# Patient Record
Sex: FEMALE | Race: BLACK OR AFRICAN AMERICAN | NOT HISPANIC OR LATINO | Employment: FULL TIME | URBAN - METROPOLITAN AREA
[De-identification: names, ages, dates, MRNs, and addresses within clinical notes are randomized per-mention and may not be internally consistent; named-entity substitution may affect disease eponyms.]

---

## 2017-01-06 ENCOUNTER — OFFICE VISIT (OUTPATIENT)
Dept: OPHTHALMOLOGY | Facility: CLINIC | Age: 62
End: 2017-01-06
Payer: COMMERCIAL

## 2017-01-06 DIAGNOSIS — H10.812 PINGUECULITIS OF LEFT EYE: Primary | ICD-10-CM

## 2017-01-06 PROCEDURE — 99999 PR PBB SHADOW E&M-EST. PATIENT-LVL I: CPT | Mod: PBBFAC,,, | Performed by: OPTOMETRIST

## 2017-01-06 PROCEDURE — 92012 INTRM OPH EXAM EST PATIENT: CPT | Mod: S$GLB,,, | Performed by: OPTOMETRIST

## 2017-01-06 NOTE — PROGRESS NOTES
HPI     Eye Problem    Additional comments: irritation, tearing, fb sensation OS           Comments   PT c/o irritation, tearing, fb tearing, am crusting, itching in her left   eye for about 1 week. PT has been wearing cls during the day, which helps   with symptoms. Does not sleep with them .  Pain Scale:  1 discomfort  Onset:   1 week  OD, OS, OU:   OS*  Discharge:   tearing  A.M. Matting:  yes  Itch:   yes  Redness:   yes  Photophobia:   no  Foreign body sensation:   yes  Deep pain:   no  Previous occurrence:   no  Drops:   no         Last edited by Yee Jessica MA on 1/6/2017  9:32 AM. (History)            Assessment /Plan     For exam results, see Encounter Report.    Pingueculitis of left eye    pingueculitis OS with papillilary rxn  No fb seen with lid eversion   Pt has not yet started pred or ketorolac OD  Recommended d/c cls wear  Start pred and ketorolac OD as directed per JCC  Also pred qid OS x 1 wk, then bid OS x 1 wk, then d/c OS    Recommended pt schedule 1 mo f/u with JCC per last note  RTC PRN if symptoms worsen or persist x 1 wk OS  Discussed above and all questions were answered.

## 2017-01-23 RX ORDER — METFORMIN HYDROCHLORIDE 500 MG/1
TABLET, EXTENDED RELEASE ORAL
Qty: 90 TABLET | Refills: 1 | Status: SHIPPED | OUTPATIENT
Start: 2017-01-23 | End: 2017-03-31 | Stop reason: SDUPTHER

## 2017-02-17 DIAGNOSIS — E11.9 TYPE 2 DIABETES MELLITUS WITHOUT COMPLICATION: ICD-10-CM

## 2017-02-20 ENCOUNTER — OFFICE VISIT (OUTPATIENT)
Dept: OPHTHALMOLOGY | Facility: CLINIC | Age: 62
End: 2017-02-20
Payer: COMMERCIAL

## 2017-02-20 DIAGNOSIS — Z79.4 TYPE 2 DIABETES MELLITUS WITH BOTH EYES AFFECTED BY MILD NONPROLIFERATIVE RETINOPATHY AND MACULAR EDEMA, WITH LONG-TERM CURRENT USE OF INSULIN: Primary | ICD-10-CM

## 2017-02-20 DIAGNOSIS — E11.3213 TYPE 2 DIABETES MELLITUS WITH BOTH EYES AFFECTED BY MILD NONPROLIFERATIVE RETINOPATHY AND MACULAR EDEMA, WITH LONG-TERM CURRENT USE OF INSULIN: Primary | ICD-10-CM

## 2017-02-20 PROCEDURE — 3045F PR MOST RECENT HEMOGLOBIN A1C LEVEL 7.0-9.0%: CPT | Mod: S$GLB,,, | Performed by: OPHTHALMOLOGY

## 2017-02-20 PROCEDURE — 99213 OFFICE O/P EST LOW 20 MIN: CPT | Mod: 24,S$GLB,, | Performed by: OPHTHALMOLOGY

## 2017-02-20 PROCEDURE — 92134 CPTRZ OPH DX IMG PST SGM RTA: CPT | Mod: S$GLB,,, | Performed by: OPHTHALMOLOGY

## 2017-02-20 PROCEDURE — 99999 PR PBB SHADOW E&M-EST. PATIENT-LVL II: CPT | Mod: PBBFAC,,, | Performed by: OPHTHALMOLOGY

## 2017-02-20 RX ORDER — PREDNISOLONE ACETATE 10 MG/ML
1 SUSPENSION/ DROPS OPHTHALMIC 3 TIMES DAILY
Qty: 5 ML | Refills: 1 | Status: SHIPPED | OUTPATIENT
Start: 2017-02-20 | End: 2017-07-19 | Stop reason: ALTCHOICE

## 2017-02-20 RX ORDER — KETOROLAC TROMETHAMINE 5 MG/ML
1 SOLUTION OPHTHALMIC 3 TIMES DAILY
Qty: 5 ML | Refills: 0 | Status: SHIPPED | OUTPATIENT
Start: 2017-02-20 | End: 2018-02-20

## 2017-02-20 NOTE — MR AVS SNAPSHOT
[facility] - Ophthalmology  [address]  [place] [address]  Phone: [phone]  Fax: [phone]                  Latosha Enriquez   2017 3:00 PM   Office Visit    Description:  Female : 1955   Provider:  LINDSAY Bright MD   Department:  [facility] - Ophthalmology           Reason for Visit     dme           Diagnoses this Visit        Comments    Type 2 diabetes mellitus with both eyes affected by mild nonproliferative retinopathy and macular edema, with long-term current use of insulin    -  Primary            To Do List           Goals (5 Years of Data)     None      Follow-Up and Disposition     Return in about 1 month (around 3/20/2017) for eval for tx.       These Medications        Disp Refills Start End    prednisoLONE acetate (PRED FORTE) 1 % DrpS 5 mL 1 2017     Place 1 drop into both eyes 3 (three) times daily. - Both Eyes    [facility]: [facility] [address] [place] [address] & [facility] Ph #: [phone]       ketorolac 0.5% (ACULAR) 0.5 % Drop 5 mL 0 2017    Place 1 drop into both eyes 3 (three) times daily. - Both Eyes    [facility]: [facility] [address] [place], [place] [address] [facility] & [facility] Ph #: [phone]         [facility] On Call     [facility] On Call Nurse Care Line -  Assistance  Registered nurses in the [facility] Center provide clinical advisement, health education, appointment booking, and other advisory services.  Call for this free service at [phone].             Medications           Message regarding Medications     Verify the changes and/or additions to your medication regime listed below are the same as discussed with your clinician today.  If any of these changes or additions are incorrect, please notify your healthcare provider.        START taking these NEW medications        Refills    prednisoLONE acetate (PRED  "FORTE) 1 % DrpS 1    Sig: Place 1 drop into both eyes 3 (three) times daily.    Class: Normal    Route: Both Eyes    ketorolac 0.5% (ACULAR) 0.5 % Drop 0    Sig: Place 1 drop into both eyes 3 (three) times daily.    Class: Normal    Route: Both Eyes           Verify that the below list of medications is an accurate representation of the medications you are currently taking.  If none reported, the list may be blank. If incorrect, please contact your healthcare provider. Carry this list with you in case of emergency.           Current Medications     albuterol 90 mcg/actuation inhaler Inhale 2 puffs into the lungs every 6 (six) hours as needed.    allopurinol (ZYLOPRIM) 100 MG tablet Take 1 tablet (100 mg total) by mouth once daily.    budesonide 180mcg (PULMICORT FLEXHALER) 180 mcg/actuation AePB Inhale 2 puffs into the lungs 2 (two) times daily.    ergocalciferol (ERGOCALCIFEROL) 50,000 unit Cap Take 50,000 Units by mouth every 7 days.    esomeprazole (NEXIUM) 20 MG capsule Take 1 capsule (20 mg total) by mouth before breakfast.    EYLEA 2 mg/0.05 mL Soln     HUMALOG MIX 75-25 KWIKPEN 100 unit/mL (75-25) InPn INJECT 30 UNITS UNDER THE SKIN EVERY MORNING AND 20 UNITS EVERY EVENING.    hydrocodone-acetaminophen 10-325mg (NORCO)  mg Tab Take by mouth.    ibuprofen (ADVIL,MOTRIN) 800 MG tablet Take 1 tablet (800 mg total) by mouth 3 (three) times daily as needed for Pain.    losartan-hydrochlorothiazide 100-25 mg (HYZAAR) 100-25 mg per tablet Take 1 tablet by mouth once daily.    metformin (GLUCOPHAGE-XR) 500 MG 24 hr tablet TAKE 2 TABLETS BY MOUTH DAILY WITH BREAKFAST AND 1 TABLET WITH DINNER    pen needle, diabetic 31 gauge x 1/4" Ndle 1 Device by Misc.(Non-Drug; Combo Route) route 2 (two) times daily.    potassium chloride (MICRO-K) 10 MEQ CpSR TAKE 1 CAPSULE(10 MEQ) BY MOUTH EVERY DAY    prednisoLONE acetate (PRED FORTE) 1 % DrpS Place 1 drop into the right eye 4 (four) times daily.    simvastatin (ZOCOR) 20 " MG tablet Take 1 tablet (20 mg total) by mouth every evening.    ketorolac 0.5% (ACULAR) 0.5 % Drop Place 1 drop into both eyes 3 (three) times daily.    prednisoLONE acetate (PRED FORTE) 1 % DrpS Place 1 drop into both eyes 3 (three) times daily.           Clinical Reference Information           Allergies as of 2/20/2017     Antihistamines - Alkylamine    Doxycycline    Iodine And Iodide Containing Products      Immunizations Administered on Date of Encounter - 2/20/2017     None      Orders Placed During Today's Visit      Normal Orders This Visit    Posterior Segment OCT Retina-Both eyes       Language Assistance Services     ATTENTION: Language assistance services are available, free of charge. Please call [phone].      ATENCIÓN: Si kelley miranda, tiene a blancas disposición servicios gratuitos de asistencia lingüística. Lldebbie al [phone].     CHÚ Ý: N?u b?n nói Ti?ng Vi?t, có các d?ch v? h? tr? ngôn ng? mi?n phí dành cho b?n. G?i s? [phone].         [facility] - Ophthalmology complies with applicable Federal civil rights laws and does not discriminate on the basis of race, color, national origin, age, disability, or sex.

## 2017-02-20 NOTE — PROGRESS NOTES
===============================  Latosha Enriquez,   61 y.o. female   Last visit [facility]: :12/9/2016   Last visit eye dept. 1/6/2017  [place]:  Corrected distance visual acuity was 20/30 in the right eye and 20/20 in the left eye.   Not recorded         Not recorded        Manifest Refraction     Manifest Refraction      Sphere Cylinder Axis Dist   Right -2.75 +0.50 180 20/40   Left -3.00 Sphere  20/20                 Chief Complaint   Patient presents with    dme     2 months check up/   she has not used her ketorolac only the pred.     Ophthalmic Medications     Ophthalmic - Anti-inflammatory, Glucocorticoids Start End    prednisoLONE acetate (PRED FORTE) 1 % DrpS 12/9/2016     Sig: Place 1 drop into the right eye 4 (four) times daily.    Route: Right Eye         HPI     dme    Additional comments: 2 months check up/   she has not used her ketorolac   only the pred.           Comments   **NOTE** due to DME did not work around lula days    DM  DME OD  EYLEA OD #3 11/8/16       Last edited by Patricia Slater on 2/20/2017  3:09 PM. (History)      Read Studies: y  Gabriel    ________________  2/20/2017  1. Type 2 diabetes mellitus with both eyes affected by mild nonproliferative retinopathy and macular edema, with long-term current use of insulin      Has not been using ketoralac  Is out of PF  Refill pf and start ketoralac tid ou  rtc 1 month, inb eylea od         ===========================

## 2017-03-12 RX ORDER — INSULIN LISPRO 100 [IU]/ML
INJECTION, SUSPENSION SUBCUTANEOUS
Qty: 15 ML | Refills: 3 | Status: SHIPPED | OUTPATIENT
Start: 2017-03-12 | End: 2017-06-05 | Stop reason: SDUPTHER

## 2017-03-31 ENCOUNTER — OFFICE VISIT (OUTPATIENT)
Dept: OPHTHALMOLOGY | Facility: CLINIC | Age: 62
End: 2017-03-31
Payer: COMMERCIAL

## 2017-03-31 DIAGNOSIS — Z79.4 TYPE 2 DIABETES MELLITUS WITH BOTH EYES AFFECTED BY MILD NONPROLIFERATIVE RETINOPATHY AND MACULAR EDEMA, WITH LONG-TERM CURRENT USE OF INSULIN: Primary | ICD-10-CM

## 2017-03-31 DIAGNOSIS — E11.3213 TYPE 2 DIABETES MELLITUS WITH BOTH EYES AFFECTED BY MILD NONPROLIFERATIVE RETINOPATHY AND MACULAR EDEMA, WITH LONG-TERM CURRENT USE OF INSULIN: Primary | ICD-10-CM

## 2017-03-31 DIAGNOSIS — H52.4 MYOPIA WITH PRESBYOPIA, BILATERAL: Primary | ICD-10-CM

## 2017-03-31 DIAGNOSIS — H52.13 MYOPIA WITH PRESBYOPIA, BILATERAL: Primary | ICD-10-CM

## 2017-03-31 PROCEDURE — 92015 DETERMINE REFRACTIVE STATE: CPT | Mod: S$GLB,,, | Performed by: OPTOMETRIST

## 2017-03-31 PROCEDURE — 99999 PR PBB SHADOW E&M-EST. PATIENT-LVL I: CPT | Mod: PBBFAC,,, | Performed by: OPTOMETRIST

## 2017-03-31 PROCEDURE — 92310 CONTACT LENS FITTING OU: CPT | Mod: ,,, | Performed by: OPTOMETRIST

## 2017-03-31 PROCEDURE — 67028 INJECTION EYE DRUG: CPT | Mod: RT,S$GLB,, | Performed by: OPHTHALMOLOGY

## 2017-03-31 PROCEDURE — 92134 CPTRZ OPH DX IMG PST SGM RTA: CPT | Mod: S$GLB,,, | Performed by: OPHTHALMOLOGY

## 2017-03-31 PROCEDURE — 99499 UNLISTED E&M SERVICE: CPT | Mod: S$GLB,,, | Performed by: OPTOMETRIST

## 2017-03-31 PROCEDURE — 92012 INTRM OPH EXAM EST PATIENT: CPT | Mod: 25,S$GLB,, | Performed by: OPHTHALMOLOGY

## 2017-03-31 PROCEDURE — 99999 PR PBB SHADOW E&M-EST. PATIENT-LVL II: CPT | Mod: PBBFAC,,, | Performed by: OPHTHALMOLOGY

## 2017-03-31 RX ORDER — CIPROFLOXACIN HYDROCHLORIDE 3 MG/ML
1 SOLUTION/ DROPS OPHTHALMIC 4 TIMES DAILY
Qty: 5 ML | Refills: 0 | Status: SHIPPED | OUTPATIENT
Start: 2017-03-31 | End: 2017-04-04

## 2017-03-31 RX ORDER — METFORMIN HYDROCHLORIDE 500 MG/1
TABLET, EXTENDED RELEASE ORAL
Qty: 90 TABLET | Refills: 3 | Status: SHIPPED | OUTPATIENT
Start: 2017-03-31 | End: 2017-06-05 | Stop reason: SDUPTHER

## 2017-03-31 NOTE — PROGRESS NOTES
HPI     Contact Lens Fit    Additional comments: previous wear           Comments   Pt as last seen 2/20/17 by jcc, last seen by dnl 1/6/17. Here today for   contact lens fitting. Currently wears contact lenses, Air Optix; happy   with brand. Uses otc readers over contacts, +1.25-+1.75 Going to see jcc   after visit today. States slight change in va since last updated cls a few   years ago.        Last edited by Katherine Lea on 3/31/2017 11:22 AM. (History)            Assessment /Plan     For exam results, see Encounter Report.    Myopia with presbyopia, bilateral      Eyeglass Final Rx     Eyeglass Final Rx      Sphere Cylinder Axis Add   Right -2.75 +0.50 180 +2.50   Left -3.00 Sphere  +2.50       Expiration Date:  02/20/2018              Contact Lens Prescription (3/31/2017)       Brand Base Curve Diameter Sphere Cylinder   Right Air Optix Aqua 8.6 14.2 -2.50 Sphere   Left Air Optix Aqua 8.6 14.2 -3.00 Sphere       Expiration Date:  4/1/2018    Replacement:  Monthly    Solutions:  OptiFree Express    Wearing Schedule:  Daily wear        Dispensed trial contact lenses today. Patient is to wear lenses for 1 week. Ok to order supply if no problems. RTC PRN if any problems arise.  Otherwise f/u 1 yr for CL check up, as scheduled with [facility]  Discussed above and all questions were answered.

## 2017-03-31 NOTE — MR AVS SNAPSHOT
[facility]  [address] [place] [place] [address]  Phone: [phone]  Fax: [phone]                  Latosha Enriquez   3/31/2017 11:30 AM   Office Visit    Description:  Female : 1955   Provider:  LINDSAY Bright MD   Department:  [facility] - Ophthalmology           Reason for Visit     PDR/ME           Diagnoses this Visit        Comments    Type 2 diabetes mellitus with both eyes affected by mild nonproliferative retinopathy and macular edema, with long-term current use of insulin    -  Primary            To Do List           Goals (5 Years of Data)     None      Follow-Up and Disposition     Return in about 1 month (around 2017).       These Medications        Disp Refills Start End    ciprofloxacin HCl (CILOXAN) 0.3 % ophthalmic solution 5 mL 0 3/31/2017 2017    Place 1 drop into the right eye 4 (four) times daily. - Right Eye    [facility] [address] [place] [place] [address] [facility] Ph #: [phone]         [facility] On Call     [facility] On [facility] Nurse Care Line -  Assistance  Unless otherwise directed by your provider, please contact [facility], our nurse care line that is available for  assistance.     Registered nurses in the [facility] provide: appointment scheduling, clinical advisement, health education, and other advisory services.  Call: [phone] (toll free)               Medications           Message regarding Medications     Verify the changes and/or additions to your medication regime listed below are the same as discussed with your clinician today.  If any of these changes or additions are incorrect, please notify your healthcare provider.        START taking these NEW medications        Refills    ciprofloxacin HCl (CILOXAN) 0.3 % ophthalmic solution 0    Sig: Place 1 drop into the right eye 4 (four) times daily.    Class: Normal    Route: Right Eye     "  These medications were administered today        Dose Freq    aflibercept Soln 2 mg 2 mg Clinic/HOD 1 time    Si.05 mLs (2 mg total) by Intravitreal route one time.    Class: Normal    Route: Intravitreal           Verify that the below list of medications is an accurate representation of the medications you are currently taking.  If none reported, the list may be blank. If incorrect, please contact your healthcare provider. Carry this list with you in case of emergency.           Current Medications     albuterol 90 mcg/actuation inhaler Inhale 2 puffs into the lungs every 6 (six) hours as needed.    allopurinol (ZYLOPRIM) 100 MG tablet Take 1 tablet (100 mg total) by mouth once daily.    budesonide 180mcg (PULMICORT FLEXHALER) 180 mcg/actuation AePB Inhale 2 puffs into the lungs 2 (two) times daily.    ergocalciferol (ERGOCALCIFEROL) 50,000 unit Cap Take 50,000 Units by mouth every 7 days.    esomeprazole (NEXIUM) 20 MG capsule Take 1 capsule (20 mg total) by mouth before breakfast.    EYLEA 2 mg/0.05 mL Soln     HUMALOG MIX 75-25 KWIKPEN 100 unit/mL (75-25) InPn INJECT 30 UNITS UNDER THE SKIN IN THE MORNING AND 20 UNITS IN THE EVENING    hydrocodone-acetaminophen 10-325mg (NORCO)  mg Tab Take by mouth.    ibuprofen (ADVIL,MOTRIN) 800 MG tablet Take 1 tablet (800 mg total) by mouth 3 (three) times daily as needed for Pain.    ketorolac 0.5% (ACULAR) 0.5 % Drop Place 1 drop into both eyes 3 (three) times daily.    losartan-hydrochlorothiazide 100-25 mg (HYZAAR) 100-25 mg per tablet Take 1 tablet by mouth once daily.    metformin (GLUCOPHAGE-XR) 500 MG 24 hr tablet TAKE 2 TABLETS BY MOUTH DAILY WITH BREAKFAST AND 1 TABLET WITH DINNER    pen needle, diabetic 31 gauge x 1/4" Ndle 1 Device by Misc.(Non-Drug; Combo Route) route 2 (two) times daily.    potassium chloride (MICRO-K) 10 MEQ CpSR TAKE 1 CAPSULE(10 MEQ) BY MOUTH EVERY DAY    prednisoLONE acetate (PRED FORTE) 1 % DrpS Place 1 drop into the right " eye 4 (four) times daily.    prednisoLONE acetate (PRED FORTE) 1 % DrpS Place 1 drop into both eyes 3 (three) times daily.    simvastatin (ZOCOR) 20 MG tablet Take 1 tablet (20 mg total) by mouth every evening.    ciprofloxacin HCl (CILOXAN) 0.3 % ophthalmic solution Place 1 drop into the right eye 4 (four) times daily.           Clinical Reference Information           Allergies as of 3/31/2017     Antihistamines - Alkylamine    Doxycycline    Iodine And Iodide Containing Products      Immunizations Administered on Date of Encounter - 3/31/2017     None      Orders Placed During Today's Visit      Normal Orders This Visit    Posterior Segment OCT Retina-Both eyes     Prior Authorization Order       Administrations This Visit     aflibercept Soln 2 mg     Admin Date Action Dose Route Administered By             03/31/2017 Given 2 mg Intravitreal LINDSAY Bright MD                      Language Assistance Services     ATTENTION: Language assistance services are available, free of charge. Please call [phone].      ATENCIÓN: Si kelley miranda, tiene a blancas disposición servicios gratuitos de asistencia lingüística. Llame al [phone].     CHÚ Ý: N?u b?n nói Ti?ng Vi?t, có các d?ch v? h? tr? ngôn ng? mi?n phí dành cho b?n. G?i s? [phone].         [facility] - Ophthalmology complies with applicable Federal civil rights laws and does not discriminate on the basis of race, color, national origin, age, disability, or sex.

## 2017-03-31 NOTE — PROGRESS NOTES
===============================  Latosha Enriquez,   61 y.o. female   Last visit [facility]: :2/20/2017   Last visit eye dept. 3/31/2017  [place]:  Corrected distance visual acuity was 20/40 in the right eye and 20/20 in the left eye.  Tonometry     Tonometry (Applanation, 11:55 AM)      Right Left   Pressure 18 17                  Not recorded         Not recorded        Chief Complaint   Patient presents with    PDR/ME     1 MONTH CHECK/ DROPS/  IF NO BETTER EYLEA OD     Ophthalmic Medications     Ophthalmic - Anti-inflammatory, Glucocorticoids Start End    prednisoLONE acetate (PRED FORTE) 1 % DrpS 12/9/2016     Sig: Place 1 drop into the right eye 4 (four) times daily.    Route: Right Eye    prednisoLONE acetate (PRED FORTE) 1 % DrpS 2/20/2017     Sig: Place 1 drop into both eyes 3 (three) times daily.    Route: Both Eyes    Ophthalmic - Anti-inflammatory, NSAIDs Start End    ketorolac 0.5% (ACULAR) 0.5 % Drop 2/20/2017 2/20/2018    Sig: Place 1 drop into both eyes 3 (three) times daily.    Route: Both Eyes         HPI     PDR/ME    Additional comments: 1 MONTH CHECK/ DROPS/  IF NO BETTER EYLEA OD           Comments   As of 3/22/17 1 Eylea in fridge    DM  DME OD  EYLEA OD #3 11/8/16    pred forte  ketoralac       Last edited by Patricia Slater on 3/31/2017 11:51 AM. (History)      Read Studies: y  Vitalsy    ________________  3/31/2017  Problem List Items Addressed This Visit        Eye/Vision problems    Type 2 diabetes mellitus with both eyes affected by mild nonproliferative retinopathy and macular edema, with long-term current use of insulin - Primary    Relevant Medications    aflibercept Soln 2 mg (Completed)    ciprofloxacin HCl (CILOXAN) 0.3 % ophthalmic solution    Other Relevant Orders    Prior Authorization Order    Posterior Segment OCT Retina-Both eyes (Completed)        Worse DME OD today  Recommend resume treatment  Eylea OD today.    3/31/2017  Diagnosis :  od dme  Today:   Eylea (afibercept) 2 mg/0.05  ml Intravitreal Injection , OD   Follow up: rtc 1  Mo eyle #2        Call 24/7 for any worsening of vision. Check  OU QD. Gave my home phone number.      Procedure  Note:   OD}  Eylea (afibercept) 2 mg/0.05 ml Intravitreal Injection    I have explained the Risks, Benefits and Alternatives of the procedure in detail.  The patient voices understanding and all questions have been answered.  The patient agrees to proceed as discussed.  LIDOCAINE 2%  subconj bleb  was used for anesthesia.  Topical vigamox was used for antisepsis.  0.05 cc was  injected 3.7 mm from corneal limbus in the inferotemporal quadrant.  Following injection the IOP was less than thirty (<30) by tonopen.  The eye was then thoroughly irrigated with BSS.  Patient tolerated procedure well.  No complications were observed.  The Patient was educated that mild irritation tonight was normal secondary to topical antispsis use.  Pt was advised to call at any time day or night for pain, redness, or any decline in vision. I gave the patient my home number as well as the clinic on call number. Daily visual checks and Amsler grid testing were reviewed.  ciloxan Antibiotic Drops to be used 4 times daily for 4 days  LINDSAY Bright MD  Procedure ordered: y  Consent: y  Pre auth: y  MAR:y  Opnote: y  Charge capture:y  Sided procedure note: y         ===========================

## 2017-04-06 ENCOUNTER — OFFICE VISIT (OUTPATIENT)
Dept: OPHTHALMOLOGY | Facility: CLINIC | Age: 62
End: 2017-04-06
Payer: COMMERCIAL

## 2017-04-06 DIAGNOSIS — E11.3213 TYPE 2 DIABETES MELLITUS WITH BOTH EYES AFFECTED BY MILD NONPROLIFERATIVE RETINOPATHY AND MACULAR EDEMA, WITH LONG-TERM CURRENT USE OF INSULIN: ICD-10-CM

## 2017-04-06 DIAGNOSIS — H11.31 SUBCONJUNCTIVAL HEMORRHAGE, RIGHT: Primary | ICD-10-CM

## 2017-04-06 DIAGNOSIS — Z79.4 TYPE 2 DIABETES MELLITUS WITH BOTH EYES AFFECTED BY MILD NONPROLIFERATIVE RETINOPATHY AND MACULAR EDEMA, WITH LONG-TERM CURRENT USE OF INSULIN: ICD-10-CM

## 2017-04-06 PROCEDURE — 99212 OFFICE O/P EST SF 10 MIN: CPT | Mod: S$GLB,,, | Performed by: OPHTHALMOLOGY

## 2017-04-06 PROCEDURE — 99999 PR PBB SHADOW E&M-EST. PATIENT-LVL II: CPT | Mod: PBBFAC,,, | Performed by: OPHTHALMOLOGY

## 2017-04-06 PROCEDURE — 1160F RVW MEDS BY RX/DR IN RCRD: CPT | Mod: S$GLB,,, | Performed by: OPHTHALMOLOGY

## 2017-04-06 NOTE — MR AVS SNAPSHOT
[facility]  [address] [place] [address]  Phone: [phone]  Fax: [phone]                  Latosha Enriquez   2017 1:45 PM   Office Visit    Description:  Female : 1955   Provider:  LINDSAY Bright MD   Department:  [facility] - Ophthalmology           Reason for Visit     RED EYE           Diagnoses this Visit        Comments    Subconjunctival hemorrhage, right    -  Primary            To Do List           Future Appointments        Provider Department Dept Phone    2017 3:00 PM LINDSAY Bright MD [facility] Ophthalmology [phone]      Goals (5 Years of Data)     None      Follow-Up and Disposition     Return in about 3 weeks (around 2017).      [facility] On Call     [facility] On Call Nurse Care Line -  Assistance  Unless otherwise directed by your provider, please contact [facility], our nurse care line that is available for  assistance.     Registered nurses in the [facility] On Call Center provide: appointment scheduling, clinical advisement, health education, and other advisory services.  Call: [phone] (toll free)               Medications           Message regarding Medications     Verify the changes and/or additions to your medication regime listed below are the same as discussed with your clinician today.  If any of these changes or additions are incorrect, please notify your healthcare provider.             Verify that the below list of medications is an accurate representation of the medications you are currently taking.  If none reported, the list may be blank. If incorrect, please contact your healthcare provider. Carry this list with you in case of emergency.           Current Medications     albuterol 90 mcg/actuation inhaler Inhale 2 puffs into the lungs every 6 (six) hours as needed.    allopurinol (ZYLOPRIM) 100 MG tablet Take 1 tablet (100 mg total) by mouth once daily.    budesonide 180mcg (PULMICORT FLEXHALER) 180 mcg/actuation AePB  "Inhale 2 puffs into the lungs 2 (two) times daily.    ergocalciferol (ERGOCALCIFEROL) 50,000 unit Cap Take 50,000 Units by mouth every 7 days.    esomeprazole (NEXIUM) 20 MG capsule Take 1 capsule (20 mg total) by mouth before breakfast.    EYLEA 2 mg/0.05 mL Soln     HUMALOG MIX 75-25 KWIKPEN 100 unit/mL (75-25) InPn INJECT 30 UNITS UNDER THE SKIN IN THE MORNING AND 20 UNITS IN THE EVENING    hydrocodone-acetaminophen 10-325mg (NORCO)  mg Tab Take by mouth.    ibuprofen (ADVIL,MOTRIN) 800 MG tablet Take 1 tablet (800 mg total) by mouth 3 (three) times daily as needed for Pain.    ketorolac 0.5% (ACULAR) 0.5 % Drop Place 1 drop into both eyes 3 (three) times daily.    losartan-hydrochlorothiazide 100-25 mg (HYZAAR) 100-25 mg per tablet Take 1 tablet by mouth once daily.    metformin (GLUCOPHAGE-XR) 500 MG 24 hr tablet TAKE 2 TABLETS BY MOUTH DAILY WITH BREAKFAST AND 1 TABLET WITH DINNER    pen needle, diabetic 31 gauge x 1/4" Ndle 1 Device by Misc.(Non-Drug; Combo Route) route 2 (two) times daily.    potassium chloride (MICRO-K) 10 MEQ CpSR TAKE 1 CAPSULE(10 MEQ) BY MOUTH EVERY DAY    prednisoLONE acetate (PRED FORTE) 1 % DrpS Place 1 drop into the right eye 4 (four) times daily.    prednisoLONE acetate (PRED FORTE) 1 % DrpS Place 1 drop into both eyes 3 (three) times daily.    simvastatin (ZOCOR) 20 MG tablet Take 1 tablet (20 mg total) by mouth every evening.           Clinical Reference Information           Allergies as of 4/6/2017     Antihistamines - Alkylamine    Doxycycline    Iodine And Iodide Containing Products      Immunizations Administered on Date of Encounter - 4/6/2017     None      Language Assistance Services     ATTENTION: Language assistance services are available, free of charge. Please call [phone].      ATENCIÓN: Si kelley domingorebeka, tiene a blancas disposición servicios gratuitos de asistencia lingüística. Llame al [phone].     STEVIE Ý: N?u b?n nói Ti?ng Vi?t, có các d?ch v? h? tr? " jose hoewll? mi?n phí dành cho b?n. G?i s? [phone].         [facility] - Ophthalmology complies with applicable Federal civil rights laws and does not discriminate on the basis of race, color, national origin, age, disability, or sex.

## 2017-04-06 NOTE — PROGRESS NOTES
===============================  Latosha Enriquez,   61 y.o. female   Last visit [facility]: :3/31/2017   Last visit eye dept. 3/31/2017  [facility]:  Corrected distance visual acuity was 20/30 in the right eye and 20/20 in the left eye.  Tonometry     Tonometry      Right Left   Pressure 17                   Not recorded         Not recorded        Chief Complaint   Patient presents with    RED EYE     pt states that her eye is still red since her injection     Ophthalmic Medications     Ophthalmic - Anti-inflammatory, Glucocorticoids Start End    prednisoLONE acetate (PRED FORTE) 1 % DrpS 12/9/2016     Sig: Place 1 drop into the right eye 4 (four) times daily.    Route: Right Eye    prednisoLONE acetate (PRED FORTE) 1 % DrpS 2/20/2017     Sig: Place 1 drop into both eyes 3 (three) times daily.    Route: Both Eyes    Ophthalmic - Anti-inflammatory, NSAIDs Start End    ketorolac 0.5% (ACULAR) 0.5 % Drop 2/20/2017 2/20/2018    Sig: Place 1 drop into both eyes 3 (three) times daily.    Route: Both Eyes         HPI     RED EYE    Additional comments: pt states that her eye is still red since her   injection           Comments   As of 3/22/17 1 Eylea in fridge    DM  DME OD  EYLEA OD #4 3/31/17       Last edited by Patricia Slater on 4/6/2017  2:15 PM. (History)      Read Studies:   Vitals    ________________  4/6/2017  Problem List Items Addressed This Visit     None      Visit Diagnoses     Subconjunctival hemorrhage, right    -  Primary        MARY LOU, normal post injection  Expect resolution over couple weeks  rtc as scheduled for next injection.       ===========================

## 2017-04-19 ENCOUNTER — OFFICE VISIT (OUTPATIENT)
Dept: FAMILY MEDICINE | Facility: CLINIC | Age: 62
End: 2017-04-19
Payer: COMMERCIAL

## 2017-04-19 VITALS
SYSTOLIC BLOOD PRESSURE: 140 MMHG | BODY MASS INDEX: 40.25 KG/M2 | RESPIRATION RATE: 18 BRPM | HEIGHT: 66 IN | HEART RATE: 119 BPM | DIASTOLIC BLOOD PRESSURE: 80 MMHG | TEMPERATURE: 98 F | WEIGHT: 250.44 LBS | OXYGEN SATURATION: 98 %

## 2017-04-19 DIAGNOSIS — J30.9 ALLERGIC RHINITIS, UNSPECIFIED: Primary | ICD-10-CM

## 2017-04-19 DIAGNOSIS — R06.2 WHEEZING SYMPTOM: ICD-10-CM

## 2017-04-19 PROCEDURE — 3077F SYST BP >= 140 MM HG: CPT | Mod: S$GLB,,, | Performed by: REGISTERED NURSE

## 2017-04-19 PROCEDURE — 1160F RVW MEDS BY RX/DR IN RCRD: CPT | Mod: S$GLB,,, | Performed by: REGISTERED NURSE

## 2017-04-19 PROCEDURE — 99999 PR PBB SHADOW E&M-EST. PATIENT-LVL IV: CPT | Mod: PBBFAC,,, | Performed by: REGISTERED NURSE

## 2017-04-19 PROCEDURE — 3079F DIAST BP 80-89 MM HG: CPT | Mod: S$GLB,,, | Performed by: REGISTERED NURSE

## 2017-04-19 PROCEDURE — 99213 OFFICE O/P EST LOW 20 MIN: CPT | Mod: 25,S$GLB,, | Performed by: REGISTERED NURSE

## 2017-04-19 PROCEDURE — 96372 THER/PROPH/DIAG INJ SC/IM: CPT | Mod: S$GLB,,, | Performed by: REGISTERED NURSE

## 2017-04-19 RX ORDER — PROMETHAZINE HYDROCHLORIDE AND CODEINE PHOSPHATE 6.25; 1 MG/5ML; MG/5ML
5 SOLUTION ORAL
Qty: 180 ML | Refills: 0 | Status: SHIPPED | OUTPATIENT
Start: 2017-04-19 | End: 2017-06-09 | Stop reason: SDUPTHER

## 2017-04-19 RX ORDER — ALBUTEROL SULFATE 90 UG/1
2 AEROSOL, METERED RESPIRATORY (INHALATION) EVERY 6 HOURS PRN
Qty: 18 G | Refills: 3 | Status: SHIPPED | OUTPATIENT
Start: 2017-04-19 | End: 2019-03-05 | Stop reason: SDUPTHER

## 2017-04-19 RX ORDER — BETAMETHASONE SODIUM PHOSPHATE AND BETAMETHASONE ACETATE 3; 3 MG/ML; MG/ML
12 INJECTION, SUSPENSION INTRA-ARTICULAR; INTRALESIONAL; INTRAMUSCULAR; SOFT TISSUE
Status: COMPLETED | OUTPATIENT
Start: 2017-04-19 | End: 2017-04-19

## 2017-04-19 RX ADMIN — BETAMETHASONE SODIUM PHOSPHATE AND BETAMETHASONE ACETATE 12 MG: 3; 3 INJECTION, SUSPENSION INTRA-ARTICULAR; INTRALESIONAL; INTRAMUSCULAR; SOFT TISSUE at 09:04

## 2017-04-19 NOTE — MR AVS SNAPSHOT
[facility]  [address] [facility] [address]  Phone: [phone]                  Latosha Enriquez   2017 9:00 AM   Office Visit    Description:  Female : 1955   Provider:  Lior Beckman NP   Department:  [facility]           Reason for Visit     Sinus Problem           Diagnoses this Visit        Comments    Allergic rhinitis, unspecified    -  Primary     Wheezing symptom                To Do List           Future Appointments        Provider Department Dept Phone    2017 3:00 PM LINDSAY Bright MD [facility] - Ophthalmology [phone]      Goals (5 Years of Data)     None       These Medications        Disp Refills Start End    albuterol 90 mcg/actuation inhaler 18 g 3 2017     Inhale 2 puffs into the lungs every 6 (six) hours as needed. - Inhalation    Pharmacy: [facility] [address] [facility] [address] [facility] Ph #: [phone]       promethazine-codeine 6.25-10 mg/5 ml (PHENERGAN WITH CODEINE) 6.25-10 mg/5 mL syrup 180 mL 0 2017     Take 5 mLs by mouth every 4 to 6 hours as needed for Cough. - Oral    [facility] [address] [facility] [address] [facility] Ph #: [phone]         [facility]     [facility] Nurse Care Line -  Assistance  Unless otherwise directed by your provider, please contact [facility], our nurse care line that is available for 24/ assistance.     Registered nurses in the [facility] provide: appointment scheduling, clinical advisement, health education, and other advisory services.  Call: [phone] (toll free)               Medications           Message regarding Medications     Verify the changes and/or additions to your medication regime listed below are the same as discussed with your clinician today.  If any of these  changes or additions are incorrect, please notify your healthcare provider.        START taking these NEW medications        Refills    promethazine-codeine 6.25-10 mg/5 ml (PHENERGAN WITH CODEINE) 6.25-10 mg/5 mL syrup 0    Sig: Take 5 mLs by mouth every 4 to 6 hours as needed for Cough.    Class: Normal    Route: Oral      These medications were administered today        Dose Freq    betamethasone acetate-betamethasone sodium phosphate injection 12 mg 12 mg Clinic/HOD 1 time    Sig: Inject 2 mLs (12 mg total) into the muscle one time.    Class: Normal    Route: Intramuscular           Verify that the below list of medications is an accurate representation of the medications you are currently taking.  If none reported, the list may be blank. If incorrect, please contact your healthcare provider. Carry this list with you in case of emergency.           Current Medications     albuterol 90 mcg/actuation inhaler Inhale 2 puffs into the lungs every 6 (six) hours as needed.    allopurinol (ZYLOPRIM) 100 MG tablet Take 1 tablet (100 mg total) by mouth once daily.    budesonide 180mcg (PULMICORT FLEXHALER) 180 mcg/actuation AePB Inhale 2 puffs into the lungs 2 (two) times daily.    ergocalciferol (ERGOCALCIFEROL) 50,000 unit Cap Take 50,000 Units by mouth every 7 days.    esomeprazole (NEXIUM) 20 MG capsule Take 1 capsule (20 mg total) by mouth before breakfast.    EYLEA 2 mg/0.05 mL Soln     HUMALOG MIX 75-25 KWIKPEN 100 unit/mL (75-25) InPn INJECT 30 UNITS UNDER THE SKIN IN THE MORNING AND 20 UNITS IN THE EVENING    hydrocodone-acetaminophen 10-325mg (NORCO)  mg Tab Take by mouth.    ibuprofen (ADVIL,MOTRIN) 800 MG tablet Take 1 tablet (800 mg total) by mouth 3 (three) times daily as needed for Pain.    ketorolac 0.5% (ACULAR) 0.5 % Drop Place 1 drop into both eyes 3 (three) times daily.    losartan-hydrochlorothiazide 100-25 mg (HYZAAR) 100-25 mg per tablet Take 1 tablet by mouth once daily.    metformin  "(GLUCOPHAGE-XR) 500 MG 24 hr tablet TAKE 2 TABLETS BY MOUTH DAILY WITH BREAKFAST AND 1 TABLET WITH DINNER    pen needle, diabetic 31 gauge x 1/4" Ndle 1 Device by Misc.(Non-Drug; Combo Route) route 2 (two) times daily.    potassium chloride (MICRO-K) 10 MEQ CpSR TAKE 1 CAPSULE(10 MEQ) BY MOUTH EVERY DAY    prednisoLONE acetate (PRED FORTE) 1 % DrpS Place 1 drop into the right eye 4 (four) times daily.    prednisoLONE acetate (PRED FORTE) 1 % DrpS Place 1 drop into both eyes 3 (three) times daily.    simvastatin (ZOCOR) 20 MG tablet Take 1 tablet (20 mg total) by mouth every evening.    promethazine-codeine 6.25-10 mg/5 ml (PHENERGAN WITH CODEINE) 6.25-10 mg/5 mL syrup Take 5 mLs by mouth every 4 to 6 hours as needed for Cough.           Clinical Reference Information           Your Vitals Were     BP Pulse Temp Resp Height Weight    140/80 119 98.1 °F (36.7 °C) (Tympanic) 18 5' 5.5" (1.664 m) 113.6 kg (250 lb 7.1 oz)    SpO2 BMI             98% 41.04 kg/m2         Blood Pressure          Most Recent Value    BP  (!)  140/80      Allergies as of 4/19/2017     Antihistamines - Alkylamine    Doxycycline    Iodine And Iodide Containing Products      Immunizations Administered on Date of Encounter - 4/19/2017     None      Language Assistance Services     ATTENTION: Language assistance services are available, free of charge. Please call [phone].      ATENCIÓN: Si habla ruth, tiene a blancas disposición servicios gratuitos de asistencia lingüística. Llame al [phone].     STEVIE Ý: N?u b?n nói Ti?ng Vi?t, có các d?ch v? h? tr? ngôn ng? mi?n phí dành cho b?n. G?i s? [phone].         [facility] complies with applicable Federal civil rights laws and does not discriminate on the basis of race, color, national origin, age, disability, or sex.        "

## 2017-04-19 NOTE — PROGRESS NOTES
"Subjective:       Patient ID: Latosha Enriquez is a 61 y.o. female.    Chief Complaint: Sinus Problem      HPI    Mrs. Enriquez is here today with c/o sinus/allergy issues x 3 days.  Taking Tylenol with saline nasal rinses.  Reports RN, NC, PND, sneezing, itchy watery eyes.  Does c/o cough (productive, white) with occ wheezing at times.  Does c/o ear pain and pressure, HA, sweats and chills.  She reports being "allergic to antihistamines" and not able to take anything orally; however, she requests refill on Phenergan-Codeine for cough.  This does not cause any problems although contains antihistamine.  Reports antihistamines causes increased throat itching and irritation.    Review of Systems   Constitutional: Positive for chills, diaphoresis and fatigue.   HENT: Positive for congestion, ear pain, postnasal drip, rhinorrhea and sneezing. Negative for nosebleeds, sinus pressure and sore throat.    Eyes: Positive for discharge and itching. Negative for photophobia, pain, redness and visual disturbance.   Respiratory: Positive for cough and wheezing. Negative for shortness of breath.    Cardiovascular: Negative.    Gastrointestinal: Negative for abdominal pain, nausea and vomiting.   Allergic/Immunologic: Positive for environmental allergies.   Neurological: Positive for light-headedness and headaches. Negative for weakness and numbness.   Hematological: Negative for adenopathy.         Patient Active Problem List   Diagnosis    Hemorrhoids, internal    Diabetes mellitus type II, uncontrolled    Essential hypertension    Morbid obesity with BMI of 40.0-44.9, adult    Vitamin D deficiency disease    Primary osteoarthritis of both knees    GERD (gastroesophageal reflux disease)    Hyperlipidemia    Type 2 diabetes mellitus with both eyes affected by mild nonproliferative retinopathy and macular edema, with long-term current use of insulin         Objective:     Vitals:    04/19/17 0920   BP: (!) 140/80   Pulse: (!) 119 " "  Resp: 18   Temp: 98.1 °F (36.7 °C)   TempSrc: Tympanic   SpO2: 98%   Weight: 113.6 kg (250 lb 7.1 oz)   Height: 5' 5.5" (1.664 m)       Physical Exam   Constitutional: She is oriented to person, place, and time. She appears well-developed and well-nourished.   HENT:   Head: Normocephalic and atraumatic.   Right Ear: Tympanic membrane is bulging. Tympanic membrane is not injected, not perforated, not erythematous and not retracted.   Left Ear: Tympanic membrane is bulging. Tympanic membrane is not injected, not perforated, not erythematous and not retracted.   Nose: Mucosal edema and rhinorrhea (boggy, clear RN) present. No sinus tenderness. No epistaxis. Right sinus exhibits no maxillary sinus tenderness and no frontal sinus tenderness. Left sinus exhibits no maxillary sinus tenderness and no frontal sinus tenderness.   Mouth/Throat: Mucous membranes are normal. No oropharyngeal exudate, posterior oropharyngeal edema or posterior oropharyngeal erythema (clear PND noted).   Eyes: Pupils are equal, round, and reactive to light. Right eye exhibits discharge (increased amount of B clear watery d/c, no eye redness noted). Left eye exhibits discharge.   Neck: Normal range of motion. Neck supple.   Cardiovascular: Regular rhythm and normal heart sounds.  Tachycardia present.    Pulmonary/Chest: Effort normal and breath sounds normal. No respiratory distress. She has no wheezes. She exhibits no tenderness.   Lymphadenopathy:     She has no cervical adenopathy.   Neurological: She is alert and oriented to person, place, and time.   Vitals reviewed.        Medication List with Changes/Refills   New Medications    PROMETHAZINE-CODEINE 6.25-10 MG/5 ML (PHENERGAN WITH CODEINE) 6.25-10 MG/5 ML SYRUP    Take 5 mLs by mouth every 4 to 6 hours as needed for Cough.   Current Medications    ALLOPURINOL (ZYLOPRIM) 100 MG TABLET    Take 1 tablet (100 mg total) by mouth once daily.    BUDESONIDE 180MCG (PULMICORT FLEXHALER) 180 " "MCG/ACTUATION AEPB    Inhale 2 puffs into the lungs 2 (two) times daily.    ERGOCALCIFEROL (ERGOCALCIFEROL) 50,000 UNIT CAP    Take 50,000 Units by mouth every 7 days.    ESOMEPRAZOLE (NEXIUM) 20 MG CAPSULE    Take 1 capsule (20 mg total) by mouth before breakfast.    EYLEA 2 MG/0.05 ML SOLN        HUMALOG MIX 75-25 KWIKPEN 100 UNIT/ML (75-25) INPN    INJECT 30 UNITS UNDER THE SKIN IN THE MORNING AND 20 UNITS IN THE EVENING    HYDROCODONE-ACETAMINOPHEN 10-325MG (NORCO)  MG TAB    Take by mouth.    IBUPROFEN (ADVIL,MOTRIN) 800 MG TABLET    Take 1 tablet (800 mg total) by mouth 3 (three) times daily as needed for Pain.    KETOROLAC 0.5% (ACULAR) 0.5 % DROP    Place 1 drop into both eyes 3 (three) times daily.    LOSARTAN-HYDROCHLOROTHIAZIDE 100-25 MG (HYZAAR) 100-25 MG PER TABLET    Take 1 tablet by mouth once daily.    METFORMIN (GLUCOPHAGE-XR) 500 MG 24 HR TABLET    TAKE 2 TABLETS BY MOUTH DAILY WITH BREAKFAST AND 1 TABLET WITH DINNER    PEN NEEDLE, DIABETIC 31 GAUGE X 1/4" NDLE    1 Device by Misc.(Non-Drug; Combo Route) route 2 (two) times daily.    POTASSIUM CHLORIDE (MICRO-K) 10 MEQ CPSR    TAKE 1 CAPSULE(10 MEQ) BY MOUTH EVERY DAY    PREDNISOLONE ACETATE (PRED FORTE) 1 % DRPS    Place 1 drop into the right eye 4 (four) times daily.    PREDNISOLONE ACETATE (PRED FORTE) 1 % DRPS    Place 1 drop into both eyes 3 (three) times daily.    SIMVASTATIN (ZOCOR) 20 MG TABLET    Take 1 tablet (20 mg total) by mouth every evening.   Changed and/or Refilled Medications    Modified Medication Previous Medication    ALBUTEROL 90 MCG/ACTUATION INHALER albuterol 90 mcg/actuation inhaler       Inhale 2 puffs into the lungs every 6 (six) hours as needed.    Inhale 2 puffs into the lungs every 6 (six) hours as needed.           Diagnosis       1. Allergic rhinitis, unspecified    2. Wheezing symptom          Assessment/ Plan     Allergic rhinitis, unspecified  -     betamethasone acetate-betamethasone sodium phosphate " injection 12 mg; Inject 2 mLs (12 mg total) into the muscle one time.  -     promethazine-codeine 6.25-10 mg/5 ml (PHENERGAN WITH CODEINE) 6.25-10 mg/5 mL syrup; Take 5 mLs by mouth every 4 to 6 hours as needed for Cough.  Dispense: 180 mL; Refill: 0    Wheezing symptom  -     betamethasone acetate-betamethasone sodium phosphate injection 12 mg; Inject 2 mLs (12 mg total) into the muscle one time.  -     albuterol 90 mcg/actuation inhaler; Inhale 2 puffs into the lungs every 6 (six) hours as needed.  Dispense: 18 g; Refill: 3  -     promethazine-codeine 6.25-10 mg/5 ml (PHENERGAN WITH CODEINE) 6.25-10 mg/5 mL syrup; Take 5 mLs by mouth every 4 to 6 hours as needed for Cough.  Dispense: 180 mL; Refill: 0        Inhaler refilled for subjective c/o wheezing.  Continue nasal sprays, washes, rinses, etc.  Symptomatic care, rest, fluids, hydration.  Follow-up in clinic as needed.      LUIS Erickson  [facility]

## 2017-04-27 ENCOUNTER — TELEPHONE (OUTPATIENT)
Dept: PHARMACY | Facility: CLINIC | Age: 62
End: 2017-04-27

## 2017-04-27 RX ORDER — AFLIBERCEPT 40 MG/ML
2 INJECTION, SOLUTION INTRAVITREAL
Qty: 8 VIAL | Refills: 3 | Status: SHIPPED | OUTPATIENT
Start: 2017-04-27 | End: 2017-05-02 | Stop reason: SDUPTHER

## 2017-05-01 ENCOUNTER — TELEPHONE (OUTPATIENT)
Dept: FAMILY MEDICINE | Facility: CLINIC | Age: 62
End: 2017-05-01

## 2017-05-01 RX ORDER — TRAMADOL HYDROCHLORIDE 50 MG/1
50-100 TABLET ORAL EVERY 6 HOURS PRN
Qty: 30 TABLET | Refills: 0 | Status: SHIPPED | OUTPATIENT
Start: 2017-05-01 | End: 2017-05-03

## 2017-05-01 RX ORDER — TAMSULOSIN HYDROCHLORIDE 0.4 MG/1
0.4 CAPSULE ORAL DAILY
Qty: 30 CAPSULE | Refills: 0 | Status: SHIPPED | OUTPATIENT
Start: 2017-05-01 | End: 2018-02-09 | Stop reason: SDUPTHER

## 2017-05-01 NOTE — TELEPHONE ENCOUNTER
----- Message from Jessica Perez sent at 5/1/2017  9:15 AM CDT -----  States calling to speak to nurse regarding her kidney stone. Please adv/call [phone].//thanks. cw

## 2017-05-01 NOTE — TELEPHONE ENCOUNTER
Pt states she has a kidney stone and she has had them before.  She states that she knows its a kidney stone and wants tramadol and flomax called in to the pharmacy for her.  States she can only take her norco at night

## 2017-05-01 NOTE — TELEPHONE ENCOUNTER
----- Message from Emeli Sung sent at 5/1/2017  4:07 PM CDT -----  Contact: pt  Returning missed call - please call again

## 2017-05-02 DIAGNOSIS — E11.3213 TYPE 2 DIABETES MELLITUS WITH BOTH EYES AFFECTED BY MILD NONPROLIFERATIVE RETINOPATHY AND MACULAR EDEMA, WITH LONG-TERM CURRENT USE OF INSULIN: ICD-10-CM

## 2017-05-02 DIAGNOSIS — Z79.4 TYPE 2 DIABETES MELLITUS WITH BOTH EYES AFFECTED BY MILD NONPROLIFERATIVE RETINOPATHY AND MACULAR EDEMA, WITH LONG-TERM CURRENT USE OF INSULIN: ICD-10-CM

## 2017-05-02 RX ORDER — AFLIBERCEPT 40 MG/ML
2 INJECTION, SOLUTION INTRAVITREAL
Qty: 2 VIAL | Refills: 3 | Status: SHIPPED | OUTPATIENT
Start: 2017-05-02 | End: 2017-06-15 | Stop reason: SDUPTHER

## 2017-05-03 ENCOUNTER — TELEPHONE (OUTPATIENT)
Dept: FAMILY MEDICINE | Facility: CLINIC | Age: 62
End: 2017-05-03

## 2017-05-03 ENCOUNTER — HOSPITAL ENCOUNTER (OUTPATIENT)
Dept: RADIOLOGY | Facility: HOSPITAL | Age: 62
Discharge: HOME | End: 2017-05-03
Attending: REGISTERED NURSE
Payer: COMMERCIAL

## 2017-05-03 ENCOUNTER — OFFICE VISIT (OUTPATIENT)
Dept: FAMILY MEDICINE | Facility: CLINIC | Age: 62
End: 2017-05-03
Payer: COMMERCIAL

## 2017-05-03 VITALS
OXYGEN SATURATION: 98 % | HEIGHT: 66 IN | WEIGHT: 250.44 LBS | TEMPERATURE: 97 F | SYSTOLIC BLOOD PRESSURE: 160 MMHG | HEART RATE: 127 BPM | DIASTOLIC BLOOD PRESSURE: 70 MMHG | RESPIRATION RATE: 18 BRPM | BODY MASS INDEX: 40.25 KG/M2

## 2017-05-03 DIAGNOSIS — N20.0 KIDNEY STONE ON RIGHT SIDE: ICD-10-CM

## 2017-05-03 DIAGNOSIS — N20.0 RIGHT NEPHROLITHIASIS: Primary | ICD-10-CM

## 2017-05-03 DIAGNOSIS — N13.30 HYDRONEPHROSIS OF RIGHT KIDNEY: ICD-10-CM

## 2017-05-03 DIAGNOSIS — N20.0 KIDNEY STONE ON RIGHT SIDE: Primary | ICD-10-CM

## 2017-05-03 PROCEDURE — 1160F RVW MEDS BY RX/DR IN RCRD: CPT | Mod: S$GLB,,, | Performed by: REGISTERED NURSE

## 2017-05-03 PROCEDURE — 3077F SYST BP >= 140 MM HG: CPT | Mod: S$GLB,,, | Performed by: REGISTERED NURSE

## 2017-05-03 PROCEDURE — 96372 THER/PROPH/DIAG INJ SC/IM: CPT | Mod: S$GLB,,, | Performed by: REGISTERED NURSE

## 2017-05-03 PROCEDURE — 74176 CT ABD & PELVIS W/O CONTRAST: CPT | Mod: TC,PO

## 2017-05-03 PROCEDURE — 3078F DIAST BP <80 MM HG: CPT | Mod: S$GLB,,, | Performed by: REGISTERED NURSE

## 2017-05-03 PROCEDURE — 74176 CT ABD & PELVIS W/O CONTRAST: CPT | Mod: 26,,, | Performed by: RADIOLOGY

## 2017-05-03 PROCEDURE — 99213 OFFICE O/P EST LOW 20 MIN: CPT | Mod: 25,S$GLB,, | Performed by: REGISTERED NURSE

## 2017-05-03 PROCEDURE — 99999 PR PBB SHADOW E&M-EST. PATIENT-LVL V: CPT | Mod: 25,PBBFAC,, | Performed by: REGISTERED NURSE

## 2017-05-03 RX ORDER — HYDROCODONE BITARTRATE AND ACETAMINOPHEN 10; 325 MG/1; MG/1
1 TABLET ORAL
Qty: 15 TABLET | Refills: 0 | Status: SHIPPED | OUTPATIENT
Start: 2017-05-03 | End: 2017-07-06

## 2017-05-03 RX ORDER — ONDANSETRON 4 MG/1
4-8 TABLET, ORALLY DISINTEGRATING ORAL
Qty: 30 TABLET | Refills: 0 | Status: SHIPPED | OUTPATIENT
Start: 2017-05-03 | End: 2018-04-03

## 2017-05-03 RX ORDER — HYDROCODONE BITARTRATE AND ACETAMINOPHEN 10; 325 MG/1; MG/1
1 TABLET ORAL
Qty: 15 TABLET | Refills: 0 | Status: SHIPPED | OUTPATIENT
Start: 2017-05-03 | End: 2017-05-03 | Stop reason: SDUPTHER

## 2017-05-03 RX ORDER — KETOROLAC TROMETHAMINE 30 MG/ML
60 INJECTION, SOLUTION INTRAMUSCULAR; INTRAVENOUS
Status: COMPLETED | OUTPATIENT
Start: 2017-05-03 | End: 2017-05-03

## 2017-05-03 RX ADMIN — KETOROLAC TROMETHAMINE 60 MG: 30 INJECTION, SOLUTION INTRAMUSCULAR; INTRAVENOUS at 02:05

## 2017-05-03 NOTE — MR AVS SNAPSHOT
[facility]  [address] [facility] [address] [facility] [address]  Phone: [phone]                  Latosha Enriquez   5/3/2017 2:00 PM   Office Visit    Description:  Female : 1955   Provider:  Lior Beckman NP   Department:  [facility]           Reason for Visit     Nephrolithiasis           Diagnoses this Visit        Comments    Kidney stone on right side    -  Primary            To Do List           Future Appointments        Provider Department Dept Phone    5/3/2017 3:10 PM [facility] CT1 LIMIT [address] [facility] [phone]    2017 3:00 PM LINDSAY Bright MD [facility] - Ophthalmology [phone]      Goals (5 Years of Data)     None       These Medications        Disp Refills Start End    ondansetron (ZOFRAN-ODT) 4 MG TbDL 30 tablet 0 5/3/2017     Take 1-2 tablets (4-8 mg total) by mouth every 6 to 8 hours as needed (nausea/vomiting). - Oral    Pharmacy: [facility] [address] [place] [address] & [facility] Ph #: [phone]       hydrocodone-acetaminophen 10-325mg (NORCO)  mg Tab 15 tablet 0 5/3/2017     Take 1 tablet by mouth every 6 to 8 hours as needed for Pain. - Oral    Pharmacy: [facility] [address] [place] [address] & [facility] Ph #: [phone]         [facility]     [facility] Care Line -  Assistance  Unless otherwise directed by your provider, please contact [facility], our nurse care line that is available for  assistance.     Registered nurses in the [facility] provide: appointment scheduling, clinical advisement, health education, and other advisory services.  Call: [phone] (toll free)               Medications           Message regarding Medications     Verify the changes and/or additions to your medication regime listed below are the same as  discussed with your clinician today.  If any of these changes or additions are incorrect, please notify your healthcare provider.        START taking these NEW medications        Refills    ondansetron (ZOFRAN-ODT) 4 MG TbDL 0    Sig: Take 1-2 tablets (4-8 mg total) by mouth every 6 to 8 hours as needed (nausea/vomiting).    Class: Normal    Route: Oral    hydrocodone-acetaminophen 10-325mg (NORCO)  mg Tab 0    Sig: Take 1 tablet by mouth every 6 to 8 hours as needed for Pain.    Class: Print    Route: Oral      These medications were administered today        Dose Freq    ketorolac injection 60 mg 60 mg Clinic/HOD 1 time    Sig: Inject 60 mg into the muscle one time.    Class: Normal    Route: Intramuscular           Verify that the below list of medications is an accurate representation of the medications you are currently taking.  If none reported, the list may be blank. If incorrect, please contact your healthcare provider. Carry this list with you in case of emergency.           Current Medications     albuterol 90 mcg/actuation inhaler Inhale 2 puffs into the lungs every 6 (six) hours as needed.    allopurinol (ZYLOPRIM) 100 MG tablet Take 1 tablet (100 mg total) by mouth once daily.    budesonide 180mcg (PULMICORT FLEXHALER) 180 mcg/actuation AePB Inhale 2 puffs into the lungs 2 (two) times daily.    ergocalciferol (ERGOCALCIFEROL) 50,000 unit Cap Take 50,000 Units by mouth every 7 days.    esomeprazole (NEXIUM) 20 MG capsule Take 1 capsule (20 mg total) by mouth before breakfast.    EYLEA 2 mg/0.05 mL Soln     EYLEA 2 mg/0.05 mL Soln 0.05 mLs (2 mg total) by Intravitreal route every 28 days.    HUMALOG MIX 75-25 KWIKPEN 100 unit/mL (75-25) InPn INJECT 30 UNITS UNDER THE SKIN IN THE MORNING AND 20 UNITS IN THE EVENING    ibuprofen (ADVIL,MOTRIN) 800 MG tablet Take 1 tablet (800 mg total) by mouth 3 (three) times daily as needed for Pain.    ketorolac 0.5% (ACULAR) 0.5 % Drop Place 1 drop into both eyes  "3 (three) times daily.    losartan-hydrochlorothiazide 100-25 mg (HYZAAR) 100-25 mg per tablet Take 1 tablet by mouth once daily.    metformin (GLUCOPHAGE-XR) 500 MG 24 hr tablet TAKE 2 TABLETS BY MOUTH DAILY WITH BREAKFAST AND 1 TABLET WITH DINNER    pen needle, diabetic 31 gauge x 1/4" Ndle 1 Device by Misc.(Non-Drug; Combo Route) route 2 (two) times daily.    potassium chloride (MICRO-K) 10 MEQ CpSR TAKE 1 CAPSULE(10 MEQ) BY MOUTH EVERY DAY    prednisoLONE acetate (PRED FORTE) 1 % DrpS Place 1 drop into the right eye 4 (four) times daily.    prednisoLONE acetate (PRED FORTE) 1 % DrpS Place 1 drop into both eyes 3 (three) times daily.    promethazine-codeine 6.25-10 mg/5 ml (PHENERGAN WITH CODEINE) 6.25-10 mg/5 mL syrup Take 5 mLs by mouth every 4 to 6 hours as needed for Cough.    simvastatin (ZOCOR) 20 MG tablet Take 1 tablet (20 mg total) by mouth every evening.    tamsulosin (FLOMAX) 0.4 mg Cp24 Take 1 capsule (0.4 mg total) by mouth once daily.    tramadol (ULTRAM) 50 mg tablet Take 1-2 tablets ( mg total) by mouth every 6 (six) hours as needed for Pain.    hydrocodone-acetaminophen 10-325mg (NORCO)  mg Tab Take 1 tablet by mouth every 6 to 8 hours as needed for Pain.    ondansetron (ZOFRAN-ODT) 4 MG TbDL Take 1-2 tablets (4-8 mg total) by mouth every 6 to 8 hours as needed (nausea/vomiting).           Clinical Reference Information           Your Vitals Were     BP Pulse Temp Resp    160/70 (BP Location: Right arm, Patient Position: Sitting, BP Method: Manual) 127 96.9 °F (36.1 °C) (Tympanic) 18    Height Weight SpO2 BMI    5' 6" (1.676 m) 113.6 kg (250 lb 7.1 oz) 98% 40.42 kg/m2      Blood Pressure          Most Recent Value    BP  (!)  160/70      Allergies as of 5/3/2017     Antihistamines - Alkylamine    Doxycycline    Iodine And Iodide Containing Products      Immunizations Administered on Date of Encounter - 5/3/2017     None      Orders Placed During Today's Visit     Future " Labs/Procedures Expected by Expires    CT Renal Stone Study ABD Pelvis WO  5/3/2017 5/3/2018      Language Assistance Services     ATTENTION: Language assistance services are available, free of charge. Please call [phone].      ATENCIÓN: Si kelley miranda, tiene a blancas disposición servicios gratuitos de asistencia lingüística. Llame al [phone].     CHÚ Ý: N?u b?n nói Ti?ng Vi?t, có các d?ch v? h? tr? ngôn ng? mi?n phí dành cho b?n. G?i s? [phone].         [facility] complies with applicable Federal civil rights laws and does not discriminate on the basis of race, color, national origin, age, disability, or sex.

## 2017-05-03 NOTE — TELEPHONE ENCOUNTER
CT scan shows an obstructing 8mm stone in the distal right ureter with severe right-sided hydronephrosis --- needs to see Urology ASAP.

## 2017-05-03 NOTE — PROGRESS NOTES
Subjective:       Patient ID: Latosha Enriquez is a 61 y.o. female.    Chief Complaint: Nephrolithiasis      HPI    Mrs. Enriquez is here today with pain due to kidney stones.  Dr. Ronquillo phoned in Flomax and tramadol yesterday, which she is taking, but pain persists and is worsening.  Pain located to RT flank and bladder area, pain reported as stabbing.  Pain began about 3 days ago after eating green salad; she reports this is her usual trigger due to calcium oxalate stones.  She is also not able to eat chocolate or dairy.  Tramadol has been making her nauseated, she wishes to try something else for pain.  Requests scan to check for obstruction or blockage.      Review of Systems   Constitutional: Negative for chills and fever.   Respiratory: Negative.    Cardiovascular: Negative.    Genitourinary: Positive for flank pain and pelvic pain. Negative for difficulty urinating, dysuria, frequency and hematuria.   Neurological: Negative.          Patient Active Problem List   Diagnosis    Hemorrhoids, internal    Diabetes mellitus type II, uncontrolled    Essential hypertension    Morbid obesity with BMI of 40.0-44.9, adult    Vitamin D deficiency disease    Primary osteoarthritis of both knees    GERD (gastroesophageal reflux disease)    Hyperlipidemia    Type 2 diabetes mellitus with both eyes affected by mild nonproliferative retinopathy and macular edema, with long-term current use of insulin         Objective:     Physical Exam   Constitutional: She is oriented to person, place, and time. She appears well-developed and well-nourished.   Abdominal: There is tenderness. There is CVA tenderness.       Musculoskeletal:        Back:    Neurological: She is alert and oriented to person, place, and time.         Medication List with Changes/Refills   New Medications    HYDROCODONE-ACETAMINOPHEN 10-325MG (NORCO)  MG TAB    Take 1 tablet by mouth every 6 to 8 hours as needed for Pain.    ONDANSETRON (ZOFRAN-ODT) 4  "MG TBDL    Take 1-2 tablets (4-8 mg total) by mouth every 6 to 8 hours as needed (nausea/vomiting).   Current Medications    ALBUTEROL 90 MCG/ACTUATION INHALER    Inhale 2 puffs into the lungs every 6 (six) hours as needed.    ALLOPURINOL (ZYLOPRIM) 100 MG TABLET    Take 1 tablet (100 mg total) by mouth once daily.    BUDESONIDE 180MCG (PULMICORT FLEXHALER) 180 MCG/ACTUATION AEPB    Inhale 2 puffs into the lungs 2 (two) times daily.    ERGOCALCIFEROL (ERGOCALCIFEROL) 50,000 UNIT CAP    Take 50,000 Units by mouth every 7 days.    ESOMEPRAZOLE (NEXIUM) 20 MG CAPSULE    Take 1 capsule (20 mg total) by mouth before breakfast.    EYLEA 2 MG/0.05 ML SOLN        EYLEA 2 MG/0.05 ML SOLN    0.05 mLs (2 mg total) by Intravitreal route every 28 days.    HUMALOG MIX 75-25 KWIKPEN 100 UNIT/ML (75-25) INPN    INJECT 30 UNITS UNDER THE SKIN IN THE MORNING AND 20 UNITS IN THE EVENING    IBUPROFEN (ADVIL,MOTRIN) 800 MG TABLET    Take 1 tablet (800 mg total) by mouth 3 (three) times daily as needed for Pain.    KETOROLAC 0.5% (ACULAR) 0.5 % DROP    Place 1 drop into both eyes 3 (three) times daily.    LOSARTAN-HYDROCHLOROTHIAZIDE 100-25 MG (HYZAAR) 100-25 MG PER TABLET    Take 1 tablet by mouth once daily.    METFORMIN (GLUCOPHAGE-XR) 500 MG 24 HR TABLET    TAKE 2 TABLETS BY MOUTH DAILY WITH BREAKFAST AND 1 TABLET WITH DINNER    PEN NEEDLE, DIABETIC 31 GAUGE X 1/4" NDLE    1 Device by Misc.(Non-Drug; Combo Route) route 2 (two) times daily.    POTASSIUM CHLORIDE (MICRO-K) 10 MEQ CPSR    TAKE 1 CAPSULE(10 MEQ) BY MOUTH EVERY DAY    PREDNISOLONE ACETATE (PRED FORTE) 1 % DRPS    Place 1 drop into the right eye 4 (four) times daily.    PREDNISOLONE ACETATE (PRED FORTE) 1 % DRPS    Place 1 drop into both eyes 3 (three) times daily.    PROMETHAZINE-CODEINE 6.25-10 MG/5 ML (PHENERGAN WITH CODEINE) 6.25-10 MG/5 ML SYRUP    Take 5 mLs by mouth every 4 to 6 hours as needed for Cough.    SIMVASTATIN (ZOCOR) 20 MG TABLET    Take 1 tablet (20 mg " total) by mouth every evening.    TAMSULOSIN (FLOMAX) 0.4 MG CP24    Take 1 capsule (0.4 mg total) by mouth once daily.   Discontinued Medications    HYDROCODONE-ACETAMINOPHEN 10-325MG (NORCO)  MG TAB    Take by mouth.    TRAMADOL (ULTRAM) 50 MG TABLET    Take 1-2 tablets ( mg total) by mouth every 6 (six) hours as needed for Pain.           Diagnosis       1. Kidney stone on right side          Assessment/ Plan     Kidney stone on right side  -     CT Renal Stone Study ABD Pelvis WO; Future; Expected date: 5/3/17  -     ketorolac injection 60 mg; Inject 60 mg into the muscle one time.  -     ondansetron (ZOFRAN-ODT) 4 MG TbDL; Take 1-2 tablets (4-8 mg total) by mouth every 6 to 8 hours as needed (nausea/vomiting).  Dispense: 30 tablet; Refill: 0  -     hydrocodone-acetaminophen 10-325mg (NORCO)  mg Tab; Take 1 tablet by mouth every 6 to 8 hours as needed for Pain.  Dispense: 15 tablet; Refill: 0      CT pending.  Hydration, continue Flomax.  To ED if pain/symptoms worsen or persist.      LUIS Erickson  [facility]

## 2017-05-04 ENCOUNTER — HOSPITAL ENCOUNTER (OUTPATIENT)
Dept: RADIOLOGY | Facility: HOSPITAL | Age: 62
Discharge: HOME | End: 2017-05-04
Attending: UROLOGY
Payer: COMMERCIAL

## 2017-05-04 ENCOUNTER — APPOINTMENT (OUTPATIENT)
Dept: CARDIOLOGY | Facility: CLINIC | Age: 62
End: 2017-05-04
Payer: COMMERCIAL

## 2017-05-04 ENCOUNTER — OFFICE VISIT (OUTPATIENT)
Dept: UROLOGY | Facility: CLINIC | Age: 62
End: 2017-05-04
Payer: COMMERCIAL

## 2017-05-04 VITALS
WEIGHT: 250.88 LBS | BODY MASS INDEX: 40.49 KG/M2 | SYSTOLIC BLOOD PRESSURE: 146 MMHG | DIASTOLIC BLOOD PRESSURE: 81 MMHG | HEART RATE: 92 BPM

## 2017-05-04 DIAGNOSIS — N20.1 URETERAL STONE: ICD-10-CM

## 2017-05-04 DIAGNOSIS — N20.1 URETERAL STONE: Primary | ICD-10-CM

## 2017-05-04 LAB
BILIRUB SERPL-MCNC: NORMAL MG/DL
BLOOD URINE, POC: 250
COLOR, POC UA: YELLOW
GLUCOSE UR QL STRIP: NORMAL
KETONES UR QL STRIP: NORMAL
LEUKOCYTE ESTERASE URINE, POC: NORMAL
NITRITE, POC UA: NORMAL
PH, POC UA: 5
PROTEIN, POC: NORMAL
SPECIFIC GRAVITY, POC UA: 1.02
UROBILINOGEN, POC UA: NORMAL

## 2017-05-04 PROCEDURE — 99244 OFF/OP CNSLTJ NEW/EST MOD 40: CPT | Mod: 25,S$GLB,, | Performed by: UROLOGY

## 2017-05-04 PROCEDURE — 93000 ELECTROCARDIOGRAM COMPLETE: CPT | Mod: S$GLB,,, | Performed by: INTERNAL MEDICINE

## 2017-05-04 PROCEDURE — 99999 PR PBB SHADOW E&M-EST. PATIENT-LVL III: CPT | Mod: 25,PBBFAC,, | Performed by: UROLOGY

## 2017-05-04 PROCEDURE — 71020 XR CHEST PA AND LATERAL: CPT | Mod: 26,,, | Performed by: RADIOLOGY

## 2017-05-04 PROCEDURE — 74000 XR ABDOMEN AP 1 VIEW: CPT | Mod: TC

## 2017-05-04 PROCEDURE — 74000 XR ABDOMEN AP 1 VIEW: CPT | Mod: 26,,, | Performed by: RADIOLOGY

## 2017-05-04 PROCEDURE — 81002 URINALYSIS NONAUTO W/O SCOPE: CPT | Mod: S$GLB,,, | Performed by: UROLOGY

## 2017-05-04 PROCEDURE — 71020 XR CHEST PA AND LATERAL: CPT | Mod: TC

## 2017-05-04 NOTE — MR AVS SNAPSHOT
[facility]  [address] [facility] [address]  Phone: [phone]  Fax: [phone]                  Latosha Enriquez   2017 1:00 PM   Office Visit    Description:  Female : 1955   Provider:  Geoff Love IV, MD   Department:  [facility]           Diagnoses this Visit        Comments    Ureteral stone    -  Primary            To Do List           Future Appointments        Provider Department Dept Phone    2017 2:30 PM EKG, [facility] - Special Cardiology [phone]    2017 3:00 PM LINDSAY Bright MD [facility] - Ophthalmology [phone]    2017 3:45 PM ONL XR1- [facility] [phone]    2017 9:20 AM Geoff Love IV, MD [facility] Urology [phone]      Goals (5 Years of Data)     None      [facility]     [facility] -  Assistance  Unless otherwise directed by your provider, please contact [facility], our nurse care line that is available for  assistance.     Registered nurses in the [facility] provide: appointment scheduling, clinical advisement, health education, and other advisory services.  Call: [phone] (toll free)               Medications           Message regarding Medications     Verify the changes and/or additions to your medication regime listed below are the same as discussed with your clinician today.  If any of these changes or additions are incorrect, please notify your healthcare provider.             Verify that the below list of medications is an accurate representation of the medications you are currently taking.  If none reported, the list may be blank. If incorrect, please contact your healthcare provider. Carry this list with you in case of emergency.           Current Medications     albuterol 90 mcg/actuation inhaler Inhale 2 puffs into the lungs every 6 (six) hours as needed.    allopurinol (ZYLOPRIM) 100 MG tablet Take 1 tablet  "(100 mg total) by mouth once daily.    budesonide 180mcg (PULMICORT FLEXHALER) 180 mcg/actuation AePB Inhale 2 puffs into the lungs 2 (two) times daily.    ergocalciferol (ERGOCALCIFEROL) 50,000 unit Cap Take 50,000 Units by mouth every 7 days.    esomeprazole (NEXIUM) 20 MG capsule Take 1 capsule (20 mg total) by mouth before breakfast.    EYLEA 2 mg/0.05 mL Soln     EYLEA 2 mg/0.05 mL Soln 0.05 mLs (2 mg total) by Intravitreal route every 28 days.    HUMALOG MIX 75-25 KWIKPEN 100 unit/mL (75-25) InPn INJECT 30 UNITS UNDER THE SKIN IN THE MORNING AND 20 UNITS IN THE EVENING    hydrocodone-acetaminophen 10-325mg (NORCO)  mg Tab Take 1 tablet by mouth every 6 to 8 hours as needed for Pain.    ibuprofen (ADVIL,MOTRIN) 800 MG tablet Take 1 tablet (800 mg total) by mouth 3 (three) times daily as needed for Pain.    ketorolac 0.5% (ACULAR) 0.5 % Drop Place 1 drop into both eyes 3 (three) times daily.    losartan-hydrochlorothiazide 100-25 mg (HYZAAR) 100-25 mg per tablet Take 1 tablet by mouth once daily.    metformin (GLUCOPHAGE-XR) 500 MG 24 hr tablet TAKE 2 TABLETS BY MOUTH DAILY WITH BREAKFAST AND 1 TABLET WITH DINNER    ondansetron (ZOFRAN-ODT) 4 MG TbDL Take 1-2 tablets (4-8 mg total) by mouth every 6 to 8 hours as needed (nausea/vomiting).    pen needle, diabetic 31 gauge x 1/4" Ndle 1 Device by Misc.(Non-Drug; Combo Route) route 2 (two) times daily.    potassium chloride (MICRO-K) 10 MEQ CpSR TAKE 1 CAPSULE(10 MEQ) BY MOUTH EVERY DAY    prednisoLONE acetate (PRED FORTE) 1 % DrpS Place 1 drop into the right eye 4 (four) times daily.    prednisoLONE acetate (PRED FORTE) 1 % DrpS Place 1 drop into both eyes 3 (three) times daily.    promethazine-codeine 6.25-10 mg/5 ml (PHENERGAN WITH CODEINE) 6.25-10 mg/5 mL syrup Take 5 mLs by mouth every 4 to 6 hours as needed for Cough.    simvastatin (ZOCOR) 20 MG tablet Take 1 tablet (20 mg total) by mouth every evening.    tamsulosin (FLOMAX) 0.4 mg Cp24 Take 1 capsule " (0.4 mg total) by mouth once daily.           Clinical Reference Information           Your Vitals Were     BP Pulse Weight BMI       146/81 (BP Location: Right arm, Patient Position: Sitting, BP Method: Automatic) 92 113.8 kg (250 lb 14.1 oz) 40.49 kg/m2       Blood Pressure          Most Recent Value    BP  (!)  146/81      Allergies as of 5/4/2017     Antihistamines - Alkylamine    Doxycycline    Iodine And Iodide Containing Products    Tramadol      Immunizations Administered on Date of Encounter - 5/4/2017     None      Orders Placed During Today's Visit      Normal Orders This Visit    Case Request Operating Room: LITHOTRIPSY-LASER     POCT urine dipstick without microscope     Urine culture     Future Labs/Procedures Expected by Expires    X-Ray Abdomen AP 1 View  5/4/2017 5/4/2018      Language Assistance Services     ATTENTION: Language assistance services are available, free of charge. Please call [phone].      ATENCIÓN: Si habla ruth, tiene a blancas disposición servicios gratuitos de asistencia lingüística. Llame al [phone].     CHÚ Ý: N?u b?n nói Ti?ng Vi?t, có các d?ch v? h? tr? ngôn ng? mi?n phí dành cho b?n. G?i s? [phone].         [facility] complies with applicable Federal civil rights laws and does not discriminate on the basis of race, color, national origin, age, disability, or sex.

## 2017-05-04 NOTE — PROGRESS NOTES
"Chief Complaint: Urolithiasis    HPI:   5/4/17: 62 yo woman has passed 300 stones, CaOx or Uric Acid she says.  Really watches her diet.  If she eats a block of chocolate she makes a stone on the spot and passes it right away.  Eating lettuce makes a stone attack within 8 hours.  Cramps, passed stones a few weeks ago.  Has 80 bags of stones.  Nauseated with midline abd pain and right flank pain no other abd/pelvic pain and no exac/rel factors.  No gross hematuria.  No urinary bother.  No  history.  Normal sexual function.  CT shows an 8mm obstructing right ureteral stone.  Referred by Dr. Ronquillo.  8/13: Se: "The most recent study reveals a 24 hour urine volume of approximately 1300 cc. While low as much better than her previous study, which  revealed about 800 cc of urine within a 24-hour period. Other abnormalities include elevated excretion of sodium and calcium, as well as oxalate and uric acid. The patient has been counseled to increase her urine output and to  limit her sodium intake. She states that she is already on low oxalate diet. I referred her to a website (the national kidney disease clearing house) for more extensive dietary list. I will try to increase her urine output and decrease her calcium excretion by adding hydrochlorothiazide 25 mg by mouth twice daily. Previously, she used allopurinol, but stopped it after she had no kidney stone episodes for a couple of years, an indicator of its effectiveness. I restarted allopurinol today at 100 mg daily. I had a long talk with the patient over a period of at least a half hour regarding the implications of the low urine volume, as well as the other abnormalities, and we decided to try a repeat 24-hour urine study in about 6 weeks to see if these maneuvers will help her.     Allergies:  Antihistamines - alkylamine; Doxycycline; Iodine and iodide containing products; and Tramadol    Medications:  has a current medication list which includes the " following prescription(s): albuterol, allopurinol, budesonide 180mcg, ergocalciferol, esomeprazole, eylea, eylea, humalog mix 75-25 kwikpen, hydrocodone-acetaminophen 10-325mg, ibuprofen, ketorolac 0.5%, losartan-hydrochlorothiazide 100-25 mg, metformin, ondansetron, pen needle, diabetic, potassium chloride, prednisolone acetate, prednisolone acetate, promethazine-codeine 6.25-10 mg/5 ml, simvastatin, and tamsulosin.    Review of Systems:  General: No fever, chills, fatigability, or weight loss.  Skin: No rashes, itching, or changes in color or texture of skin.  Chest: Denies CANALES, cyanosis, wheezing, cough, and sputum production.  Abdomen: Appetite fine. No weight loss. Denies diarrhea, abdominal pain, hematemesis, or blood in stool.  Musculoskeletal: No joint stiffness or swelling. Denies back pain.  : As above.  All other review of systems negative.    PMH:   has a past medical history of Asthma; Colon polyps; Diabetes mellitus; Diabetes mellitus type II, uncontrolled; Diverticulosis of large intestine without hemorrhage (2015); Elevated liver enzymes; GERD (gastroesophageal reflux disease); Gout, unspecified; Hemorrhoids, internal (2015); Hyperlipidemia; Hypertension; IBS (irritable bowel syndrome); Morbid obesity with BMI of 40.0-44.9, adult; Nasal vestibulitis; Osteoarthritis of multiple joints; Type 2 diabetes mellitus with both eyes affected by mild nonproliferative retinopathy and macular edema, with long-term current use of insulin (2016); Urolithiasis; and Vitamin D deficiency disease.    PSH:   has a past surgical history that includes Total abdominal hysterectomy; Breast biopsy;  section, classic; Colonoscopy (N/A, 2015); Extracorporeal shock wave lithotripsy; and Ureteroscopy.    FamHx: family history includes Colon cancer in her maternal grandmother; Colon polyps in her mother; Diabetes in her brother and mother; Heart disease in her mother; Hypertension in her brother  and mother; Other in her mother; Ovarian cancer in her paternal aunt; Stroke in her brother.    SocHx:  reports that she has never smoked. She has never used smokeless tobacco. She reports that she does not drink alcohol or use illicit drugs.      Physical Exam:  Vitals:    05/04/17 1318   BP: (!) 146/81   Pulse: 92     General: A&Ox3, no apparent distress, no deformities  Neck: No masses, normal thyroid  Lungs: normal inspiration, no use of accessory muscles  Heart: normal pulse, no arrhythmias  Abdomen: Soft, NT, ND, no masses, no hernias, no hepatosplenomegaly  Lymphatic: Neck and groin nodes negative  Skin: The skin is warm and dry. No jaundice.  Ext: No c/c/e.  : deferred    Labs/Studies: Urinalysis performed in clinic, summary: UA normal exc 250 blood    Impression/Plan:   1. She has right hydronephrosis behind an obstructing 8mm ureteral stone.  Stable on flomax and pain meds currently.  Cath UA/UCx and RTC Wed for right URS with laser litho.

## 2017-05-04 NOTE — LETTER
May 4, 2017        Gabbie Ronquillo MD  [address] [facility] [address]             [facility]  [address] [place] [address]  Phone: [phone]  Fax: [phone]   Patient: Latosha Enriquez   MR Number: [id number]   YOB: 1955   Date of Visit: 5/4/2017       Dear Dr. Ronquillo:    Thank you for referring Latosha Enriquez to me for evaluation. Below are the relevant portions of my assessment and plan of care.            If you have questions, please do not hesitate to call me. I look forward to following Latosha along with you.    Sincerely,      Geoff Love IV, MD           CC  No Recipients

## 2017-05-05 ENCOUNTER — TELEPHONE (OUTPATIENT)
Dept: UROLOGY | Facility: CLINIC | Age: 62
End: 2017-05-05

## 2017-05-05 NOTE — TELEPHONE ENCOUNTER
Patient was called and informed that her surgery date has been changed to Thursday, May 11, 2017. Verbalized understanding.

## 2017-05-10 ENCOUNTER — ANESTHESIA EVENT (OUTPATIENT)
Dept: SURGERY | Facility: HOSPITAL | Age: 62
End: 2017-05-10
Payer: COMMERCIAL

## 2017-05-10 ENCOUNTER — TELEPHONE (OUTPATIENT)
Dept: UROLOGY | Facility: CLINIC | Age: 62
End: 2017-05-10

## 2017-05-10 DIAGNOSIS — N20.0 KIDNEY STONES: Primary | ICD-10-CM

## 2017-05-10 NOTE — TELEPHONE ENCOUNTER
Spoke with patient and confirmed surgery arrival time of 9AM with Dr. Love tomorrow. Patient states understanding.

## 2017-05-10 NOTE — PRE ADMISSION SCREENING
Pre op instructions reviewed with patient per phone:    To confirm, Your surgeon has instructed you:  Surgery is scheduled 05/11/17 at per MD.      [facility] main lobby by per MD.      INSTRUCTIONS IMPORTANT!!!  ¨ Do not eat, drink, or smoke after 12 midnight-including water. OK to brush teeth, no gum, candy or mints!    ¨ Take only these medicines with a small swallow of water-morning of surgery.  Nexium        ____  Do not wear makeup, including mascara.  ____  No powder, lotions or creams to surgical area.  ____  Please remove all jewelry, including piercings and leave at home.  ____  No money or valuables needed. Please leave at home.  ____  Please bring identification and insurance information to hospital.  ____  If going home the same day, arrange for a ride home. You will not be able to   drive if Anesthesia was used.  ____  Children, under 12 years old, must remain in the waiting room with an adult.  They are not allowed in patient areas.  ____  Wear loose fitting clothing. Allow for dressings, bandages.  ____  Stop Aspirin, Ibuprofen, Motrin and Aleve at least 5-7 days before surgery, unless otherwise instructed by your doctor, or the nurse.   You MAY use Tylenol/acetaminophen until day of surgery.  ____  If you take diabetic medication, do not take am of surgery unless instructed by   Doctor.  ____ Stop taking any Fish Oil supplement or any Vitamins that contain Vitamin E at least 5 days prior to surgery.          Bathing Instructions-- The night before surgery and the morning prior to coming to the hospital:   -Do not shave the surgical area.   -Shower and wash your hair and body as usual with anti-bacterial  soap and shampoo.   -Rinse your hair and body completely.   -Use one packet of hibiclens to wash the surgical site (using your hand) gently for 5 minutes.  Do not scrub you skin too hard.   -Do not use hibiclens on your head, face, or  genitals.   -Do not wash with anti-bacterial soap after you use the hibiclens.   -Rinse your body thoroughly.   -Dry with clean, soft towel.  Do not use lotion, cream, deodorant, or powders on   the surgical site.    Use antibacterial soap in place of hibiclens if your surgery is on the head, face or genitals.         Surgical Site Infection    Prevention of surgical site infections:     -Keep incisions clean and dry.   -Do not soak/submerge incisions in water until completely healed.   -Do not apply lotions, powders, creams, or deodorants to site.   -Always make sure hands are cleaned with antibacterial soap/ alcohol-based   prior to touching the surgical site.  (This includes doctors, nurses, staff, and yourself.)    Signs and symptoms:   -Redness and pain around the area where you had surgery   -Drainage of cloudy fluid from your surgical wound   -Fever over 100.4  I have read or had read and explained to me, and understand the above information.

## 2017-05-11 ENCOUNTER — ANESTHESIA (OUTPATIENT)
Dept: SURGERY | Facility: HOSPITAL | Age: 62
End: 2017-05-11
Payer: COMMERCIAL

## 2017-05-11 ENCOUNTER — HOSPITAL ENCOUNTER (OUTPATIENT)
Facility: HOSPITAL | Age: 62
Discharge: HOME | End: 2017-05-11
Attending: UROLOGY | Admitting: UROLOGY
Payer: COMMERCIAL

## 2017-05-11 ENCOUNTER — SURGERY (OUTPATIENT)
Age: 62
End: 2017-05-11

## 2017-05-11 VITALS
BODY MASS INDEX: 39.1 KG/M2 | SYSTOLIC BLOOD PRESSURE: 146 MMHG | TEMPERATURE: 98 F | HEART RATE: 72 BPM | OXYGEN SATURATION: 97 % | HEIGHT: 67 IN | DIASTOLIC BLOOD PRESSURE: 72 MMHG | WEIGHT: 249.13 LBS | RESPIRATION RATE: 26 BRPM

## 2017-05-11 DIAGNOSIS — N20.1 URETERAL STONE: ICD-10-CM

## 2017-05-11 LAB
POCT GLUCOSE: 163 MG/DL (ref 70–110)
POCT GLUCOSE: 200 MG/DL (ref 70–110)

## 2017-05-11 PROCEDURE — C2617 STENT, NON-COR, TEM W/O DEL: HCPCS | Performed by: UROLOGY

## 2017-05-11 PROCEDURE — 25000003 PHARM REV CODE 250: Performed by: NURSE ANESTHETIST, CERTIFIED REGISTERED

## 2017-05-11 PROCEDURE — 71000015 HC POSTOP RECOV 1ST HR: Performed by: UROLOGY

## 2017-05-11 PROCEDURE — 88300 SURGICAL PATH GROSS: CPT | Performed by: PATHOLOGY

## 2017-05-11 PROCEDURE — 74420 UROGRAPHY RTRGR +-KUB: CPT | Mod: 26,,, | Performed by: UROLOGY

## 2017-05-11 PROCEDURE — 36000707: Performed by: UROLOGY

## 2017-05-11 PROCEDURE — 27201423 OPTIME MED/SURG SUP & DEVICES STERILE SUPPLY: Performed by: UROLOGY

## 2017-05-11 PROCEDURE — 63600175 PHARM REV CODE 636 W HCPCS: Performed by: UROLOGY

## 2017-05-11 PROCEDURE — 25000003 PHARM REV CODE 250: Performed by: ANESTHESIOLOGY

## 2017-05-11 PROCEDURE — C1758 CATHETER, URETERAL: HCPCS | Performed by: UROLOGY

## 2017-05-11 PROCEDURE — 63600175 PHARM REV CODE 636 W HCPCS: Performed by: NURSE ANESTHETIST, CERTIFIED REGISTERED

## 2017-05-11 PROCEDURE — 76000 FLUOROSCOPY <1 HR PHYS/QHP: CPT | Mod: 26,59,, | Performed by: UROLOGY

## 2017-05-11 PROCEDURE — 82370 X-RAY ASSAY CALCULUS: CPT

## 2017-05-11 PROCEDURE — 36000706: Performed by: UROLOGY

## 2017-05-11 PROCEDURE — 63600175 PHARM REV CODE 636 W HCPCS: Performed by: ANESTHESIOLOGY

## 2017-05-11 PROCEDURE — 37000008 HC ANESTHESIA 1ST 15 MINUTES: Performed by: UROLOGY

## 2017-05-11 PROCEDURE — C1773 RET DEV, INSERTABLE: HCPCS | Performed by: UROLOGY

## 2017-05-11 PROCEDURE — 52356 CYSTO/URETERO W/LITHOTRIPSY: CPT | Mod: RT,,, | Performed by: UROLOGY

## 2017-05-11 PROCEDURE — 25500020 PHARM REV CODE 255: Performed by: UROLOGY

## 2017-05-11 PROCEDURE — 37000009 HC ANESTHESIA EA ADD 15 MINS: Performed by: UROLOGY

## 2017-05-11 PROCEDURE — C1769 GUIDE WIRE: HCPCS | Performed by: UROLOGY

## 2017-05-11 PROCEDURE — 71000033 HC RECOVERY, INTIAL HOUR: Performed by: UROLOGY

## 2017-05-11 PROCEDURE — 71000039 HC RECOVERY, EACH ADD'L HOUR: Performed by: UROLOGY

## 2017-05-11 PROCEDURE — 88300 SURGICAL PATH GROSS: CPT | Mod: 26,,, | Performed by: PATHOLOGY

## 2017-05-11 DEVICE — STENT URETERAL UNIV 6FR 24CM: Type: IMPLANTABLE DEVICE | Site: URETER | Status: FUNCTIONAL

## 2017-05-11 RX ORDER — SODIUM CHLORIDE 9 MG/ML
3 INJECTION, SOLUTION INTRAMUSCULAR; INTRAVENOUS; SUBCUTANEOUS
Status: DISCONTINUED | OUTPATIENT
Start: 2017-05-11 | End: 2017-05-11 | Stop reason: HOSPADM

## 2017-05-11 RX ORDER — CIPROFLOXACIN 500 MG/1
500 TABLET ORAL EVERY 12 HOURS
Qty: 6 TABLET | Refills: 0 | Status: SHIPPED | OUTPATIENT
Start: 2017-05-11 | End: 2017-05-14

## 2017-05-11 RX ORDER — NEOSTIGMINE METHYLSULFATE 1 MG/ML
INJECTION, SOLUTION INTRAVENOUS
Status: DISCONTINUED | OUTPATIENT
Start: 2017-05-11 | End: 2017-05-11

## 2017-05-11 RX ORDER — SODIUM CHLORIDE, SODIUM LACTATE, POTASSIUM CHLORIDE, CALCIUM CHLORIDE 600; 310; 30; 20 MG/100ML; MG/100ML; MG/100ML; MG/100ML
INJECTION, SOLUTION INTRAVENOUS CONTINUOUS PRN
Status: DISCONTINUED | OUTPATIENT
Start: 2017-05-11 | End: 2017-05-11

## 2017-05-11 RX ORDER — SODIUM CHLORIDE, SODIUM LACTATE, POTASSIUM CHLORIDE, CALCIUM CHLORIDE 600; 310; 30; 20 MG/100ML; MG/100ML; MG/100ML; MG/100ML
125 INJECTION, SOLUTION INTRAVENOUS CONTINUOUS
Status: DISCONTINUED | OUTPATIENT
Start: 2017-05-11 | End: 2017-05-11 | Stop reason: HOSPADM

## 2017-05-11 RX ORDER — DOCUSATE SODIUM 100 MG/1
100 CAPSULE, LIQUID FILLED ORAL 2 TIMES DAILY
Qty: 60 CAPSULE | Refills: 0 | Status: SHIPPED | OUTPATIENT
Start: 2017-05-11 | End: 2018-05-22

## 2017-05-11 RX ORDER — PROPOFOL 10 MG/ML
VIAL (ML) INTRAVENOUS
Status: DISCONTINUED | OUTPATIENT
Start: 2017-05-11 | End: 2017-05-11

## 2017-05-11 RX ORDER — FENTANYL CITRATE 50 UG/ML
INJECTION, SOLUTION INTRAMUSCULAR; INTRAVENOUS
Status: DISCONTINUED | OUTPATIENT
Start: 2017-05-11 | End: 2017-05-11

## 2017-05-11 RX ORDER — OXYCODONE AND ACETAMINOPHEN 5; 325 MG/1; MG/1
1 TABLET ORAL
Status: DISCONTINUED | OUTPATIENT
Start: 2017-05-11 | End: 2017-05-11 | Stop reason: HOSPADM

## 2017-05-11 RX ORDER — GLYCOPYRROLATE 0.2 MG/ML
INJECTION INTRAMUSCULAR; INTRAVENOUS
Status: DISCONTINUED | OUTPATIENT
Start: 2017-05-11 | End: 2017-05-11

## 2017-05-11 RX ORDER — MORPHINE SULFATE 10 MG/ML
2 INJECTION INTRAMUSCULAR; INTRAVENOUS; SUBCUTANEOUS EVERY 5 MIN PRN
Status: DISCONTINUED | OUTPATIENT
Start: 2017-05-11 | End: 2017-05-11 | Stop reason: HOSPADM

## 2017-05-11 RX ORDER — MIDAZOLAM HYDROCHLORIDE 1 MG/ML
INJECTION, SOLUTION INTRAMUSCULAR; INTRAVENOUS
Status: DISCONTINUED | OUTPATIENT
Start: 2017-05-11 | End: 2017-05-11

## 2017-05-11 RX ORDER — HYDROCODONE BITARTRATE AND ACETAMINOPHEN 5; 325 MG/1; MG/1
1 TABLET ORAL EVERY 4 HOURS PRN
Status: DISCONTINUED | OUTPATIENT
Start: 2017-05-11 | End: 2017-05-11 | Stop reason: HOSPADM

## 2017-05-11 RX ORDER — ONDANSETRON 2 MG/ML
INJECTION INTRAMUSCULAR; INTRAVENOUS
Status: DISCONTINUED | OUTPATIENT
Start: 2017-05-11 | End: 2017-05-11

## 2017-05-11 RX ORDER — LIDOCAINE HYDROCHLORIDE 10 MG/ML
INJECTION INFILTRATION; PERINEURAL
Status: DISCONTINUED | OUTPATIENT
Start: 2017-05-11 | End: 2017-05-11

## 2017-05-11 RX ORDER — ONDANSETRON 2 MG/ML
4 INJECTION INTRAMUSCULAR; INTRAVENOUS ONCE AS NEEDED
Status: DISCONTINUED | OUTPATIENT
Start: 2017-05-11 | End: 2017-05-11 | Stop reason: HOSPADM

## 2017-05-11 RX ORDER — MEPERIDINE HYDROCHLORIDE 50 MG/ML
12.5 INJECTION INTRAMUSCULAR; INTRAVENOUS; SUBCUTANEOUS ONCE AS NEEDED
Status: COMPLETED | OUTPATIENT
Start: 2017-05-11 | End: 2017-05-11

## 2017-05-11 RX ORDER — CEFAZOLIN SODIUM 2 G/50ML
2 SOLUTION INTRAVENOUS
Status: DISCONTINUED | OUTPATIENT
Start: 2017-05-11 | End: 2017-05-11 | Stop reason: HOSPADM

## 2017-05-11 RX ORDER — HYDROCODONE BITARTRATE AND ACETAMINOPHEN 10; 325 MG/1; MG/1
1 TABLET ORAL EVERY 4 HOURS PRN
Status: DISCONTINUED | OUTPATIENT
Start: 2017-05-11 | End: 2017-05-11 | Stop reason: HOSPADM

## 2017-05-11 RX ORDER — OXYCODONE AND ACETAMINOPHEN 5; 325 MG/1; MG/1
1-2 TABLET ORAL EVERY 4 HOURS PRN
Qty: 30 TABLET | Refills: 0 | Status: SHIPPED | OUTPATIENT
Start: 2017-05-11 | End: 2017-11-27

## 2017-05-11 RX ORDER — HYDROMORPHONE HYDROCHLORIDE 2 MG/ML
0.2 INJECTION, SOLUTION INTRAMUSCULAR; INTRAVENOUS; SUBCUTANEOUS EVERY 5 MIN PRN
Status: DISCONTINUED | OUTPATIENT
Start: 2017-05-11 | End: 2017-05-11 | Stop reason: HOSPADM

## 2017-05-11 RX ORDER — FUROSEMIDE 10 MG/ML
INJECTION INTRAMUSCULAR; INTRAVENOUS
Status: DISCONTINUED | OUTPATIENT
Start: 2017-05-11 | End: 2017-05-11

## 2017-05-11 RX ORDER — ROCURONIUM BROMIDE 10 MG/ML
INJECTION, SOLUTION INTRAVENOUS
Status: DISCONTINUED | OUTPATIENT
Start: 2017-05-11 | End: 2017-05-11

## 2017-05-11 RX ADMIN — FUROSEMIDE 10 MG: 10 INJECTION, SOLUTION INTRAMUSCULAR; INTRAVENOUS at 10:05

## 2017-05-11 RX ADMIN — ROCURONIUM BROMIDE 50 MG: 10 INJECTION, SOLUTION INTRAVENOUS at 10:05

## 2017-05-11 RX ADMIN — LIDOCAINE HYDROCHLORIDE 80 MG: 10 INJECTION, SOLUTION INFILTRATION; PERINEURAL at 10:05

## 2017-05-11 RX ADMIN — SODIUM CHLORIDE, SODIUM LACTATE, POTASSIUM CHLORIDE, AND CALCIUM CHLORIDE: 600; 310; 30; 20 INJECTION, SOLUTION INTRAVENOUS at 10:05

## 2017-05-11 RX ADMIN — ONDANSETRON 4 MG: 2 INJECTION, SOLUTION INTRAMUSCULAR; INTRAVENOUS at 11:05

## 2017-05-11 RX ADMIN — IOHEXOL 50 ML: 300 INJECTION, SOLUTION INTRAVENOUS at 10:05

## 2017-05-11 RX ADMIN — FENTANYL CITRATE 100 MCG: 50 INJECTION, SOLUTION INTRAMUSCULAR; INTRAVENOUS at 10:05

## 2017-05-11 RX ADMIN — CEFAZOLIN SODIUM 2 G: 2 SOLUTION INTRAVENOUS at 10:05

## 2017-05-11 RX ADMIN — MORPHINE SULFATE 2 MG: 10 INJECTION, SOLUTION INTRAMUSCULAR; INTRAVENOUS at 12:05

## 2017-05-11 RX ADMIN — NEOSTIGMINE METHYLSULFATE 5 MG: 1 INJECTION INTRAVENOUS at 11:05

## 2017-05-11 RX ADMIN — MIDAZOLAM HYDROCHLORIDE 2 MG: 1 INJECTION, SOLUTION INTRAMUSCULAR; INTRAVENOUS at 10:05

## 2017-05-11 RX ADMIN — PROPOFOL 200 MG: 10 INJECTION, EMULSION INTRAVENOUS at 10:05

## 2017-05-11 RX ADMIN — MEPERIDINE HYDROCHLORIDE 12.5 MG: 50 INJECTION INTRAMUSCULAR; INTRAVENOUS; SUBCUTANEOUS at 12:05

## 2017-05-11 RX ADMIN — GLYCOPYRROLATE 0.8 MG: 0.2 INJECTION, SOLUTION INTRAMUSCULAR; INTRAVENOUS at 11:05

## 2017-05-11 NOTE — PLAN OF CARE
Patient and daughter given discharge instructions and verbalize understanding.  Patient complains of tolerable pain and vital signs are stable.

## 2017-05-11 NOTE — TRANSFER OF CARE
"Anesthesia Transfer of Care Note    Patient: Latosha Enriquez    Procedure(s) Performed: Procedure(s) (LRB):  LITHOTRIPSY-LASER (Right)  PYELOGRAM-RETROGRADE (Right)  EXTRACTION-STONE-URETEROSCOPY (Right)  CYSTOSCOPY WITH STENT PLACEMENT (Right)    Patient location: PACU    Anesthesia Type: general    Transport from OR: Transported from OR on room air with adequate spontaneous ventilation    Post pain: adequate analgesia    Post assessment: no apparent anesthetic complications    Post vital signs: stable    Level of consciousness: awake    Nausea/Vomiting: no nausea/vomiting    Complications: none    Transfer of care protocol was followed      Last vitals:   Visit Vitals    BP (!) 142/80 (BP Location: Left arm, Patient Position: Lying, BP Method: Automatic)    Pulse 85    Temp 36.8 °C (98.3 °F) (Oral)    Resp 18    Ht 5' 6.5" (1.689 m)    Wt 113 kg (249 lb 1.9 oz)    SpO2 97%    Breastfeeding No    BMI 39.61 kg/m2     "

## 2017-05-11 NOTE — H&P (VIEW-ONLY)
"Chief Complaint: Urolithiasis    HPI:   5/4/17: 60 yo woman has passed 300 stones, CaOx or Uric Acid she says.  Really watches her diet.  If she eats a block of chocolate she makes a stone on the spot and passes it right away.  Eating lettuce makes a stone attack within 8 hours.  Cramps, passed stones a few weeks ago.  Has 80 bags of stones.  Nauseated with midline abd pain and right flank pain no other abd/pelvic pain and no exac/rel factors.  No gross hematuria.  No urinary bother.  No  history.  Normal sexual function.  CT shows an 8mm obstructing right ureteral stone.  Referred by Dr. Ronquillo.  8/13: Se: "The most recent study reveals a 24 hour urine volume of approximately 1300 cc. While low as much better than her previous study, which  revealed about 800 cc of urine within a 24-hour period. Other abnormalities include elevated excretion of sodium and calcium, as well as oxalate and uric acid. The patient has been counseled to increase her urine output and to  limit her sodium intake. She states that she is already on low oxalate diet. I referred her to a website (the national kidney disease clearing house) for more extensive dietary list. I will try to increase her urine output and decrease her calcium excretion by adding hydrochlorothiazide 25 mg by mouth twice daily. Previously, she used allopurinol, but stopped it after she had no kidney stone episodes for a couple of years, an indicator of its effectiveness. I restarted allopurinol today at 100 mg daily. I had a long talk with the patient over a period of at least a half hour regarding the implications of the low urine volume, as well as the other abnormalities, and we decided to try a repeat 24-hour urine study in about 6 weeks to see if these maneuvers will help her.     Allergies:  Antihistamines - alkylamine; Doxycycline; Iodine and iodide containing products; and Tramadol    Medications:  has a current medication list which includes the " following prescription(s): albuterol, allopurinol, budesonide 180mcg, ergocalciferol, esomeprazole, eylea, eylea, humalog mix 75-25 kwikpen, hydrocodone-acetaminophen 10-325mg, ibuprofen, ketorolac 0.5%, losartan-hydrochlorothiazide 100-25 mg, metformin, ondansetron, pen needle, diabetic, potassium chloride, prednisolone acetate, prednisolone acetate, promethazine-codeine 6.25-10 mg/5 ml, simvastatin, and tamsulosin.    Review of Systems:  General: No fever, chills, fatigability, or weight loss.  Skin: No rashes, itching, or changes in color or texture of skin.  Chest: Denies CANALES, cyanosis, wheezing, cough, and sputum production.  Abdomen: Appetite fine. No weight loss. Denies diarrhea, abdominal pain, hematemesis, or blood in stool.  Musculoskeletal: No joint stiffness or swelling. Denies back pain.  : As above.  All other review of systems negative.    PMH:   has a past medical history of Asthma; Colon polyps; Diabetes mellitus; Diabetes mellitus type II, uncontrolled; Diverticulosis of large intestine without hemorrhage (2015); Elevated liver enzymes; GERD (gastroesophageal reflux disease); Gout, unspecified; Hemorrhoids, internal (2015); Hyperlipidemia; Hypertension; IBS (irritable bowel syndrome); Morbid obesity with BMI of 40.0-44.9, adult; Nasal vestibulitis; Osteoarthritis of multiple joints; Type 2 diabetes mellitus with both eyes affected by mild nonproliferative retinopathy and macular edema, with long-term current use of insulin (2016); Urolithiasis; and Vitamin D deficiency disease.    PSH:   has a past surgical history that includes Total abdominal hysterectomy; Breast biopsy;  section, classic; Colonoscopy (N/A, 2015); Extracorporeal shock wave lithotripsy; and Ureteroscopy.    FamHx: family history includes Colon cancer in her maternal grandmother; Colon polyps in her mother; Diabetes in her brother and mother; Heart disease in her mother; Hypertension in her brother  and mother; Other in her mother; Ovarian cancer in her paternal aunt; Stroke in her brother.    SocHx:  reports that she has never smoked. She has never used smokeless tobacco. She reports that she does not drink alcohol or use illicit drugs.      Physical Exam:  Vitals:    05/04/17 1318   BP: (!) 146/81   Pulse: 92     General: A&Ox3, no apparent distress, no deformities  Neck: No masses, normal thyroid  Lungs: normal inspiration, no use of accessory muscles  Heart: normal pulse, no arrhythmias  Abdomen: Soft, NT, ND, no masses, no hernias, no hepatosplenomegaly  Lymphatic: Neck and groin nodes negative  Skin: The skin is warm and dry. No jaundice.  Ext: No c/c/e.  : deferred    Labs/Studies: Urinalysis performed in clinic, summary: UA normal exc 250 blood    Impression/Plan:   1. She has right hydronephrosis behind an obstructing 8mm ureteral stone.  Stable on flomax and pain meds currently.  Cath UA/UCx and RTC Wed for right URS with laser litho.

## 2017-05-11 NOTE — ANESTHESIA PREPROCEDURE EVALUATION
05/11/2017  Latosha Enriquez is a 61 y.o., female.    Anesthesia Evaluation    I have reviewed the Patient Summary Reports.    I have reviewed the Nursing Notes.   I have reviewed the Medications.     Review of Systems  Anesthesia Hx:  No problems with previous Anesthesia  History of prior surgery of interest to airway management or planning: Denies Family Hx of Anesthesia complications.   Denies Personal Hx of Anesthesia complications.   Social:  Non-Smoker    Cardiovascular:   Hypertension hyperlipidemia ECG has been reviewed.    Pulmonary:   Asthma    Renal/:   renal calculi    Hepatic/GI:   GERD Hemorrhoids  IBS  Diverticulosis  obesity   Musculoskeletal:   Arthritis  OA bilateral knees  gout   Endocrine:   Diabetes, well controlled, type 2 Vitamin D deficiency       Physical Exam  General:  Morbid Obesity    Airway/Jaw/Neck:  Airway Findings: Mouth Opening: Normal Tongue: Normal  General Airway Assessment: Adult  Mallampati: II  TM Distance: Normal, at least 6 cm         Dental:  Dental Findings:Upper and lower implants   Chest/Lungs:  Chest/Lungs Findings: Normal Respiratory Rate     Heart/Vascular:  Heart Findings: Rate: Normal             Anesthesia Plan  Type of Anesthesia, risks & benefits discussed:  Anesthesia Type:  general  Patient's Preference:   Intra-op Monitoring Plan: standard ASA monitors  Intra-op Monitoring Plan Comments:   Post Op Pain Control Plan:   Post Op Pain Control Plan Comments: Per surgeon  Induction:   IV  Beta Blocker:  Patient is not currently on a Beta-Blocker (No further documentation required).       Informed Consent: Patient understands risks and agrees with Anesthesia plan.  Questions answered. Anesthesia consent signed with patient.  ASA Score: 3     Day of Surgery Review of History & Physical:    H&P update referred to the surgeon.     Anesthesia Plan Notes: WBC 7.53,  Hgb 12.1, Hct 37.4, Plt 255,        Ready For Surgery From Anesthesia Perspective.

## 2017-05-11 NOTE — DISCHARGE SUMMARY
Date of Discharge: 05/11/2017     Principle Diagnosis: Right ureteral stone    Secondary Diagnosis:  has a past medical history of Asthma; Colon polyps; Diabetes mellitus; Diabetes mellitus type II, uncontrolled; Diverticulosis of large intestine without hemorrhage (11/23/2015); Elevated liver enzymes; Encounter for blood transfusion; GERD (gastroesophageal reflux disease); Gout, unspecified; Hemorrhoids, internal (11/23/2015); Hyperlipidemia; Hypertension; IBS (irritable bowel syndrome); Morbid obesity with BMI of 40.0-44.9, adult; Nasal vestibulitis; Osteoarthritis of multiple joints; Type 2 diabetes mellitus with both eyes affected by mild nonproliferative retinopathy and macular edema, with long-term current use of insulin (11/9/2016); Urolithiasis; and Vitamin D deficiency disease.    History of Present Illness: Pt was worked up in clinic and today's procedure was scheduled.  Please see H&P for full details.    Hospital Course: Pt presented on the day of surgery and after proper consents were obtained he was brought to the OR where his procedure was performed without difficulty.    Discharge Disposition: Home    Followup Plan:  1. Pt is provided with medications for pain and others as indicated.  2. RTC in 4 weeks with Mag3 prior

## 2017-05-11 NOTE — PLAN OF CARE
Pt requesting to go to restroom during transfer to Phase II of Recovery, assisted pt to bathroom. Upon exiting bathroom, pt states she does not want to get back on stretcher at this time but wanting to walk to room from bathroom. Pt allowed and guarded by nurse Emelyn PAUL. Pt observed with steady gait.

## 2017-05-11 NOTE — ANESTHESIA POSTPROCEDURE EVALUATION
"Anesthesia Post Evaluation    Patient: Latosha Enriquez    Procedure(s) Performed: Procedure(s) (LRB):  LITHOTRIPSY-LASER (Right)  PYELOGRAM-RETROGRADE (Right)  EXTRACTION-STONE-URETEROSCOPY (Right)  CYSTOSCOPY WITH STENT PLACEMENT (Right)    Final Anesthesia Type: general  Patient location during evaluation: PACU  Patient participation: Yes- Able to Participate  Level of consciousness: awake and alert  Post-procedure vital signs: reviewed and stable  Pain management: adequate  Airway patency: patent  PONV status at discharge: No PONV  Anesthetic complications: no      Cardiovascular status: blood pressure returned to baseline and hemodynamically stable  Respiratory status: unassisted, spontaneous ventilation and room air  Hydration status: euvolemic  Follow-up not needed.        Visit Vitals    BP (!) 143/78 (BP Location: Right arm, Patient Position: Lying, BP Method: Automatic)    Pulse 72    Temp 36.7 °C (98.1 °F) (Temporal)    Resp 11    Ht 5' 6.5" (1.689 m)    Wt 113 kg (249 lb 1.9 oz)    SpO2 99%    Breastfeeding No    BMI 39.61 kg/m2       Pain/Reza Score: Pain Assessment Performed: Yes (5/11/2017 11:30 AM)  Presence of Pain: non-verbal indicators absent (5/11/2017 11:30 AM)  Reza Score: 8 (5/11/2017 11:30 AM)      "

## 2017-05-11 NOTE — OP NOTE
Date of Procedure: 05/11/2017    PREOPERATIVE DIAGNOSIS:  Right ureteral stone.                                                                                                           POSTOPERATIVE DIAGNOSIS:  Right ureteral stone.                               PROCEDURES:                                                                  1.  Right ureteroscopy with laser lithotripsy and stone basket extraction.                          2.  Cystoscopy with placement of right double-J ureteral stent.              3.  Right retrograde pyelogram.                                               4.  Fluoro less than 1 hour.                                                 SURGEON:  Kenyon Love IV, M.D.                                          Assistants: None    Specimen: None    Prosthetics, Devices, Grafts: None    ANESTHESIA:  General endotracheal.                                           FINDINGS:  The patient had an approximately 1 cm stone in the right ureter which was obstructing.  Ureteroscopy was possible after placement of an appropriate guidewire. The stone was broken into small pieces. A stone basket was used, and all significant pieces were removed and brought to the back table for pathologic examination.  A 6-Algerian x 24 cm double-J ureteral stent was placed. Flouroscopy was used throughout the case for wire and scope guidance, and if applicable to check final stent placement.                                    BLOOD LOSS:  None.                                                           BLOOD GIVEN:  None.                                                          URINE OUTPUT:  None.                                                         DRAINS:  6-Algerian x 24 cm right double-J ureteral stent.                      PROCEDURE IN DETAIL:  After proper consents were obtained, the patient was brought to the Operating Room where he was prepped and draped in normal sterile fashion and provided general  endotracheal anesthesia in dorsal lithotomy position.  A 22-Belgian cystoscope was assembled and introduced per urethra and the bladder was rinsed and drained.  Fluoroscopy was used to evaluate stone position at the start of the case and throughout the case for wire and scope guidance.    An attempt was made to gently pass a guidewire up the ureter, but it would not pass the stone.  Thus, a 5-Belgian open-ended catheter with an angle-tipped glidewire was used, and the stone was passed with the wire up to the renal pelvis.  It was a little tight, but the 5-Belgian open-ended catheter was then advanced over the wire up to the renal pelvis.  Retrograde pyelogram was performed to ensure that the wire was inside the left collecting system.  The calices of the kidney were blunted and not appreciated, with severe hydronephrosis.  The wire was indeed within the collecting system. Water soluble contrast was used in a 50:50 solution with sterile water.  After retrograde pyelogram, the wire was exchanged using the 5-Belgian open-ended catheter for the normal guidewire, and then the cystoscope was set aside with the guidewire in place.      The semirigid ureteroscope was then brought to bear and easily passed into the ureteral orifice.  The stone was obstructing the ureter and observed in the mid ureter.  The medium laser fiber was brought in and with 1 joules of energy and 5 repetitions per second, the stone was broken into a large number of small pieces.  It was observed that the pieces would not pass easily through the distal ureter, so a stone basket was used and with multiple passes into the ureter, the larger stone fragments were brought to the bladder or back table.  The ureteroscope was then set aside and the cystoscope replaced into the bladder over the wire.  A double-J ureteral stent was advanced coaxially over the wire and up to the renal pelvis, and there was an excellent curl on both ends when the wire was withdrawn,  using fluoroscopic guidance.      The bladder was then rinsed and drained and stone fragments collected for pathologic examination.  After the bladder was drained, cystoscope was withdrawn and set aside.      The pt tolerated all of this well, and there were no complications.  she was awakened and transferred to Recovery in stable condition.

## 2017-05-11 NOTE — DISCHARGE INSTRUCTIONS
Cystoscopy    Cystoscopy is a procedure that lets your doctor look directly inside your urethra and bladder. It can be used to:  · Help diagnose a problem with your urethra, bladder, or kidneys.  · Take a sample (biopsy) of bladder or urethral tissue.  · Treat certain problems (such as removing kidney stones).  · Place a stent to bypass an obstruction.  · Take special X-rays of the kidneys.  Based on the findings, your doctor may recommend other tests or treatments.  What is a cystoscope?  A cystoscope is a telescope-like instrument that contains lenses and fiberoptics (small glass wires that make bright light). The cystoscope may be straight and rigid, or flexible to bend around curves in the urethra. The doctor may look directly into the cystoscope, or project the image onto a monitor.  Getting ready  · Ask your doctor if you should stop taking any medications prior to the procedure.  · Ask whether you should avoid eating or drinking anything after midnight before the procedure.  · Follow any other instructions your doctor gives you.  Tell your doctor before the exam if you:  · Take any medications, such as aspirin or blood thinners  · Have allergies to any medications  · Are pregnant   The procedure  Cystoscopy is done in the doctors office or hospital. The doctor and a nurse are present during the procedure. It takes only a few minutes, longer if a biopsy, X-ray, or treatment needs to be done.  During the procedure:  · You lie on an exam table on your back, knees bent and legs apart. You are covered with a drape.  · Your urethra and the area around it are washed. Anesthetic jelly may be applied to numb the urethra. Other pain medication is usually not needed. In some cases, you may be offered a mild sedative to help you relax. If a more extensive procedure is to be done, such as a biopsy or kidney stone removal, general anesthesia may be needed.  · The cystoscope is inserted. A sterile fluid is put into the  bladder to expand it. You may feel pressure from this fluid.  · When the procedure is done, the cystoscope is removed.  After the procedure  If you had a sedative, general anesthesia, or spinal anesthesia, you must have someone drive you home. Once youre home:  · Drink plenty of fluids.  · You may have burning or light bleeding when you urinate--this is normal.  · Medications may be prescribed to ease any discomfort or prevent infection. Take these as directed.  · Call your doctor if you have heavy bleeding or blood clots, burning that lasts more than a day, a fever over 100°F  (38° C), or trouble urinating.  Date Last Reviewed: 9/8/2014  © 1022-1023 [employer]. [address] [place] [address], [place] [address]. All rights reserved. This information is not intended as a substitute for professional medical care. Always follow your healthcare professional's instructions.      Treating Kidney Stones: Ureteroscopic Stone Removal    Ureteroscopic stone removal may be done before, after, or instead of other treatments. If you need this procedure, your doctor will discuss its risks and possible complications. You will be told how to prepare. And you will be told about anesthesia that will keep you pain-free during treatment.     A ureteroscope lets your doctor see your stone before removing it.   Removing the stone through the ureter  Ureteroscopic stone removal extracts a small stone in your ureter without an incision. Your doctor places a viewing tube (ureteroscope) in your ureter. A wire basket inserted through the tube removes the stone. Sometimes, a laser or a mechanical device is used to break up the stone. A soft tube may be left in your ureter briefly to drain urine.     The stone may be fragmented. The stone is then withdrawn or passed.   Your recovery  This is an outpatient or overnight procedure. For a few days after surgery, you may feel some pain when you urinate. Or you may need to urinate more often,  or have bloody urine. You may have a ureteral stent. This is a soft tube that prevents blockage from swelling after the procedure. The stent is removed when the swelling goes down, often within days. Follow up as instructed to check for any new stones.  When to call your healthcare provider  Call your healthcare provider right away if:  · You have sudden pain or flank pain.  · You have a fever over 100.4°F (38°C).  · You have nausea that lasts for days.  · You have heavy bleeding when you urinate.  · You have heavy bleeding through your drainage tube.  · You have swelling or redness around your incision.   Date Last Reviewed: 1/5/2015 © 2000-2016 [employer]. [address] [place] [address], [place] [address]. All rights reserved. This information is not intended as a substitute for professional medical care. Always follow your healthcare professional's instructions.      Shock Wave Lithotripsy  Passing a kidney stone can be very painful. Shock wave lithotripsy is a treatment that helps by breaking the kidney stone into smaller pieces that are easier to pass. This treatment is also called extracorporeal shock wave lithotripsy (ESWL). Lithotripsy takes about an hour. Its done in a hospital, lithotripsy center, or mobile lithotripsy van. You will likely go home the same day. This treatment is not used for all types of kidney stones. Your healthcare provider will discuss whether this is the right treatment for the type of stone you have.      Energy waves strike the stone, which begins to crack. The stone crumbles into tiny pieces.   During the procedure  · You get medicine to prevent pain and help you relax or sleep during lithotripsy. Once this takes effect, the procedure will start.  · A stent (flexible tube with holes in it) may be placed into your ureter (the tube that connects the kidney and the bladder). This helps keep urine flowing from the kidney.  · Your healthcare provider then uses X-ray or  ultrasound to find the exact location of the kidney stone.  · Sound waves are aimed at the stone and sent at high speed. If youre awake, you may feel a tapping as they pass through your body.  After the procedure  · Youll be monitored in a recovery room for about 1 hour to 3 hours. Antibiotics and pain medicine may be prescribed before you leave.  · Youll have a follow-up visit in a few weeks. If you received a stent, it will be removed. Your doctor will also check for pieces of stone. If large pieces remain, you may need a second lithotripsy or another procedure.  Possible risks and complications  · Infection  · Bleeding in the kidney  · Bruising of the kidney or skin  · Obstruction (blockage) of the ureter  · Failure to break up the stone (other procedures may be needed)   Passing the stone  It can take a day to several weeks for the pieces of stone to leave your body. Drink plenty of liquids to help flush your system. During this time:  · Your urine may be cloudy or slightly bloody. You may even see small pieces of stone.  · You may have a slight fever and some pain. Take prescribed or over-the-counter pain medication as instructed by your healthcare provider.  · You may be asked to strain your urine to collect some stone particles. These will be studied in the lab.  When to call your healthcare provider   Contact your healthcare provider right away if you have any of the following:  · Fever of 100.4°F (38°C) or higher, or as directed by your healthcare provider  · Heavy bleeding  · Pain that doesnt go away with medication  · Upset stomach and vomiting  · Problems urinating   Date Last Reviewed: 11/26/2014  © 8500-1819 [employer]. [address] [place] [address], [place] [address]. All rights reserved. This information is not intended as a substitute for professional medical care. Always follow your healthcare professional's instructions.    General Information:    1. Do not drink alcoholic beverages  including beer for 24 hours or as long as you are on pain medication..  2. Do not drive a motor vehicle, operate machinery or power tools, or signs legal papers for 24 hours or as long as you are on pain medication.   3. You may experience light-headedness, dizziness, and sleepiness following surgery. Please do not stay alone. A responsible adult should be with you for this 24 hour period.  4. Go home and rest.  5. Progress slowly to a normal diet unless instructed.  Otherwise, begin with liquids such as soft drinks, then soup and crackers working up to solid foods. Drink plenty of nonalcoholic fluids.  6. Certain anesthetics and pain medications produce nausea and vomiting in certain individuals. If nausea becomes a problem at home, call you doctor.  7. A nurse will be calling you sometime after surgery. Do not be alarmed. This is our way of finding out how you are doing.  8. Several times every hour while you are awake, take 2-3 deep breaths and cough. If you had stomach surgery hold a pillow or rolled towel firmly against your stomach before you cough. This will help with any pain the cough might cause.  9. Several times every hour while you are awake, pump and flex your feet 5-6 times and do foot circles. This will help prevent blood clots.  10. Call your doctor for severe pain, bleeding, fever, or signs or symptoms of infection (pain, swelling, redness, foul odor, drainage).  11. You can contact your doctor anytime by [phone] for the [facility] (at [facility]) or [phone] for the [facility] on [facility].   [email or web address] is another way to contact your doctor if you are an active participant online with [facility].      Ciprofloxacin tablets  What is this medicine?  CIPROFLOXACIN (sip moise FLOX a sin) is a quinolone antibiotic. It is used to treat certain kinds of bacterial infections. It will not work for colds, flu, or other viral infections.  How should I use this  medicine?  Take this medicine by mouth with a glass of water. Follow the directions on the prescription label. Take your medicine at regular intervals. Do not take your medicine more often than directed. Take all of your medicine as directed even if you think your are better. Do not skip doses or stop your medicine early.  You can take this medicine with food or on an empty stomach. It can be taken with a meal that contains dairy or calcium, but do not take it alone with a dairy product, like milk or yogurt or calcium-fortified juice.  A special MedGuide will be given to you by the pharmacist with each prescription and refill. Be sure to read this information carefully each time.  Talk to your pediatrician regarding the use of this medicine in children. Special care may be needed.  What side effects may I notice from receiving this medicine?  Side effects that you should report to your doctor or health care professional as soon as possible:  · allergic reactions like skin rash or hives, swelling of the face, lips, or tongue  · anxious  · confusion  · depressed mood  · diarrhea  · fast, irregular heartbeat  · hallucination, loss of contact with reality  · joint, muscle, or tendon pain or swelling  · pain, tingling, numbness in the hands or feet  · suicidal thoughts or other mood changes  · sunburn  · unusually weak or tired  Side effects that usually do not require medical attention (report to your doctor or health care professional if they continue or are bothersome):  · dry mouth  · headache  · nausea  · trouble sleeping  What may interact with this medicine?  Do not take this medicine with any of the following medications:  · cisapride  · droperidol  · terfenadine  · tizanidine  This medicine may also interact with the following medications:  · antacids  · birth control pills  · caffeine  · cyclosporin  · didanosine (ddI) buffered tablets or powder  · medicines for diabetes  · medicines for inflammation like  ibuprofen, naproxen  · methotrexate  · multivitamins  · omeprazole  · phenytoin  · probenecid  · sucralfate  · theophylline  · warfarin  What if I miss a dose?  If you miss a dose, take it as soon as you can. If it is almost time for your next dose, take only that dose. Do not take double or extra doses.  Where should I keep my medicine?  Keep out of the reach of children.  Store at room temperature below 30 degrees C (86 degrees F). Keep container tightly closed. Throw away any unused medicine after the expiration date.  What should I tell my health care provider before I take this medicine?  They need to know if you have any of these conditions:  · bone problems  · cerebral disease  · history of low levels of potassium in the blood  · joint problems  · irregular heartbeat  · kidney disease  · myasthenia gravis  · seizures  · tendon problems  · tingling of the fingers or toes, or other nerve disorder  · an unusual or allergic reaction to ciprofloxacin, other antibiotics or medicines, foods, dyes, or preservatives  · pregnant or trying to get pregnant  · breast-feeding  What should I watch for while using this medicine?  Tell your doctor or health care professional if your symptoms do not improve.  Do not treat diarrhea with over the counter products. Contact your doctor if you have diarrhea that lasts more than 2 days or if it is severe and watery.  You may get drowsy or dizzy. Do not drive, use machinery, or do anything that needs mental alertness until you know how this medicine affects you. Do not stand or sit up quickly, especially if you are an older patient. This reduces the risk of dizzy or fainting spells.  This medicine can make you more sensitive to the sun. Keep out of the sun. If you cannot avoid being in the sun, wear protective clothing and use sunscreen. Do not use sun lamps or tanning beds/booths.  Avoid antacids, aluminum, calcium, iron, magnesium, and zinc products for 6 hours before and 2 hours  after taking a dose of this medicine.  Date Last Reviewed:   NOTE:This sheet is a summary. It may not cover all possible information. If you have questions about this medicine, talk to your doctor, pharmacist, or health care provider. Copyright© 2016 [employer]      Docusate capsules  What is this medicine?  DOCUSATE (doc CUE sayt) is stool softener. It helps prevent constipation and straining or discomfort associated with hard or dry stools.  How should I use this medicine?  Take this medicine by mouth with a glass of water. Follow the directions on the label. Take your doses at regular intervals. Do not take your medicine more often than directed.  Talk to your pediatrician regarding the use of this medicine in children. While this medicine may be prescribed for children as young as 2 years for selected conditions, precautions do apply.  What side effects may I notice from receiving this medicine?  Side effects that you should report to your doctor or health care professional as soon as possible:  · allergic reactions like skin rash, itching or hives, swelling of the face, lips, or tongue  Side effects that usually do not require medical attention (report to your doctor or health care professional if they continue or are bothersome):  · diarrhea  · stomach cramps  · throat irritation  What may interact with this medicine?  · mineral oil  What if I miss a dose?  If you miss a dose, take it as soon as you can. If it is almost time for your next dose, take only that dose. Do not take double or extra doses.  Where should I keep my medicine?  Keep out of the reach of children.  Store at room temperature between 15 and 30 degrees C (59 and 86 degrees F). Throw away any unused medicine after the expiration date.  What should I tell my health care provider before I take this medicine?  They need to know if you have any of these conditions:  · nausea or vomiting  · severe constipation  · stomach pain  · sudden change in  bowel habit lasting more than 2 weeks  · an unusual or allergic reaction to docusate, other medicines, foods, dyes, or preservatives  · pregnant or trying to get pregnant  · breast-feeding  What should I watch for while using this medicine?  Do not use for more than one week without advice from your doctor or health care professional. If your constipation returns, check with your doctor or health care professional.  Drink plenty of water while taking this medicine. Drinking water helps decrease constipation.  Stop using this medicine and contact your doctor or health care professional if you experience any rectal bleeding or do not have a bowel movement after use. These could be signs of a more serious condition.  Date Last Reviewed:   NOTE:This sheet is a summary. It may not cover all possible information. If you have questions about this medicine, talk to your doctor, pharmacist, or health care provider. Copyright© 2016 [employer]      Acetaminophen; Oxycodone tablets  What is this medicine?  ACETAMINOPHEN; OXYCODONE (a set a JUAN DIEGO radha fen; ox i KOE done) is a pain reliever. It is used to treat moderate to severe pain.  How should I use this medicine?  Take this medicine by mouth with a full glass of water. Follow the directions on the prescription label. You can take it with or without food. If it upsets your stomach, take it with food. Take your medicine at regular intervals. Do not take it more often than directed.  A special MedGuide will be given to you by the pharmacist with each prescription and refill. Be sure to read this information carefully each time.  Talk to your pediatrician regarding the use of this medicine in children. Special care may be needed.  What side effects may I notice from receiving this medicine?  Side effects that you should report to your doctor or health care professional as soon as possible:  · allergic reactions like skin rash, itching or hives, swelling of the face, lips, or  tongue  · breathing problems  · confusion  · redness, blistering, peeling or loosening of the skin, including inside the mouth  · signs and symptoms of liver injury like dark yellow or brown urine; general ill feeling or flu-like symptoms; light-colored stools; loss of appetite; nausea; right upper belly pain; unusually weak or tired; yellowing of the eyes or skin  · signs and symptoms of low blood pressure like dizziness; feeling faint or lightheaded, falls; unusually weak or tired  · trouble passing urine or change in the amount of urine  Side effects that usually do not require medical attention (report to your doctor or health care professional if they continue or are bothersome):  · constipation  · dry mouth  · nausea, vomiting  · tiredness  What may interact with this medicine?  This medicine may interact with the following medications:  · alcohol  · antihistamines for allergy, cough and cold  · antiviral medicines for HIV or AIDS  · atropine  · certain antibiotics like clarithromycin, erythromycin, linezolid, rifampin  · certain medicines for anxiety or sleep  · certain medicines for bladder problems like oxybutynin, tolterodine  · certain medicines for depression like amitriptyline, fluoxetine, sertraline  · certain medicines for fungal infections like ketoconazole, itraconazole, voriconazole  · certain medicines for migraine headache like almotriptan, eletriptan, frovatriptan, naratriptan, rizatriptan, sumatriptan, zolmitriptan  · certain medicines for nausea or vomiting like dolasetron, ondansetron, palonosetron  · certain medicines for Parkinson's disease like benztropine, trihexyphenidyl  · certain medicines for seizures like phenobarbital, phenytoin, primidone  · certain medicines for stomach problems like dicyclomine, hyoscyamine  · certain medicines for travel sickness like scopolamine  · diuretics  · general anesthetics like halothane, isoflurane, methoxyflurane, propofol  · ipratropium  · local  anesthetics like lidocaine, pramoxine, tetracaine  · MAOIs like Carbex, Eldepryl, Marplan, Nardil, and Parnate  · medicines that relax muscles for surgery  · methylene blue  · nilotinib  · other medicines with acetaminophen  · other narcotic medicines for pain or cough  · phenothiazines like chlorpromazine, mesoridazine, prochlorperazine, thioridazine  What if I miss a dose?  If you miss a dose, take it as soon as you can. If it is almost time for your next dose, take only that dose. Do not take double or extra doses.  Where should I keep my medicine?  Keep out of the reach of children. This medicine can be abused. Keep your medicine in a safe place to protect it from theft. Do not share this medicine with anyone. Selling or giving away this medicine is dangerous and against the law.  This medicine may cause accidental overdose and death if it taken by other adults, children, or pets. Mix any unused medicine with a substance like cat litter or coffee grounds. Then throw the medicine away in a sealed container like a sealed bag or a coffee can with a lid. Do not use the medicine after the expiration date.  Store at room temperature between 20 and 25 degrees C (68 and 77 degrees F).  What should I tell my health care provider before I take this medicine?  They need to know if you have any of these conditions:  · brain tumor  · Crohn's disease, inflammatory bowel disease, or ulcerative colitis  · drug abuse or addiction  · head injury  · heart or circulation problems  · if you often drink alcohol  · kidney disease or problems going to the bathroom  · liver disease  · lung disease, asthma, or breathing problems  · an unusual or allergic reaction to acetaminophen, oxycodone, other opioid analgesics, other medicines, foods, dyes, or preservatives  · pregnant or trying to get pregnant  · breast-feeding  What should I watch for while using this medicine?  Tell your doctor or health care professional if your pain does not go  away, if it gets worse, or if you have new or a different type of pain. You may develop tolerance to the medicine. Tolerance means that you will need a higher dose of the medication for pain relief. Tolerance is normal and is expected if you take this medicine for a long time.  Do not suddenly stop taking your medicine because you may develop a severe reaction. Your body becomes used to the medicine. This does NOT mean you are addicted. Addiction is a behavior related to getting and using a drug for a non-medical reason. If you have pain, you have a medical reason to take pain medicine. Your doctor will tell you how much medicine to take. If your doctor wants you to stop the medicine, the dose will be slowly lowered over time to avoid any side effects.  There are different types of narcotic medicines (opiates). If you take more than one type at the same time or if you are taking another medicine that also causes drowsiness, you may have more side effects. Give your health care provider a list of all medicines you use. Your doctor will tell you how much medicine to take. Do not take more medicine than directed. Call emergency for help if you have problems breathing or unusual sleepiness.  Do not take other medicines that contain acetaminophen with this medicine. Always read labels carefully. If you have questions, ask your doctor or pharmacist.  If you take too much acetaminophen get medical help right away. Too much acetaminophen can be very dangerous and cause liver damage. Even if you do not have symptoms, it is important to get help right away.  You may get drowsy or dizzy. Do not drive, use machinery, or do anything that needs mental alertness until you know how this medicine affects you. Do not stand or sit up quickly, especially if you are an older patient. This reduces the risk of dizzy or fainting spells. Alcohol may interfere with the effect of this medicine. Avoid alcoholic drinks.  The medicine will cause  constipation. Try to have a bowel movement at least every 2 to 3 days. If you do not have a bowel movement for 3 days, call your doctor or health care professional.  Your mouth may get dry. Chewing sugarless gum or sucking hard candy, and drinking plenty or water may help. Contact your doctor if the problem does not go away or is severe.  Date Last Reviewed:   NOTE:This sheet is a summary. It may not cover all possible information. If you have questions about this medicine, talk to your doctor, pharmacist, or health care provider. Copyright© 2016 [employer]

## 2017-05-11 NOTE — PLAN OF CARE
Pt resting on stretcher stating she has rt flank and vaginal discomfort. iV pain med administered, pt states she would like  To hold off on any further pain med at this time and wait a moment before being transferred to Phase II of Recovery. Will cont to monitor.

## 2017-05-12 ENCOUNTER — TELEPHONE (OUTPATIENT)
Dept: UROLOGY | Facility: CLINIC | Age: 62
End: 2017-05-12

## 2017-05-12 NOTE — TELEPHONE ENCOUNTER
----- Message from Geoff Love IV, MD sent at 5/11/2017 11:16 AM CDT -----  RTC 4 wks with a Mag3 a few days prior (I put the order in already).  Needs a moya for it please arrange.  RTC should be for a cysto/stent pull

## 2017-05-15 ENCOUNTER — TELEPHONE (OUTPATIENT)
Dept: UROLOGY | Facility: CLINIC | Age: 62
End: 2017-05-15

## 2017-05-15 NOTE — TELEPHONE ENCOUNTER
"Patient requesting that stent be pulled sooner that 6/8/17. She states she is constantly going to the restroom and believes she is loosing a lot of fluid despite drinking a gallon of water daily. Informed patient that the stent causes frequency and to maintain her fluid intake. Patient still request be removed sooner to "prevent dehydration"... Please advise.  "

## 2017-05-15 NOTE — TELEPHONE ENCOUNTER
----- Message from Promise Sanjay sent at 5/15/2017  2:52 PM CDT -----  Contact: pt-  [phone]  Pt had sx last Thursday.  Pt is having a great deal of pain.   Needs to know what is normal.    Pharmacy_  [facility]

## 2017-05-16 RX ORDER — OXYBUTYNIN CHLORIDE 5 MG/1
5 TABLET ORAL 3 TIMES DAILY
Qty: 90 TABLET | Refills: 11 | Status: SHIPPED | OUTPATIENT
Start: 2017-05-16 | End: 2018-04-03

## 2017-05-24 LAB — URINARY STONE ANALYSIS: NORMAL

## 2017-05-25 ENCOUNTER — TELEPHONE (OUTPATIENT)
Dept: UROLOGY | Facility: CLINIC | Age: 62
End: 2017-05-25

## 2017-05-25 NOTE — TELEPHONE ENCOUNTER
----- Message from Zuleyka Rodríguez sent at 5/25/2017 12:57 PM CDT -----  Patient would like for you to call her regarding and appointment.    Call her at [phone].                            meyers

## 2017-05-30 ENCOUNTER — TELEPHONE (OUTPATIENT)
Dept: UROLOGY | Facility: CLINIC | Age: 62
End: 2017-05-30

## 2017-05-30 ENCOUNTER — HOSPITAL ENCOUNTER (EMERGENCY)
Facility: HOSPITAL | Age: 62
Discharge: HOME | End: 2017-05-30
Attending: SPECIALIST
Payer: COMMERCIAL

## 2017-05-30 VITALS
OXYGEN SATURATION: 98 % | BODY MASS INDEX: 38.3 KG/M2 | RESPIRATION RATE: 20 BRPM | WEIGHT: 244 LBS | HEART RATE: 69 BPM | DIASTOLIC BLOOD PRESSURE: 65 MMHG | SYSTOLIC BLOOD PRESSURE: 110 MMHG | TEMPERATURE: 98 F | HEIGHT: 67 IN

## 2017-05-30 DIAGNOSIS — R35.0 URINARY FREQUENCY: ICD-10-CM

## 2017-05-30 DIAGNOSIS — N20.1 URETEROLITHIASIS: ICD-10-CM

## 2017-05-30 DIAGNOSIS — T83.84XA PAIN DUE TO URETERAL STENT, INITIAL ENCOUNTER: Primary | ICD-10-CM

## 2017-05-30 DIAGNOSIS — R31.9 HEMATURIA: ICD-10-CM

## 2017-05-30 DIAGNOSIS — R10.9 RIGHT FLANK PAIN: ICD-10-CM

## 2017-05-30 DIAGNOSIS — N13.30 HYDRONEPHROSIS, UNSPECIFIED HYDRONEPHROSIS TYPE: ICD-10-CM

## 2017-05-30 LAB
ALBUMIN SERPL BCP-MCNC: 3.7 G/DL
ALP SERPL-CCNC: 74 U/L
ALT SERPL W/O P-5'-P-CCNC: 32 U/L
ANION GAP SERPL CALC-SCNC: 9 MMOL/L
APTT BLDCRRT: 26.9 SEC
AST SERPL-CCNC: 17 U/L
BACTERIA #/AREA URNS HPF: ABNORMAL /HPF
BASOPHILS # BLD AUTO: 0.06 K/UL
BASOPHILS NFR BLD: 0.6 %
BILIRUB SERPL-MCNC: 0.4 MG/DL
BILIRUB UR QL STRIP: NEGATIVE
BUN SERPL-MCNC: 13 MG/DL
CALCIUM SERPL-MCNC: 9.9 MG/DL
CHLORIDE SERPL-SCNC: 104 MMOL/L
CLARITY UR: CLEAR
CO2 SERPL-SCNC: 27 MMOL/L
COLOR UR: YELLOW
CREAT SERPL-MCNC: 0.8 MG/DL
DIFFERENTIAL METHOD: ABNORMAL
EOSINOPHIL # BLD AUTO: 0.3 K/UL
EOSINOPHIL NFR BLD: 2.4 %
ERYTHROCYTE [DISTWIDTH] IN BLOOD BY AUTOMATED COUNT: 13 %
EST. GFR  (AFRICAN AMERICAN): >60 ML/MIN/1.73 M^2
EST. GFR  (NON AFRICAN AMERICAN): >60 ML/MIN/1.73 M^2
GLUCOSE SERPL-MCNC: 110 MG/DL
GLUCOSE UR QL STRIP: NEGATIVE
HCT VFR BLD AUTO: 39.5 %
HGB BLD-MCNC: 12.5 G/DL
HGB UR QL STRIP: ABNORMAL
HYALINE CASTS #/AREA URNS LPF: 0 /LPF
INR PPP: 0.9
KETONES UR QL STRIP: ABNORMAL
LEUKOCYTE ESTERASE UR QL STRIP: ABNORMAL
LYMPHOCYTES # BLD AUTO: 3.1 K/UL
LYMPHOCYTES NFR BLD: 30.1 %
MCH RBC QN AUTO: 30.1 PG
MCHC RBC AUTO-ENTMCNC: 31.6 %
MCV RBC AUTO: 95 FL
MICROSCOPIC COMMENT: ABNORMAL
MONOCYTES # BLD AUTO: 0.7 K/UL
MONOCYTES NFR BLD: 6.3 %
NEUTROPHILS # BLD AUTO: 6.2 K/UL
NEUTROPHILS NFR BLD: 60.6 %
NITRITE UR QL STRIP: POSITIVE
PH UR STRIP: 5 [PH] (ref 5–8)
PLATELET # BLD AUTO: 285 K/UL
PMV BLD AUTO: 9.7 FL
POTASSIUM SERPL-SCNC: 3.6 MMOL/L
PROT SERPL-MCNC: 7.7 G/DL
PROT UR QL STRIP: ABNORMAL
PROTHROMBIN TIME: 9.7 SEC
RBC # BLD AUTO: 4.15 M/UL
RBC #/AREA URNS HPF: >100 /HPF (ref 0–4)
SODIUM SERPL-SCNC: 140 MMOL/L
SP GR UR STRIP: 1.02 (ref 1–1.03)
URN SPEC COLLECT METH UR: ABNORMAL
UROBILINOGEN UR STRIP-ACNC: 1 EU/DL
WBC # BLD AUTO: 10.3 K/UL
WBC #/AREA URNS HPF: 20 /HPF (ref 0–5)
WBC CLUMPS URNS QL MICRO: ABNORMAL

## 2017-05-30 PROCEDURE — 81000 URINALYSIS NONAUTO W/SCOPE: CPT

## 2017-05-30 PROCEDURE — 51702 INSERT TEMP BLADDER CATH: CPT

## 2017-05-30 PROCEDURE — 63600175 PHARM REV CODE 636 W HCPCS: Performed by: EMERGENCY MEDICINE

## 2017-05-30 PROCEDURE — 85610 PROTHROMBIN TIME: CPT

## 2017-05-30 PROCEDURE — 96375 TX/PRO/DX INJ NEW DRUG ADDON: CPT

## 2017-05-30 PROCEDURE — 85025 COMPLETE CBC W/AUTO DIFF WBC: CPT

## 2017-05-30 PROCEDURE — 96361 HYDRATE IV INFUSION ADD-ON: CPT

## 2017-05-30 PROCEDURE — 25000003 PHARM REV CODE 250: Performed by: EMERGENCY MEDICINE

## 2017-05-30 PROCEDURE — 80053 COMPREHEN METABOLIC PANEL: CPT

## 2017-05-30 PROCEDURE — 99285 EMERGENCY DEPT VISIT HI MDM: CPT | Mod: 25

## 2017-05-30 PROCEDURE — 85730 THROMBOPLASTIN TIME PARTIAL: CPT

## 2017-05-30 PROCEDURE — 87086 URINE CULTURE/COLONY COUNT: CPT

## 2017-05-30 PROCEDURE — 96365 THER/PROPH/DIAG IV INF INIT: CPT

## 2017-05-30 RX ORDER — SODIUM CHLORIDE 9 MG/ML
1000 INJECTION, SOLUTION INTRAVENOUS
Status: COMPLETED | OUTPATIENT
Start: 2017-05-30 | End: 2017-05-30

## 2017-05-30 RX ORDER — KETOROLAC TROMETHAMINE 30 MG/ML
30 INJECTION, SOLUTION INTRAMUSCULAR; INTRAVENOUS
Status: COMPLETED | OUTPATIENT
Start: 2017-05-30 | End: 2017-05-30

## 2017-05-30 RX ORDER — CEFUROXIME AXETIL 500 MG/1
500 TABLET ORAL EVERY 12 HOURS
Qty: 20 TABLET | Refills: 0 | Status: SHIPPED | OUTPATIENT
Start: 2017-05-30 | End: 2017-06-09

## 2017-05-30 RX ADMIN — SODIUM CHLORIDE 1000 ML: 0.9 INJECTION, SOLUTION INTRAVENOUS at 05:05

## 2017-05-30 RX ADMIN — KETOROLAC TROMETHAMINE 30 MG: 30 INJECTION, SOLUTION INTRAMUSCULAR at 05:05

## 2017-05-30 RX ADMIN — CEFTRIAXONE 2 G: 2 INJECTION, SOLUTION INTRAVENOUS at 05:05

## 2017-05-30 NOTE — ED PROVIDER NOTES
"SCRIBE #1 NOTE: I, Sanjay Liriano, am scribing for, and in the presence of, Zoya Rivera MD. I have scribed the HPI, ROS, and PEx    SCRIBE #2 NOTE: I, Guera Christensen , am scribing for, and in the presence of,  Dr. Jose Garrison. I have scribed the remaining portions of the note not scribed by Scribe #1.     History      Chief Complaint   Patient presents with    Post-op Problem     reports placement of a urostomy stent on 5/11 with increased pain and bleeing with urination       Review of patient's allergies indicates:   Allergen Reactions    Antihistamines - alkylamine Anaphylaxis and Itching     Cough, throat itches    Doxycycline Other (See Comments)     Stomach pain    Iodine and iodide containing products Other (See Comments)     Tongue swelling    Tramadol Nausea Only    Yeast Itching     Facial swelling, constipation        HPI   HPI    5/30/2017, 3:36 PM   History obtained from the patient      History of Present Illness: Latosha Enriquez is a 61 y.o. female patient who presents to the Emergency Department for further evaluation regarding a ureteral stent placed on 5/11 by Dr. Love. Pt c/o of frequent urination, stating she urinates 30-40 times a day. Symptoms are episodic and moderate in severity.  No mitigating or exacerbating factors reported. Associated sxs include hematuria, generalized weakness, and generalized body pain. Pt states that the generalized body pain is "so bad that she can't walk." Patient denies any fever, chills, constipation, hematochezia, dysuria, n/v/d., and all other sxs at this time. Pt has past prior Hx of anemia. No further complaints or concerns at this time.         Arrival mode: Personal vehicle     PCP: Gabbie Ronquillo MD       Past Medical History:  Past Medical History:   Diagnosis Date    Asthma     Colon polyps     Diabetes mellitus     Diabetes mellitus type II, uncontrolled     Diverticulosis of large intestine without hemorrhage 11/23/2015    " Elevated liver enzymes     Encounter for blood transfusion     GERD (gastroesophageal reflux disease)     Gout, unspecified     Hemorrhoids, internal 2015    Hyperlipidemia     Hypertension     IBS (irritable bowel syndrome)     Morbid obesity with BMI of 40.0-44.9, adult     Nasal vestibulitis     Osteoarthritis of multiple joints     Type 2 diabetes mellitus with both eyes affected by mild nonproliferative retinopathy and macular edema, with long-term current use of insulin 2016    Urolithiasis     Vitamin D deficiency disease        Past Surgical History:  Past Surgical History:   Procedure Laterality Date    BREAST BIOPSY Right      SECTION, CLASSIC      COLONOSCOPY N/A 2015    Procedure: COLONOSCOPY;  Surgeon: Stanislav Pickard MD;  Location[facility];  Service: Endoscopy;  Laterality: N/A;    EXTRACORPOREAL SHOCK WAVE LITHOTRIPSY      TOTAL ABDOMINAL HYSTERECTOMY      URETEROSCOPY           Family History:  Family History   Problem Relation Age of Onset    Hypertension Mother     Heart disease Mother     Diabetes Mother     Colon polyps Mother     Other Mother      lower limb amputation    Hypertension Brother     Stroke Brother     Ovarian cancer Paternal Aunt     Colon cancer Maternal Grandmother     Diabetes Brother        Social History:  Social History     Social History Main Topics    Smoking status: Never Smoker    Smokeless tobacco: Never Used    Alcohol use No    Drug use: No    Sexual activity: No       ROS   Review of Systems   Constitutional: Negative for fever.          + generalized weakness     HENT: Negative for sore throat.    Respiratory: Negative for chest tightness and shortness of breath.    Cardiovascular: Negative for chest pain.   Gastrointestinal: Negative for abdominal pain, anal bleeding, blood in stool, constipation, diarrhea, nausea and vomiting.   Genitourinary: Positive for frequency (30-40 times) and hematuria. Negative  "for dysuria, flank pain and urgency.   Musculoskeletal: Negative for back pain.        + generalized body pain   Skin: Negative for rash and wound.   Neurological: Negative for weakness.   Hematological: Does not bruise/bleed easily.   All other systems reviewed and are negative.      Physical Exam      Initial Vitals [05/30/17 1521]   BP Pulse Resp Temp SpO2   (!) 156/98 103 18 98 °F (36.7 °C) 97 %      Physical Exam  Nursing Notes and Vital Signs Reviewed.  Constitutional: Patient is in no apparent distress. Well-developed and well-nourished.  Head: Atraumatic. Normocephalic.  Eyes: PERRL. EOM intact. Conjunctivae are not pale. No scleral icterus.  ENT: Mucous membranes are moist. Oropharynx is clear and symmetric.    Neck: Supple. Full ROM. No lymphadenopathy.  Cardiovascular: Tachycardic. Regular rhythm. No murmurs, rubs, or gallops. Distal pulses are 2+ and symmetric.  Pulmonary/Chest: No respiratory distress. Clear to auscultation bilaterally. No wheezing, rales, or rhonchi.  Abdominal: Soft and non-distended.  There is no tenderness.  No rebound, guarding, or rigidity. Good bowel sounds.  Genitourinary: Right CVA tenderness  Musculoskeletal: Moves all extremities. No obvious deformities. No edema. No calf tenderness.  Skin: Warm and dry.  Neurological:  Alert, awake, and appropriate.  Normal speech.  No acute focal neurological deficits are appreciated.  Psychiatric: Normal affect. Good eye contact. Appropriate in content.    ED Course    Procedures  ED Vital Signs:  Vitals:    05/30/17 1521 05/30/17 1653 05/30/17 1657   BP: (!) 156/98 118/70    Pulse: 103  80   Resp: 18  17   Temp: 98 °F (36.7 °C)     TempSrc: Oral     SpO2: 97%  97%   Weight: 110.7 kg (244 lb)     Height: 5' 7" (1.702 m)         Abnormal Lab Results:  Labs Reviewed   CBC W/ AUTO DIFFERENTIAL - Abnormal; Notable for the following:        Result Value    MCHC 31.6 (*)     All other components within normal limits   URINALYSIS - Abnormal; " Notable for the following:     Protein, UA 2+ (*)     Ketones, UA Trace (*)     Occult Blood UA 3+ (*)     Nitrite, UA Positive (*)     Leukocytes, UA 2+ (*)     All other components within normal limits   URINALYSIS MICROSCOPIC - Abnormal; Notable for the following:     RBC, UA >100 (*)     WBC, UA 20 (*)     WBC Clumps, UA Occasional (*)     Bacteria, UA Few (*)     All other components within normal limits   CULTURE, URINE   COMPREHENSIVE METABOLIC PANEL   PROTIME-INR   APTT        All Lab Results:  Results for orders placed or performed during the hospital encounter of 05/30/17   CBC auto differential   Result Value Ref Range    WBC 10.30 3.90 - 12.70 K/uL    RBC 4.15 4.00 - 5.40 M/uL    Hemoglobin 12.5 12.0 - 16.0 g/dL    Hematocrit 39.5 37.0 - 48.5 %    MCV 95 82 - 98 fL    MCH 30.1 27.0 - 31.0 pg    MCHC 31.6 (L) 32.0 - 36.0 %    RDW 13.0 11.5 - 14.5 %    Platelets 285 150 - 350 K/uL    MPV 9.7 9.2 - 12.9 fL    Gran # 6.2 1.8 - 7.7 K/uL    Lymph # 3.1 1.0 - 4.8 K/uL    Mono # 0.7 0.3 - 1.0 K/uL    Eos # 0.3 0.0 - 0.5 K/uL    Baso # 0.06 0.00 - 0.20 K/uL    Gran% 60.6 38.0 - 73.0 %    Lymph% 30.1 18.0 - 48.0 %    Mono% 6.3 4.0 - 15.0 %    Eosinophil% 2.4 0.0 - 8.0 %    Basophil% 0.6 0.0 - 1.9 %    Differential Method Automated    Comprehensive metabolic panel   Result Value Ref Range    Sodium 140 136 - 145 mmol/L    Potassium 3.6 3.5 - 5.1 mmol/L    Chloride 104 95 - 110 mmol/L    CO2 27 23 - 29 mmol/L    Glucose 110 70 - 110 mg/dL    BUN, Bld 13 6 - 20 mg/dL    Creatinine 0.8 0.5 - 1.4 mg/dL    Calcium 9.9 8.7 - 10.5 mg/dL    Total Protein 7.7 6.0 - 8.4 g/dL    Albumin 3.7 3.5 - 5.2 g/dL    Total Bilirubin 0.4 0.1 - 1.0 mg/dL    Alkaline Phosphatase 74 55 - 135 U/L    AST 17 10 - 40 U/L    ALT 32 10 - 44 U/L    Anion Gap 9 8 - 16 mmol/L    eGFR if African American >60 >60 mL/min/1.73 m^2    eGFR if non African American >60 >60 mL/min/1.73 m^2   Protime-INR   Result Value Ref Range    Prothrombin Time 9.7 9.0  - 12.5 sec    INR 0.9 0.8 - 1.2   APTT   Result Value Ref Range    aPTT 26.9 21.0 - 32.0 sec   Urinalysis   Result Value Ref Range    Specimen UA Urine, Clean Catch     Color, UA Yellow Yellow, Straw, Tabby    Appearance, UA Clear Clear    pH, UA 5.0 5.0 - 8.0    Specific Gravity, UA 1.025 1.005 - 1.030    Protein, UA 2+ (A) Negative    Glucose, UA Negative Negative    Ketones, UA Trace (A) Negative    Bilirubin (UA) Negative Negative    Occult Blood UA 3+ (A) Negative    Nitrite, UA Positive (A) Negative    Urobilinogen, UA 1.0 <2.0 EU/dL    Leukocytes, UA 2+ (A) Negative   Urinalysis Microscopic   Result Value Ref Range    RBC, UA >100 (H) 0 - 4 /hpf    WBC, UA 20 (H) 0 - 5 /hpf    WBC Clumps, UA Occasional (A) None-Rare    Bacteria, UA Few (A) None-Occ /hpf    Hyaline Casts, UA 0 0-1/lpf /lpf    Microscopic Comment SEE COMMENT          Imaging Results:  Imaging Results          CT Renal Stone Study ABD Pelvis WO (Final result)  Result time 05/30/17 16:31:47    Final result by Cyrus Euceda III, MD (05/30/17 16:31:47)                 Impression:        1. Interval placement of a double-J type ureteral stent in position of the right with reduction in the hydronephrosis and hydroureter on the right although there is still moderate residual.    2. Ventral abdominal wall hernia containing bowel without obstruction again noted. Otherwise as above.      Electronically signed by: CYRUS EUCEDA MD  Date:     05/30/17  Time:    16:31              Narrative:    CT of the abdomen and pelvis without contrast.    Clinical indication: Abdominal pain. Kidney stones.    Compared to May 3.    Heart size is at least borderline enlarged. Suspicion of a small hiatal hernia. Extreme lung bases show minimal atelectatic change. There is no bowel obstruction or free intraperitoneal air. Bony windows show no gross acute abnormality. Multilevel degenerative changes again noted. These are again most severe at L5-S1 with  interspace narrowing and mild subluxation.        The liver and spleen remain grossly unremarkable for a noncontrasted examination. No adrenal or pancreatic mass or enlargement. Gallbladder is of normal CT appearance. Moderate volume of stool within the colon. No gross evidence of acute appendicitis. Cannot exclude a few diverticula in the distal colon with no gross evidence of diverticulitis.    Left kidney shows no significant finding. No detrimental change.    Right kidney shows interval placement of a ureteral stent with the proximal J. in the renal pelvis and the distal J. coiled within the bladder. There is at least borderline to mild bladder wall thickening.    These small stone previously noted in the mid to lower pole of the right kidney is again identified. Significant reduction in the dilatation of the right renal pelvis and an to a lesser extent the ureter compared to the prior study with mild periureteral and perinephric stranding again identified. The stone previously identified in the distal ureter is no longer present. Small anterior abdominal wall hernia again noted. No bowel obstruction.                                      The Emergency Provider reviewed the vital signs and test results, which are outlined above.    ED Discussion     4:03 PM: Dr. Marrufo discussed the pt's case with Dr. Love (Urology) who wants to be called when the renal CT and nuclear medicine flow study comes in.    4:04 PM: Dr. Marrufo transfers care of pt to Dr. Garrison, pending labs and radiology results.    4:40 PM: [email or web address] states that Dr. Love recommends the nuclear renal scan may be canceled for tonight and performed tomorrow morning. Currently awaiting lab results. Confirmed recommendation with Dr. Love.    4:59 PM: Reassessed pt at this time.  Pt is alert, awake, and in no apparent distress. Discussed with pt all pertinent ED information and results. Discussed pt dx and plan of tx. Gave pt all f/u and return to  the ED instructions. All questions and concerns were addressed at this time. Pt expresses understanding of information and instructions, and is comfortable with plan to discharge. Pt is stable for discharge.    I discussed with patient and/or family/caretaker that evaluation in the ED does not suggest any emergent or life threatening medical conditions requiring immediate intervention beyond what was provided in the ED, and I believe patient is safe for discharge.  Regardless, an unremarkable evaluation in the ED does not preclude the development or presence of a serious of life threatening condition. As such, patient was instructed to return immediately for any worsening or change in current symptoms.              ED Medication(s):  Medications   cefTRIAXone (ROCEPHIN) 2 g in dextrose 5 % 50 mL IVPB (2 g Intravenous New Bag 5/30/17 1724)   0.9%  NaCl infusion (1,000 mLs Intravenous New Bag 5/30/17 1726)   ketorolac injection 30 mg (30 mg Intravenous Given 5/30/17 1731)       New Prescriptions    CEFUROXIME (CEFTIN) 500 MG TABLET    Take 1 tablet (500 mg total) by mouth every 12 (twelve) hours.       Follow-up Information     Geoff Love IV, MD. Go in 1 day.    Specialty:  Urology  Contact information:  [address] [place] [address] [address] Faustino TRONCOSO [address] [phone]                     Medical Decision Making    Medical Decision Making:   Clinical Tests:   Lab Tests: Ordered and Reviewed  Radiological Study: Ordered and Reviewed           Scribe Attestation:   Scribe #1: I performed the above scribed service and the documentation accurately describes the services I performed. I attest to the accuracy of the note.    Attending:   Physician Attestation Statement for Scribe #1: I, Zoay Rivera MD, personally performed the services described in this documentation, as scribed by Sanjay Liriano, in my presence, and it is both accurate and complete.       Scribe Attestation:   Scribe #2: I performed the above  scribed service and the documentation accurately describes the services I performed. I attest to the accuracy of the note.    Attending Attestation:           Physician Attestation for Scribe:    Physician Attestation Statement for Scribe #2: I, Guera Christensen, reviewed documentation, as scribed by Dr. Robin Garrison in my presence, and it is both accurate and complete. I also acknowledge and confirm the content of the note done by Scribe #1.          Clinical Impression       ICD-10-CM ICD-9-CM   1. Pain due to ureteral stent, initial encounter T83.84XA 996.76     338.18   2. Ureterolithiasis N20.1 592.1   3. Urinary frequency R35.0 788.41   4. Hematuria R31.9 599.70   5. Right flank pain R10.9 789.09   6. Hydronephrosis, unspecified hydronephrosis type N13.30 591       ICD-10-CM ICD-9-CM   1. Pain due to ureteral stent, initial encounter T83.84XA 996.76     338.18   2. Ureterolithiasis N20.1 592.1   3. Urinary frequency R35.0 788.41   4. Hematuria R31.9 599.70   5. Right flank pain R10.9 789.09   6. Hydronephrosis, unspecified hydronephrosis type N13.30 591       Disposition:   Disposition: Discharged  Condition: Stable         Robin Garrison MD  05/30/17 1831       Robin Garrison MD  05/30/17 1832

## 2017-05-30 NOTE — TELEPHONE ENCOUNTER
----- Message from Emelyn Dee sent at 5/30/2017  9:46 AM CDT -----  Contact: self [phone]  States that she needs to speak to Carline regarding post op questions. Please call back at [phone]//thank you acc

## 2017-05-30 NOTE — ED NOTES
Pt resting in ER stretcher, aaox4, rr e/u, NAD noted. Pt remains on cardiac monitor with vss noted. Bed low and locked, call light in reach, side rails up x2. Mother, daughter, grandson at bedside.  Pt informed that IV antibiotics will be complete in 60 minutes; IV fluids continue to infuse as well.  Pt verbalized understanding of status and POC; denies further needs. Will continue to monitor.

## 2017-05-30 NOTE — TELEPHONE ENCOUNTER
"Patient states she is in terrible pain. She states she feels like something is "stabbing" her bladder and she is urinating 3 times an hour. She is scheduled for stent removal on 6/8/17 but this is the 4th time she has called requesting that the stent be removed. She wants to have it removed tomorrow after her Mag 3. Please advise.  "

## 2017-05-30 NOTE — ED NOTES
Pt resting in ER stretcher, aaox4, rr e/u, NAD noted. Bed low and locked, call light in reach, side rails up x2. Mother at bedside.  Pt verbalized understanding of status and POC; denies further needs. Will continue to monitor.

## 2017-05-30 NOTE — TELEPHONE ENCOUNTER
Called to inform patient that her appointment has been rescheduled to 6/1/17. No answer; left message to call back.

## 2017-05-31 ENCOUNTER — HOSPITAL ENCOUNTER (OUTPATIENT)
Dept: RADIOLOGY | Facility: HOSPITAL | Age: 62
Discharge: HOME | End: 2017-05-31
Attending: UROLOGY
Payer: COMMERCIAL

## 2017-05-31 DIAGNOSIS — N20.1 URETERAL STONE: ICD-10-CM

## 2017-05-31 PROCEDURE — A9562 TC99M MERTIATIDE: HCPCS

## 2017-05-31 PROCEDURE — 63600175 PHARM REV CODE 636 W HCPCS: Performed by: UROLOGY

## 2017-05-31 PROCEDURE — 78708 K FLOW/FUNCT IMAGE W/DRUG: CPT | Mod: TC

## 2017-05-31 RX ORDER — FUROSEMIDE 10 MG/ML
40 INJECTION INTRAMUSCULAR; INTRAVENOUS ONCE
Status: COMPLETED | OUTPATIENT
Start: 2017-05-31 | End: 2017-05-31

## 2017-05-31 RX ADMIN — FUROSEMIDE 40 MG: 10 INJECTION, SOLUTION INTRAMUSCULAR; INTRAVENOUS at 10:05

## 2017-05-31 NOTE — TELEPHONE ENCOUNTER
----- Message from Rayray Perez sent at 5/31/2017  8:49 AM CDT -----  Contact: Pt  Pt is returning a missed call to the nurse (Monica)./ Pt can be reached at [phone] (bnwo)

## 2017-05-31 NOTE — TELEPHONE ENCOUNTER
Patient states she has arrived for her Mag 3 scheduled for today and she will be here for her cystoscopy with stent removal on tomorrow.

## 2017-06-01 ENCOUNTER — OFFICE VISIT (OUTPATIENT)
Dept: UROLOGY | Facility: CLINIC | Age: 62
End: 2017-06-01
Payer: COMMERCIAL

## 2017-06-01 VITALS — BODY MASS INDEX: 38.22 KG/M2 | WEIGHT: 244 LBS

## 2017-06-01 DIAGNOSIS — N20.1 URETERAL STONE: Primary | ICD-10-CM

## 2017-06-01 LAB — BACTERIA UR CULT: NO GROWTH

## 2017-06-01 PROCEDURE — 99499 UNLISTED E&M SERVICE: CPT | Mod: S$GLB,,, | Performed by: UROLOGY

## 2017-06-01 PROCEDURE — 99999 PR PBB SHADOW E&M-EST. PATIENT-LVL II: CPT | Mod: PBBFAC,,, | Performed by: UROLOGY

## 2017-06-01 PROCEDURE — 52310 CYSTOSCOPY AND TREATMENT: CPT | Mod: S$GLB,,, | Performed by: UROLOGY

## 2017-06-01 PROCEDURE — 96372 THER/PROPH/DIAG INJ SC/IM: CPT | Mod: 59,S$GLB,, | Performed by: UROLOGY

## 2017-06-01 RX ORDER — CEFTRIAXONE 1 G/1
1 INJECTION, POWDER, FOR SOLUTION INTRAMUSCULAR; INTRAVENOUS
Status: COMPLETED | OUTPATIENT
Start: 2017-06-01 | End: 2017-06-01

## 2017-06-01 RX ADMIN — CEFTRIAXONE 1 G: 1 INJECTION, POWDER, FOR SOLUTION INTRAMUSCULAR; INTRAVENOUS at 03:06

## 2017-06-01 NOTE — PROGRESS NOTES
"Chief Complaint: Urolithiasis    HPI:   6/1/17: Mag3 s/p URS shows 39/61 R/L function.  Still has small lower/mid pole right stones untreated.  5/4/17: 62 yo woman has passed 300 stones, CaOx or Uric Acid she says.  Really watches her diet.  If she eats a block of chocolate she makes a stone on the spot and passes it right away.  Eating lettuce makes a stone attack within 8 hours.  Cramps, passed stones a few weeks ago.  Has 80 bags of stones.  Nauseated with midline abd pain and right flank pain no other abd/pelvic pain and no exac/rel factors.  No gross hematuria.  No urinary bother.  No  history.  Normal sexual function.  CT shows an 8mm obstructing right ureteral stone.  Referred by Dr. Ronquillo.  8/13: Se: "The most recent study reveals a 24 hour urine volume of approximately 1300 cc. While low as much better than her previous study, which  revealed about 800 cc of urine within a 24-hour period. Other abnormalities include elevated excretion of sodium and calcium, as well as oxalate and uric acid. The patient has been counseled to increase her urine output and to  limit her sodium intake. She states that she is already on low oxalate diet. I referred her to a website (the national kidney disease clearing house) for more extensive dietary list. I will try to increase her urine output and decrease her calcium excretion by adding hydrochlorothiazide 25 mg by mouth twice daily. Previously, she used allopurinol, but stopped it after she had no kidney stone episodes for a couple of years, an indicator of its effectiveness. I restarted allopurinol today at 100 mg daily. I had a long talk with the patient over a period of at least a half hour regarding the implications of the low urine volume, as well as the other abnormalities, and we decided to try a repeat 24-hour urine study in about 6 weeks to see if these maneuvers will help her.     Allergies:  Antihistamines - alkylamine; Doxycycline; Iodine and iodide " containing products; Tramadol; and Yeast    Medications:  has a current medication list which includes the following prescription(s): albuterol, allopurinol, budesonide 180mcg, calcium carbonate/vitamin d3, cefuroxime, docusate sodium, ergocalciferol, esomeprazole, eylea, eylea, humalog mix 75-25 kwikpen, hydrocodone-acetaminophen 10-325mg, ketorolac 0.5%, losartan-hydrochlorothiazide 100-25 mg, metformin, ondansetron, oxybutynin, oxycodone-acetaminophen, pen needle, diabetic, potassium chloride, prednisolone acetate, prednisolone acetate, promethazine-codeine 6.25-10 mg/5 ml, simvastatin, and tamsulosin.    Review of Systems:  General: No fever, chills, fatigability, or weight loss.  Skin: No rashes, itching, or changes in color or texture of skin.  Chest: Denies CANALES, cyanosis, wheezing, cough, and sputum production.  Abdomen: Appetite fine. No weight loss. Denies diarrhea, abdominal pain, hematemesis, or blood in stool.  Musculoskeletal: No joint stiffness or swelling. Denies back pain.  : As above.  All other review of systems negative.    PMH:   has a past medical history of Asthma; Colon polyps; Diabetes mellitus; Diabetes mellitus type II, uncontrolled; Diverticulosis of large intestine without hemorrhage (2015); Elevated liver enzymes; Encounter for blood transfusion; GERD (gastroesophageal reflux disease); Gout, unspecified; Hemorrhoids, internal (2015); Hyperlipidemia; Hypertension; IBS (irritable bowel syndrome); Morbid obesity with BMI of 40.0-44.9, adult; Nasal vestibulitis; Osteoarthritis of multiple joints; Type 2 diabetes mellitus with both eyes affected by mild nonproliferative retinopathy and macular edema, with long-term current use of insulin (2016); Urolithiasis; and Vitamin D deficiency disease.    PSH:   has a past surgical history that includes Total abdominal hysterectomy; Breast biopsy (Right);  section, classic; Colonoscopy (N/A, 2015); Extracorporeal shock  wave lithotripsy; and Ureteroscopy.    FamHx: family history includes Colon cancer in her maternal grandmother; Colon polyps in her mother; Diabetes in her brother and mother; Heart disease in her mother; Hypertension in her brother and mother; Other in her mother; Ovarian cancer in her paternal aunt; Stroke in her brother.    SocHx:  reports that she has never smoked. She has never used smokeless tobacco. She reports that she does not drink alcohol or use drugs.      Physical Exam:  There were no vitals filed for this visit.  General: A&Ox3, no apparent distress, no deformities  Neck: No masses, normal thyroid  Lungs: normal inspiration, no use of accessory muscles  Heart: normal pulse, no arrhythmias  Abdomen: Soft, NT, ND, no masses, no hernias, no hepatosplenomegaly  Lymphatic: Neck and groin nodes negative  Skin: The skin is warm and dry. No jaundice.  Ext: No c/c/e.  : deferred    Labs/Studies: Urinalysis performed in clinic, summary: UA normal exc 250 blood    Procedure:  Cystoscopy with removal of foreign body - simple (ureteral stent)    Procedure in Detail: After proper consents were obtained, the patient was prepped and draped in normal sterile fashion for diagnostic cystoscopy. 5 ml of lidocaine jelly was instilled in the urethra. The flexible cystoscope was then introduced into the urethra, and advanced into the bladder under direct vision. The previously placed stent was observed and was held with graspers.  The cystoscope was then removed, and the procedure terminated. The stent was removed in its entirety.     Findings: Right ureteral stent removed without difficulty    Impression/Plan:   1. KUB/US 1 mo with RTC then.

## 2017-06-01 NOTE — PROGRESS NOTES
...After obtaining consent, and per orders of Dr. Love, injection of Rocephin 1g given in right ventrogluteal.  Patient instructed to remain in clinic for 20 minutes afterwards, and to report any adverse reaction to me immediately.  Pt tolerated well and next appointment given to patient.

## 2017-06-05 RX ORDER — POTASSIUM CHLORIDE 750 MG/1
CAPSULE, EXTENDED RELEASE ORAL
Qty: 90 CAPSULE | Refills: 0 | Status: SHIPPED | OUTPATIENT
Start: 2017-06-05 | End: 2017-08-23

## 2017-06-05 RX ORDER — METFORMIN HYDROCHLORIDE 500 MG/1
TABLET, EXTENDED RELEASE ORAL
Qty: 270 TABLET | Refills: 0 | Status: SHIPPED | OUTPATIENT
Start: 2017-06-05 | End: 2017-10-02 | Stop reason: SDUPTHER

## 2017-06-05 RX ORDER — LOSARTAN POTASSIUM AND HYDROCHLOROTHIAZIDE 25; 100 MG/1; MG/1
1 TABLET ORAL DAILY
Qty: 90 TABLET | Refills: 0 | Status: SHIPPED | OUTPATIENT
Start: 2017-06-05 | End: 2017-10-02 | Stop reason: SDUPTHER

## 2017-06-05 RX ORDER — SIMVASTATIN 20 MG/1
20 TABLET, FILM COATED ORAL NIGHTLY
Qty: 90 TABLET | Refills: 0 | Status: SHIPPED | OUTPATIENT
Start: 2017-06-05 | End: 2017-10-02 | Stop reason: SDUPTHER

## 2017-06-05 RX ORDER — INSULIN LISPRO 100 [IU]/ML
INJECTION, SUSPENSION SUBCUTANEOUS
Qty: 15 ML | Refills: 0 | Status: SHIPPED | OUTPATIENT
Start: 2017-06-05 | End: 2017-06-27 | Stop reason: SDUPTHER

## 2017-06-09 DIAGNOSIS — R06.2 WHEEZING SYMPTOM: ICD-10-CM

## 2017-06-09 DIAGNOSIS — J30.9 ALLERGIC RHINITIS, UNSPECIFIED: ICD-10-CM

## 2017-06-09 RX ORDER — PROMETHAZINE HYDROCHLORIDE AND CODEINE PHOSPHATE 6.25; 1 MG/5ML; MG/5ML
5 SOLUTION ORAL
Qty: 180 ML | Refills: 0 | Status: SHIPPED | OUTPATIENT
Start: 2017-06-09 | End: 2018-02-09

## 2017-06-09 NOTE — TELEPHONE ENCOUNTER
Pt states she has a stent R/T kidney stone  And its causing her to have a bad dry hacking cough  No fever   Pt states she lost the last bottle given to her in April  Request a refill on cough med sent to the pharmacy

## 2017-06-09 NOTE — TELEPHONE ENCOUNTER
----- Message from Caty Nuñez sent at 6/9/2017 11:43 AM CDT -----  Contact: pt  She's calling to get a refill on Rx Tromethan cough syrup, [facility] [address], please advise, [phone] (home)

## 2017-06-09 NOTE — TELEPHONE ENCOUNTER
----- Message from Zuleyka Rodríguez sent at 6/9/2017  3:28 PM CDT -----  Patient states that she was returning your call.  Call her at [phone].                                      meyers

## 2017-06-15 ENCOUNTER — PROCEDURE VISIT (OUTPATIENT)
Dept: OPHTHALMOLOGY | Facility: CLINIC | Age: 62
End: 2017-06-15
Payer: COMMERCIAL

## 2017-06-15 DIAGNOSIS — E11.3513 TYPE 2 DIABETES MELLITUS WITH BOTH EYES AFFECTED BY PROLIFERATIVE RETINOPATHY AND MACULAR EDEMA, WITH LONG-TERM CURRENT USE OF INSULIN: ICD-10-CM

## 2017-06-15 DIAGNOSIS — Z79.4 TYPE 2 DIABETES MELLITUS WITH BOTH EYES AFFECTED BY PROLIFERATIVE RETINOPATHY AND MACULAR EDEMA, WITH LONG-TERM CURRENT USE OF INSULIN: ICD-10-CM

## 2017-06-15 DIAGNOSIS — Z79.4 TYPE 2 DIABETES MELLITUS WITH BOTH EYES AFFECTED BY MILD NONPROLIFERATIVE RETINOPATHY AND MACULAR EDEMA, WITH LONG-TERM CURRENT USE OF INSULIN: Primary | ICD-10-CM

## 2017-06-15 DIAGNOSIS — E11.3213 TYPE 2 DIABETES MELLITUS WITH BOTH EYES AFFECTED BY MILD NONPROLIFERATIVE RETINOPATHY AND MACULAR EDEMA, WITH LONG-TERM CURRENT USE OF INSULIN: Primary | ICD-10-CM

## 2017-06-15 PROCEDURE — 92134 CPTRZ OPH DX IMG PST SGM RTA: CPT | Mod: S$GLB,,, | Performed by: OPHTHALMOLOGY

## 2017-06-15 PROCEDURE — 99499 UNLISTED E&M SERVICE: CPT | Mod: S$GLB,,, | Performed by: OPHTHALMOLOGY

## 2017-06-15 PROCEDURE — 67028 INJECTION EYE DRUG: CPT | Mod: RT,S$GLB,, | Performed by: OPHTHALMOLOGY

## 2017-06-15 RX ORDER — CIPROFLOXACIN HYDROCHLORIDE 3 MG/ML
1 SOLUTION/ DROPS OPHTHALMIC 4 TIMES DAILY
Qty: 5 ML | Refills: 0 | Status: SHIPPED | OUTPATIENT
Start: 2017-06-15 | End: 2017-06-19

## 2017-06-15 RX ORDER — AFLIBERCEPT 40 MG/ML
2 INJECTION, SOLUTION INTRAVITREAL
Qty: 2 VIAL | Refills: 3 | Status: SHIPPED | OUTPATIENT
Start: 2017-06-15 | End: 2017-08-31 | Stop reason: SDUPTHER

## 2017-06-15 NOTE — PROGRESS NOTES
===============================  06/15/2017   Latosha Enriquez,   61 y.o. female   Last visit [facility]: :4/6/2017   Last [place] dept[facility] [place]6/[place]  [facility]  Corrected distance visual acuity was 20/40 in the right eye and 20/20 in the left eye.   Not recorded         Not recorded         Not recorded        Chief Complaint   Patient presents with    pdr / dme     eylea od, pt states her vision is good, loat to follow up 2' to kidney stone sx     Ophthalmic Medications     Ophthalmic - Anti-inflammatory, Glucocorticoids Start End    prednisoLONE acetate (PRED FORTE) 1 % DrpS 12/9/2016     Sig: Place 1 drop into the right eye 4 (four) times daily.    Route: Right Eye    prednisoLONE acetate (PRED FORTE) 1 % DrpS 2/20/2017     Sig: Place 1 drop into both eyes 3 (three) times daily.    Route: Both Eyes    Ophthalmic - Anti-inflammatory, NSAIDs Start End    ketorolac 0.5% (ACULAR) 0.5 % Drop 2/20/2017 2/20/2018    Sig: Place 1 drop into both eyes 3 (three) times daily.    Route: Both Eyes         HPI     pdr / dme    Additional comments: eylea od, pt states her vision is good, loat to   follow up 2' to kidney stone sx           Comments   DM  DME OD  EYLEA OD #4 3/31/17       Last edited by KILEY Aguirre on 6/15/2017  8:11 AM. (History)      Read Studies: y  Vitalsy  ________________  6/15/2017  Problem List Items Addressed This Visit        Eye/Vision problems    Type 2 diabetes mellitus with both eyes affected by mild nonproliferative retinopathy and macular edema, with long-term current use of insulin - Primary    Relevant Medications    EYLEA 2 mg/0.05 mL Soln    aflibercept Soln 2 mg (Start on 6/15/2017 11:15 AM)      Other Visit Diagnoses     Type 2 diabetes mellitus with both eyes affected by proliferative retinopathy and macular edema, with long-term current use of insulin        Relevant Medications    ciprofloxacin HCl (CILOXAN) 0.3 % ophthalmic solution    Other Relevant Orders    Posterior Segment OCT  Retina-Both eyes        .  od macrocystic me   Today eylea #1 (lost to fu)     6/15/2017  Diagnosis :  od dme  Today:   Eylea (afibercept) 2 mg/0.05 ml Intravitreal Injection , OD   Follow up: rtc 1 mo        Call 24/7 for any worsening of vision. Check  OU QD. Gave my home phone number.      Procedure  Note:   OD}  Eylea (afibercept) 2 mg/0.05 ml Intravitreal Injection    I have explained the Risks, Benefits and Alternatives of the procedure in detail.  The patient voices understanding and all questions have been answered.  The patient agrees to proceed as discussed.  LIDOCAINE 2% subconj bleb    was used for anesthesia.  Topical Vigamox was used for antisepsis.  0.05 cc was  injected 3.7 mm from corneal limbus in the inferotemporal quadrant.  Following injection the IOP was less than thirty (<30) by tonopen.  The eye was then thoroughly irrigated with BSS.  Patient tolerated procedure well.  No complications were observed.  The Patient was educated that mild irritation tonight was normal secondary to topical antispsis use.  Pt was advised to call at any time day or night for pain, redness, or any decline in vision. I gave the patient my home number as well as the clinic on call number. Daily visual checks and Amsler grid testing were reviewed.  ciloxan Antibiotic Drops to be used 4 times daily for 4 days  LINDSAY Bright MD  Procedure ordered: y  Consent: y  Pre auth: y  MAR:y  Opnote: y  Charge capture:y  Sided procedure note: y         ===========================

## 2017-06-27 RX ORDER — INSULIN LISPRO 100 [IU]/ML
INJECTION, SUSPENSION SUBCUTANEOUS
Qty: 15 ML | Refills: 0 | Status: SHIPPED | OUTPATIENT
Start: 2017-06-27 | End: 2017-10-02

## 2017-06-27 NOTE — TELEPHONE ENCOUNTER
----- Message from Dolores Hill sent at 6/27/2017 11:06 AM CDT -----  Contact: pt  1. What is the name of the medication you are requesting? flexpen needles for her humalog insulin  2. What is the dose? ?  3. How do you take the medication? Orally, topically, etc? injection  4. How often do you take this medication? Twice a day  5. Do you need a 30 day or 90 day supply? 30  6. How many refills are you requesting? 3  7. What is your preferred pharmacy and location of the pharmacy? .  [facility] [id number] - [place], [place] - [address] AT [address] [address]  Phone: [phone] Fax: [phone]    8. Who can we contact with further questions? Pt    Please call pt at [phone]. Thank you

## 2017-06-30 RX ORDER — PEN NEEDLE, DIABETIC 29 G X1/2"
1 NEEDLE, DISPOSABLE MISCELLANEOUS 2 TIMES DAILY
Qty: 60 EACH | Refills: 0 | Status: SHIPPED | OUTPATIENT
Start: 2017-06-30 | End: 2019-04-01

## 2017-06-30 NOTE — TELEPHONE ENCOUNTER
----- Message from Emeli Sung sent at 6/30/2017  4:09 PM CDT -----  Contact: pt  Pt had called in for a refill on her Novalog Flex Pen NEEDLES only - not the insulin and the insulin was sent in anyway not the needles and now she is completely out of the needles.   Please send sn urgent immediate script for the NEEDLES ONLY to [facility] on [address] and call pt to confirm at [phone]

## 2017-07-06 ENCOUNTER — TELEPHONE (OUTPATIENT)
Dept: RADIOLOGY | Facility: HOSPITAL | Age: 62
End: 2017-07-06

## 2017-07-06 ENCOUNTER — OFFICE VISIT (OUTPATIENT)
Dept: UROLOGY | Facility: CLINIC | Age: 62
End: 2017-07-06
Payer: COMMERCIAL

## 2017-07-06 VITALS
WEIGHT: 244.94 LBS | SYSTOLIC BLOOD PRESSURE: 147 MMHG | BODY MASS INDEX: 38.36 KG/M2 | DIASTOLIC BLOOD PRESSURE: 63 MMHG | HEART RATE: 68 BPM

## 2017-07-06 DIAGNOSIS — N20.0 RENAL STONES: Primary | ICD-10-CM

## 2017-07-06 LAB
BILIRUB SERPL-MCNC: NORMAL MG/DL
BLOOD URINE, POC: NORMAL
COLOR, POC UA: NORMAL
GLUCOSE UR QL STRIP: NORMAL
KETONES UR QL STRIP: NORMAL
LEUKOCYTE ESTERASE URINE, POC: NORMAL
NITRITE, POC UA: NORMAL
PH, POC UA: 5
PROTEIN, POC: NORMAL
SPECIFIC GRAVITY, POC UA: 1.02
UROBILINOGEN, POC UA: NORMAL

## 2017-07-06 PROCEDURE — 81002 URINALYSIS NONAUTO W/O SCOPE: CPT | Mod: S$GLB,,, | Performed by: UROLOGY

## 2017-07-06 PROCEDURE — 99214 OFFICE O/P EST MOD 30 MIN: CPT | Mod: 25,S$GLB,, | Performed by: UROLOGY

## 2017-07-06 PROCEDURE — 99999 PR PBB SHADOW E&M-EST. PATIENT-LVL II: CPT | Mod: PBBFAC,,, | Performed by: UROLOGY

## 2017-07-06 NOTE — PROGRESS NOTES
"Chief Complaint: Urolithiasis    HPI:   7/6/17: Was to return with KUB/US but it seems these weren't done.  Stone was CaOx.    6/1/17: Mag3 s/p URS shows 39/61 R/L function.  Still has small lower/mid pole right stones untreated.  Stent removed  5/11/17: Right URS with stent placement  5/4/17: 62 yo woman has passed 300 stones, CaOx or Uric Acid she says.  Really watches her diet.  If she eats a block of chocolate she makes a stone on the spot and passes it right away.  Eating lettuce makes a stone attack within 8 hours.  Cramps, passed stones a few weeks ago.  Has 80 bags of stones.  Nauseated with midline abd pain and right flank pain no other abd/pelvic pain and no exac/rel factors.  No gross hematuria.  No urinary bother.  No  history.  Normal sexual function.  CT shows an 8mm obstructing right ureteral stone.  Referred by Dr. Ronquillo.  8/13: Se: "The most recent study reveals a 24 hour urine volume of approximately 1300 cc. While low as much better than her previous study, which  revealed about 800 cc of urine within a 24-hour period. Other abnormalities include elevated excretion of sodium and calcium, as well as oxalate and uric acid. The patient has been counseled to increase her urine output and to  limit her sodium intake. She states that she is already on low oxalate diet. I referred her to a website (the national kidney disease clearing house) for more extensive dietary list. I will try to increase her urine output and decrease her calcium excretion by adding hydrochlorothiazide 25 mg by mouth twice daily. Previously, she used allopurinol, but stopped it after she had no kidney stone episodes for a couple of years, an indicator of its effectiveness. I restarted allopurinol today at 100 mg daily. I had a long talk with the patient over a period of at least a half hour regarding the implications of the low urine volume, as well as the other abnormalities, and we decided to try a repeat 24-hour urine " study in about 6 weeks to see if these maneuvers will help her.     Allergies:  Antihistamines - alkylamine; Doxycycline; Iodine and iodide containing products; Tramadol; and Yeast    Medications:  has a current medication list which includes the following prescription(s): albuterol, allopurinol, budesonide 180mcg, calcium carbonate/vitamin d3, docusate sodium, ergocalciferol, esomeprazole, eylea, eylea, insulin lispro protamin-lispro, ketorolac 0.5%, losartan-hydrochlorothiazide 100-25 mg, metformin, ondansetron, oxybutynin, oxycodone-acetaminophen, pen needle, diabetic, potassium chloride, prednisolone acetate, prednisolone acetate, promethazine-codeine 6.25-10 mg/5 ml, simvastatin, and tamsulosin.    Review of Systems:  General: No fever, chills, fatigability, or weight loss.  Skin: No rashes, itching, or changes in color or texture of skin.  Chest: Denies CANALES, cyanosis, wheezing, cough, and sputum production.  Abdomen: Appetite fine. No weight loss. Denies diarrhea, abdominal pain, hematemesis, or blood in stool.  Musculoskeletal: No joint stiffness or swelling. Denies back pain.  : As above.  All other review of systems negative.    PMH:   has a past medical history of Asthma; Colon polyps; Diabetes mellitus; Diabetes mellitus type II, uncontrolled; Diverticulosis of large intestine without hemorrhage (11/23/2015); Elevated liver enzymes; Encounter for blood transfusion; GERD (gastroesophageal reflux disease); Gout, unspecified; Hemorrhoids, internal (11/23/2015); Hyperlipidemia; Hypertension; IBS (irritable bowel syndrome); Morbid obesity with BMI of 40.0-44.9, adult; Nasal vestibulitis; Osteoarthritis of multiple joints; Type 2 diabetes mellitus with both eyes affected by mild nonproliferative retinopathy and macular edema, with long-term current use of insulin (11/9/2016); Urolithiasis; and Vitamin D deficiency disease.    PSH:   has a past surgical history that includes Total abdominal hysterectomy;  Breast biopsy (Right);  section, classic; Colonoscopy (N/A, 2015); Extracorporeal shock wave lithotripsy; and Ureteroscopy.    FamHx: family history includes Colon cancer in her maternal grandmother; Colon polyps in her mother; Diabetes in her brother and mother; Heart disease in her mother; Hypertension in her brother and mother; Other in her mother; Ovarian cancer in her paternal aunt; Stroke in her brother.    SocHx:  reports that she has never smoked. She has never used smokeless tobacco. She reports that she does not drink alcohol or use drugs.      Physical Exam:  Vitals:    17 1357   BP: (!) 147/63   Pulse: 68     General: A&Ox3, no apparent distress, no deformities  Neck: No masses, normal thyroid  Lungs: normal inspiration, no use of accessory muscles  Heart: normal pulse, no arrhythmias  Abdomen: Soft, NT, ND  Skin: The skin is warm and dry. No jaundice.  Ext: No c/c/e.  : deferred    Labs/Studies:     Impression/Plan:   1. KUB/US now with RTC KUB/US 6 mo.  2. Stone is CaOx.  24 hour urine study before next visit.

## 2017-07-07 ENCOUNTER — HOSPITAL ENCOUNTER (OUTPATIENT)
Dept: RADIOLOGY | Facility: HOSPITAL | Age: 62
Discharge: HOME | End: 2017-07-07
Attending: UROLOGY
Payer: COMMERCIAL

## 2017-07-07 DIAGNOSIS — N20.1 URETERAL STONE: ICD-10-CM

## 2017-07-07 PROCEDURE — 74000 XR ABDOMEN AP 1 VIEW: CPT | Mod: 26,,, | Performed by: RADIOLOGY

## 2017-07-07 PROCEDURE — 76770 US EXAM ABDO BACK WALL COMP: CPT | Mod: 26,,, | Performed by: RADIOLOGY

## 2017-07-07 PROCEDURE — 74000 XR ABDOMEN AP 1 VIEW: CPT | Mod: TC

## 2017-07-07 PROCEDURE — 76770 US EXAM ABDO BACK WALL COMP: CPT | Mod: TC

## 2017-07-19 ENCOUNTER — PROCEDURE VISIT (OUTPATIENT)
Dept: OPHTHALMOLOGY | Facility: CLINIC | Age: 62
End: 2017-07-19
Payer: COMMERCIAL

## 2017-07-19 DIAGNOSIS — E11.3213 TYPE 2 DIABETES MELLITUS WITH BOTH EYES AFFECTED BY MILD NONPROLIFERATIVE RETINOPATHY AND MACULAR EDEMA, WITH LONG-TERM CURRENT USE OF INSULIN: Primary | ICD-10-CM

## 2017-07-19 DIAGNOSIS — Z79.4 TYPE 2 DIABETES MELLITUS WITH BOTH EYES AFFECTED BY MILD NONPROLIFERATIVE RETINOPATHY AND MACULAR EDEMA, WITH LONG-TERM CURRENT USE OF INSULIN: Primary | ICD-10-CM

## 2017-07-19 PROCEDURE — 67028 INJECTION EYE DRUG: CPT | Mod: RT,S$GLB,, | Performed by: OPHTHALMOLOGY

## 2017-07-19 PROCEDURE — 99499 UNLISTED E&M SERVICE: CPT | Mod: S$GLB,,, | Performed by: OPHTHALMOLOGY

## 2017-07-19 RX ORDER — NEOMYCIN SULFATE, POLYMYXIN B SULFATE, AND DEXAMETHASONE 3.5; 10000; 1 MG/G; [USP'U]/G; MG/G
OINTMENT OPHTHALMIC 4 TIMES DAILY
Qty: 1 TUBE | Refills: 0 | Status: SHIPPED | OUTPATIENT
Start: 2017-07-19 | End: 2017-07-29

## 2017-07-19 RX ORDER — CIPROFLOXACIN HYDROCHLORIDE 3 MG/ML
SOLUTION/ DROPS OPHTHALMIC
Qty: 5 ML | Refills: 0 | Status: SHIPPED | OUTPATIENT
Start: 2017-07-19 | End: 2017-07-26

## 2017-07-19 RX ORDER — PEN NEEDLE, DIABETIC 31 GX5/16"
NEEDLE, DISPOSABLE MISCELLANEOUS
COMMUNITY
Start: 2017-06-30 | End: 2018-06-06 | Stop reason: SDUPTHER

## 2017-07-19 NOTE — PROGRESS NOTES
===============================  07/19/2017   Latosha Enriquez,   61 y.o. female   Last visit [facility]: :6/15/2017   Last visit [place] dept[facility] 6[place]15/2017  [facility]:  Corrected distance visual acuity was 20/50 +2 in the right eye and 20/20 in the left eye.   Not recorded         Not recorded         Not recorded        Chief Complaint   Patient presents with    PDR/ME     EYLEA OD     Ophthalmic Medications     Ophthalmic - Anti-inflammatory, Glucocorticoids Start End    prednisoLONE acetate (PRED FORTE) 1 % DrpS (Discontinued) 12/9/2016 7/19/2017    Sig: Place 1 drop into the right eye 4 (four) times daily.    Route: Right Eye    Reason for Discontinue: Alternate therapy    prednisoLONE acetate (PRED FORTE) 1 % DrpS (Discontinued) 2/20/2017 7/19/2017    Sig: Place 1 drop into both eyes 3 (three) times daily.    Route: Both Eyes    Reason for Discontinue: Alternate therapy    Ophthalmic - Anti-inflammatory, NSAIDs Start End    ketorolac 0.5% (ACULAR) 0.5 % Drop 2/20/2017 2/20/2018    Sig: Place 1 drop into both eyes 3 (three) times daily.    Route: Both Eyes         HPI     PDR/ME    Additional comments: EYLEA OD           Comments   ((No Betadine - Vigamox Prep))    DM  DME OD  EYLEA OD #5 6/15/17    --Rx for Eylea sent to [facility] 6/15/17 11:02 am       Last edited by Patricia Slater on 7/19/2017 10:25 AM. (History)      Read Studies: y  Vitalsy  ________________  7/19/2017  Problem List Items Addressed This Visit        Eye/Vision problems    Type 2 diabetes mellitus with both eyes affected by mild nonproliferative retinopathy and macular edema, with long-term current use of insulin - Primary    Relevant Medications    ciprofloxacin HCl (CILOXAN) 0.3 % ophthalmic solution    neomycin-polymyxin-dexamethasone (DEXACINE) 3.5 mg/g-10,000 unit/g-0.1 % Oint    aflibercept Soln 2 mg (Completed) (Start on 7/19/2017 11:15 AM)    Other Relevant Orders    Prior Authorization Order      Other Visit Diagnoses    None.       .  .eyela  #  7/19/2017  Diagnosis :  od dme  Today:   Eylea (afibercept) 2 mg/0.05 ml Intravitreal Injection , OD   Follow up: rtc 1 mo         Call 24/7 for any worsening of vision. Check  OU QD. Gave my home phone number.      Procedure  Note:   OD}  Eylea (afibercept) 2 mg/0.05 ml Intravitreal Injection    I have explained the Risks, Benefits and Alternatives of the procedure in detail.  The patient voices understanding and all questions have been answered.  The patient agrees to proceed as discussed.  LIDOCAINE 2%  subconj bleb  was used for anesthesia.  Topical betadine was used for antisepsis.  0.05 cc was  injected 3.7 mm from corneal limbus in the inferotemporal quadrant.  Following injection the IOP was less than thirty (<30) by tonopen.  The eye was then thoroughly irrigated with BSS.  Patient tolerated procedure well.  No complications were observed.  The Patient was educated that mild irritation tonight was normal secondary to topical antispsis use.  Pt was advised to call at any time day or night for pain, redness, or any decline in vision. I gave the patient my home number as well as the clinic on call number. Daily visual checks and Amsler grid testing were reviewed.  ciloxan Antibiotic Drops to be used 4 times daily for 4 days  LINDSAY Bright MD  Procedure ordered: y  Consent: y  Pre auth: y  MAR:y  Opnote: y  Charge capture:y  Sided procedure note: y         ===========================

## 2017-07-21 ENCOUNTER — LAB VISIT (OUTPATIENT)
Dept: LAB | Facility: HOSPITAL | Age: 62
End: 2017-07-21
Attending: FAMILY MEDICINE
Payer: COMMERCIAL

## 2017-07-21 ENCOUNTER — OFFICE VISIT (OUTPATIENT)
Dept: FAMILY MEDICINE | Facility: CLINIC | Age: 62
End: 2017-07-21
Payer: COMMERCIAL

## 2017-07-21 VITALS
BODY MASS INDEX: 38.4 KG/M2 | SYSTOLIC BLOOD PRESSURE: 116 MMHG | HEIGHT: 67 IN | WEIGHT: 244.69 LBS | OXYGEN SATURATION: 97 % | DIASTOLIC BLOOD PRESSURE: 72 MMHG | RESPIRATION RATE: 18 BRPM | TEMPERATURE: 98 F | HEART RATE: 96 BPM

## 2017-07-21 DIAGNOSIS — Z79.4 TYPE 2 DIABETES MELLITUS WITH BOTH EYES AFFECTED BY MILD NONPROLIFERATIVE RETINOPATHY AND MACULAR EDEMA, WITH LONG-TERM CURRENT USE OF INSULIN: ICD-10-CM

## 2017-07-21 DIAGNOSIS — Z00.00 PREVENTATIVE HEALTH CARE: Primary | ICD-10-CM

## 2017-07-21 DIAGNOSIS — E78.00 PURE HYPERCHOLESTEROLEMIA: ICD-10-CM

## 2017-07-21 DIAGNOSIS — Z12.31 ENCOUNTER FOR SCREENING MAMMOGRAM FOR MALIGNANT NEOPLASM OF BREAST: ICD-10-CM

## 2017-07-21 DIAGNOSIS — E11.3213 TYPE 2 DIABETES MELLITUS WITH BOTH EYES AFFECTED BY MILD NONPROLIFERATIVE RETINOPATHY AND MACULAR EDEMA, WITH LONG-TERM CURRENT USE OF INSULIN: ICD-10-CM

## 2017-07-21 DIAGNOSIS — I10 ESSENTIAL HYPERTENSION: ICD-10-CM

## 2017-07-21 DIAGNOSIS — Z23 NEED FOR TDAP VACCINATION: ICD-10-CM

## 2017-07-21 DIAGNOSIS — N20.0 KIDNEY STONES, CALCIUM OXALATE: ICD-10-CM

## 2017-07-21 DIAGNOSIS — Z88.9 MULTIPLE ALLERGIES: ICD-10-CM

## 2017-07-21 LAB
CREAT UR-MCNC: 172 MG/DL
MICROALBUMIN UR DL<=1MG/L-MCNC: 43 UG/ML
MICROALBUMIN/CREATININE RATIO: 25 UG/MG

## 2017-07-21 PROCEDURE — 82570 ASSAY OF URINE CREATININE: CPT

## 2017-07-21 PROCEDURE — 99396 PREV VISIT EST AGE 40-64: CPT | Mod: 25,S$GLB,, | Performed by: FAMILY MEDICINE

## 2017-07-21 PROCEDURE — 90471 IMMUNIZATION ADMIN: CPT | Mod: S$GLB,,, | Performed by: FAMILY MEDICINE

## 2017-07-21 PROCEDURE — 99999 PR PBB SHADOW E&M-EST. PATIENT-LVL IV: CPT | Mod: PBBFAC,,, | Performed by: FAMILY MEDICINE

## 2017-07-21 PROCEDURE — 90715 TDAP VACCINE 7 YRS/> IM: CPT | Mod: S$GLB,,, | Performed by: FAMILY MEDICINE

## 2017-07-21 NOTE — PROGRESS NOTES
CHIEF COMPLAINT: This is a 61-year-old female here for preventive health exam.    SUBJECTIVE: The patient has recently had problems with kidney stones.  She is status post cystoscopy with stent placement and right ureteroscopic with stone basket extraction in May 2017.  Patient complains of passing frequent stones, which seems to be related to diet. She has both calcium oxalate and uric acid stones.  She takes allopurinol daily.  Patient complains of ALLERGY to yeast and requests consult with allergist.  Patient reports her blood sugars have been .  She denies polyuria, polydipsia or polyphagia.  Last A1c was 7% in July 2016.  Patient injects Humalog mix 75-25 twice daily.  Patient's blood pressure is controlled on losartan HCT and she takes simvastatin for hyperlipidemia.  Patient has vitamin D deficiency and takes 50,000 units of vitamin D2 weekly.    Eye exam April 2017. Mammogram June 2016. Colonoscopy November 2015, due again in November 2025. TD booster 2007.     ROS:  GENERAL: Patient denies fever, chills, night sweats. Patient denies weight gain or loss. Patient denies anorexia, fatigue, weakness or swollen glands.  SKIN: Patient denies rash or hair loss.  HEENT: Patient denies sore throat, ear pain, hearing loss, nasal congestion, or runny nose. Patient denies visual disturbance, eye irritation or discharge.  LUNGS: Patient denies cough, wheeze or hemoptysis.  CARDIOVASCULAR: Patient denies chest pain, shortness of breath, palpitations, syncope or lower extremity edema.  GI: Patient denies abdominal pain, nausea, vomiting, diarrhea, blood in stool or melena.  GENITOURINARY: Patient denies pelvic pain, vaginal discharge, itch or odor. Patient denies irregular vaginal bleeding. Patient denies dysuria, frequency, hematuria, nocturia, urgency or incontinence.  BREASTS: Patient denies breast pain, mass or nipple discharge.  MUSCULOSKELETAL: Patient denies joint pain, swelling, redness or  warmth.  NEUROLOGIC: Patient denies headache, vertigo, paresthesias, weakness in limb, dysarthria, dysphagia or abnormality of gait.  PSYCHIATRIC: Patient denies anxiety, depression, or memory loss.     OBJECTIVE:   GENERAL: Well-developed well-nourished, obese, black female alert and oriented x3, in no acute distress. Memory, judgment and cognition without deficit.   SKIN: Clear without rash. Normal color and tone. Multiple skin tags on chest wall.  Flesh-colored skin lesion left upper buttock just below labia majora.  HEENT: Eyes: No scleral icterus. Clear conjunctivae. Pupils equal reactive to light and accommodation. Ears: Clear TMs. Right canal with excessive cerumen.. Nose: Without congestion. Pharynx: Without injection or exudates.  NECK: Supple, normal range of motion. No masses, nodes or enlarged thyroid. No JVD. Carotids 2+ and equal. No bruits.  LUNGS: Clear to auscultation. Normal respiratory effort.  CARDIOVASCULAR: Regular rhythm, normal S1, S2 without murmur, gallop or rub.  BACK: No CVA or spinal tenderness.  BREASTS: No masses, tenderness or nipple discharge.  ABDOMEN: Normal appearance. Active bowel sounds. Soft, nontender without mass or organomegaly. No rebound or guarding.  EXTREMITIES: Without cyanosis, clubbing or edema. Distal pulses 2+ and equal. Normal range of motion in all extremities. No joint effusion, erythema or warmth.  NEUROLOGIC: Cranial nerves II through XII without deficit. Motor strength equal bilaterally. Sensation normal to touch. Deep tendon reflexes 2+ and equal. Gait without abnormality. No tremor. Negative cerebellar signs.  FOOT EVALUATION: 10 gram monofilament exam with protective sensation intact bilaterally. Nails appropriately trimmed. No ulcers. Distal pulses palpable.  PELVIC: External: Without lesions or inflammation. Vaginal: Atrophic changes, malodor, cuff intact. Bimanual: Non-tender, without masses. Rectovaginal: Confirms, heme-negative stool x2.       ASSESSMENT:  1. Preventative health care    2. Uncontrolled type 2 diabetes mellitus without complication, with long-term current use of insulin    3. Essential hypertension    4. Pure hypercholesterolemia    5. Type 2 diabetes mellitus with both eyes affected by mild nonproliferative retinopathy and macular edema, with long-term current use of insulin    6. Kidney stones, calcium oxalate    7. Encounter for screening mammogram for malignant neoplasm of breast    8. Need for Tdap vaccination    9. Multiple allergies      PLAN:   1.  Weight reduction.  Exercise regularly.  2.  Age-appropriate counseling.  3.  Fasting lab.  4.  ALLERGY consult.  5.  Mammogram.  6.  Tdap vaccine.  7.  Encourage patient to increase water intake to 64 ounces daily.

## 2017-07-25 ENCOUNTER — LAB VISIT (OUTPATIENT)
Dept: LAB | Facility: HOSPITAL | Age: 62
End: 2017-07-25
Attending: FAMILY MEDICINE
Payer: COMMERCIAL

## 2017-07-25 DIAGNOSIS — N20.0 KIDNEY STONES, CALCIUM OXALATE: ICD-10-CM

## 2017-07-25 DIAGNOSIS — E78.00 PURE HYPERCHOLESTEROLEMIA: ICD-10-CM

## 2017-07-25 DIAGNOSIS — Z00.00 PREVENTATIVE HEALTH CARE: ICD-10-CM

## 2017-07-25 LAB
25(OH)D3+25(OH)D2 SERPL-MCNC: 38 NG/ML
ALT SERPL W/O P-5'-P-CCNC: 44 U/L
AST SERPL-CCNC: 25 U/L
CHOLEST/HDLC SERPL: 3.8 {RATIO}
HCV AB SERPL QL IA: NEGATIVE
HDL/CHOLESTEROL RATIO: 26.3 %
HDLC SERPL-MCNC: 171 MG/DL
HDLC SERPL-MCNC: 45 MG/DL
LDLC SERPL CALC-MCNC: 112.2 MG/DL
NONHDLC SERPL-MCNC: 126 MG/DL
PTH-INTACT SERPL-MCNC: 83 PG/ML
TRIGL SERPL-MCNC: 69 MG/DL
TSH SERPL DL<=0.005 MIU/L-ACNC: 0.82 UIU/ML
URATE SERPL-MCNC: 3.8 MG/DL

## 2017-07-25 PROCEDURE — 82306 VITAMIN D 25 HYDROXY: CPT

## 2017-07-25 PROCEDURE — 80061 LIPID PANEL: CPT

## 2017-07-25 PROCEDURE — 84450 TRANSFERASE (AST) (SGOT): CPT

## 2017-07-25 PROCEDURE — 84550 ASSAY OF BLOOD/URIC ACID: CPT

## 2017-07-25 PROCEDURE — 84443 ASSAY THYROID STIM HORMONE: CPT

## 2017-07-25 PROCEDURE — 86803 HEPATITIS C AB TEST: CPT

## 2017-07-25 PROCEDURE — 84460 ALANINE AMINO (ALT) (SGPT): CPT

## 2017-07-25 PROCEDURE — 83036 HEMOGLOBIN GLYCOSYLATED A1C: CPT

## 2017-07-25 PROCEDURE — 83970 ASSAY OF PARATHORMONE: CPT

## 2017-07-25 PROCEDURE — 36415 COLL VENOUS BLD VENIPUNCTURE: CPT | Mod: PO

## 2017-07-26 LAB
ESTIMATED AVG GLUCOSE: 143 MG/DL
HBA1C MFR BLD HPLC: 6.6 %

## 2017-07-27 ENCOUNTER — OFFICE VISIT (OUTPATIENT)
Dept: FAMILY MEDICINE | Facility: CLINIC | Age: 62
End: 2017-07-27
Payer: COMMERCIAL

## 2017-07-27 ENCOUNTER — TELEPHONE (OUTPATIENT)
Dept: FAMILY MEDICINE | Facility: CLINIC | Age: 62
End: 2017-07-27

## 2017-07-27 VITALS
WEIGHT: 243.38 LBS | TEMPERATURE: 98 F | RESPIRATION RATE: 18 BRPM | BODY MASS INDEX: 38.12 KG/M2 | HEART RATE: 103 BPM | DIASTOLIC BLOOD PRESSURE: 78 MMHG | SYSTOLIC BLOOD PRESSURE: 134 MMHG

## 2017-07-27 DIAGNOSIS — M17.0 PRIMARY OSTEOARTHRITIS OF BOTH KNEES: Primary | ICD-10-CM

## 2017-07-27 PROCEDURE — 99999 PR PBB SHADOW E&M-EST. PATIENT-LVL III: CPT | Mod: PBBFAC,,, | Performed by: FAMILY MEDICINE

## 2017-07-27 PROCEDURE — 99213 OFFICE O/P EST LOW 20 MIN: CPT | Mod: 25,S$GLB,, | Performed by: FAMILY MEDICINE

## 2017-07-27 PROCEDURE — 20610 DRAIN/INJ JOINT/BURSA W/O US: CPT | Mod: 50,S$GLB,, | Performed by: FAMILY MEDICINE

## 2017-07-27 RX ORDER — TRIAMCINOLONE ACETONIDE 40 MG/ML
40 INJECTION, SUSPENSION INTRA-ARTICULAR; INTRAMUSCULAR
Status: COMPLETED | OUTPATIENT
Start: 2017-07-27 | End: 2017-07-27

## 2017-07-27 RX ORDER — LIDOCAINE HYDROCHLORIDE 10 MG/ML
1 INJECTION INFILTRATION; PERINEURAL
Status: COMPLETED | OUTPATIENT
Start: 2017-07-27 | End: 2017-07-27

## 2017-07-27 RX ADMIN — LIDOCAINE HYDROCHLORIDE 1 ML: 10 INJECTION INFILTRATION; PERINEURAL at 01:07

## 2017-07-27 RX ADMIN — TRIAMCINOLONE ACETONIDE 40 MG: 40 INJECTION, SUSPENSION INTRA-ARTICULAR; INTRAMUSCULAR at 01:07

## 2017-07-27 NOTE — TELEPHONE ENCOUNTER
----- Message from Janene Cardona sent at 7/27/2017  8:27 AM CDT -----  Contact: Pt  Pt is calling Nurse staff regarding if patient can be seen today or tomorrow for knee injections. Pt call back to be advised asap. Pt call back [phone] . thanks

## 2017-07-27 NOTE — TELEPHONE ENCOUNTER
----- Message from Shadia Tellez sent at 7/27/2017 10:59 AM CDT -----  Pt is returning a call to nurse to discuss nurse visit apt for knee injection.        Please call pt back at [phone]

## 2017-08-15 ENCOUNTER — TELEPHONE (OUTPATIENT)
Dept: OPHTHALMOLOGY | Facility: CLINIC | Age: 62
End: 2017-08-15

## 2017-08-15 NOTE — TELEPHONE ENCOUNTER
CALLED MS ROSENBERG BACK AND LEFT A VOICEMESSAGE THAT I CHANGED HER APPT. TO THE NEXT AVAILABLE DAY WHICH IS AUG 31 AT 1:30, iF SHE WANTS A EARLIER TIME THAT DAY TO GIVE ME A CALL BACK AND LET ME KNOW. KF

## 2017-08-22 ENCOUNTER — TELEPHONE (OUTPATIENT)
Dept: FAMILY MEDICINE | Facility: CLINIC | Age: 62
End: 2017-08-22

## 2017-08-22 NOTE — TELEPHONE ENCOUNTER
Pt request potassium citrate for kidney stones   To alkaline the urine  States she has tried to contact urologist but has not been able to get a return call

## 2017-08-22 NOTE — TELEPHONE ENCOUNTER
I don't see potassium citrate on her medication list.  I see potassium chloride.  Who suggested that she take this medication?

## 2017-08-22 NOTE — TELEPHONE ENCOUNTER
----- Message from Dolores Hill sent at 8/22/2017 10:41 AM CDT -----  Contact: pt  States she's returning a nurse call. Please call pt at [phone]. Thank you

## 2017-08-22 NOTE — TELEPHONE ENCOUNTER
----- Message from Jessica Nichole sent at 8/22/2017  9:57 AM CDT -----  Would like to speak to nurse about potassium. Please call back at [phone]. thanks

## 2017-08-23 ENCOUNTER — TELEPHONE (OUTPATIENT)
Dept: UROLOGY | Facility: CLINIC | Age: 62
End: 2017-08-23

## 2017-08-23 RX ORDER — POTASSIUM CITRATE 10 MEQ/1
10 TABLET, EXTENDED RELEASE ORAL
Qty: 90 TABLET | Refills: 3 | Status: SHIPPED | OUTPATIENT
Start: 2017-08-23 | End: 2018-07-27

## 2017-08-23 NOTE — TELEPHONE ENCOUNTER
Its not a refill she is asking for this medication because she has kidney stones and she says that it helps keep the urine alkaline.  Pt states she went to the emergency room with her mom and the doctor there suggested she try it.

## 2017-08-23 NOTE — TELEPHONE ENCOUNTER
----- Message from Janene aCrdona sent at 8/23/2017  8:17 AM CDT -----  Contact: Pt   Pt is calling Nurse staff regarding pt Kidney stones. Pt call back today [phone] to be advised.  thanks

## 2017-08-23 NOTE — TELEPHONE ENCOUNTER
Notified patient that Dr. Love would like her to complete 24 hour urine study then follow up to determine if she needs Potassium Citrate. Patient states understanding.

## 2017-08-31 ENCOUNTER — PROCEDURE VISIT (OUTPATIENT)
Dept: OPHTHALMOLOGY | Facility: CLINIC | Age: 62
End: 2017-08-31
Payer: COMMERCIAL

## 2017-08-31 DIAGNOSIS — E11.3213 TYPE 2 DIABETES MELLITUS WITH BOTH EYES AFFECTED BY MILD NONPROLIFERATIVE RETINOPATHY AND MACULAR EDEMA, WITH LONG-TERM CURRENT USE OF INSULIN: Primary | ICD-10-CM

## 2017-08-31 DIAGNOSIS — Z79.4 TYPE 2 DIABETES MELLITUS WITH BOTH EYES AFFECTED BY MILD NONPROLIFERATIVE RETINOPATHY AND MACULAR EDEMA, WITH LONG-TERM CURRENT USE OF INSULIN: Primary | ICD-10-CM

## 2017-08-31 PROCEDURE — 99499 UNLISTED E&M SERVICE: CPT | Mod: S$GLB,,, | Performed by: OPHTHALMOLOGY

## 2017-08-31 PROCEDURE — 92134 CPTRZ OPH DX IMG PST SGM RTA: CPT | Mod: S$GLB,,, | Performed by: OPHTHALMOLOGY

## 2017-08-31 PROCEDURE — 67028 INJECTION EYE DRUG: CPT | Mod: RT,S$GLB,, | Performed by: OPHTHALMOLOGY

## 2017-08-31 RX ORDER — AFLIBERCEPT 40 MG/ML
2 INJECTION, SOLUTION INTRAVITREAL
Qty: 2 VIAL | Refills: 3 | Status: SHIPPED | OUTPATIENT
Start: 2017-08-31 | End: 2017-09-29 | Stop reason: SDUPTHER

## 2017-08-31 NOTE — PROGRESS NOTES
===============================  08/31/2017   Latosha Enriquez,   61 y.o. female   Last visit [facility]: :7/19/2017   Last [place] [facility] dept[facility] 7/19/2017  [facility]:  [place] was not recorded.   Not recorded         Not recorded         Not recorded        Chief Complaint   Patient presents with    dme     eylea od, pt states vision is about the same as last time she was here     Ophthalmic Medications     Ophthalmic - Anti-inflammatory, NSAIDs Start End    ketorolac 0.5% (ACULAR) 0.5 % Drop 2/20/2017 2/20/2018    Sig: Place 1 drop into both eyes 3 (three) times daily.    Route: Both Eyes         HPI     dme    Additional comments: eylea od, pt states vision is about the same as last   time she was here           Comments   LAST (DILATED EXAM 3-31-17  (No Betadine - Vigamox Prep))    DM  DME OD  EYLEA OD #6 last 7/19/17    --Rx for Eylea sent to [facility] 6/15/17 11:02 am       Last edited by KILEY Aguirre on 8/31/2017  3:20 PM. (History)      Read Studies: y  Vitalsy  ________________  8/31/2017  Problem List Items Addressed This Visit        Eye/Vision problems    Type 2 diabetes mellitus with both eyes affected by mild nonproliferative retinopathy and macular edema, with long-term current use of insulin - Primary    Relevant Medications    aflibercept Soln 2 mg (Start on 8/31/2017  4:00 PM)    Other Relevant Orders    Posterior Segment OCT Retina-Both eyes    Prior Authorization Order      Other Visit Diagnoses    None.       .  .od dme  [facility]  Oct betre but iny sl   today eyeel; #3 since worse    8/31/2017  Diagnosis :  od dme   Today:   Eylea (afibercept) 2 mg/0.05 ml Intravitreal Injection , OD   Follow up: rtc 1 mo        Call 24/7 for any worsening of vision. Check  OU QD. Gave my home phone number.      Procedure  Note:   OD}  Eylea (afibercept) 2 mg/0.05 ml Intravitreal Injection    I have explained the Risks, Benefits and Alternatives of the procedure in detail.  The patient voices understanding  and all questions have been answered.  The patient agrees to proceed as discussed.  LIDOCAINE 2%  subconj bleb  was used for anesthesia.  Topical betadine was used for antisepsis.  0.05 cc was  injected 3.7 mm from corneal limbus in the inferotemporal quadrant.  Following injection the IOP was less than thirty (<30) by tonopen.  The eye was then thoroughly irrigated with BSS.  Patient tolerated procedure well.  No complications were observed.  The Patient was educated that mild irritation tonight was normal secondary to topical antispsis use.  Pt was advised to call at any time day or night for pain, redness, or any decline in vision. I gave the patient my home number as well as the clinic on call number. Daily visual checks and Amsler grid testing were reviewed.  ciloxan Antibiotic Drops to be used 4 times daily for 4 days  LINDSAY Bright MD  Procedure ordered: y  Consent: y  Pre auth: y  MAR:y  Opnote: y  Charge capture:y  Sided procedure note: y         ===========================

## 2017-09-15 ENCOUNTER — TELEPHONE (OUTPATIENT)
Dept: FAMILY MEDICINE | Facility: CLINIC | Age: 62
End: 2017-09-15

## 2017-09-15 NOTE — TELEPHONE ENCOUNTER
----- Message from Zuleyka Rodríguez sent at 9/15/2017 12:50 PM CDT -----  Patient would like for you to call her at [phone] regarding her back.                                           meyers

## 2017-09-29 ENCOUNTER — TELEPHONE (OUTPATIENT)
Dept: OPHTHALMOLOGY | Facility: CLINIC | Age: 62
End: 2017-09-29

## 2017-09-29 RX ORDER — AFLIBERCEPT 40 MG/ML
2 INJECTION, SOLUTION INTRAVITREAL
Qty: 2 VIAL | Refills: 3 | OUTPATIENT
Start: 2017-09-29 | End: 2017-09-29 | Stop reason: SDUPTHER

## 2017-09-29 RX ORDER — AFLIBERCEPT 40 MG/ML
2 INJECTION, SOLUTION INTRAVITREAL
Qty: 2 VIAL | Refills: 3 | Status: SHIPPED | OUTPATIENT
Start: 2017-09-29 | End: 2017-09-29 | Stop reason: SDUPTHER

## 2017-09-29 RX ORDER — AFLIBERCEPT 40 MG/ML
2 INJECTION, SOLUTION INTRAVITREAL
Qty: 2 VIAL | Refills: 3 | Status: SHIPPED | OUTPATIENT
Start: 2017-09-29 | End: 2017-10-23 | Stop reason: SDUPTHER

## 2017-09-29 NOTE — TELEPHONE ENCOUNTER
----- Message from Vangie Perez sent at 9/28/2017 11:38 AM CDT -----  Contact: [facility]  Calling for copy of  updated rx for EYLEA 2 mg/0.05 mL  pls fax over to [phone] any questions pls call [phone]  Forwarded call to Dr. Bright to print RX and I will fax.

## 2017-09-29 NOTE — TELEPHONE ENCOUNTER
----- Message from KILEY Ray sent at 9/29/2017  7:51 AM CDT -----  Contact[facility]      ----- Message -----  From: Vangie Perez  Sent: 9/28/2017  11:38 AM  To: Deangelo Buenrost[facility] Staff    Calling for copy of  updated rx for EYLEA 2 mg/0.05 mL  pls fax over to [phone] any questions pls call [phone]

## 2017-10-02 RX ORDER — LOSARTAN POTASSIUM AND HYDROCHLOROTHIAZIDE 25; 100 MG/1; MG/1
1 TABLET ORAL DAILY
Qty: 90 TABLET | Refills: 3 | Status: SHIPPED | OUTPATIENT
Start: 2017-10-02 | End: 2018-05-10 | Stop reason: SDUPTHER

## 2017-10-02 RX ORDER — INSULIN LISPRO 100 [IU]/ML
INJECTION, SUSPENSION SUBCUTANEOUS
Qty: 15 ML | Refills: 3 | Status: SHIPPED | OUTPATIENT
Start: 2017-10-02 | End: 2018-06-06 | Stop reason: SDUPTHER

## 2017-10-02 RX ORDER — METFORMIN HYDROCHLORIDE 500 MG/1
TABLET, EXTENDED RELEASE ORAL
Qty: 270 TABLET | Refills: 3 | Status: SHIPPED | OUTPATIENT
Start: 2017-10-02 | End: 2018-02-20

## 2017-10-02 RX ORDER — SIMVASTATIN 20 MG/1
20 TABLET, FILM COATED ORAL NIGHTLY
Qty: 90 TABLET | Refills: 3 | Status: SHIPPED | OUTPATIENT
Start: 2017-10-02 | End: 2019-11-29 | Stop reason: SDUPTHER

## 2017-10-04 ENCOUNTER — TELEPHONE (OUTPATIENT)
Dept: OPHTHALMOLOGY | Facility: CLINIC | Age: 62
End: 2017-10-04

## 2017-10-04 NOTE — TELEPHONE ENCOUNTER
We called [facility] to find out why Ms. Enriquez's medication has not been sent for today's appt.  [facility] said Ms. Enriquez has not been answering phone and they can not send without speaking to her.  I attempted to call Ms. Enriquez, as this has been the case multiple times in the past, but she does not answer for me either, and her voicemail box is full.  I am attempting to reach Ms. Enriquez to let her know she needs to reschedule her appointment after she speaks with her pharmacy and finds out when they can have her medication here.

## 2017-10-04 NOTE — TELEPHONE ENCOUNTER
An attempt was made again to contact Ms. Enriquez, still no answer and her voicemail is full.  See previous messages.

## 2017-10-04 NOTE — TELEPHONE ENCOUNTER
Another attempt made to contact Ms. Enriquez.  Still no answer - vm box full - see previous messages.

## 2017-10-09 ENCOUNTER — TELEPHONE (OUTPATIENT)
Dept: OPHTHALMOLOGY | Facility: CLINIC | Age: 62
End: 2017-10-09

## 2017-10-09 NOTE — TELEPHONE ENCOUNTER
----- Message from Vangie Perez sent at 10/9/2017 10:04 AM CDT -----  Contact: pt  Would like to schedule proc want to make sure approval has came for injection pls advise [phone]    Sravani spoke with [facility] pharmacy this morning, rx will be here tomorrow.  I spoke with Ms. Dalton and scheduled her appt.

## 2017-10-17 ENCOUNTER — TELEPHONE (OUTPATIENT)
Dept: OPHTHALMOLOGY | Facility: CLINIC | Age: 62
End: 2017-10-17

## 2017-10-17 NOTE — TELEPHONE ENCOUNTER
CALLED MS ROSENBERG AND LEFT VOICMAIL THAT WE DO HAVE HER MEDICINE AND SHE IS ON OUR SCHEDULE FOR A PROCEDURE TODAY.KF

## 2017-10-23 ENCOUNTER — PROCEDURE VISIT (OUTPATIENT)
Dept: OPHTHALMOLOGY | Facility: CLINIC | Age: 62
End: 2017-10-23
Payer: COMMERCIAL

## 2017-10-23 DIAGNOSIS — E11.3213 TYPE 2 DIABETES MELLITUS WITH BOTH EYES AFFECTED BY MILD NONPROLIFERATIVE RETINOPATHY AND MACULAR EDEMA, WITH LONG-TERM CURRENT USE OF INSULIN: Primary | ICD-10-CM

## 2017-10-23 DIAGNOSIS — Z79.4 TYPE 2 DIABETES MELLITUS WITH BOTH EYES AFFECTED BY MILD NONPROLIFERATIVE RETINOPATHY AND MACULAR EDEMA, WITH LONG-TERM CURRENT USE OF INSULIN: ICD-10-CM

## 2017-10-23 DIAGNOSIS — E11.3213 TYPE 2 DIABETES MELLITUS WITH BOTH EYES AFFECTED BY MILD NONPROLIFERATIVE RETINOPATHY AND MACULAR EDEMA, WITH LONG-TERM CURRENT USE OF INSULIN: ICD-10-CM

## 2017-10-23 DIAGNOSIS — Z79.4 TYPE 2 DIABETES MELLITUS WITH BOTH EYES AFFECTED BY MILD NONPROLIFERATIVE RETINOPATHY AND MACULAR EDEMA, WITH LONG-TERM CURRENT USE OF INSULIN: Primary | ICD-10-CM

## 2017-10-23 PROCEDURE — 67028 INJECTION EYE DRUG: CPT | Mod: RT,S$GLB,, | Performed by: OPHTHALMOLOGY

## 2017-10-23 PROCEDURE — 92012 INTRM OPH EXAM EST PATIENT: CPT | Mod: 25,S$GLB,, | Performed by: OPHTHALMOLOGY

## 2017-10-23 PROCEDURE — 92134 CPTRZ OPH DX IMG PST SGM RTA: CPT | Mod: S$GLB,,, | Performed by: OPHTHALMOLOGY

## 2017-10-23 RX ORDER — KETOROLAC TROMETHAMINE 5 MG/ML
1 SOLUTION OPHTHALMIC 4 TIMES DAILY
Qty: 5 ML | Refills: 0 | Status: CANCELLED | OUTPATIENT
Start: 2017-10-23 | End: 2018-10-27

## 2017-10-23 RX ORDER — AFLIBERCEPT 40 MG/ML
2 INJECTION, SOLUTION INTRAVITREAL
Qty: 2 VIAL | Refills: 3 | Status: SHIPPED | OUTPATIENT
Start: 2017-10-23 | End: 2018-07-03 | Stop reason: SDUPTHER

## 2017-10-23 NOTE — PROGRESS NOTES
===============================  10/24/2017   Latosha Enriquez,   61 y.o. female   Last visit [facility]: :8/31/2017   Last visit eye dept. 8/31/2017  [facility]:  Corrected distance visual acuity was 20/40+2 in the right eye and 20/20-1 in the left eye.   Not recorded        Wearing Rx     Wearing Rx       Sphere Cylinder Axis Add    Right -2.75 +0.50 180 +2.50    Left -3.00 Sphere  +2.50          Wearing Rx #2       Sphere Cylinder Axis Add    Right +1.75 Sphere      Left +1.75 Sphere      Type:  otc readers               Not recorded        Chief Complaint   Patient presents with    PROCEDURE     EYLEA OD     Ophthalmic Medications     Ophthalmic - Anti-inflammatory, NSAIDs Start End    ketorolac 0.5% (ACULAR) 0.5 % Drop 2/20/2017 2/20/2018    Sig: Place 1 drop into both eyes 3 (three) times daily.    Route: Both Eyes         HPI     PROCEDURE    Additional comments: EYLEA OD           Comments   LAST (DILATED EXAM 3-31-17  RX for Eylea sent to [facility] 8/31/17 @ 4:07  (No Betadine - Vigamox Prep))    DM  DME OD  EYLEA OD #8 last 10/23/17    --Rx for Eylea sent to [facility] 6/15/17 11:02 am       Last edited by Kolby Do MA on 10/23/2017  2:45 PM. (History)          ________________  10/23/2017  Problem List Items Addressed This Visit        Eye/Vision problems    Type 2 diabetes mellitus with both eyes affected by mild nonproliferative retinopathy and macular edema, with long-term current use of insulin - Primary    Relevant Medications    aflibercept Soln 2 mg (Completed)    EYLEA 2 mg/0.05 mL Soln    Other Relevant Orders    Posterior Segment OCT Retina-Both eyes          .last eylea 8/31/17  Missed last injection, eylea comes from [facility]   [facility], but problems with communication for  Shipment between patient and pharmacy, pharmacy has trouble  Reaching Ms. Enriquez, so we had no medication on her last appointment  And she rescheduled x 3  And worse DME today  Discussed importance of communication  with pharmacy and with my office  On many occasions we have attempted to contact her and her voicemail is full  And she does not return the calls.  Important to continue timely treatment    10/23/2017  Diagnosis :  bdr dme  Today:   Eylea (afibercept) 2 mg/0.05 ml Intravitreal Injection , OD   Follow up: 1 mo eylea od        Call 24/7 for any worsening of vision. Check  OU QD. Gave my home phone number.      Procedure  Note:   OD}  Eylea (afibercept) 2 mg/0.05 ml Intravitreal Injection    I have explained the Risks, Benefits and Alternatives of the procedure in detail.  The patient voices understanding and all questions have been answered.  The patient agrees to proceed as discussed.  LIDOCAINE 2%  subconj bleb  was used for anesthesia.  Topical vigamox was used for antisepsis.  0.05 cc was  injected 3.7 mm from corneal limbus in the inferotemporal quadrant.  Following injection the IOP was less than thirty (<30) by tonopen.  The eye was then thoroughly irrigated with BSS.  Patient tolerated procedure well.  No complications were observed.  The Patient was educated that mild irritation tonight was normal secondary to topical antispsis use.  Pt was advised to call at any time day or night for pain, redness, or any decline in vision. I gave the patient my home number as well as the clinic on call number. Daily visual checks and Amsler grid testing were reviewed.  ciloxan Antibiotic Drops to be used 4 times daily for 4 days  LINDSAY Bright MD  Procedure ordered: y  Consent: y  Pre auth: y  MAR:y  Opnote: y  Charge capture:y  Sided procedure note: y         ===========================

## 2017-10-23 NOTE — PROGRESS NOTES
===============================  10/23/2017   Latosha Enriquez,   61 y.o. female   Last visit [facility]: :8/31/2017   Last visit eye dept. 8/31/2017  [facility]:  Corrected distance visual acuity was 20/40+2 in the right eye and 20/20-1 in the left eye.   Not recorded        Wearing Rx     Wearing Rx       Sphere Cylinder Axis Add    Right -2.75 +0.50 180 +2.50    Left -3.00 Sphere  +2.50          Wearing Rx #2       Sphere Cylinder Axis Add    Right +1.75 Sphere      Left +1.75 Sphere      Type:  otc readers               Not recorded        Chief Complaint   Patient presents with    PROCEDURE     EYLEA OD     Ophthalmic Medications     Ophthalmic - Anti-inflammatory, NSAIDs Start End    ketorolac 0.5% (ACULAR) 0.5 % Drop 2/20/2017 2/20/2018    Sig: Place 1 drop into both eyes 3 (three) times daily.    Route: Both Eyes         HPI     PROCEDURE    Additional comments: EYLEA OD           Comments   LAST (DILATED EXAM 3-31-17  [facility]ylea [facility] 8/31/17 @ 4:07  (No Betadine - Vigamox Prep))    DM  DME OD  EYLEA OD #8 last 10/23/17    --Rx for Eylea sent to [facility] 6/15/17 11:02 am       Last edited by Kolby Do MA on 10/23/2017  2:45 PM. (History)          ________________  10/23/2017  Problem List Items Addressed This Visit     None          .       ===========================

## 2017-11-02 ENCOUNTER — OFFICE VISIT (OUTPATIENT)
Dept: FAMILY MEDICINE | Facility: CLINIC | Age: 62
End: 2017-11-02
Payer: COMMERCIAL

## 2017-11-02 ENCOUNTER — HOSPITAL ENCOUNTER (OUTPATIENT)
Dept: RADIOLOGY | Facility: HOSPITAL | Age: 62
Discharge: HOME | End: 2017-11-02
Attending: FAMILY MEDICINE
Payer: COMMERCIAL

## 2017-11-02 VITALS
HEART RATE: 76 BPM | RESPIRATION RATE: 16 BRPM | TEMPERATURE: 98 F | OXYGEN SATURATION: 98 % | SYSTOLIC BLOOD PRESSURE: 130 MMHG | HEIGHT: 65 IN | DIASTOLIC BLOOD PRESSURE: 80 MMHG | WEIGHT: 246.06 LBS | BODY MASS INDEX: 41 KG/M2

## 2017-11-02 DIAGNOSIS — M54.2 NECK PAIN: ICD-10-CM

## 2017-11-02 DIAGNOSIS — M54.2 NECK PAIN: Primary | ICD-10-CM

## 2017-11-02 PROCEDURE — 72040 X-RAY EXAM NECK SPINE 2-3 VW: CPT | Mod: 26,,, | Performed by: RADIOLOGY

## 2017-11-02 PROCEDURE — 99999 PR PBB SHADOW E&M-EST. PATIENT-LVL V: CPT | Mod: PBBFAC,,, | Performed by: FAMILY MEDICINE

## 2017-11-02 PROCEDURE — 72040 X-RAY EXAM NECK SPINE 2-3 VW: CPT | Mod: TC,PO

## 2017-11-02 PROCEDURE — 96372 THER/PROPH/DIAG INJ SC/IM: CPT | Mod: S$GLB,,, | Performed by: FAMILY MEDICINE

## 2017-11-02 PROCEDURE — 99214 OFFICE O/P EST MOD 30 MIN: CPT | Mod: 25,S$GLB,, | Performed by: FAMILY MEDICINE

## 2017-11-02 RX ORDER — KETOROLAC TROMETHAMINE 30 MG/ML
30 INJECTION, SOLUTION INTRAMUSCULAR; INTRAVENOUS ONCE
Status: COMPLETED | OUTPATIENT
Start: 2017-11-02 | End: 2017-11-02

## 2017-11-02 RX ADMIN — KETOROLAC TROMETHAMINE 30 MG: 30 INJECTION, SOLUTION INTRAMUSCULAR; INTRAVENOUS at 03:11

## 2017-11-02 NOTE — PATIENT INSTRUCTIONS
Reach and Hold Exercise       Do this exercise on your hands and knees. Keep your knees under your hips and your hands under your shoulders. Keep your spine in a neutral position (not arched or sagging). Keep your ears in line with your shoulders. Hold for a few seconds before starting the exercise:  1. Tighten your abdominal muscles and raise one arm straight in front of you, palm down. Hold for 5 seconds, then lower. Repeat 5 times.  2. Do the exercise again, this time lifting your arm to the side. Repeat 5 times.  3. Do the exercise again, this time lifting your arm backward, palm up. Repeat 5 times.  Switch sides and do each exercise with the other arm.  Date Last Reviewed: 8/16/2015  © 5429-3397 [employer]. [address] [place] [address]. All rights reserved. This information is not intended as a substitute for professional medical care. Always follow your healthcare professional's instructions.        Shoulder and Upper Back Stretch  To start, stand tall with your ears, shoulders, and hips in line. Your feet should be slightly apart, positioned just under your hips. Focus your eyes directly in front of you.  this position for a few seconds before starting your exercise. This helps increase your awareness of proper posture.          Reach overhead and slightly back with both arms. Keep your shoulders and neck aligned and your elbows behind your shoulders:  · With your palms facing the ceiling, turn your fingers inward.  · Take a deep breath. Breathe out, and lower your elbows toward your buttocks. Hold for 5 seconds, then return to starting position.  · Repeat 3 times.  Date Last Reviewed: 8/16/2015  © 1698-2698 [employer]. [address] [place] [address]. All rights reserved. This information is not intended as a substitute for professional medical care. Always follow your healthcare professional's instructions.

## 2017-11-02 NOTE — PROGRESS NOTES
"Subjective:       Patient ID: Latosha Enriquez is a 61 y.o. female.    Chief Complaint: Neck Pain      HPI   Ms. Enriquez presents to clinic today for neck pain.   She states it has been going on for 2 months.   She states she has some tingling and numbness.   She states it is difficult to turn her head.   She states it is difficult to put her chin to her chest.   She states it feels like it is popping.   She has tried muscle relaxer and ice pack and it is not helping.   She has some tingling and numbness.       Review of Systems   Constitutional: Negative for fever.   HENT: Negative for congestion.    Respiratory: Negative for cough and shortness of breath.    Cardiovascular: Negative for chest pain.   Gastrointestinal: Negative for abdominal pain and vomiting.   Musculoskeletal: Positive for neck pain.       Medication List with Changes/Refills   Current Medications    ALBUTEROL 90 MCG/ACTUATION INHALER    Inhale 2 puffs into the lungs every 6 (six) hours as needed.    ALLOPURINOL (ZYLOPRIM) 100 MG TABLET    Take 1 tablet (100 mg total) by mouth once daily.    BD INSULIN PEN NEEDLE UF SHORT 31 GAUGE X 5/16" NDLE        BUDESONIDE 180MCG (PULMICORT FLEXHALER) 180 MCG/ACTUATION AEPB    Inhale 2 puffs into the lungs 2 (two) times daily.    CALCIUM CARBONATE/VITAMIN D3 (VITAMIN D-3 ORAL)    Take by mouth once daily.    DOCUSATE SODIUM (COLACE) 100 MG CAPSULE    Take 1 capsule (100 mg total) by mouth 2 (two) times daily.    ERGOCALCIFEROL (ERGOCALCIFEROL) 50,000 UNIT CAP    Take 50,000 Units by mouth every 7 days.    ESOMEPRAZOLE (NEXIUM) 20 MG CAPSULE    Take 1 capsule (20 mg total) by mouth before breakfast.    EYLEA 2 MG/0.05 ML SOLN    0.05 mLs (2 mg total) by Intravitreal route every 28 days.    HUMALOG MIX 75-25 KWIKPEN 100 UNIT/ML (75-25) INPN    INJECT 30 UNITS UNDER THE SKIN IN THE MORNING AND 20 UNITS IN THE EVENING    KETOROLAC 0.5% (ACULAR) 0.5 % DROP    Place 1 drop into both eyes 3 (three) times daily.    " "LOSARTAN-HYDROCHLOROTHIAZIDE 100-25 MG (HYZAAR) 100-25 MG PER TABLET    Take 1 tablet by mouth once daily.    METFORMIN (GLUCOPHAGE-XR) 500 MG 24 HR TABLET    TAKE 2 TABLETS DAILY WITH BREAKFAST AND 1 TABLET WITH DINNER    ONDANSETRON (ZOFRAN-ODT) 4 MG TBDL    Take 1-2 tablets (4-8 mg total) by mouth every 6 to 8 hours as needed (nausea/vomiting).    OXYBUTYNIN (DITROPAN) 5 MG TAB    Take 1 tablet (5 mg total) by mouth 3 (three) times daily.    OXYCODONE-ACETAMINOPHEN (PERCOCET) 5-325 MG PER TABLET    Take 1-2 tablets by mouth every 4 (four) hours as needed for Pain.    PEN NEEDLE, DIABETIC 31 GAUGE X 1/4" NDLE    1 Device by Misc.(Non-Drug; Combo Route) route 2 (two) times daily.    POTASSIUM CITRATE (UROCIT-K) 10 MEQ (1,080 MG) TBSR    Take 1 tablet (10 mEq total) by mouth 3 (three) times daily with meals.    PROMETHAZINE-CODEINE 6.25-10 MG/5 ML (PHENERGAN WITH CODEINE) 6.25-10 MG/5 ML SYRUP    Take 5 mLs by mouth every 4 to 6 hours as needed for Cough.    SIMVASTATIN (ZOCOR) 20 MG TABLET    Take 1 tablet (20 mg total) by mouth every evening.    TAMSULOSIN (FLOMAX) 0.4 MG CP24    Take 1 capsule (0.4 mg total) by mouth once daily.       Patient Active Problem List   Diagnosis    Hemorrhoids, internal    Diabetes mellitus type II, uncontrolled    Essential hypertension    Morbid obesity with BMI of 40.0-44.9, adult    Vitamin D deficiency disease    Primary osteoarthritis of both knees    GERD (gastroesophageal reflux disease)    Hyperlipidemia    Type 2 diabetes mellitus with both eyes affected by mild nonproliferative retinopathy and macular edema, with long-term current use of insulin    Kidney stones, calcium oxalate         Objective:     Physical Exam   Constitutional: She is oriented to person, place, and time. She appears well-developed and well-nourished. No distress.   HENT:   Head: Normocephalic and atraumatic.   Right Ear: External ear normal.   Left Ear: External ear normal.   Eyes: EOM are " normal. Right eye exhibits no discharge. Left eye exhibits no discharge.   Neck: Neck supple. No tracheal deviation present.   Stiffness   Decrease ROM   Decrease strength of neck      Cardiovascular: Normal rate and regular rhythm.    Pulmonary/Chest: Effort normal and breath sounds normal. No respiratory distress. She has no wheezes.   Musculoskeletal: She exhibits no edema.   Neurological: She is alert and oriented to person, place, and time.   Skin: Skin is warm and dry. She is not diaphoretic. No erythema.   Psychiatric: She has a normal mood and affect.   Vitals reviewed.    Vitals:    11/02/17 1435   BP: 130/80   Pulse: 76   Resp: 16   Temp: 98.2 °F (36.8 °C)       Assessment/  PLAN     Neck pain  -     X-Ray Cervical Spine AP And Lateral; Future; Expected date: 11/02/2017  -     Ambulatory Referral to Physical/Occupational Therapy  -     ketorolac injection 30 mg; Inject 30 mg into the muscle once.  - continue exercises , stretches, heat pack/ice pack     Plan as above   rtc prn         Alonso Pedraza MD  [facility]

## 2017-11-03 ENCOUNTER — TELEPHONE (OUTPATIENT)
Dept: FAMILY MEDICINE | Facility: CLINIC | Age: 62
End: 2017-11-03

## 2017-11-03 NOTE — TELEPHONE ENCOUNTER
----- Message from Briseida Perez sent at 11/3/2017 11:43 AM CDT -----  Contact: pt  The pt request a call concerning her xray results and the name of her cough medication, pt can be reached at [phone]////thxMW

## 2017-11-03 NOTE — TELEPHONE ENCOUNTER
----- Message from Elmira Lombardi LPN sent at 11/3/2017  1:37 PM CDT -----  Contact: pt      ----- Message -----  From: Briseida Perez  Sent: 11/3/2017  11:43 AM  To: Yg OSULLIVAN Staff    The pt request a call concerning her xray results and the name of her cough medication, pt can be reached at [phone]////thxMW

## 2017-11-06 ENCOUNTER — TELEPHONE (OUTPATIENT)
Dept: FAMILY MEDICINE | Facility: CLINIC | Age: 62
End: 2017-11-06

## 2017-11-06 DIAGNOSIS — M47.812 OSTEOARTHRITIS OF CERVICAL SPINE, UNSPECIFIED SPINAL OSTEOARTHRITIS COMPLICATION STATUS: Primary | ICD-10-CM

## 2017-11-06 NOTE — TELEPHONE ENCOUNTER
Pt states she is still having pain in her neck  Has not started therapy yet  Want to be referred to pain management   rKistian Gruber at the [facility]

## 2017-11-06 NOTE — TELEPHONE ENCOUNTER
----- Message from Shadia Tellez sent at 11/6/2017  1:17 PM CST -----  Pt is requesting a referral to a pain management provider. Please send referral Dr Kristian Gruber office             Please call pt back at [phone]

## 2017-11-13 ENCOUNTER — TELEPHONE (OUTPATIENT)
Dept: FAMILY MEDICINE | Facility: CLINIC | Age: 62
End: 2017-11-13

## 2017-11-13 NOTE — TELEPHONE ENCOUNTER
----- Message from Sharona Mayorga sent at 11/13/2017 11:02 AM CST -----  Pt at [phone]//[facility] is calling to ask if her referral has been sent to Dr Kristian Gruber//please call//iram/alana

## 2017-11-13 NOTE — TELEPHONE ENCOUNTER
Spoke with the patient and informed her that Dr. Ronquillo's office would contact her when the results are ready and then recommendations will follow, patient verbalized understanding.

## 2017-11-15 ENCOUNTER — HOSPITAL ENCOUNTER (OUTPATIENT)
Dept: RADIOLOGY | Facility: HOSPITAL | Age: 62
Discharge: HOME | End: 2017-11-15
Attending: FAMILY MEDICINE
Payer: COMMERCIAL

## 2017-11-15 ENCOUNTER — OFFICE VISIT (OUTPATIENT)
Dept: FAMILY MEDICINE | Facility: CLINIC | Age: 62
End: 2017-11-15
Payer: COMMERCIAL

## 2017-11-15 VITALS
HEART RATE: 89 BPM | HEIGHT: 65 IN | WEIGHT: 250.69 LBS | RESPIRATION RATE: 16 BRPM | TEMPERATURE: 97 F | DIASTOLIC BLOOD PRESSURE: 80 MMHG | BODY MASS INDEX: 41.77 KG/M2 | OXYGEN SATURATION: 98 % | SYSTOLIC BLOOD PRESSURE: 130 MMHG

## 2017-11-15 DIAGNOSIS — M25.561 ACUTE PAIN OF RIGHT KNEE: Primary | ICD-10-CM

## 2017-11-15 DIAGNOSIS — M54.50 ACUTE BILATERAL LOW BACK PAIN WITHOUT SCIATICA: ICD-10-CM

## 2017-11-15 DIAGNOSIS — M25.561 ACUTE PAIN OF RIGHT KNEE: ICD-10-CM

## 2017-11-15 PROCEDURE — 73562 X-RAY EXAM OF KNEE 3: CPT | Mod: 26,RT,, | Performed by: RADIOLOGY

## 2017-11-15 PROCEDURE — 73560 X-RAY EXAM OF KNEE 1 OR 2: CPT | Mod: 26,LT,, | Performed by: RADIOLOGY

## 2017-11-15 PROCEDURE — 73560 X-RAY EXAM OF KNEE 1 OR 2: CPT | Mod: TC,PO,LT

## 2017-11-15 PROCEDURE — 99999 PR PBB SHADOW E&M-EST. PATIENT-LVL V: CPT | Mod: PBBFAC,,, | Performed by: FAMILY MEDICINE

## 2017-11-15 PROCEDURE — 99214 OFFICE O/P EST MOD 30 MIN: CPT | Mod: S$GLB,,, | Performed by: FAMILY MEDICINE

## 2017-11-15 RX ORDER — CYCLOBENZAPRINE HCL 5 MG
5 TABLET ORAL 2 TIMES DAILY PRN
Qty: 30 TABLET | Refills: 0 | Status: SHIPPED | OUTPATIENT
Start: 2017-11-15 | End: 2017-11-15 | Stop reason: SDUPTHER

## 2017-11-15 RX ORDER — CYCLOBENZAPRINE HCL 5 MG
5 TABLET ORAL 2 TIMES DAILY PRN
Qty: 30 TABLET | Refills: 0 | Status: SHIPPED | OUTPATIENT
Start: 2017-11-15 | End: 2017-11-25

## 2017-11-15 NOTE — PROGRESS NOTES
"Subjective:       Patient ID: Latosha Enriquez is a 61 y.o. female.    Chief Complaint: Follow-up      HPI   Ms. Enriquez presents to clinic today for knee pain.   She states when she got out of the elevator, the floor was not at level of the elevator and when she stepped out , she hurt her knee.   She was told by the building staff that the elevator was actually out of service.   She was instructed by the building staff to follow up with a doctor.   She states the incident occurred at [facility].   She does not have any redness or swelling on the knee.   She states her knee has been locking up.   She also has some back pain as she feels like she jolted when she stepped out.     Review of Systems   Constitutional: Negative for fever.   Respiratory: Negative for cough and shortness of breath.    Cardiovascular: Negative for chest pain.   Gastrointestinal: Negative for abdominal pain and vomiting.   Musculoskeletal: Positive for back pain.        Knee pain - right   Low back pain        Medication List with Changes/Refills   New Medications    CYCLOBENZAPRINE (FLEXERIL) 5 MG TABLET    Take 1 tablet (5 mg total) by mouth 2 (two) times daily as needed for Muscle spasms.   Current Medications    ALBUTEROL 90 MCG/ACTUATION INHALER    Inhale 2 puffs into the lungs every 6 (six) hours as needed.    ALLOPURINOL (ZYLOPRIM) 100 MG TABLET    Take 1 tablet (100 mg total) by mouth once daily.    BD INSULIN PEN NEEDLE UF SHORT 31 GAUGE X 5/16" NDLE        BUDESONIDE 180MCG (PULMICORT FLEXHALER) 180 MCG/ACTUATION AEPB    Inhale 2 puffs into the lungs 2 (two) times daily.    DOCUSATE SODIUM (COLACE) 100 MG CAPSULE    Take 1 capsule (100 mg total) by mouth 2 (two) times daily.    ESOMEPRAZOLE (NEXIUM) 20 MG CAPSULE    Take 1 capsule (20 mg total) by mouth before breakfast.    EYLEA 2 MG/0.05 ML SOLN    0.05 mLs (2 mg total) by Intravitreal route every 28 days.    HUMALOG MIX 75-25 KWIKPEN 100 UNIT/ML " "(75-25) INPN    INJECT 30 UNITS UNDER THE SKIN IN THE MORNING AND 20 UNITS IN THE EVENING    KETOROLAC 0.5% (ACULAR) 0.5 % DROP    Place 1 drop into both eyes 3 (three) times daily.    LOSARTAN-HYDROCHLOROTHIAZIDE 100-25 MG (HYZAAR) 100-25 MG PER TABLET    Take 1 tablet by mouth once daily.    METFORMIN (GLUCOPHAGE-XR) 500 MG 24 HR TABLET    TAKE 2 TABLETS DAILY WITH BREAKFAST AND 1 TABLET WITH DINNER    ONDANSETRON (ZOFRAN-ODT) 4 MG TBDL    Take 1-2 tablets (4-8 mg total) by mouth every 6 to 8 hours as needed (nausea/vomiting).    OXYBUTYNIN (DITROPAN) 5 MG TAB    Take 1 tablet (5 mg total) by mouth 3 (three) times daily.    OXYCODONE-ACETAMINOPHEN (PERCOCET) 5-325 MG PER TABLET    Take 1-2 tablets by mouth every 4 (four) hours as needed for Pain.    PEN NEEDLE, DIABETIC 31 GAUGE X 1/4" NDLE    1 Device by Misc.(Non-Drug; Combo Route) route 2 (two) times daily.    POTASSIUM CITRATE (UROCIT-K) 10 MEQ (1,080 MG) TBSR    Take 1 tablet (10 mEq total) by mouth 3 (three) times daily with meals.    PROMETHAZINE-CODEINE 6.25-10 MG/5 ML (PHENERGAN WITH CODEINE) 6.25-10 MG/5 ML SYRUP    Take 5 mLs by mouth every 4 to 6 hours as needed for Cough.    SIMVASTATIN (ZOCOR) 20 MG TABLET    Take 1 tablet (20 mg total) by mouth every evening.    TAMSULOSIN (FLOMAX) 0.4 MG CP24    Take 1 capsule (0.4 mg total) by mouth once daily.       Patient Active Problem List   Diagnosis    Hemorrhoids, internal    Diabetes mellitus type II, uncontrolled    Essential hypertension    Morbid obesity with BMI of 40.0-44.9, adult    Vitamin D deficiency disease    Primary osteoarthritis of both knees    GERD (gastroesophageal reflux disease)    Hyperlipidemia    Type 2 diabetes mellitus with both eyes affected by mild nonproliferative retinopathy and macular edema, with long-term current use of insulin    Kidney stones, calcium oxalate         Objective:     Physical Exam   Constitutional: She is oriented to person, place, and time. She " appears well-developed and well-nourished. No distress.   HENT:   Head: Normocephalic and atraumatic.   Right Ear: External ear normal.   Left Ear: External ear normal.   Eyes: EOM are normal. Right eye exhibits no discharge. Left eye exhibits no discharge.   Cardiovascular: Normal rate and regular rhythm.    Pulmonary/Chest: Effort normal and breath sounds normal. No respiratory distress. She has no wheezes.   Musculoskeletal: She exhibits tenderness. She exhibits no edema.   Gait is wnl   No erythema or warmth   Strength is okay b/l  Some tenderness on supra and infra patella    Neurological: She is alert and oriented to person, place, and time.   Skin: Skin is warm and dry. She is not diaphoretic. No erythema.   Psychiatric: She has a normal mood and affect.   Vitals reviewed.    Vitals:    11/15/17 1628   BP: 130/80   Pulse: 89   Resp: 16   Temp: 97.3 °F (36.3 °C)       Assessment/  PLAN     Acute pain of right knee  -     X-ray Knee Ortho Right; Future; Expected date: 11/15/2017  - RICE therapy     Acute bilateral low back pain without sciatica  -     cyclobenzaprine (FLEXERIL) 5 MG tablet; Take 1 tablet (5 mg total) by mouth 2 (two) times daily as needed for Muscle spasms.  Dispense: 30 tablet; Refill: 0    Plan as above   rtc if not better     Alonso Pedraza MD  [facility]

## 2017-11-21 ENCOUNTER — OFFICE VISIT (OUTPATIENT)
Dept: FAMILY MEDICINE | Facility: CLINIC | Age: 62
End: 2017-11-21
Payer: COMMERCIAL

## 2017-11-21 VITALS
HEART RATE: 104 BPM | BODY MASS INDEX: 41.07 KG/M2 | SYSTOLIC BLOOD PRESSURE: 128 MMHG | RESPIRATION RATE: 17 BRPM | TEMPERATURE: 98 F | OXYGEN SATURATION: 96 % | DIASTOLIC BLOOD PRESSURE: 72 MMHG | WEIGHT: 246.5 LBS | HEIGHT: 65 IN

## 2017-11-21 DIAGNOSIS — M53.82 CHRONIC LIMITATION OF MOVEMENT OF NECK: ICD-10-CM

## 2017-11-21 DIAGNOSIS — G89.29 CHRONIC NECK PAIN: ICD-10-CM

## 2017-11-21 DIAGNOSIS — M50.30 DDD (DEGENERATIVE DISC DISEASE), CERVICAL: Primary | ICD-10-CM

## 2017-11-21 DIAGNOSIS — M54.2 CHRONIC NECK PAIN: ICD-10-CM

## 2017-11-21 PROCEDURE — 99999 PR PBB SHADOW E&M-EST. PATIENT-LVL IV: CPT | Mod: PBBFAC,,, | Performed by: FAMILY MEDICINE

## 2017-11-21 PROCEDURE — 99213 OFFICE O/P EST LOW 20 MIN: CPT | Mod: S$GLB,,, | Performed by: FAMILY MEDICINE

## 2017-11-21 NOTE — PROGRESS NOTES
CHIEF COMPLAINT: This is a 61-year-old female complaining of neck pain.    SUBJECTIVE: Patient complains of a 2 month history of neck pain.  Patient rates the pain 10 out of 10 on the pain scale.  Neck pain is left-sided in location and radiates from base of head to shoulder and upper chest.  Patient denies radiation to left arm.  She initially had numbness and tingling in her fingers, which has subsided.  She complains of decreased range of motion due to pain.  She is currently in physical therapy with some improvement.  X-rays of her neck on November 2 showed reversal of normal lordosis and moderate to severe disc space narrowing and spondylosis at C5-C6.    ROS:  GENERAL: Patient denies fever, chills, night sweats.  Patient denies weight gain or loss. Patient denies anorexia, fatigue, weakness or swollen glands.  SKIN: Patient denies rash.  LUNGS: Patient denies cough, wheeze or hemoptysis.  CARDIOVASCULAR: Patient denies chest pain, shortness of breath, palpitations, syncope or lower extremity edema.  MUSCULOSKELETAL: Patient denies joint pain, swelling, redness or warmth.  NEUROLOGIC: Patient denies headache, vertigo, weakness in limb, dysarthria, dysphagia or abnormality of gait.  PSYCHIATRIC: Patient denies depression, anxiety or memory loss.    OBJECTIVE:   GENERAL: Well-developed well-nourished female alert and oriented x3 in no acute distress.  Memory, judgment and cognition without deficit.  SKIN: Clear without rash.  Normal color and tone.  HEENT: Eyes: Clear conjunctivae.  No scleral icterus.    NECK: Decreased range of motion secondary to pain.  Mild left-sided paraspinous muscle tenderness.  No lymphadenopathy.  No masses or enlarged thyroid.  No JVD.    LUNGS: Clear to auscultation.  Normal respiratory effort.  CARDIOVASCULAR: Regular rhythm, normal S1, S2 without murmur, gallop or rub.  BACK: No CVA or spinal tenderness.  EXTREMITIES: Without cyanosis, clubbing or edema.  Distal pulses 2+ and equal.   Normal range of motion in upper extremities.  No joint effusion, erythema or warmth.  NEUROLOGIC:  Motor strength equal bilaterally.  Sensation normal to touch.  Deep tendon reflexes 2+ and equal.  Gait without abnormality.  No tremor.      ASSESSMENT:  1. DDD (degenerative disc disease), cervical    2. Chronic neck pain    3. Chronic limitation of movement of neck      PLAN:   1.  MRI cervical spine.  2.  Continue physical therapy.  3.  Continue Flexeril as needed.  4.  Apply moistened under ice 4 times a day for 15-20 minutes.  5.  Consider physical medicine consult after MRI results reviewed.

## 2017-11-22 ENCOUNTER — TELEPHONE (OUTPATIENT)
Dept: FAMILY MEDICINE | Facility: CLINIC | Age: 62
End: 2017-11-22

## 2017-11-24 ENCOUNTER — HOSPITAL ENCOUNTER (OUTPATIENT)
Dept: RADIOLOGY | Facility: HOSPITAL | Age: 62
Discharge: HOME | End: 2017-11-24
Attending: FAMILY MEDICINE
Payer: COMMERCIAL

## 2017-11-24 DIAGNOSIS — M54.2 CHRONIC NECK PAIN: ICD-10-CM

## 2017-11-24 DIAGNOSIS — M50.30 DDD (DEGENERATIVE DISC DISEASE), CERVICAL: ICD-10-CM

## 2017-11-24 DIAGNOSIS — M53.82 CHRONIC LIMITATION OF MOVEMENT OF NECK: ICD-10-CM

## 2017-11-24 DIAGNOSIS — G89.29 CHRONIC NECK PAIN: ICD-10-CM

## 2017-12-05 ENCOUNTER — TELEPHONE (OUTPATIENT)
Dept: FAMILY MEDICINE | Facility: CLINIC | Age: 62
End: 2017-12-05

## 2017-12-05 NOTE — TELEPHONE ENCOUNTER
----- Message from Shadia Tellez sent at 12/5/2017 11:59 AM CST -----  Pt is requesting a call from nurse to r/s her MRI.          Please call pt back [phone]

## 2017-12-05 NOTE — TELEPHONE ENCOUNTER
Pt  States she needs a open MRI  Will call [facility] and see if her insurance will pay for her to have it there

## 2017-12-12 ENCOUNTER — TELEPHONE (OUTPATIENT)
Dept: FAMILY MEDICINE | Facility: CLINIC | Age: 62
End: 2017-12-12

## 2017-12-12 DIAGNOSIS — G89.29 CHRONIC NECK PAIN: ICD-10-CM

## 2017-12-12 DIAGNOSIS — M50.30 DDD (DEGENERATIVE DISC DISEASE), CERVICAL: Primary | ICD-10-CM

## 2017-12-12 DIAGNOSIS — M53.82 CHRONIC LIMITATION OF MOVEMENT OF NECK: ICD-10-CM

## 2017-12-12 DIAGNOSIS — M54.2 CHRONIC NECK PAIN: ICD-10-CM

## 2017-12-12 NOTE — TELEPHONE ENCOUNTER
----- Message from Floridalma Ward sent at 12/12/2017 11:26 AM CST -----  Contact: pt  Please call pt @ [phone] regarding MRI referral.

## 2017-12-12 NOTE — TELEPHONE ENCOUNTER
I can't order MRI at [facility], I don't think.  Why does she want to do MRI there and not at [facility]?

## 2017-12-12 NOTE — TELEPHONE ENCOUNTER
----- Message from Hope Curran sent at 12/12/2017  3:36 PM CST -----  returned call...[phone] (home)

## 2017-12-12 NOTE — TELEPHONE ENCOUNTER
I have reissued the order.  She can find a place and schedule herself with the order.    DDD (degenerative disc disease), cervical     M50.30 722.4   Chronic limitation of movement of neck     M53.82 723.9   Chronic neck pain     M54.2  G89.29

## 2017-12-19 ENCOUNTER — HOSPITAL ENCOUNTER (OUTPATIENT)
Dept: RADIOLOGY | Facility: HOSPITAL | Age: 62
Discharge: HOME | End: 2017-12-19
Attending: FAMILY MEDICINE
Payer: COMMERCIAL

## 2017-12-19 VITALS — BODY MASS INDEX: 40.98 KG/M2 | WEIGHT: 246 LBS | HEIGHT: 65 IN

## 2017-12-19 DIAGNOSIS — Z12.31 ENCOUNTER FOR SCREENING MAMMOGRAM FOR MALIGNANT NEOPLASM OF BREAST: ICD-10-CM

## 2017-12-19 PROCEDURE — 77067 SCR MAMMO BI INCL CAD: CPT | Mod: 26,,, | Performed by: RADIOLOGY

## 2017-12-19 PROCEDURE — 77067 SCR MAMMO BI INCL CAD: CPT | Mod: TC,PO

## 2017-12-20 ENCOUNTER — PROCEDURE VISIT (OUTPATIENT)
Dept: OPHTHALMOLOGY | Facility: CLINIC | Age: 62
End: 2017-12-20
Payer: COMMERCIAL

## 2017-12-20 ENCOUNTER — TELEPHONE (OUTPATIENT)
Dept: FAMILY MEDICINE | Facility: CLINIC | Age: 62
End: 2017-12-20

## 2017-12-20 DIAGNOSIS — E11.3213 TYPE 2 DIABETES MELLITUS WITH BOTH EYES AFFECTED BY MILD NONPROLIFERATIVE RETINOPATHY AND MACULAR EDEMA, WITH LONG-TERM CURRENT USE OF INSULIN: Primary | ICD-10-CM

## 2017-12-20 DIAGNOSIS — Z79.4 TYPE 2 DIABETES MELLITUS WITH BOTH EYES AFFECTED BY MILD NONPROLIFERATIVE RETINOPATHY AND MACULAR EDEMA, WITH LONG-TERM CURRENT USE OF INSULIN: Primary | ICD-10-CM

## 2017-12-20 PROCEDURE — 99499 UNLISTED E&M SERVICE: CPT | Mod: S$GLB,,, | Performed by: OPHTHALMOLOGY

## 2017-12-20 PROCEDURE — 67028 INJECTION EYE DRUG: CPT | Mod: RT,S$GLB,, | Performed by: OPHTHALMOLOGY

## 2017-12-20 PROCEDURE — 92134 CPTRZ OPH DX IMG PST SGM RTA: CPT | Mod: S$GLB,,, | Performed by: OPHTHALMOLOGY

## 2017-12-20 RX ORDER — CIPROFLOXACIN HYDROCHLORIDE 3 MG/ML
1 SOLUTION/ DROPS OPHTHALMIC 4 TIMES DAILY
Qty: 5 ML | Refills: 0 | Status: SHIPPED | OUTPATIENT
Start: 2017-12-20 | End: 2017-12-24

## 2017-12-20 NOTE — PROGRESS NOTES
===============================  12/20/2017   Latosha Enriquez,   62 y.o. female   Last visit [facility]: :10/23/2017   Last [place] [facility] [place]  [facility]  [place] distance visual acuity was 20/30 in the right eye and 20/20 in the left eye.   Not recorded         Not recorded         Not recorded        Chief Complaint   Patient presents with    DME     eylea od     Ophthalmic Medications     Ophthalmic - Anti-inflammatory, NSAIDs Start End    ketorolac 0.5% (ACULAR) 0.5 % Drop 2/20/2017 2/20/2018    Sig: Place 1 drop into both eyes 3 (three) times daily.    Route: Both Eyes         HPI     DME    Additional comments: eylea od           Comments   LAST (DILATED EXAM 3-31-17  [facility], has 1 in refrigerator for next   appointment  (No Betadine - Vigamox Prep))    DM  DME OD  EYLEA OD #8 last 10/23/17       Last edited by KILEY Aguirre on 12/20/2017  2:47 PM. (History)          ________________  12/20/2017  Problem List Items Addressed This Visit        Eye/Vision problems    Type 2 diabetes mellitus with both eyes affected by mild nonproliferative retinopathy and macular edema, with long-term current use of insulin - Primary    Relevant Medications    aflibercept Soln 2 mg (Start on 12/20/2017  4:15 PM)    Other Relevant Orders    Posterior Segment OCT Retina-Both eyes (Completed)    Prior Authorization Order        eyela  od for dme stable     12/20/2017  Diagnosis :  od dme  Today:   Eylea (afibercept) 2 mg/0.05 ml Intravitreal Injection , OD   Follow up: rtc 1 mo do         Call 24/7 for any worsening of vision. Check  OU QD. Gave my home phone number.      Procedure  Note:   OD}  Eylea (afibercept) 2 mg/0.05 ml Intravitreal Injection    I have explained the Risks, Benefits and Alternatives of the procedure in detail.  The patient voices understanding and all questions have been answered.  The patient agrees to proceed as discussed.  LIDOCAINE 2% subconj bleb   was used for  anesthesia.  Topical betadine was used for antisepsis.  0.05 cc was  injected 3.7 mm from corneal limbus in the inferotemporal quadrant.  Following injection the IOP was less than thirty (<30) by tonopen.  The eye was then thoroughly irrigated with BSS.  Patient tolerated procedure well.  No complications were observed.  The Patient was educated that mild irritation tonight was normal secondary to topical antispsis use.  Pt was advised to call at any time day or night for pain, redness, or any decline in vision. I gave the patient my home number as well as the clinic on call number. Daily visual checks and Amsler grid testing were reviewed.  ciloxan Antibiotic Drops to be used 4 times daily for 4 days  LINDSAY Bright MD  Procedure ordered: y  Consent: y  Pre auth: y  MAR:y  Opnote: y  Charge capture:y  Sided procedure note: y    .       ===========================

## 2017-12-27 ENCOUNTER — TELEPHONE (OUTPATIENT)
Dept: FAMILY MEDICINE | Facility: CLINIC | Age: 62
End: 2017-12-27

## 2017-12-27 NOTE — TELEPHONE ENCOUNTER
----- Message from Jessica Perez sent at 12/27/2017 12:01 PM CST -----  Patient requesting test results. Please adv/call [phone].//thanks. cw

## 2018-01-03 ENCOUNTER — TELEPHONE (OUTPATIENT)
Dept: FAMILY MEDICINE | Facility: CLINIC | Age: 63
End: 2018-01-03

## 2018-01-03 NOTE — TELEPHONE ENCOUNTER
----- Message from Briseida Perez sent at 1/3/2018  4:00 PM CST -----  Contact: pt  The pt request a call concerning her MRI results, pt can be reached at [phone]///thxMW

## 2018-01-04 ENCOUNTER — TELEPHONE (OUTPATIENT)
Dept: RADIOLOGY | Facility: HOSPITAL | Age: 63
End: 2018-01-04

## 2018-01-04 NOTE — TELEPHONE ENCOUNTER
Notified pt of MRI results  Pt wants to know what else she can do for the pain  States she stopped physical therapy cause it was making the pain worse

## 2018-02-02 DIAGNOSIS — E11.9 TYPE 2 DIABETES MELLITUS WITHOUT COMPLICATION: ICD-10-CM

## 2018-02-07 ENCOUNTER — TELEPHONE (OUTPATIENT)
Dept: OPHTHALMOLOGY | Facility: CLINIC | Age: 63
End: 2018-02-07

## 2018-02-07 NOTE — TELEPHONE ENCOUNTER
CALLED AND LEFT MESSAGE THAT I WILL CANCEL HER APPT FOR TODAY AND WE HAVE APPTS COMING UP THAT.  TO PLEASE CALL US BACK AND WE WILL SCHEDULE HER . KF

## 2018-02-09 ENCOUNTER — OFFICE VISIT (OUTPATIENT)
Dept: FAMILY MEDICINE | Facility: CLINIC | Age: 63
End: 2018-02-09
Payer: COMMERCIAL

## 2018-02-09 VITALS
RESPIRATION RATE: 17 BRPM | DIASTOLIC BLOOD PRESSURE: 76 MMHG | HEIGHT: 65 IN | TEMPERATURE: 98 F | HEART RATE: 115 BPM | SYSTOLIC BLOOD PRESSURE: 122 MMHG | BODY MASS INDEX: 41.58 KG/M2 | WEIGHT: 249.56 LBS | OXYGEN SATURATION: 97 %

## 2018-02-09 DIAGNOSIS — J06.9 VIRAL URI: Primary | ICD-10-CM

## 2018-02-09 DIAGNOSIS — R53.83 FATIGUE, UNSPECIFIED TYPE: ICD-10-CM

## 2018-02-09 DIAGNOSIS — R52 BODY ACHES: ICD-10-CM

## 2018-02-09 DIAGNOSIS — R50.9 FEVER AND CHILLS: ICD-10-CM

## 2018-02-09 LAB
CTP QC/QA: YES
FLUAV AG NPH QL: NEGATIVE
FLUBV AG NPH QL: NEGATIVE

## 2018-02-09 PROCEDURE — 99213 OFFICE O/P EST LOW 20 MIN: CPT | Mod: 25,S$GLB,, | Performed by: REGISTERED NURSE

## 2018-02-09 PROCEDURE — 3008F BODY MASS INDEX DOCD: CPT | Mod: S$GLB,,, | Performed by: REGISTERED NURSE

## 2018-02-09 PROCEDURE — 96372 THER/PROPH/DIAG INJ SC/IM: CPT | Mod: S$GLB,,, | Performed by: FAMILY MEDICINE

## 2018-02-09 PROCEDURE — 99999 PR PBB SHADOW E&M-EST. PATIENT-LVL IV: CPT | Mod: PBBFAC,,, | Performed by: REGISTERED NURSE

## 2018-02-09 PROCEDURE — 87804 INFLUENZA ASSAY W/OPTIC: CPT | Mod: 59,QW,S$GLB, | Performed by: REGISTERED NURSE

## 2018-02-09 RX ORDER — PROMETHAZINE HYDROCHLORIDE AND CODEINE PHOSPHATE 6.25; 1 MG/5ML; MG/5ML
5 SOLUTION ORAL EVERY 4 HOURS PRN
Qty: 180 ML | Refills: 0 | Status: SHIPPED | OUTPATIENT
Start: 2018-02-09 | End: 2018-02-19

## 2018-02-09 RX ORDER — BETAMETHASONE SODIUM PHOSPHATE AND BETAMETHASONE ACETATE 3; 3 MG/ML; MG/ML
12 INJECTION, SUSPENSION INTRA-ARTICULAR; INTRALESIONAL; INTRAMUSCULAR; SOFT TISSUE
Status: COMPLETED | OUTPATIENT
Start: 2018-02-09 | End: 2018-02-09

## 2018-02-09 RX ORDER — TRAMADOL HYDROCHLORIDE 50 MG/1
TABLET ORAL
COMMUNITY
Start: 2018-01-28 | End: 2018-02-09

## 2018-02-09 RX ORDER — GABAPENTIN 100 MG/1
CAPSULE ORAL NIGHTLY
COMMUNITY
Start: 2018-01-28 | End: 2018-12-31

## 2018-02-09 RX ORDER — TAMSULOSIN HYDROCHLORIDE 0.4 MG/1
0.4 CAPSULE ORAL DAILY
Qty: 30 CAPSULE | Refills: 0 | Status: SHIPPED | OUTPATIENT
Start: 2018-02-09 | End: 2019-05-28 | Stop reason: SDUPTHER

## 2018-02-09 RX ADMIN — BETAMETHASONE SODIUM PHOSPHATE AND BETAMETHASONE ACETATE 12 MG: 3; 3 INJECTION, SUSPENSION INTRA-ARTICULAR; INTRALESIONAL; INTRAMUSCULAR; SOFT TISSUE at 05:02

## 2018-02-14 ENCOUNTER — PROCEDURE VISIT (OUTPATIENT)
Dept: OPHTHALMOLOGY | Facility: CLINIC | Age: 63
End: 2018-02-14
Payer: COMMERCIAL

## 2018-02-14 DIAGNOSIS — E11.3213 TYPE 2 DIABETES MELLITUS WITH BOTH EYES AFFECTED BY MILD NONPROLIFERATIVE RETINOPATHY AND MACULAR EDEMA, WITH LONG-TERM CURRENT USE OF INSULIN: Primary | ICD-10-CM

## 2018-02-14 DIAGNOSIS — Z79.4 TYPE 2 DIABETES MELLITUS WITH BOTH EYES AFFECTED BY MILD NONPROLIFERATIVE RETINOPATHY AND MACULAR EDEMA, WITH LONG-TERM CURRENT USE OF INSULIN: Primary | ICD-10-CM

## 2018-02-14 PROCEDURE — 92134 CPTRZ OPH DX IMG PST SGM RTA: CPT | Mod: S$GLB,,, | Performed by: OPHTHALMOLOGY

## 2018-02-14 PROCEDURE — 67028 INJECTION EYE DRUG: CPT | Mod: RT,S$GLB,, | Performed by: OPHTHALMOLOGY

## 2018-02-14 PROCEDURE — 99499 UNLISTED E&M SERVICE: CPT | Mod: S$GLB,,, | Performed by: OPHTHALMOLOGY

## 2018-02-14 RX ORDER — CIPROFLOXACIN HYDROCHLORIDE 3 MG/ML
SOLUTION/ DROPS OPHTHALMIC
Qty: 5 ML | Refills: 0 | Status: SHIPPED | OUTPATIENT
Start: 2018-02-14 | End: 2018-02-21

## 2018-02-14 NOTE — PROGRESS NOTES
===============================  02/14/2018   Latosha Enriquez,   62 y.o. female   Last visit [facility]: :12/20/2017   Last [place] [facility] [place]  [facility]  [place] visual acuity was 20/30 in the right eye and 20/30 in the left eye.   Not recorded         Not recorded         Not recorded        Chief Complaint   Patient presents with    DME     EYLEA OD     Ophthalmic Medications     Ophthalmic - Anti-inflammatory, NSAIDs Start End    ketorolac 0.5% (ACULAR) 0.5 % Drop 2/20/2017 2/20/2018    Sig: Place 1 drop into both eyes 3 (three) times daily.    Route: Both Eyes         HPI     DME    Additional comments: EYLEA OD           Comments   LAST (DILATED EXAM 3-31-17  (No Betadine - Vigamox Prep))    DM  DME OD  EYLEA OD #9 last 12/20/17       Last edited by Patricia Slater on 2/14/2018  8:13 AM. (History)          ________________  2/14/2018  Problem List Items Addressed This Visit        Eye/Vision problems    Type 2 diabetes mellitus with both eyes affected by mild nonproliferative retinopathy and macular edema, with long-term current use of insulin - Primary    Relevant Medications    ciprofloxacin HCl (CILOXAN) 0.3 % ophthalmic solution    aflibercept Soln 2 mg (Start on 2/14/2018  9:15 AM)    Other Relevant Orders    Prior Authorization Order    Posterior Segment OCT Retina-Both eyes (Completed)        Sp eylea last visit - not much better    2/14/2018  Diagnosis :  od dme  Today:   Eylea (afibercept) 2 mg/0.05 ml Intravitreal Injection , OD   Follow up: rtc 1 mo    Instructed to call 24/7 for any worsening of vision. Check Both eyes daily. Gave patient my home phone number.    Procedure  Note:   OD}  Eylea (afibercept) 2 mg/0.05 ml Intravitreal Injection    I have explained the Risks, Benefits and Alternatives of the procedure in detail.  The patient voices understanding and all questions have been answered.  The patient agrees to proceed as discussed.  LIDOCAINE 2%  subconj bleb  was used for  anesthesia.  Topical vigamox was used for antisepsis.  0.05 cc was  injected 3.7 mm from corneal limbus in the inferotemporal quadrant.  Following injection the IOP was less than thirty (<30) by tonopen.  The eye was then thoroughly irrigated with BSS.  Patient tolerated procedure well.  No complications were observed.  The Patient was educated that mild irritation tonight was normal secondary to topical antispsis use.  Pt was advised to call at any time day or night for pain, redness, or any decline in vision. I gave the patient my home number as well as the clinic on call number. Daily visual checks and Amsler grid testing were reviewed.  ciloxan Antibiotic Drops to be used 4 times daily for 4 days  LINDSAY Bright MD  Procedure ordered: y  Consent: y  Pre auth: y  MAR:y  Opnote: y  Charge capture:y      .       ===========================

## 2018-02-15 NOTE — PROGRESS NOTES
"Subjective:       Patient ID: Latosha Enriquez is a 62 y.o. female.    Chief Complaint: Sinus Problem      HPI    Mrs. Enriquez is here today with c/o illness x 3 to 4 days.  Reports sweats, chills, RN and NC.  Does c/o cough and sneezing at times.  Taking Tylenol and Vicks.  She has even taken a few doses of leftover clindamycin from home.  Allergic to "anti-histamines".      Review of Systems   Constitutional: Positive for chills and diaphoresis.   HENT: Positive for congestion, postnasal drip, rhinorrhea and sneezing. Negative for ear pain, facial swelling, nosebleeds, sinus pain and sore throat.    Eyes: Negative.    Respiratory: Positive for cough. Negative for shortness of breath and wheezing.    Cardiovascular: Negative.    Gastrointestinal: Negative.    Neurological: Negative.    Hematological: Negative for adenopathy.         Patient Active Problem List   Diagnosis    Hemorrhoids, internal    Diabetes mellitus type II, uncontrolled    Essential hypertension    Morbid obesity with BMI of 40.0-44.9, adult    Vitamin D deficiency disease    Primary osteoarthritis of both knees    GERD (gastroesophageal reflux disease)    Hyperlipidemia    Type 2 diabetes mellitus with both eyes affected by mild nonproliferative retinopathy and macular edema, with long-term current use of insulin    Kidney stones, calcium oxalate         Objective:     Vitals:    02/09/18 1648   BP: 122/76   BP Location: Left arm   Patient Position: Sitting   BP Method: Large (Manual)   Pulse: (!) 115   Resp: 17   Temp: 98.3 °F (36.8 °C)   TempSrc: Tympanic   SpO2: 97%   Weight: 113.2 kg (249 lb 9 oz)   Height: 5' 5" (1.651 m)       Physical Exam   Constitutional: She is oriented to person, place, and time. She appears well-developed and well-nourished.   HENT:   Head: Normocephalic and atraumatic.   Right Ear: Tympanic membrane normal.   Left Ear: Tympanic membrane normal.   Nose: Mucosal edema and rhinorrhea present. Right sinus exhibits " "no maxillary sinus tenderness and no frontal sinus tenderness. Left sinus exhibits no maxillary sinus tenderness and no frontal sinus tenderness.   Mouth/Throat: No oropharyngeal exudate, posterior oropharyngeal edema or posterior oropharyngeal erythema.   Eyes: EOM are normal. Pupils are equal, round, and reactive to light.   Cardiovascular: Regular rhythm and normal heart sounds.  Tachycardia present.    Pulmonary/Chest: Effort normal and breath sounds normal.   Lymphadenopathy:     She has no cervical adenopathy.   Neurological: She is alert and oriented to person, place, and time.   Vitals reviewed.        Medication List with Changes/Refills   New Medications    CIPROFLOXACIN HCL (CILOXAN) 0.3 % OPHTHALMIC SOLUTION    Use 1 drop 4 times a day for 4 days in the right eye    PROMETHAZINE-CODEINE 6.25-10 MG/5 ML (PHENERGAN WITH CODEINE) 6.25-10 MG/5 ML SYRUP    Take 5 mLs by mouth every 4 (four) hours as needed.   Current Medications    ALBUTEROL 90 MCG/ACTUATION INHALER    Inhale 2 puffs into the lungs every 6 (six) hours as needed.    ALLOPURINOL (ZYLOPRIM) 100 MG TABLET    Take 1 tablet (100 mg total) by mouth once daily.    BD INSULIN PEN NEEDLE UF SHORT 31 GAUGE X 5/16" NDLE        BUDESONIDE 180MCG (PULMICORT FLEXHALER) 180 MCG/ACTUATION AEPB    Inhale 2 puffs into the lungs 2 (two) times daily.    DOCUSATE SODIUM (COLACE) 100 MG CAPSULE    Take 1 capsule (100 mg total) by mouth 2 (two) times daily.    ESOMEPRAZOLE (NEXIUM) 20 MG CAPSULE    Take 1 capsule (20 mg total) by mouth before breakfast.    EYLEA 2 MG/0.05 ML SOLN    0.05 mLs (2 mg total) by Intravitreal route every 28 days.    GABAPENTIN (NEURONTIN) 300 MG CAPSULE        HUMALOG MIX 75-25 KWIKPEN 100 UNIT/ML (75-25) INPN    INJECT 30 UNITS UNDER THE SKIN IN THE MORNING AND 20 UNITS IN THE EVENING    KETOROLAC 0.5% (ACULAR) 0.5 % DROP    Place 1 drop into both eyes 3 (three) times daily.    LOSARTAN-HYDROCHLOROTHIAZIDE 100-25 MG (HYZAAR) 100-25 MG " "PER TABLET    Take 1 tablet by mouth once daily.    METFORMIN (GLUCOPHAGE-XR) 500 MG 24 HR TABLET    TAKE 2 TABLETS DAILY WITH BREAKFAST AND 1 TABLET WITH DINNER    ONDANSETRON (ZOFRAN-ODT) 4 MG TBDL    Take 1-2 tablets (4-8 mg total) by mouth every 6 to 8 hours as needed (nausea/vomiting).    OXYBUTYNIN (DITROPAN) 5 MG TAB    Take 1 tablet (5 mg total) by mouth 3 (three) times daily.    PEN NEEDLE, DIABETIC 31 GAUGE X 1/4" NDLE    1 Device by Misc.(Non-Drug; Combo Route) route 2 (two) times daily.    POTASSIUM CITRATE (UROCIT-K) 10 MEQ (1,080 MG) TBSR    Take 1 tablet (10 mEq total) by mouth 3 (three) times daily with meals.    SIMVASTATIN (ZOCOR) 20 MG TABLET    Take 1 tablet (20 mg total) by mouth every evening.   Changed and/or Refilled Medications    Modified Medication Previous Medication    TAMSULOSIN (FLOMAX) 0.4 MG CP24 tamsulosin (FLOMAX) 0.4 mg Cp24       Take 1 capsule (0.4 mg total) by mouth once daily.    Take 1 capsule (0.4 mg total) by mouth once daily.   Discontinued Medications    PROMETHAZINE-CODEINE 6.25-10 MG/5 ML (PHENERGAN WITH CODEINE) 6.25-10 MG/5 ML SYRUP    Take 5 mLs by mouth every 4 to 6 hours as needed for Cough.    TRAMADOL (ULTRAM) 50 MG TABLET             Component      Latest Ref Rng & Units 2/9/2018   Rapid Influenza A Ag      Negative Negative   Rapid Influenza B Ag      Negative Negative    Acceptable       Yes       Diagnosis       1. Viral URI    2. Fatigue, unspecified type    3. Body aches    4. Fever and chills          Assessment/ Plan     Viral URI  -     betamethasone acetate-betamethasone sodium phosphate injection 12 mg; Inject 2 mLs (12 mg total) into the muscle one time.  -     promethazine-codeine 6.25-10 mg/5 ml (PHENERGAN WITH CODEINE) 6.25-10 mg/5 mL syrup; Take 5 mLs by mouth every 4 (four) hours as needed.  Dispense: 180 mL; Refill: 0    Fatigue, unspecified type  -     POCT Influenza A/B    Body aches  -     POCT Influenza A/B    Fever and " chills  -     POCT Influenza A/B    Other orders  -     tamsulosin (FLOMAX) 0.4 mg Cp24; Take 1 capsule (0.4 mg total) by mouth once daily.  Dispense: 30 capsule; Refill: 0        Symptomatic care, rest, fluids, hydration.  Follow-up in clinic as needed.        LUIS Erickson  [facility]

## 2018-02-20 RX ORDER — METFORMIN HYDROCHLORIDE 500 MG/1
TABLET, EXTENDED RELEASE ORAL
Qty: 90 TABLET | Refills: 5 | Status: SHIPPED | OUTPATIENT
Start: 2018-02-20 | End: 2019-01-01 | Stop reason: SDUPTHER

## 2018-04-03 ENCOUNTER — LAB VISIT (OUTPATIENT)
Dept: LAB | Facility: HOSPITAL | Age: 63
End: 2018-04-03
Attending: FAMILY MEDICINE
Payer: COMMERCIAL

## 2018-04-03 ENCOUNTER — OFFICE VISIT (OUTPATIENT)
Dept: FAMILY MEDICINE | Facility: CLINIC | Age: 63
End: 2018-04-03
Payer: COMMERCIAL

## 2018-04-03 VITALS
BODY MASS INDEX: 41.87 KG/M2 | TEMPERATURE: 98 F | HEIGHT: 65 IN | OXYGEN SATURATION: 98 % | WEIGHT: 251.31 LBS | HEART RATE: 105 BPM | DIASTOLIC BLOOD PRESSURE: 70 MMHG | SYSTOLIC BLOOD PRESSURE: 110 MMHG | RESPIRATION RATE: 18 BRPM

## 2018-04-03 DIAGNOSIS — G57.12 MERALGIA PARESTHETICA OF LEFT SIDE: Primary | ICD-10-CM

## 2018-04-03 DIAGNOSIS — G89.29 CHRONIC MIDLINE LOW BACK PAIN WITHOUT SCIATICA: ICD-10-CM

## 2018-04-03 DIAGNOSIS — E11.9 TYPE 2 DIABETES MELLITUS WITHOUT COMPLICATION: ICD-10-CM

## 2018-04-03 DIAGNOSIS — H93.13 TINNITUS OF BOTH EARS: ICD-10-CM

## 2018-04-03 DIAGNOSIS — M54.50 CHRONIC MIDLINE LOW BACK PAIN WITHOUT SCIATICA: ICD-10-CM

## 2018-04-03 DIAGNOSIS — H91.91 HEARING LOSS OF RIGHT EAR, UNSPECIFIED HEARING LOSS TYPE: ICD-10-CM

## 2018-04-03 DIAGNOSIS — B35.1 ONYCHOMYCOSIS OF LEFT GREAT TOE: ICD-10-CM

## 2018-04-03 LAB
ESTIMATED AVG GLUCOSE: 143 MG/DL
HBA1C MFR BLD HPLC: 6.6 %

## 2018-04-03 PROCEDURE — 99214 OFFICE O/P EST MOD 30 MIN: CPT | Mod: S$GLB,,, | Performed by: FAMILY MEDICINE

## 2018-04-03 PROCEDURE — 3074F SYST BP LT 130 MM HG: CPT | Mod: CPTII,S$GLB,, | Performed by: FAMILY MEDICINE

## 2018-04-03 PROCEDURE — 83036 HEMOGLOBIN GLYCOSYLATED A1C: CPT

## 2018-04-03 PROCEDURE — 99999 PR PBB SHADOW E&M-EST. PATIENT-LVL IV: CPT | Mod: PBBFAC,,, | Performed by: FAMILY MEDICINE

## 2018-04-03 PROCEDURE — 36415 COLL VENOUS BLD VENIPUNCTURE: CPT | Mod: PO

## 2018-04-03 PROCEDURE — 3078F DIAST BP <80 MM HG: CPT | Mod: CPTII,S$GLB,, | Performed by: FAMILY MEDICINE

## 2018-04-03 PROCEDURE — 3044F HG A1C LEVEL LT 7.0%: CPT | Mod: CPTII,S$GLB,, | Performed by: FAMILY MEDICINE

## 2018-04-03 RX ORDER — TERBINAFINE HYDROCHLORIDE 250 MG/1
250 TABLET ORAL DAILY
Qty: 30 TABLET | Refills: 1 | Status: SHIPPED | OUTPATIENT
Start: 2018-04-03 | End: 2018-05-03

## 2018-04-03 RX ORDER — TRAMADOL HYDROCHLORIDE 50 MG/1
TABLET ORAL
COMMUNITY
Start: 2018-03-18 | End: 2018-12-31

## 2018-04-03 RX ORDER — METHOCARBAMOL 500 MG/1
TABLET, FILM COATED ORAL
COMMUNITY
Start: 2018-03-11 | End: 2018-11-13

## 2018-04-03 NOTE — PROGRESS NOTES
CHIEF COMPLAINT: This is a 62-year-old female with multiple medical complaints.    SUBJECTIVE: Patient complains of fungal infection in left great toenail.  She's been using Vicks VapoRub without relief.  Toenail has fallen off once and grown back thickened and distorted.    Patient complains of persistent back and knee pain for which she is undergoing physical therapy.  She has been having burning in left anterior thigh for years and was recently given gabapentin with resolution of her symptoms.  She wants to be sure that dedication is compatible with her other routine meds.  Patient complains of a vibration in both ears.  Ears were checked by her daughters audiologists and she was told that nothing was visibly wrong.  She reports possible hearing loss in right ear.  She denies vertigo, ear pain or discharge from ear.    The patient has type 2 diabetes.  Patient reports her blood sugars have been .  She denies polyuria, polydipsia or polyphagia.  Last A1c was 6.6% 8 months ago.  Patient injects Humalog mix 75-25 twice daily.    ROS:  GENERAL: Patient denies fever, chills, night sweats. Patient denies weight gain or loss. Patient denies anorexia, fatigue, weakness or swollen glands.  SKIN: Patient denies rash or hair loss.  HEENT: Patient denies sore throat, nasal congestion, or runny nose. Patient denies visual disturbance, eye irritation or discharge.  LUNGS: Patient denies cough, wheeze or hemoptysis.  CARDIOVASCULAR: Patient denies chest pain, shortness of breath, palpitations, syncope or lower extremity edema.  GI: Patient denies abdominal pain, nausea, vomiting, diarrhea, blood in stool or melena.  GENITOURINARY: Patient denies irregular vaginal bleeding. Patient denies dysuria, frequency, hematuria, nocturia, urgency or incontinence.  MUSCULOSKELETAL: Patient denies joint swelling, redness or warmth.  NEUROLOGIC: Patient denies headache, vertigo, paresthesias, weakness in limb, dysarthria, dysphagia or  abnormality of gait.    OBJECTIVE:   GENERAL: Well-developed well-nourished, obese, black female alert and oriented x3, in no acute distress. Memory, judgment and cognition without deficit.   SKIN: Clear without rash. Normal color and tone.  Left great toenail with dark discoloration, thickening, distortion and debris underneath nail.  HEENT: Eyes: No scleral icterus. Clear conjunctivae. Pupils equal reactive to light and accommodation. Ears: Clear canals.  Clear TMs.  Nose: Without congestion. Pharynx: Without injection or exudates.  NECK: Supple, normal range of motion. No masses, nodes or enlarged thyroid. No JVD. Carotids 2+ and equal. No bruits.  LUNGS: Clear to auscultation. Normal respiratory effort.  CARDIOVASCULAR: Regular rhythm, normal S1, S2 without murmur, gallop or rub.  BACK: No CVA or spinal tenderness.  ABDOMEN: Soft, nontender without mass or organomegaly. No rebound or guarding.  EXTREMITIES: Without cyanosis, clubbing or edema. Distal pulses 2+ and equal. Normal range of motion in all extremities. No joint effusion, erythema or warmth.  NEUROLOGIC: Motor strength equal bilaterally. Sensation normal to touch. Deep tendon reflexes 2+ and equal. Gait without abnormality. No tremor.     ASSESSMENT:  1. Meralgia paresthetica of left side    2. Onychomycosis of left great toe    3. Chronic midline low back pain without sciatica    4. Tinnitus of both ears    5. Hearing loss of right ear, unspecified hearing loss type    6. Uncontrolled type 2 diabetes mellitus without complication, with long-term current use of insulin      PLAN:   1.  Check A1c.  2.  Terbinafine 250 mg daily.  Dispense #30.  One refill.  Check liver enzymes in 6 weeks.  3.  Audiogram.  4.  ENT consult.  5.  Continue gabapentin 300 mg at bedtime.  6.  Follow-up if no improvement or worsening symptoms.

## 2018-04-18 ENCOUNTER — PROCEDURE VISIT (OUTPATIENT)
Dept: OPHTHALMOLOGY | Facility: CLINIC | Age: 63
End: 2018-04-18
Payer: COMMERCIAL

## 2018-04-18 DIAGNOSIS — E11.3213 TYPE 2 DIABETES MELLITUS WITH BOTH EYES AFFECTED BY MILD NONPROLIFERATIVE RETINOPATHY AND MACULAR EDEMA, WITH LONG-TERM CURRENT USE OF INSULIN: Primary | ICD-10-CM

## 2018-04-18 DIAGNOSIS — Z79.4 TYPE 2 DIABETES MELLITUS WITH BOTH EYES AFFECTED BY MILD NONPROLIFERATIVE RETINOPATHY AND MACULAR EDEMA, WITH LONG-TERM CURRENT USE OF INSULIN: Primary | ICD-10-CM

## 2018-04-18 PROCEDURE — 67028 INJECTION EYE DRUG: CPT | Mod: RT,S$GLB,, | Performed by: OPHTHALMOLOGY

## 2018-04-18 PROCEDURE — 92134 CPTRZ OPH DX IMG PST SGM RTA: CPT | Mod: S$GLB,,, | Performed by: OPHTHALMOLOGY

## 2018-04-18 PROCEDURE — 99499 UNLISTED E&M SERVICE: CPT | Mod: S$GLB,,, | Performed by: OPHTHALMOLOGY

## 2018-04-18 NOTE — PROGRESS NOTES
===============================  04/18/2018   Latosha Enriquez,   62 y.o. female   Last visit [facility]: :2/14/2018   [facility]  [facility]  [place] visual acuity was 20/40 in the right eye and 20/20 in the left eye.   Not recorded         Not recorded         Not recorded        Chief Complaint   Patient presents with    DME     EYLEA OD        HPI     DME    Additional comments: EYLEA OD           Comments   LAST (DILATED EXAM 3-31-17  (No Betadine - Vigamox Prep))    DM  DME OD  EYLEA OD #10 last 2/14/18       Last edited by Patricia Slater on 4/18/2018  2:54 PM. (History)          ________________  4/18/2018  Problem List Items Addressed This Visit        Eye/Vision problems    Type 2 diabetes mellitus with both eyes affected by mild nonproliferative retinopathy and macular edema, with long-term current use of insulin - Primary    Relevant Medications    aflibercept Soln 2 mg (Start on 4/18/2018  3:45 PM)    Other Relevant Orders    Posterior Segment OCT Retina-Both eyes        terating dme    .sp eela   Irregl;uar follow up but numeroud inejctions o d   coud this be palteau?      oct better o d   os ok    needs cosnectppe teratmenet but fu diffculty          4/18/2018  Diagnosis :  od dme  Today:   Eylea (afibercept) 2 mg/0.05 ml Intravitreal Injection , OD   Follow up: rtc 1 mo - (need 3- 4 cosectiev tx)          Instructed to call 24/7 for any worsening of vision. Check Both eyes daily. Gave patient my home phone number.      Procedure  Note:   OD}  Eylea (afibercept) 2 mg/0.05 ml Intravitreal Injection    I have explained the Risks, Benefits and Alternatives of the procedure in detail.  The patient voices understanding and all questions have been answered.  The patient agrees to proceed as discussed.  LIDOCAINE 2%  subconj bleb  was used for anesthesia.  Topical vigamox was used for antisepsis.  0.05 cc was  injected 3.7 mm from corneal limbus in the inferotemporal quadrant.  Following  injection the IOP was less than thirty (<30) by tonopen.  The eye was then thoroughly irrigated with BSS.  Patient tolerated procedure well.  No complications were observed.  The Patient was educated that mild irritation tonight was normal secondary to topical antispsis use.  Pt was advised to call at any time day or night for pain, redness, or any decline in vision. I gave the patient my home number as well as the clinic on call number. Daily visual checks and Amsler grid testing were reviewed.  ciloxan Antibiotic Drops to be used 4 times daily for 4 days  LINDSAY Bright MD  Procedure ordered: y  Consent: y  Pre auth: y  MAR:y  Opnote: y  Charge capture:y           ===========================

## 2018-04-20 ENCOUNTER — OFFICE VISIT (OUTPATIENT)
Dept: FAMILY MEDICINE | Facility: CLINIC | Age: 63
End: 2018-04-20
Payer: COMMERCIAL

## 2018-04-20 VITALS
HEART RATE: 87 BPM | DIASTOLIC BLOOD PRESSURE: 80 MMHG | BODY MASS INDEX: 41.94 KG/M2 | HEIGHT: 65 IN | WEIGHT: 251.75 LBS | SYSTOLIC BLOOD PRESSURE: 110 MMHG | RESPIRATION RATE: 18 BRPM | TEMPERATURE: 99 F | OXYGEN SATURATION: 96 %

## 2018-04-20 DIAGNOSIS — S46.911A MUSCLE STRAIN OF RIGHT SHOULDER, INITIAL ENCOUNTER: Primary | ICD-10-CM

## 2018-04-20 PROCEDURE — 99214 OFFICE O/P EST MOD 30 MIN: CPT | Mod: 25,S$GLB,, | Performed by: FAMILY MEDICINE

## 2018-04-20 PROCEDURE — 96372 THER/PROPH/DIAG INJ SC/IM: CPT | Mod: S$GLB,,, | Performed by: FAMILY MEDICINE

## 2018-04-20 PROCEDURE — 3079F DIAST BP 80-89 MM HG: CPT | Mod: CPTII,S$GLB,, | Performed by: FAMILY MEDICINE

## 2018-04-20 PROCEDURE — 99999 PR PBB SHADOW E&M-EST. PATIENT-LVL V: CPT | Mod: PBBFAC,,, | Performed by: FAMILY MEDICINE

## 2018-04-20 PROCEDURE — 3074F SYST BP LT 130 MM HG: CPT | Mod: CPTII,S$GLB,, | Performed by: FAMILY MEDICINE

## 2018-04-20 RX ORDER — BETAMETHASONE SODIUM PHOSPHATE AND BETAMETHASONE ACETATE 3; 3 MG/ML; MG/ML
12 INJECTION, SUSPENSION INTRA-ARTICULAR; INTRALESIONAL; INTRAMUSCULAR; SOFT TISSUE ONCE
Status: COMPLETED | OUTPATIENT
Start: 2018-04-20 | End: 2018-04-20

## 2018-04-20 RX ADMIN — BETAMETHASONE SODIUM PHOSPHATE AND BETAMETHASONE ACETATE 12 MG: 3; 3 INJECTION, SUSPENSION INTRA-ARTICULAR; INTRALESIONAL; INTRAMUSCULAR; SOFT TISSUE at 03:04
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
## 2018-04-20 NOTE — PATIENT INSTRUCTIONS
Ice pack/ heat pad   Do epsom salt   Stretch your shoulder   Do epsom salt   Return if not better

## 2018-05-10 RX ORDER — LOSARTAN POTASSIUM AND HYDROCHLOROTHIAZIDE 25; 100 MG/1; MG/1
1 TABLET ORAL DAILY
Qty: 90 TABLET | Refills: 0 | Status: SHIPPED | OUTPATIENT
Start: 2018-05-10 | End: 2018-09-14 | Stop reason: SDUPTHER

## 2018-05-10 NOTE — TELEPHONE ENCOUNTER
----- Message from Ludy Fine sent at 5/10/2018  9:11 AM CDT -----  Contact: Latosha Reina at [phone] pt is needing a refill on blood pressure medicine      [id number] - [place] - [address] AT [address] [facility]  [address] [address]  Phone: [phone] Fax: [phone]

## 2018-05-22 ENCOUNTER — OFFICE VISIT (OUTPATIENT)
Dept: FAMILY MEDICINE | Facility: CLINIC | Age: 63
End: 2018-05-22
Payer: COMMERCIAL

## 2018-05-22 VITALS
DIASTOLIC BLOOD PRESSURE: 70 MMHG | HEART RATE: 131 BPM | SYSTOLIC BLOOD PRESSURE: 114 MMHG | HEIGHT: 65 IN | OXYGEN SATURATION: 96 % | RESPIRATION RATE: 18 BRPM | TEMPERATURE: 98 F | WEIGHT: 248.88 LBS | BODY MASS INDEX: 41.47 KG/M2

## 2018-05-22 DIAGNOSIS — L02.219 ABSCESS, TRUNK: Primary | ICD-10-CM

## 2018-05-22 PROCEDURE — 3074F SYST BP LT 130 MM HG: CPT | Mod: CPTII,S$GLB,, | Performed by: FAMILY MEDICINE

## 2018-05-22 PROCEDURE — 99999 PR PBB SHADOW E&M-EST. PATIENT-LVL III: CPT | Mod: PBBFAC,,, | Performed by: FAMILY MEDICINE

## 2018-05-22 PROCEDURE — 99213 OFFICE O/P EST LOW 20 MIN: CPT | Mod: S$GLB,,, | Performed by: FAMILY MEDICINE

## 2018-05-22 PROCEDURE — 3078F DIAST BP <80 MM HG: CPT | Mod: CPTII,S$GLB,, | Performed by: FAMILY MEDICINE

## 2018-05-22 PROCEDURE — 3008F BODY MASS INDEX DOCD: CPT | Mod: CPTII,S$GLB,, | Performed by: FAMILY MEDICINE

## 2018-05-22 RX ORDER — SULFAMETHOXAZOLE AND TRIMETHOPRIM 800; 160 MG/1; MG/1
1 TABLET ORAL 2 TIMES DAILY
Qty: 20 TABLET | Refills: 0 | Status: SHIPPED | OUTPATIENT
Start: 2018-05-22 | End: 2018-06-01
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
## 2018-06-05 ENCOUNTER — TELEPHONE (OUTPATIENT)
Dept: FAMILY MEDICINE | Facility: CLINIC | Age: 63
End: 2018-06-05

## 2018-06-06 NOTE — TELEPHONE ENCOUNTER
----- Message from Caty Nuñez sent at 6/6/2018  4:23 PM CDT -----  Contact: pt  She's calling to discuss medications with [employer], please advise [phone] (home)

## 2018-06-07 RX ORDER — INSULIN LISPRO 100 [IU]/ML
INJECTION, SUSPENSION SUBCUTANEOUS
Qty: 15 ML | Refills: 0 | Status: SHIPPED | OUTPATIENT
Start: 2018-06-07 | End: 2018-07-27 | Stop reason: SDUPTHER

## 2018-06-07 RX ORDER — PEN NEEDLE, DIABETIC 31 GX5/16"
1 NEEDLE, DISPOSABLE MISCELLANEOUS 2 TIMES DAILY
Qty: 100 EACH | Refills: 0 | Status: SHIPPED | OUTPATIENT
Start: 2018-06-07 | End: 2019-04-01 | Stop reason: SDUPTHER

## 2018-06-11 ENCOUNTER — TELEPHONE (OUTPATIENT)
Dept: OPHTHALMOLOGY | Facility: CLINIC | Age: 63
End: 2018-06-11

## 2018-06-11 NOTE — TELEPHONE ENCOUNTER
Ms. Enriquez is lost to follow up with Dr. Bright.  She has rescheduled a couple times and most recently no show.  Attempted to call her but her phone voicemail is full.  Her Eylea from her pharmacy has  and we disposed of it.

## 2018-07-03 ENCOUNTER — OFFICE VISIT (OUTPATIENT)
Dept: OPHTHALMOLOGY | Facility: CLINIC | Age: 63
End: 2018-07-03
Payer: COMMERCIAL

## 2018-07-03 DIAGNOSIS — E11.3213 TYPE 2 DIABETES MELLITUS WITH BOTH EYES AFFECTED BY MILD NONPROLIFERATIVE RETINOPATHY AND MACULAR EDEMA, WITH LONG-TERM CURRENT USE OF INSULIN: Primary | ICD-10-CM

## 2018-07-03 DIAGNOSIS — Z91.199 NONCOMPLIANCE WITH TREATMENT PLAN: ICD-10-CM

## 2018-07-03 DIAGNOSIS — Z79.4 TYPE 2 DIABETES MELLITUS WITH BOTH EYES AFFECTED BY MILD NONPROLIFERATIVE RETINOPATHY AND MACULAR EDEMA, WITH LONG-TERM CURRENT USE OF INSULIN: Primary | ICD-10-CM

## 2018-07-03 PROCEDURE — 92134 CPTRZ OPH DX IMG PST SGM RTA: CPT | Mod: S$GLB,,, | Performed by: OPHTHALMOLOGY

## 2018-07-03 PROCEDURE — 92014 COMPRE OPH EXAM EST PT 1/>: CPT | Mod: S$GLB,,, | Performed by: OPHTHALMOLOGY

## 2018-07-03 PROCEDURE — 99999 PR PBB SHADOW E&M-EST. PATIENT-LVL II: CPT | Mod: PBBFAC,,, | Performed by: OPHTHALMOLOGY

## 2018-07-03 RX ORDER — AFLIBERCEPT 40 MG/ML
2 INJECTION, SOLUTION INTRAVITREAL
Qty: 2 VIAL | Refills: 3 | Status: SHIPPED | OUTPATIENT
Start: 2018-07-03 | End: 2018-11-13 | Stop reason: SDUPTHER

## 2018-07-03 RX ORDER — AFLIBERCEPT 40 MG/ML
2 INJECTION, SOLUTION INTRAVITREAL
Qty: 2 VIAL | Refills: 3 | Status: SHIPPED | OUTPATIENT
Start: 2018-07-03 | End: 2018-07-03 | Stop reason: SDUPTHER

## 2018-07-03 NOTE — PROGRESS NOTES
===============================  2018   Latosah Enriquez,   62 y.o. female   Last visit [facility]: :2018   [facility]. 2018  [facility]  Uncorrected distance visual acuity was 20/50 in the right eye and 20/20 in the left eye.  Tonometry     Tonometry (Applanation, 3:16 PM)       Right Left    Pressure 18 18               Not recorded         Not recorded        Chief Complaint   Patient presents with    Diabetes     SHE THINKS SHE IS DOING GOOD, BETTER.        HPI     Diabetes    Additional comments: SHE THINKS SHE IS DOING GOOD, BETTER.           Comments   (No Betadine - Vigamox Prep))    DM  DME OD  EYLEA OD #11 last 18         Last edited by Patricia Slater on 7/3/2018  3:15 PM. (History)          ________________  7/3/2018  Problem List Items Addressed This Visit        Eye/Vision problems    Type 2 diabetes mellitus with both eyes affected by mild nonproliferative retinopathy and macular edema, with long-term current use of insulin - Primary    Relevant Orders    Posterior Segment OCT Retina-Both eyes      Other Visit Diagnoses     Noncompliance with treatment plan      --frequently missed appointments           .Lost to follow up since April  Eylea must be ordered from her specialty pharmacy  Medication  prior to her return as she is lost to follow up    Schedule for Eylea injection, order medication from specialty pharmacy.    Sara Faxed to [email or web address] at [phone]  Gave to Jeffrey to follow   Instructed patient to call [facility] tomorrow to tell them to send medication  Instructed patient to call us before coming back to be sure medication came in         ===========================

## 2018-07-17 ENCOUNTER — PROCEDURE VISIT (OUTPATIENT)
Dept: OPHTHALMOLOGY | Facility: CLINIC | Age: 63
End: 2018-07-17
Payer: COMMERCIAL

## 2018-07-17 ENCOUNTER — OFFICE VISIT (OUTPATIENT)
Dept: FAMILY MEDICINE | Facility: CLINIC | Age: 63
End: 2018-07-17
Payer: COMMERCIAL

## 2018-07-17 VITALS
HEART RATE: 89 BPM | WEIGHT: 251.56 LBS | TEMPERATURE: 98 F | BODY MASS INDEX: 40.43 KG/M2 | HEIGHT: 66 IN | OXYGEN SATURATION: 98 % | SYSTOLIC BLOOD PRESSURE: 112 MMHG | DIASTOLIC BLOOD PRESSURE: 72 MMHG

## 2018-07-17 DIAGNOSIS — E11.3213 TYPE 2 DIABETES MELLITUS WITH BOTH EYES AFFECTED BY MILD NONPROLIFERATIVE RETINOPATHY AND MACULAR EDEMA, WITH LONG-TERM CURRENT USE OF INSULIN: Primary | ICD-10-CM

## 2018-07-17 DIAGNOSIS — Z79.4 TYPE 2 DIABETES MELLITUS WITH BOTH EYES AFFECTED BY MILD NONPROLIFERATIVE RETINOPATHY AND MACULAR EDEMA, WITH LONG-TERM CURRENT USE OF INSULIN: Primary | ICD-10-CM

## 2018-07-17 DIAGNOSIS — L73.9 FOLLICULITIS: Primary | ICD-10-CM

## 2018-07-17 PROCEDURE — 67028 INJECTION EYE DRUG: CPT | Mod: RT,S$GLB,, | Performed by: OPHTHALMOLOGY

## 2018-07-17 PROCEDURE — 99999 PR PBB SHADOW E&M-EST. PATIENT-LVL IV: CPT | Mod: PBBFAC,,, | Performed by: REGISTERED NURSE

## 2018-07-17 PROCEDURE — 3078F DIAST BP <80 MM HG: CPT | Mod: CPTII,S$GLB,, | Performed by: REGISTERED NURSE

## 2018-07-17 PROCEDURE — 99213 OFFICE O/P EST LOW 20 MIN: CPT | Mod: S$GLB,,, | Performed by: REGISTERED NURSE

## 2018-07-17 PROCEDURE — 3008F BODY MASS INDEX DOCD: CPT | Mod: CPTII,S$GLB,, | Performed by: REGISTERED NURSE

## 2018-07-17 PROCEDURE — 99499 UNLISTED E&M SERVICE: CPT | Mod: S$GLB,,, | Performed by: OPHTHALMOLOGY

## 2018-07-17 PROCEDURE — 3074F SYST BP LT 130 MM HG: CPT | Mod: CPTII,S$GLB,, | Performed by: REGISTERED NURSE

## 2018-07-17 RX ORDER — CEPHALEXIN 500 MG/1
500 CAPSULE ORAL EVERY 12 HOURS
Qty: 14 CAPSULE | Refills: 0 | Status: SHIPPED | OUTPATIENT
Start: 2018-07-17 | End: 2018-07-24

## 2018-07-17 RX ORDER — DICLOFENAC SODIUM 20 MG/G
SOLUTION TOPICAL
Refills: 3 | COMMUNITY
Start: 2018-05-25 | End: 2018-07-17

## 2018-07-17 RX ORDER — IBUPROFEN 800 MG/1
TABLET ORAL
COMMUNITY
Start: 2018-06-27 | End: 2021-01-05

## 2018-07-17 RX ORDER — POLYMYXIN B SULFATE AND TRIMETHOPRIM 1; 10000 MG/ML; [USP'U]/ML
1 SOLUTION OPHTHALMIC 4 TIMES DAILY
Qty: 1 BOTTLE | Refills: 0 | Status: SHIPPED | OUTPATIENT
Start: 2018-07-17 | End: 2018-07-21

## 2018-07-17 NOTE — PROGRESS NOTES
===============================  07/17/2018   Latosha Enriquez,   62 y.o. female   Last visit [facility]: :7/3/2018   Last [facility]  [facility]  Corrected distance visual acuity was 20/40 in the right eye and 20/20 in the left eye.   Not recorded         Not recorded         Not recorded        Chief Complaint   Patient presents with    PDR/ME     EYLEA OD        HPI     PDR/ME    Additional comments: EYLEA OD           Comments   (No Betadine - Vigamox Prep))    DM  DME OD  EYLEA OD #11 last 4/18/18       Last edited by Patricia Slater on 7/17/2018 10:52 AM. (History)          ________________  7/17/2018  Problem List Items Addressed This Visit        Eye/Vision problems    Type 2 diabetes mellitus with both eyes affected by mild nonproliferative retinopathy and macular edema, with long-term current use of insulin - Primary    Relevant Medications    polymyxin B sulf-trimethoprim (POLYTRIM) 10,000 unit- 1 mg/mL Drop    aflibercept Soln 2 mg    Other Relevant Orders    Prior Authorization Order        sp eela   Irregl;uar follow up but numeroud inejctions o d   coud this be palteau?      oct better o d   os ok    needs cosnectppe teratmenet but fu diffculty      intebntions is to jessenia cionsectutove tx a s a tirla  For dme    Prescription sent to her specialty pharmacy last visit with 2 vials and 8 Refills  Today after injection we have 1 vial left for next month  Patient was instructed to call her specialty pharmacy and   Give them permission each time a week and a half prior to appt. To send another Eylea.         7/17/2018  Diagnosis :  od dme  Today:   Eylea (afibercept) 2 mg/0.05 ml Intravitreal Injection , OD   Follow up: rtc 1mo        Instructed to call 24/7 for any worsening of vision. Check Both eyes daily. Gave patient my home phone number.      Procedure  Note:   OD}  Eylea (afibercept) 2 mg/0.05 ml Intravitreal Injection    I have explained the Risks, Benefits and Alternatives of the procedure in  detail.  The patient voices understanding and all questions have been answered.  The patient agrees to proceed as discussed.  Xylocaine with Epi 2% subconj bleb  was used for anesthesia.  Topical vigamox was used for antisepsis.  0.05 cc was  injected 3.7 mm from corneal limbus in the inferotemporal quadrant.  Following injection the IOP was less than thirty (<30) by tonopen.  The eye was then thoroughly irrigated with BSS.  Patient tolerated procedure well.  No complications were observed.  The Patient was educated that mild irritation tonight was normal secondary to topical antispsis use.  Pt was advised to call at any time day or night for pain, redness, or any decline in vision. I gave the patient my home number as well as the clinic on call number. Daily visual checks and Amsler grid testing were reviewed.  polytrim Antibiotic Drops to be used 4 times daily for 4 days  LINDSAY Bright MD  Procedure ordered: y  Consent: y  Pre auth: y  MAR:y  Opnote: y  Charge capture:y      ===========================

## 2018-07-18 ENCOUNTER — TELEPHONE (OUTPATIENT)
Dept: OPHTHALMOLOGY | Facility: CLINIC | Age: 63
End: 2018-07-18

## 2018-07-18 NOTE — TELEPHONE ENCOUNTER
----- Message from Briseida Perez sent at 7/18/2018  8:02 AM CDT -----  Contact: Mel - [facility]  Request a call to verify a quantity  on the pt Eylea prescription, can be reached at [phone]...ref#[id number]///[email or web address]    I spoke with Mel at [facility] and verified all the correct information for Eylea Refill.

## 2018-07-19 NOTE — PROGRESS NOTES
"Subjective:       Patient ID: Latosha Enriquez is a 62 y.o. female.    Chief Complaint: Mass (to scalp)      HPI    Mrs. Enriquez is here today with c/o noticing a small sore to the LT side of her scalp x 2 days ago.  She has tried applying rubbing alcohol and Bactroban to area.  Denies fever and chills.      Review of Systems  See HPI.      Patient Active Problem List   Diagnosis    Hemorrhoids, internal    Diabetes mellitus type II, uncontrolled    Essential hypertension    Morbid obesity with BMI of 40.0-44.9, adult    Vitamin D deficiency disease    Primary osteoarthritis of both knees    GERD (gastroesophageal reflux disease)    Hyperlipidemia    Type 2 diabetes mellitus with both eyes affected by mild nonproliferative retinopathy and macular edema, with long-term current use of insulin    Kidney stones, calcium oxalate       Past medical, surgical, family and social histories have been reviewed today.        Objective:     Vitals:    07/17/18 0925   BP: 112/72   Pulse: 89   Temp: 98.4 °F (36.9 °C)   SpO2: 98%   Weight: 114.1 kg (251 lb 8.7 oz)   Height: 5' 6" (1.676 m)       Physical Exam   Constitutional: She is oriented to person, place, and time. She appears well-developed and well-nourished.   HENT:   Head:       Small spot of folliculitis, mild at this time.   Lymphadenopathy:     She has no cervical adenopathy.   Neurological: She is alert and oriented to person, place, and time.         Medication List with Changes/Refills   New Medications    CEPHALEXIN (KEFLEX) 500 MG CAPSULE    Take 1 capsule (500 mg total) by mouth every 12 (twelve) hours. for 7 days    POLYMYXIN B SULF-TRIMETHOPRIM (POLYTRIM) 10,000 UNIT- 1 MG/ML DROP    Place 1 drop into both eyes 4 (four) times daily. for 4 days   Current Medications    ALBUTEROL 90 MCG/ACTUATION INHALER    Inhale 2 puffs into the lungs every 6 (six) hours as needed.    ALLOPURINOL (ZYLOPRIM) 100 MG TABLET    Take 1 tablet (100 mg total) by mouth once daily. " "   BD ULTRA-FINE SHORT PEN NEEDLE 31 GAUGE X 5/16" NDLE    Inject 1 Units into the skin 2 (two) times daily.    BUDESONIDE 180MCG (PULMICORT FLEXHALER) 180 MCG/ACTUATION AEPB    Inhale 2 puffs into the lungs 2 (two) times daily.    ESOMEPRAZOLE (NEXIUM) 20 MG CAPSULE    Take 1 capsule (20 mg total) by mouth before breakfast.    EYLEA 2 MG/0.05 ML SOLN    0.05 mLs (2 mg total) by Intravitreal route every 28 days. for 8 doses    GABAPENTIN (NEURONTIN) 100 MG CAPSULE    every evening.     IBUPROFEN (ADVIL,MOTRIN) 800 MG TABLET        INSULIN LISPRO PROTAMIN-LISPRO (HUMALOG MIX 75-25 KWIKPEN) 100 UNIT/ML (75-25) INPN    INJECT 30 UNITS UNDER THE SKIN IN THE MORNING AND 20 UNITS IN THE EVENING    LOSARTAN-HYDROCHLOROTHIAZIDE 100-25 MG (HYZAAR) 100-25 MG PER TABLET    Take 1 tablet by mouth once daily.    METFORMIN (GLUCOPHAGE-XR) 500 MG 24 HR TABLET    TAKE 2 TABLETS BY MOUTH DAILY WITH BREAKFAST AND 1 TABLET WITH DINNER    METHOCARBAMOL (ROBAXIN) 500 MG TAB    as needed.     PEN NEEDLE, DIABETIC 31 GAUGE X 1/4" NDLE    1 Device by Misc.(Non-Drug; Combo Route) route 2 (two) times daily.    POTASSIUM CITRATE (UROCIT-K) 10 MEQ (1,080 MG) TBSR    Take 1 tablet (10 mEq total) by mouth 3 (three) times daily with meals.    SIMVASTATIN (ZOCOR) 20 MG TABLET    Take 1 tablet (20 mg total) by mouth every evening.    TAMSULOSIN (FLOMAX) 0.4 MG CP24    Take 1 capsule (0.4 mg total) by mouth once daily.    TRAMADOL (ULTRAM) 50 MG TABLET    as needed.            Diagnosis       1. Folliculitis          Assessment/ Plan     Folliculitis  · PT with c/o sore to scalp, exam consistent with folliculitis.  · Infection precautions, good handwashing.  · Txmt plan discussed.  · Orders:  -     cephALEXin (KEFLEX) 500 MG capsule; Take 1 capsule (500 mg total) by mouth every 12 (twelve) hours. for 7 days  Dispense: 14 capsule; Refill: 0      Follow-up in clinic as needed.          LUIS Erickson  [facility] "

## 2018-07-27 ENCOUNTER — OFFICE VISIT (OUTPATIENT)
Dept: FAMILY MEDICINE | Facility: CLINIC | Age: 63
End: 2018-07-27
Payer: COMMERCIAL

## 2018-07-27 VITALS
BODY MASS INDEX: 40.5 KG/M2 | DIASTOLIC BLOOD PRESSURE: 70 MMHG | OXYGEN SATURATION: 98 % | TEMPERATURE: 98 F | HEIGHT: 66 IN | SYSTOLIC BLOOD PRESSURE: 124 MMHG | HEART RATE: 84 BPM | WEIGHT: 252 LBS

## 2018-07-27 DIAGNOSIS — M17.0 PRIMARY OSTEOARTHRITIS OF BOTH KNEES: ICD-10-CM

## 2018-07-27 DIAGNOSIS — I10 ESSENTIAL HYPERTENSION: ICD-10-CM

## 2018-07-27 DIAGNOSIS — Z00.00 PREVENTATIVE HEALTH CARE: Primary | ICD-10-CM

## 2018-07-27 DIAGNOSIS — K21.9 GASTROESOPHAGEAL REFLUX DISEASE, ESOPHAGITIS PRESENCE NOT SPECIFIED: ICD-10-CM

## 2018-07-27 DIAGNOSIS — E55.9 VITAMIN D DEFICIENCY DISEASE: ICD-10-CM

## 2018-07-27 PROCEDURE — 99396 PREV VISIT EST AGE 40-64: CPT | Mod: S$GLB,,, | Performed by: FAMILY MEDICINE

## 2018-07-27 PROCEDURE — 3044F HG A1C LEVEL LT 7.0%: CPT | Mod: CPTII,S$GLB,, | Performed by: FAMILY MEDICINE

## 2018-07-27 PROCEDURE — 3078F DIAST BP <80 MM HG: CPT | Mod: CPTII,S$GLB,, | Performed by: FAMILY MEDICINE

## 2018-07-27 PROCEDURE — 3074F SYST BP LT 130 MM HG: CPT | Mod: CPTII,S$GLB,, | Performed by: FAMILY MEDICINE

## 2018-07-27 PROCEDURE — 99999 PR PBB SHADOW E&M-EST. PATIENT-LVL III: CPT | Mod: PBBFAC,,, | Performed by: FAMILY MEDICINE

## 2018-07-27 RX ORDER — INSULIN LISPRO 100 [IU]/ML
INJECTION, SUSPENSION SUBCUTANEOUS
Qty: 45 ML | Refills: 3 | Status: SHIPPED | OUTPATIENT
Start: 2018-07-27 | End: 2019-11-05 | Stop reason: SDUPTHER

## 2018-07-27 RX ORDER — POTASSIUM CHLORIDE 750 MG/1
10 CAPSULE, EXTENDED RELEASE ORAL DAILY
Qty: 90 CAPSULE | Refills: 3 | Status: SHIPPED | OUTPATIENT
Start: 2018-07-27 | End: 2019-08-09 | Stop reason: SDUPTHER

## 2018-07-27 NOTE — PROGRESS NOTES
CHIEF COMPLAINT:  This is a 62-year-old female here for preventive health exam.      SUBJECTIVE:  Patient requests advice regarding procedures suggested by orthopedic surgeon.  Patient has had problems with right knee and lower back  since stepping out of elevator that was not level with the floor.  She cannot walk into [facility] without tiring out.  She reports that her MRI shows L4-L5  slippage and herniated disc.  Surgeon suggest that she  try epidural injection with Novocain followed by rhizotomy.  It was also suggested that she have right knee arthroscopy for torn meniscus.    Patient has type 2 diabetes.   She reports her blood sugars have been .  She denies polyuria, polydipsia or polyphagia.  Last A1c was 6.6% 3 months ago.  She takes metformin  twice daily and injects Humalog mix 75-25 twice daily.   her  blood pressure is controlled on losartan HCT.  She takes simvastatin for hyperlipidemia.  Patient has vitamin D deficiency and takes 50,000 units of vitamin D2 weekly. She has a history of uric acid kidney stones for which he takes allopurinol daily.  She takes Nexium as needed for GERD.     Eye exam  July 2018.   Mammogram December 2017.   Colonoscopy November 2015, due again in November 2025.  Tdap July 2017.     ROS:  GENERAL: Patient denies fever, chills, night sweats. Patient denies weight gain or loss. Patient denies anorexia, fatigue, weakness or swollen glands.  SKIN: Patient denies rash or hair loss.  HEENT: Patient denies sore throat, ear pain, hearing loss, nasal congestion, or runny nose. Patient denies visual disturbance, eye irritation or discharge.  LUNGS: Patient denies cough, wheeze or hemoptysis.  CARDIOVASCULAR: Patient denies chest pain, shortness of breath, palpitations, syncope or lower extremity edema.  GI: Patient denies abdominal pain, nausea, vomiting, diarrhea, blood in stool or melena.  GENITOURINARY: Patient denies pelvic pain, vaginal discharge, itch or odor. Patient  denies irregular vaginal bleeding. Patient denies dysuria, frequency, hematuria, nocturia, urgency or incontinence.  BREASTS: Patient denies breast pain, mass or nipple discharge.  MUSCULOSKELETAL: Patient denies joint pain, swelling, redness or warmth.  NEUROLOGIC: Patient denies headache, vertigo, paresthesias, weakness in limb, dysarthria, dysphagia or abnormality of gait.  PSYCHIATRIC: Patient denies anxiety, depression, or memory loss.     OBJECTIVE:   GENERAL: Well-developed well-nourished, obese, black female alert and oriented x3, in no acute distress. Memory, judgment and cognition without deficit.   SKIN: Clear without rash. Normal color and tone. Multiple skin tags on chest wall.  Flesh-colored skin lesion left upper buttock just below labia majora.  HEENT: Eyes: No scleral icterus. Clear conjunctivae. Pupils equal reactive to light and accommodation. Ears: Clear TMs. Right canal with excessive cerumen.. Nose: Without congestion. Pharynx: Without injection or exudates.  NECK: Supple, normal range of motion. No masses, nodes or enlarged thyroid. No JVD. Carotids 2+ and equal. No bruits.  LUNGS: Clear to auscultation. Normal respiratory effort.  CARDIOVASCULAR: Regular rhythm, normal S1, S2 without murmur, gallop or rub.  BACK: No CVA or spinal tenderness.  BREASTS: No masses, tenderness or nipple discharge.  ABDOMEN: Normal appearance. Active bowel sounds. Soft, nontender without mass or organomegaly. No rebound or guarding.  EXTREMITIES: Without cyanosis, clubbing or edema. Distal pulses 2+ and equal. Normal range of motion in all extremities. No joint effusion, erythema or warmth.  NEUROLOGIC: Cranial nerves II through XII without deficit. Motor strength equal bilaterally. Sensation normal to touch. Deep tendon reflexes 2+ and equal. Gait without abnormality. No tremor. Negative cerebellar signs.  FOOT EVALUATION: 10 gram monofilament exam with protective sensation intact bilaterally. Nails appropriately  trimmed. No ulcers. Distal pulses palpable.  PELVIC: External: Without lesions or inflammation. Vaginal: Atrophic changes, malodor, cuff intact. Bimanual: Non-tender, without masses. Rectovaginal: Confirms, heme-negative stool x2.     ASSESSMENT:  1. Preventative health care    2. Uncontrolled type 2 diabetes mellitus without complication, with long-term current use of insulin    3. Essential hypertension    4. Gastroesophageal reflux disease, esophagitis presence not specified    5. Primary osteoarthritis of both knees    6. Vitamin D deficiency disease      PLAN:   1.  Weight reduction.  Exercise regularly.  2.  Age-appropriate counseling.  3.  Fasting lab.  4.  Discussed treatment of back and knee pain.  5.  Follow-up in 6 months.

## 2018-08-24 ENCOUNTER — LAB VISIT (OUTPATIENT)
Dept: LAB | Facility: HOSPITAL | Age: 63
End: 2018-08-24
Attending: FAMILY MEDICINE
Payer: COMMERCIAL

## 2018-08-24 DIAGNOSIS — Z00.00 PREVENTATIVE HEALTH CARE: ICD-10-CM

## 2018-08-24 DIAGNOSIS — E55.9 VITAMIN D DEFICIENCY DISEASE: ICD-10-CM

## 2018-08-24 LAB
25(OH)D3+25(OH)D2 SERPL-MCNC: 37 NG/ML
ALBUMIN SERPL BCP-MCNC: 3.7 G/DL
ALP SERPL-CCNC: 72 U/L
ALT SERPL W/O P-5'-P-CCNC: 26 U/L
ANION GAP SERPL CALC-SCNC: 9 MMOL/L
AST SERPL-CCNC: 17 U/L
BASOPHILS # BLD AUTO: 0.06 K/UL
BASOPHILS NFR BLD: 1 %
BILIRUB SERPL-MCNC: 0.6 MG/DL
BUN SERPL-MCNC: 18 MG/DL
CALCIUM SERPL-MCNC: 9.5 MG/DL
CHLORIDE SERPL-SCNC: 105 MMOL/L
CHOLEST SERPL-MCNC: 165 MG/DL
CHOLEST/HDLC SERPL: 3.6 {RATIO}
CO2 SERPL-SCNC: 27 MMOL/L
CREAT SERPL-MCNC: 0.7 MG/DL
DIFFERENTIAL METHOD: ABNORMAL
EOSINOPHIL # BLD AUTO: 0.2 K/UL
EOSINOPHIL NFR BLD: 3.1 %
ERYTHROCYTE [DISTWIDTH] IN BLOOD BY AUTOMATED COUNT: 13.6 %
EST. GFR  (AFRICAN AMERICAN): >60 ML/MIN/1.73 M^2
EST. GFR  (NON AFRICAN AMERICAN): >60 ML/MIN/1.73 M^2
ESTIMATED AVG GLUCOSE: 137 MG/DL
GLUCOSE SERPL-MCNC: 136 MG/DL
HBA1C MFR BLD HPLC: 6.4 %
HCT VFR BLD AUTO: 38.3 %
HDLC SERPL-MCNC: 46 MG/DL
HDLC SERPL: 27.9 %
HGB BLD-MCNC: 11.9 G/DL
IMM GRANULOCYTES # BLD AUTO: 0.02 K/UL
IMM GRANULOCYTES NFR BLD AUTO: 0.3 %
LDLC SERPL CALC-MCNC: 102.8 MG/DL
LYMPHOCYTES # BLD AUTO: 2.2 K/UL
LYMPHOCYTES NFR BLD: 36.1 %
MCH RBC QN AUTO: 30.6 PG
MCHC RBC AUTO-ENTMCNC: 31.1 G/DL
MCV RBC AUTO: 99 FL
MONOCYTES # BLD AUTO: 0.4 K/UL
MONOCYTES NFR BLD: 6.6 %
NEUTROPHILS # BLD AUTO: 3.3 K/UL
NEUTROPHILS NFR BLD: 52.9 %
NONHDLC SERPL-MCNC: 119 MG/DL
NRBC BLD-RTO: 0 /100 WBC
PLATELET # BLD AUTO: 235 K/UL
PMV BLD AUTO: 11.5 FL
POTASSIUM SERPL-SCNC: 3.7 MMOL/L
PROT SERPL-MCNC: 6.8 G/DL
RBC # BLD AUTO: 3.89 M/UL
SODIUM SERPL-SCNC: 141 MMOL/L
TRIGL SERPL-MCNC: 81 MG/DL
TSH SERPL DL<=0.005 MIU/L-ACNC: 0.54 UIU/ML
WBC # BLD AUTO: 6.17 K/UL

## 2018-08-24 PROCEDURE — 36415 COLL VENOUS BLD VENIPUNCTURE: CPT | Mod: PO

## 2018-08-24 PROCEDURE — 85025 COMPLETE CBC W/AUTO DIFF WBC: CPT

## 2018-08-24 PROCEDURE — 83036 HEMOGLOBIN GLYCOSYLATED A1C: CPT

## 2018-08-24 PROCEDURE — 80061 LIPID PANEL: CPT

## 2018-08-24 PROCEDURE — 84443 ASSAY THYROID STIM HORMONE: CPT

## 2018-08-24 PROCEDURE — 80053 COMPREHEN METABOLIC PANEL: CPT

## 2018-08-24 PROCEDURE — 82306 VITAMIN D 25 HYDROXY: CPT

## 2018-09-13 ENCOUNTER — PROCEDURE VISIT (OUTPATIENT)
Dept: OPHTHALMOLOGY | Facility: CLINIC | Age: 63
End: 2018-09-13
Payer: COMMERCIAL

## 2018-09-13 DIAGNOSIS — Z79.4 TYPE 2 DIABETES MELLITUS WITH BOTH EYES AFFECTED BY MILD NONPROLIFERATIVE RETINOPATHY AND MACULAR EDEMA, WITH LONG-TERM CURRENT USE OF INSULIN: Primary | ICD-10-CM

## 2018-09-13 DIAGNOSIS — E11.3213 TYPE 2 DIABETES MELLITUS WITH BOTH EYES AFFECTED BY MILD NONPROLIFERATIVE RETINOPATHY AND MACULAR EDEMA, WITH LONG-TERM CURRENT USE OF INSULIN: Primary | ICD-10-CM

## 2018-09-13 PROCEDURE — 67028 INJECTION EYE DRUG: CPT | Mod: RT,S$GLB,, | Performed by: OPHTHALMOLOGY

## 2018-09-13 PROCEDURE — 92134 CPTRZ OPH DX IMG PST SGM RTA: CPT | Mod: S$GLB,,, | Performed by: OPHTHALMOLOGY

## 2018-09-13 PROCEDURE — 99499 UNLISTED E&M SERVICE: CPT | Mod: S$GLB,,, | Performed by: OPHTHALMOLOGY

## 2018-09-13 NOTE — PROGRESS NOTES
===============================  09/13/2018   Latosha Enriquez,   62 y.o. female   Last visit [facility]: :7/17/2018   [facility] [place] [facility]  [facility]  Cor[place] distance visual acuity was 20/30 in the right eye and 20/20 in the left eye.   Not recorded         Not recorded         Not recorded        Chief Complaint   Patient presents with    PDR/ME     EYLEA OD        HPI     PDR/ME      Additional comments: EYLEA OD              Comments     (No Betadine - Vigamox Prep))    DM  DME OD  EYLEA OD #12 last 7/17/18          Last edited by Patricia Slater on 9/13/2018  2:53 PM. (History)          ________________  9/13/2018  Problem List Items Addressed This Visit        Eye/Vision problems    Type 2 diabetes mellitus with both eyes affected by mild nonproliferative retinopathy and macular edema, with long-term current use of insulin - Primary (Chronic)    Relevant Medications    aflibercept Soln 2 mg (Start on 9/13/2018  3:45 PM)    Other Relevant Orders    Posterior Segment OCT Retina-Both eyes (Completed)    Prior Authorization Order        Good vison immendtely afetr injetcion last vist for 2 weeks  ' no w back 'oct better  Had streoi in knee      9/13/2018  Diagnosis :  od dme better but persitent me    Today:   Eylea (afibercept) 2 mg/0.05 ml Intravitreal Injection , OD   Follow up: 1 mo        Instructed to call 24/7 for any worsening of vision. Check Both eyes daily. Gave patient my home phone number.      Procedure  Note:   OD}  Eylea (afibercept) 2 mg/0.05 ml Intravitreal Injection    I have explained the Risks, Benefits and Alternatives of the procedure in detail.  The patient voices understanding and all questions have been answered.  The patient agrees to proceed as discussed.  Xylocaine with Epi 2%  subconj bleb  was used for anesthesia.  Topical betadine was used for antisepsis.  0.05 cc was  injected 3.7 mm from corneal limbus in the inferotemporal quadrant.  Following injection the IOP was less than  thirty (<30) by tonopen.  The eye was then thoroughly irrigated with BSS.  Patient tolerated procedure well.  No complications were observed.  The Patient was educated that mild irritation tonight was normal secondary to topical antispsis use.  Pt was advised to call at any time day or night for pain, redness, or any decline in vision. I gave the patient my home number as well as the clinic on call number. Daily visual checks and Amsler grid testing were reviewed.  polytrim Antibiotic Drops to be used 4 times daily for 4 days  LINDSAY Bright MD  Procedure ordered: y  Consent: y  Pre auth: y  MAR:y  Opnote: y  Charge capture:y      .       ===========================

## 2018-09-14 RX ORDER — LOSARTAN POTASSIUM AND HYDROCHLOROTHIAZIDE 25; 100 MG/1; MG/1
1 TABLET ORAL DAILY
Qty: 90 TABLET | Refills: 3 | Status: SHIPPED | OUTPATIENT
Start: 2018-09-14 | End: 2019-10-20 | Stop reason: SDUPTHER

## 2018-09-14 NOTE — TELEPHONE ENCOUNTER
----- Message from Shadia Tellez sent at 9/14/2018 11:17 AM CDT -----  ..1. What is the name of the medication you are requesting? Losartan   2. What is the dose? 25- mg   3. How do you take the medication? Orally, topically, etc? Orally   4. How often do you take this medication? Daily   5. Do you need a 30 day or 90 day supply? 30  6. How many refills are you requesting? 1  7. What is your preferred pharmacy and location of the pharmacy? ..    [id number] - [place], [place] - [address] AT [address]  [address] [address]  Phone: [phone] Fax: [phone]    8. Who can we contact with further questions? Please call pt back at [phone]

## 2018-10-03 RX ORDER — POTASSIUM CITRATE 10 MEQ/1
TABLET, EXTENDED RELEASE ORAL
Qty: 90 TABLET | Refills: 0 | OUTPATIENT
Start: 2018-10-03

## 2018-10-16 ENCOUNTER — TELEPHONE (OUTPATIENT)
Dept: OPHTHALMOLOGY | Facility: CLINIC | Age: 63
End: 2018-10-16

## 2018-10-16 NOTE — TELEPHONE ENCOUNTER
Called ms. Enriquez back at 1:35 today to reschedule her procedure, can not leave her a message because her voicemail is full. kf

## 2018-11-13 ENCOUNTER — PROCEDURE VISIT (OUTPATIENT)
Dept: OPHTHALMOLOGY | Facility: CLINIC | Age: 63
End: 2018-11-13
Payer: COMMERCIAL

## 2018-11-13 DIAGNOSIS — Z79.4 TYPE 2 DIABETES MELLITUS WITH BOTH EYES AFFECTED BY MILD NONPROLIFERATIVE RETINOPATHY AND MACULAR EDEMA, WITH LONG-TERM CURRENT USE OF INSULIN: Primary | ICD-10-CM

## 2018-11-13 DIAGNOSIS — E11.3213 TYPE 2 DIABETES MELLITUS WITH BOTH EYES AFFECTED BY MILD NONPROLIFERATIVE RETINOPATHY AND MACULAR EDEMA, WITH LONG-TERM CURRENT USE OF INSULIN: Primary | ICD-10-CM

## 2018-11-13 PROCEDURE — 92134 CPTRZ OPH DX IMG PST SGM RTA: CPT | Mod: S$GLB,,, | Performed by: OPHTHALMOLOGY

## 2018-11-13 PROCEDURE — 99499 UNLISTED E&M SERVICE: CPT | Mod: S$GLB,,, | Performed by: OPHTHALMOLOGY

## 2018-11-13 PROCEDURE — 67028 INJECTION EYE DRUG: CPT | Mod: RT,S$GLB,, | Performed by: OPHTHALMOLOGY

## 2018-11-13 RX ORDER — AFLIBERCEPT 40 MG/ML
2 INJECTION, SOLUTION INTRAVITREAL
Qty: 2 VIAL | Refills: 3 | Status: SHIPPED | OUTPATIENT
Start: 2018-11-13 | End: 2019-01-03 | Stop reason: SDUPTHER

## 2018-11-13 NOTE — PROGRESS NOTES
===============================  11/13/2018   Latosha Enriquez,   62 y.o. female   Last visit [facility]: :9/13/2018   [facility]  Corrected [place] visual acuity was 20/30 in the right eye and 20/20 in the left eye.   Not recorded         Not recorded         Not recorded        Chief Complaint   Patient presents with    dme     eylea od        HPI     dme      Additional comments: eylea od              Comments     DM  DME OD  EYLEA OD last 9/13/18          Last edited by LINDSAY Bright MD on 11/13/2018  2:34 PM. (History)          ________________  11/13/2018  Problem List Items Addressed This Visit        Eye/Vision problems    Type 2 diabetes mellitus with both eyes affected by mild nonproliferative retinopathy and macular edema, with long-term current use of insulin - Primary (Chronic)    Relevant Medications    aflibercept Soln 2 mg (Start on 11/13/2018  4:15 PM)    EYLEA 2 mg/0.05 mL Soln    Other Relevant Orders    Posterior Segment OCT Retina-Both eyes (Completed)    Prior Authorization Order        od dme   notioces suystemi[facility] fuild  Frequently missed appointment     do eylea     11/13/2018  Diagnosis :  od dme  Today:   Eylea (afibercept) 2 mg/0.05 ml Intravitreal Injection , OD   Follow up: rtc 1 mo        Instructed to call 24/7 for any worsening of vision. Check Both eyes daily. Gave patient my home phone number.      Procedure  Note:   OD}  Eylea (afibercept) 2 mg/0.05 ml Intravitreal Injection    I have explained the Risks, Benefits and Alternatives of the procedure in detail.  The patient voices understanding and all questions have been answered.  The patient agrees to proceed as discussed.  Xylocaine with Epi 2%  subconj bleb  was used for anesthesia.  Topical betadine was used for antisepsis.  0.05 cc was  injected 3.7 mm from corneal limbus in the inferotemporal quadrant.  Following injection the IOP was less than thirty (<30) by tonopen.  The eye was then thoroughly  irrigated with BSS.  Patient tolerated procedure well.  No complications were observed.  The Patient was educated that mild irritation tonight was normal secondary to topical antispsis use.  Pt was advised to call at any time day or night for pain, redness, or any decline in vision. I gave the patient my home number as well as the clinic on call number. Daily visual checks and Amsler grid testing were reviewed.  polytrim Antibiotic Drops to be used 4 times daily for 4 days  LINDSAY Bright MD  Procedure ordered: y  Consent: y  Pre auth: y  MAR:y  Opnote: y  Charge capture:y    .       ===========================

## 2018-12-07 ENCOUNTER — OFFICE VISIT (OUTPATIENT)
Dept: FAMILY MEDICINE | Facility: CLINIC | Age: 63
End: 2018-12-07
Payer: COMMERCIAL

## 2018-12-07 VITALS
TEMPERATURE: 98 F | BODY MASS INDEX: 38.79 KG/M2 | OXYGEN SATURATION: 97 % | SYSTOLIC BLOOD PRESSURE: 112 MMHG | DIASTOLIC BLOOD PRESSURE: 66 MMHG | HEIGHT: 66 IN | WEIGHT: 241.38 LBS | HEART RATE: 83 BPM

## 2018-12-07 DIAGNOSIS — G89.29 CHRONIC BILATERAL LOW BACK PAIN, WITH SCIATICA PRESENCE UNSPECIFIED: ICD-10-CM

## 2018-12-07 DIAGNOSIS — N63.0 BREAST LUMP: ICD-10-CM

## 2018-12-07 DIAGNOSIS — M54.5 CHRONIC BILATERAL LOW BACK PAIN, WITH SCIATICA PRESENCE UNSPECIFIED: ICD-10-CM

## 2018-12-07 DIAGNOSIS — N64.4 MASTODYNIA: Primary | ICD-10-CM

## 2018-12-07 DIAGNOSIS — S83.241D ACUTE MEDIAL MENISCAL TEAR, RIGHT, SUBSEQUENT ENCOUNTER: ICD-10-CM

## 2018-12-07 DIAGNOSIS — Z79.4 TYPE 2 DIABETES MELLITUS WITH BOTH EYES AFFECTED BY MILD NONPROLIFERATIVE RETINOPATHY AND MACULAR EDEMA, WITH LONG-TERM CURRENT USE OF INSULIN: ICD-10-CM

## 2018-12-07 DIAGNOSIS — M54.10 RADICULOPATHY OF LEG: ICD-10-CM

## 2018-12-07 DIAGNOSIS — I10 ESSENTIAL HYPERTENSION: ICD-10-CM

## 2018-12-07 DIAGNOSIS — E11.3213 TYPE 2 DIABETES MELLITUS WITH BOTH EYES AFFECTED BY MILD NONPROLIFERATIVE RETINOPATHY AND MACULAR EDEMA, WITH LONG-TERM CURRENT USE OF INSULIN: ICD-10-CM

## 2018-12-07 PROCEDURE — 3044F HG A1C LEVEL LT 7.0%: CPT | Mod: CPTII,S$GLB,, | Performed by: FAMILY MEDICINE

## 2018-12-07 PROCEDURE — 3074F SYST BP LT 130 MM HG: CPT | Mod: CPTII,S$GLB,, | Performed by: FAMILY MEDICINE

## 2018-12-07 PROCEDURE — 3078F DIAST BP <80 MM HG: CPT | Mod: CPTII,S$GLB,, | Performed by: FAMILY MEDICINE

## 2018-12-07 PROCEDURE — 99999 PR PBB SHADOW E&M-EST. PATIENT-LVL V: CPT | Mod: PBBFAC,,, | Performed by: FAMILY MEDICINE

## 2018-12-07 PROCEDURE — 3008F BODY MASS INDEX DOCD: CPT | Mod: CPTII,S$GLB,, | Performed by: FAMILY MEDICINE

## 2018-12-07 PROCEDURE — 99214 OFFICE O/P EST MOD 30 MIN: CPT | Mod: S$GLB,,, | Performed by: FAMILY MEDICINE

## 2018-12-07 RX ORDER — DULOXETIN HYDROCHLORIDE 30 MG/1
CAPSULE, DELAYED RELEASE ORAL
Qty: 60 CAPSULE | Refills: 2 | Status: SHIPPED | OUTPATIENT
Start: 2018-12-07 | End: 2020-04-29
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
## 2018-12-10 ENCOUNTER — TELEPHONE (OUTPATIENT)
Dept: OPHTHALMOLOGY | Facility: CLINIC | Age: 63
End: 2018-12-10

## 2018-12-10 ENCOUNTER — HOSPITAL ENCOUNTER (OUTPATIENT)
Dept: RADIOLOGY | Facility: HOSPITAL | Age: 63
Discharge: HOME | End: 2018-12-10
Attending: FAMILY MEDICINE
Payer: COMMERCIAL

## 2018-12-10 ENCOUNTER — TELEPHONE (OUTPATIENT)
Dept: RADIOLOGY | Facility: HOSPITAL | Age: 63
End: 2018-12-10

## 2018-12-10 DIAGNOSIS — N63.0 BREAST LUMP: ICD-10-CM

## 2018-12-10 DIAGNOSIS — N64.4 MASTODYNIA: ICD-10-CM

## 2018-12-10 PROCEDURE — 76642 ULTRASOUND BREAST LIMITED: CPT | Mod: TC,PO,LT

## 2018-12-10 PROCEDURE — 77062 BREAST TOMOSYNTHESIS BI: CPT | Mod: 26,,, | Performed by: RADIOLOGY

## 2018-12-10 PROCEDURE — 77066 DX MAMMO INCL CAD BI: CPT | Mod: 26,,, | Performed by: RADIOLOGY

## 2018-12-10 PROCEDURE — 76642 ULTRASOUND BREAST LIMITED: CPT | Mod: 26,LT,, | Performed by: RADIOLOGY

## 2018-12-10 PROCEDURE — 77066 DX MAMMO INCL CAD BI: CPT | Mod: TC,PO

## 2018-12-10 NOTE — TELEPHONE ENCOUNTER
----- Message from Melany Washington sent at 12/10/2018 11:31 AM CST -----  Contact: self [phone]      ----- Message -----  From: Emelyn Dee  Sent: 12/10/2018   9:35 AM  To: [facility] Ophthalmology Scheduling    States that she is calling to reschedule her appt with Dr Bright for a procedure. states that she would like to schedule it for the week of the 12/19/18-12/31/18. Please call back at [phone]//thank you acc

## 2018-12-10 NOTE — TELEPHONE ENCOUNTER
----- Message from Melany Washington sent at 12/10/2018 11:09 AM CST -----  Contact: self [phone]      ----- Message -----  From: Emelyn Dee  Sent: 12/10/2018   9:35 AM  To: [facility] Ophthalmology Scheduling    States that she is calling to reschedule her appt with Dr Bright for a procedure. states that she would like to schedule it for the week of the 12/19/18-12/31/18. Please call back at [phone]//thank you acc

## 2018-12-11 NOTE — PROGRESS NOTES
MMG and ultrasound report negative.   If symptoms are persistent, can send to general surgery for further evaluation.

## 2018-12-13 ENCOUNTER — TELEPHONE (OUTPATIENT)
Dept: FAMILY MEDICINE | Facility: CLINIC | Age: 63
End: 2018-12-13

## 2018-12-13 DIAGNOSIS — M54.5 CHRONIC BILATERAL LOW BACK PAIN, WITH SCIATICA PRESENCE UNSPECIFIED: ICD-10-CM

## 2018-12-13 DIAGNOSIS — G89.29 CHRONIC BILATERAL LOW BACK PAIN, WITH SCIATICA PRESENCE UNSPECIFIED: ICD-10-CM

## 2018-12-13 DIAGNOSIS — M54.10 RADICULOPATHY OF LEG: Primary | ICD-10-CM

## 2018-12-13 NOTE — TELEPHONE ENCOUNTER
----- Message from Sasha Suggs sent at 12/13/2018  2:54 PM CST -----  Contact: pt  Calling about physical therapy and please advise. [phone] (pdis)

## 2018-12-14 NOTE — TELEPHONE ENCOUNTER
Called pt informed her that referral to [facility] has been faxed over. Pt voiced understanding.

## 2018-12-31 ENCOUNTER — PROCEDURE VISIT (OUTPATIENT)
Dept: OPHTHALMOLOGY | Facility: CLINIC | Age: 63
End: 2018-12-31
Payer: COMMERCIAL

## 2018-12-31 DIAGNOSIS — Z79.4 TYPE 2 DIABETES MELLITUS WITH BOTH EYES AFFECTED BY MILD NONPROLIFERATIVE RETINOPATHY AND MACULAR EDEMA, WITH LONG-TERM CURRENT USE OF INSULIN: Primary | ICD-10-CM

## 2018-12-31 DIAGNOSIS — E11.3213 TYPE 2 DIABETES MELLITUS WITH BOTH EYES AFFECTED BY MILD NONPROLIFERATIVE RETINOPATHY AND MACULAR EDEMA, WITH LONG-TERM CURRENT USE OF INSULIN: Primary | ICD-10-CM

## 2018-12-31 PROCEDURE — 67028 INJECTION EYE DRUG: CPT | Mod: RT,S$GLB,, | Performed by: OPHTHALMOLOGY

## 2018-12-31 PROCEDURE — 99499 UNLISTED E&M SERVICE: CPT | Mod: S$GLB,,, | Performed by: OPHTHALMOLOGY

## 2018-12-31 PROCEDURE — 92134 CPTRZ OPH DX IMG PST SGM RTA: CPT | Mod: S$GLB,,, | Performed by: OPHTHALMOLOGY

## 2018-12-31 RX ORDER — POLYMYXIN B SULFATE AND TRIMETHOPRIM 1; 10000 MG/ML; [USP'U]/ML
1 SOLUTION OPHTHALMIC 4 TIMES DAILY
Qty: 1 BOTTLE | Refills: 0 | Status: SHIPPED | OUTPATIENT
Start: 2018-12-31 | End: 2019-01-04

## 2018-12-31 NOTE — PROGRESS NOTES
===============================  12/31/2018   Latosha Enriquez,   63 y.o. female   Last visit [facility]: :11/13/2018   Last visit eye dept. 11/13/2018  [facility]:  Corrected distance visual acuity was 20/40 in the right eye and 20/20 in the left eye.   Not recorded         Not recorded         Not recorded        Chief Complaint   Patient presents with    DME     eylea od, pt states vision is very good        HPI     DME      Additional comments: eylea od, pt states vision is very good              Comments     DM  DME OD  EYLEA OD last 11/13/18          Last edited by KILEY Aguirre on 12/31/2018  8:20 AM. (History)          ________________  12/31/2018  Problem List Items Addressed This Visit        Eye/Vision problems    Type 2 diabetes mellitus with both eyes affected by mild nonproliferative retinopathy and macular edema, with long-term current use of insulin - Primary (Chronic)    Relevant Medications    aflibercept Soln 2 mg (Completed) (Start on 12/31/2018  9:00 AM)    Other Relevant Orders    Posterior Segment OCT Retina-Both eyes (Completed)    Prior Authorization Order          .od dme  Sl worse   rtc 1 mo do       12/31/2018  Diagnosis :  od dme  Today:   Eylea (afibercept) 2 mg/0.05 ml Intravitreal Injection , OD   Follow up: rtc 1 mo do         Instructed to call 24/7 for any worsening of vision. Check Both eyes daily. Gave patient my home phone number.      Procedure  Note:   OD}  Eylea (afibercept) 2 mg/0.05 ml Intravitreal Injection    I have explained the Risks, Benefits and Alternatives of the procedure in detail.  The patient voices understanding and all questions have been answered.  The patient agrees to proceed as discussed.  Xylocaine with Epi 2%  subconj bleb  was used for anesthesia.  Topical betadine was used for antisepsis.  0.05 cc was  injected 3.7 mm from corneal limbus in the inferotemporal quadrant.  Following injection the IOP was less than thirty (<30) by tonopen.  The eye was then  thoroughly irrigated with BSS.  Patient tolerated procedure well.  No complications were observed.  The Patient was educated that mild irritation tonight was normal secondary to topical antispsis use.  Pt was advised to call at any time day or night for pain, redness, or any decline in vision. I gave the patient my home number as well as the clinic on call number. Daily visual checks and Amsler grid testing were reviewed.  polytrim Antibiotic Drops to be used 4 times daily for 4 days  LINDSAY Bright MD  Procedure ordered: y  Consent: y  Pre auth: y  MAR:y  Opnote: y  Charge capture:y           ===========================

## 2019-01-02 RX ORDER — METFORMIN HYDROCHLORIDE 500 MG/1
TABLET, EXTENDED RELEASE ORAL
Qty: 90 TABLET | Refills: 5 | Status: SHIPPED | OUTPATIENT
Start: 2019-01-02 | End: 2019-04-01 | Stop reason: SDUPTHER

## 2019-01-03 DIAGNOSIS — E11.3213 TYPE 2 DIABETES MELLITUS WITH BOTH EYES AFFECTED BY MILD NONPROLIFERATIVE RETINOPATHY AND MACULAR EDEMA, WITH LONG-TERM CURRENT USE OF INSULIN: ICD-10-CM

## 2019-01-03 DIAGNOSIS — Z79.4 TYPE 2 DIABETES MELLITUS WITH BOTH EYES AFFECTED BY MILD NONPROLIFERATIVE RETINOPATHY AND MACULAR EDEMA, WITH LONG-TERM CURRENT USE OF INSULIN: ICD-10-CM

## 2019-01-03 RX ORDER — METFORMIN HYDROCHLORIDE 500 MG/1
TABLET, EXTENDED RELEASE ORAL
Qty: 90 TABLET | Refills: 5 | OUTPATIENT
Start: 2019-01-03

## 2019-01-03 RX ORDER — AFLIBERCEPT 40 MG/ML
2 INJECTION, SOLUTION INTRAVITREAL
Qty: 2 VIAL | Refills: 3 | Status: SHIPPED | OUTPATIENT
Start: 2019-01-03 | End: 2019-08-23 | Stop reason: SDUPTHER

## 2019-01-03 NOTE — TELEPHONE ENCOUNTER
----- Message from Floridalma Ed sent at 1/3/2019  7:38 AM CST -----  Contact: pt  1. What is the name of the medication you are requesting? Metformin  2. What is the dose? na  3. How do you take the medication? Orally, topically, etc? Orally  . How often do you take this medication? 2xday  5. Do you need a 30 day or 90 day supply? 30  6. How many refills are you requesting?1  7. What is your preferred pharmacy and location of the pharmacy? [facility]. Who can we contact with further questions? Pt @ [phone]

## 2019-01-30 ENCOUNTER — TELEPHONE (OUTPATIENT)
Dept: OPHTHALMOLOGY | Facility: CLINIC | Age: 64
End: 2019-01-30

## 2019-01-30 NOTE — TELEPHONE ENCOUNTER
----- Message from Amee Gilmore sent at 1/30/2019  8:38 AM CST -----  Contact: Patient  Patient called to make sure her injection is in before she comes for her appointment today. She can be contacted at [phone].    Thanks,  Amee Murphy is calling Ms. Enriquez.  Her Eylea is not here because the pharmacy said she has not been answering her call.

## 2019-02-07 ENCOUNTER — HOSPITAL ENCOUNTER (EMERGENCY)
Facility: HOSPITAL | Age: 64
Discharge: HOME | End: 2019-02-07
Attending: EMERGENCY MEDICINE
Payer: COMMERCIAL

## 2019-02-07 ENCOUNTER — TELEPHONE (OUTPATIENT)
Dept: FAMILY MEDICINE | Facility: CLINIC | Age: 64
End: 2019-02-07

## 2019-02-07 VITALS
SYSTOLIC BLOOD PRESSURE: 105 MMHG | HEART RATE: 96 BPM | RESPIRATION RATE: 19 BRPM | TEMPERATURE: 99 F | DIASTOLIC BLOOD PRESSURE: 53 MMHG | BODY MASS INDEX: 39.19 KG/M2 | OXYGEN SATURATION: 96 % | WEIGHT: 242.81 LBS

## 2019-02-07 DIAGNOSIS — R07.9 CHEST PAIN: ICD-10-CM

## 2019-02-07 LAB
ALBUMIN SERPL BCP-MCNC: 3.7 G/DL
ALP SERPL-CCNC: 88 U/L
ALT SERPL W/O P-5'-P-CCNC: 39 U/L
ANION GAP SERPL CALC-SCNC: 9 MMOL/L
AST SERPL-CCNC: 18 U/L
BASOPHILS # BLD AUTO: 0.04 K/UL
BASOPHILS NFR BLD: 0.4 %
BILIRUB SERPL-MCNC: 0.5 MG/DL
BNP SERPL-MCNC: 26 PG/ML
BUN SERPL-MCNC: 14 MG/DL
CALCIUM SERPL-MCNC: 9.7 MG/DL
CHLORIDE SERPL-SCNC: 106 MMOL/L
CO2 SERPL-SCNC: 26 MMOL/L
CREAT SERPL-MCNC: 0.8 MG/DL
DIFFERENTIAL METHOD: NORMAL
EOSINOPHIL # BLD AUTO: 0.4 K/UL
EOSINOPHIL NFR BLD: 3.8 %
ERYTHROCYTE [DISTWIDTH] IN BLOOD BY AUTOMATED COUNT: 13.3 %
EST. GFR  (AFRICAN AMERICAN): >60 ML/MIN/1.73 M^2
EST. GFR  (NON AFRICAN AMERICAN): >60 ML/MIN/1.73 M^2
GLUCOSE SERPL-MCNC: 78 MG/DL
HCT VFR BLD AUTO: 40.9 %
HGB BLD-MCNC: 13.4 G/DL
LYMPHOCYTES # BLD AUTO: 3.2 K/UL
LYMPHOCYTES NFR BLD: 34.3 %
MCH RBC QN AUTO: 30.8 PG
MCHC RBC AUTO-ENTMCNC: 32.8 G/DL
MCV RBC AUTO: 94 FL
MONOCYTES # BLD AUTO: 0.6 K/UL
MONOCYTES NFR BLD: 6 %
NEUTROPHILS # BLD AUTO: 5.2 K/UL
NEUTROPHILS NFR BLD: 55.5 %
PLATELET # BLD AUTO: 265 K/UL
PMV BLD AUTO: 10.8 FL
POCT GLUCOSE: 77 MG/DL (ref 70–110)
POTASSIUM SERPL-SCNC: 4 MMOL/L
PROT SERPL-MCNC: 7.1 G/DL
RBC # BLD AUTO: 4.35 M/UL
SODIUM SERPL-SCNC: 141 MMOL/L
TROPONIN I SERPL DL<=0.01 NG/ML-MCNC: <0.006 NG/ML
TROPONIN I SERPL DL<=0.01 NG/ML-MCNC: <0.006 NG/ML
WBC # BLD AUTO: 9.38 K/UL

## 2019-02-07 PROCEDURE — 99283 EMERGENCY DEPT VISIT LOW MDM: CPT

## 2019-02-07 PROCEDURE — 80053 COMPREHEN METABOLIC PANEL: CPT

## 2019-02-07 PROCEDURE — 83880 ASSAY OF NATRIURETIC PEPTIDE: CPT

## 2019-02-07 PROCEDURE — 85025 COMPLETE CBC W/AUTO DIFF WBC: CPT

## 2019-02-07 PROCEDURE — 93010 EKG 12-LEAD: ICD-10-PCS | Mod: ,,, | Performed by: INTERNAL MEDICINE

## 2019-02-07 PROCEDURE — 25000003 PHARM REV CODE 250: Performed by: EMERGENCY MEDICINE

## 2019-02-07 PROCEDURE — 84484 ASSAY OF TROPONIN QUANT: CPT

## 2019-02-07 PROCEDURE — 36415 COLL VENOUS BLD VENIPUNCTURE: CPT

## 2019-02-07 PROCEDURE — 93010 ELECTROCARDIOGRAM REPORT: CPT | Mod: ,,, | Performed by: INTERNAL MEDICINE

## 2019-02-07 RX ORDER — FAMOTIDINE 20 MG/1
20 TABLET, FILM COATED ORAL
Status: COMPLETED | OUTPATIENT
Start: 2019-02-07 | End: 2019-02-07

## 2019-02-07 RX ADMIN — DICYCLOMINE HYDROCHLORIDE 50 ML: 10 SOLUTION ORAL at 02:02

## 2019-02-07 RX ADMIN — FAMOTIDINE 20 MG: 20 TABLET, FILM COATED ORAL at 02:02

## 2019-02-07 RX ADMIN — NITROGLYCERIN 1 INCH: 20 OINTMENT TOPICAL at 02:02

## 2019-02-07 NOTE — TELEPHONE ENCOUNTER
----- Message from Pako WHIT [place] sent at 2/7/2019 12:09 PM CST -----  The pt called and reported to our staff that she believed she had a heart attack earlier this morning.  She then went home took an aspirin and a nap.  The patient then went to work.  She called from work and was informed that emergency medical personal will be called so that her concern can be addressed.  The patient was asked for her job's address so that an ambulance will arrive.  The patient refused to provide the address not wanting the ambulance to arrive at her job.  She chose to have a co worker drive her to the emergency room.  The patient called from [phone].

## 2019-02-07 NOTE — ED PROVIDER NOTES
SCRIBE #1 NOTE: I, Meenakshi Baeza, am scribing for, and in the presence of, Saad Howard Jr., MD. I have scribed the HPI, ROS, and PEx.     SCRIBE #2 NOTE: I, Corinne Mack, am scribing for, and in the presence of,  Norbert Pedraza Jr., MD. I have scribed the remaining portions of the note not scribed by Scribe #1.     History      Chief Complaint   Patient presents with    Chest Pain     onset 0800 today; radiating to jaw and tingling to fingers, took 325 mg aspirin; pain subsided        Review of patient's allergies indicates:   Allergen Reactions    Antihistamines - alkylamine Anaphylaxis and Itching     Cough, throat itches    Doxycycline Other (See Comments)     Stomach pain    Iodine and iodide containing products Other (See Comments)     Tongue swelling    Tramadol Nausea Only    Yeast Itching     Facial swelling, constipation        HPI   HPI    2/7/2019, 1:30 PM   History obtained from the patient      History of Present Illness: Latosha Enriquez is a 63 y.o. female patient with a PMHx DM2, HLD, HTN who presents to the Emergency Department for midsternal CP which onset suddenly at 0730 this AM while driving her grandson to school. Pt describes the pain as a pressure. Pt reports the pain lasted for approximately 1-2 hours and subsided after taking 325mg ASA. Symptoms have resolved and were moderate in severity. No mitigating or exacerbating factors reported. Associated sxs include L jaw/LUE paresthesias, light-headedness. Pt also reports she has been belching more frequently. Patient denies any fever, chills, diaphoresis, CANALES, orthopnea, palpitations, BLE edema/pain, n/v, extremity weakness, and all other sxs at this time. No further complaints or concerns at this time.       Arrival mode: Personal vehicle    PCP: Gabbie Ronquillo MD       Past Medical History:  Past Medical History:   Diagnosis Date    Asthma     Colon polyps     Diabetes mellitus type II, uncontrolled     Diverticulosis of large  intestine without hemorrhage 2015    Elevated liver enzymes     GERD (gastroesophageal reflux disease)     Gout, unspecified     Hemorrhoids, internal 2015    History of blood transfusion     Hyperlipidemia     Hypertension     IBS (irritable bowel syndrome)     Morbid obesity with BMI of 40.0-44.9, adult     Nasal vestibulitis     Osteoarthritis of multiple joints     Type 2 diabetes mellitus with both eyes affected by mild nonproliferative retinopathy and macular edema, with long-term current use of insulin 2016    Urolithiasis     Vitamin D deficiency disease        Past Surgical History:  Past Surgical History:   Procedure Laterality Date    BREAST BIOPSY Right      SECTION, CLASSIC      COLONOSCOPY N/A 2015    Performed by [facility]islav [facility]ard [facility] at [facility]    CYSTOSCOPY W/ URETERAL STENT PLACEMENT  2017    CYSTOSCOPY WITH STENT PLACEMENT Right 2017    Performed by Geoff Love IV, MD at [facility] OR    EXTRACORPOREAL SHOCK WAVE LITHOTRIPSY      EXTRACTION-STONE-URETEROSCOPY Right 2017    Performed by Geoff [facility] IV, MD at [facility] OR    LITHOTRIPSY-LASER Right 2017    Performed by Geoff [facility] IV, [facility] at [facility] OR    PYELOGRAM-RETROGRADE Right 2017    Performed by [facility], [facility] at [facility] OR    TOTAL ABDOMINAL HYSTERECTOMY      URETEROSCOPY  2017         Family History:  Family History   Problem Relation Age of Onset    Hypertension Mother     Heart disease Mother     Diabetes Mother     Colon polyps Mother     Other Mother         lower limb amputation    Hypertension Brother     Stroke Brother     Colon cancer Maternal Grandmother     Diabetes Brother     Breast cancer Maternal Cousin        Social History:  Social History     Tobacco Use    Smoking status: Never Smoker    Smokeless tobacco: Never Used   Substance and Sexual Activity    Alcohol use: No    Drug use: No    Sexual activity: No       ROS    Review of Systems   Constitutional: Negative for chills, diaphoresis and fever.   HENT: Negative for congestion and sore throat.    Eyes: Negative for visual disturbance.   Respiratory: Negative for cough and shortness of breath.    Cardiovascular: Positive for chest pain. Negative for palpitations and leg swelling.   Gastrointestinal: Negative for nausea and vomiting.        (+) belching   Genitourinary: Negative for dysuria.   Musculoskeletal: Negative for back pain and myalgias.   Skin: Negative for rash.   Neurological: Positive for light-headedness and numbness (L jaw and LUE paresthesias). Negative for weakness.   Hematological: Does not bruise/bleed easily.   All other systems reviewed and are negative.    Physical Exam      Initial Vitals [02/07/19 1330]   BP Pulse Resp Temp SpO2   (!) 180/99 96 18 98.5 °F (36.9 °C) 96 %      MAP       --          Physical Exam  Nursing Notes and Vital Signs Reviewed.  Constitutional: Patient is in no acute distress. Well-developed and well-nourished.  Head: Atraumatic. Normocephalic.  Eyes: PERRL. EOM intact. Conjunctivae are not pale. No scleral icterus.  ENT: Mucous membranes are moist. Oropharynx is clear and symmetric.    Neck: Supple. Full ROM. No lymphadenopathy.  Cardiovascular: Regular rate. Regular rhythm. No murmurs, rubs, or gallops. Distal pulses are 2+ and symmetric.  Pulmonary/Chest: No respiratory distress. Clear to auscultation bilaterally. No wheezing or rales.  Abdominal: Soft and non-distended.  There is no tenderness.  No rebound, guarding, or rigidity. Good bowel sounds.  Genitourinary: No CVA tenderness  Musculoskeletal: Moves all extremities. No obvious deformities. No edema. No calf tenderness.  Skin: Warm and dry.  Neurological:  Alert, awake, and appropriate.  Normal speech.  No acute focal neurological deficits are appreciated.  Psychiatric: Normal affect. Good eye contact. Appropriate in content.    ED Course    Procedures  ED Vital  Signs:  Vitals:    02/07/19 1330 02/07/19 1345 02/07/19 1406 02/07/19 1430   BP: (!) 180/99 133/77 107/66 116/67   Pulse: 96 87 90 82   Resp: 18 (!) 21 (!) 24 20   Temp: 98.5 °F (36.9 °C)      TempSrc: Oral      SpO2: 96% 96% (!) 94% 97%   Weight: 110.2 kg (242 lb 13.4 oz)       02/07/19 1501 02/07/19 1604   BP: 120/63 97/62   Pulse: 82 93   Resp: (!) 21 (!) 22   Temp: 98.9 °F (37.2 °C)    TempSrc: Oral    SpO2: 95% 98%   Weight:         Abnormal Lab Results:  Labs Reviewed   CBC W/ AUTO DIFFERENTIAL   COMPREHENSIVE METABOLIC PANEL   TROPONIN I   B-TYPE NATRIURETIC PEPTIDE   TROPONIN I   POCT GLUCOSE        All Lab Results:  Results for orders placed or performed during the hospital encounter of 02/07/19   CBC auto differential   Result Value Ref Range    WBC 9.38 3.90 - 12.70 K/uL    RBC 4.35 4.00 - 5.40 M/uL    Hemoglobin 13.4 12.0 - 16.0 g/dL    Hematocrit 40.9 37.0 - 48.5 %    MCV 94 82 - 98 fL    MCH 30.8 27.0 - 31.0 pg    MCHC 32.8 32.0 - 36.0 g/dL    RDW 13.3 11.5 - 14.5 %    Platelets 265 150 - 350 K/uL    MPV 10.8 9.2 - 12.9 fL    Gran # (ANC) 5.2 1.8 - 7.7 K/uL    Lymph # 3.2 1.0 - 4.8 K/uL    Mono # 0.6 0.3 - 1.0 K/uL    Eos # 0.4 0.0 - 0.5 K/uL    Baso # 0.04 0.00 - 0.20 K/uL    Gran% 55.5 38.0 - 73.0 %    Lymph% 34.3 18.0 - 48.0 %    Mono% 6.0 4.0 - 15.0 %    Eosinophil% 3.8 0.0 - 8.0 %    Basophil% 0.4 0.0 - 1.9 %    Differential Method Automated    Comprehensive metabolic panel   Result Value Ref Range    Sodium 141 136 - 145 mmol/L    Potassium 4.0 3.5 - 5.1 mmol/L    Chloride 106 95 - 110 mmol/L    CO2 26 23 - 29 mmol/L    Glucose 78 70 - 110 mg/dL    BUN, Bld 14 8 - 23 mg/dL    Creatinine 0.8 0.5 - 1.4 mg/dL    Calcium 9.7 8.7 - 10.5 mg/dL    Total Protein 7.1 6.0 - 8.4 g/dL    Albumin 3.7 3.5 - 5.2 g/dL    Total Bilirubin 0.5 0.1 - 1.0 mg/dL    Alkaline Phosphatase 88 55 - 135 U/L    AST 18 10 - 40 U/L    ALT 39 10 - 44 U/L    Anion Gap 9 8 - 16 mmol/L    eGFR if African American >60 >60  mL/min/1.73 m^2    eGFR if non African American >60 >60 mL/min/1.73 m^2   Troponin I #1   Result Value Ref Range    Troponin I <0.006 0.000 - 0.026 ng/mL   B-Type natriuretic peptide (BNP)   Result Value Ref Range    BNP 26 0 - 99 pg/mL   Troponin I #2   Result Value Ref Range    Troponin I <0.006 0.000 - 0.026 ng/mL   POCT glucose   Result Value Ref Range    POCT Glucose 77 70 - 110 mg/dL       Imaging Results:  Imaging Results          X-Ray Chest AP Portable (Final result)  Result time 02/07/19 13:53:54    Final result by ANTOINA Romeo Sr., MD (02/07/19 13:53:54)                 Impression:      Normal study.      Electronically signed by: Pako Romeo MD  Date:    02/07/2019  Time:    13:53             Narrative:    EXAMINATION:  XR CHEST AP PORTABLE    CLINICAL HISTORY:  Chest pain;    COMPARISON:  05/04/2017    FINDINGS:  The size of the heart is normal. The lungs are clear. There is no pneumothorax.  The costophrenic angles are sharp.                               The EKG was ordered, reviewed, and independently interpreted by the ED provider.  Interpretation time: 1340  Rate: 80 BPM  Rhythm: normal sinus rhythm  Interpretation: Cannot r/o inferior infarct. No STEMI.         The Emergency Provider reviewed the vital signs and test results, which are outlined above.    ED Discussion     4:00 PM: Dr. Howard transfers care of pt to Dr. Pedraza, pending repeat troponin.    4:27 PM: Dr. Pedraza discussed the pt's case with Dr. Mallory (Cardiology) who recommends pt call the clinic tomorrow and schedule out-pt f/u if the pt's repeat troponin is negative.    5:40 PM: Reassessed pt at this time. Pt is awake, alert, and in no distress. Discussed with pt all pertinent ED information and results. Discussed pt dx and plan of tx. Gave pt all f/u and return to the ED instructions. All questions and concerns were addressed at this time. Pt expresses understanding of information and instructions, and is comfortable with  plan to discharge. Pt is stable for discharge.    I discussed with patient and/or family/caretaker that evaluation in the ED does not suggest any emergent or life threatening medical conditions requiring immediate intervention beyond what was provided in the ED, and I believe patient is safe for discharge.  Regardless, an unremarkable evaluation in the ED does not preclude the development or presence of a serious of life threatening condition. As such, patient was instructed to return immediately for any worsening or change in current symptoms.    I have discussed with patient and/or family/caretaker chest pain precautions, specifically to return for worsening chest pain, shortness of breath, fever, or any concern.  I have low suspicion for cardiopulmonary, vascular, infectious, respiratory, or other emergent medical condition based on my evaluation in the ED.      ED Medication(s):  Medications   GI cocktail (mylanta 30 mL, lidocaine 2 % viscous 10 mL, dicyclomine 10 mL) 50 mL (50 mLs Oral Given 2/7/19 1404)   famotidine tablet 20 mg (20 mg Oral Given 2/7/19 1404)   nitroGLYCERIN 2% TD oint ointment 1 inch (1 inch Topical (Top) Given 2/7/19 1405)       Follow-up Information     Viet Mallory MD. Call in 1 day.    Specialties:  Cardiology, INTERVENTIONAL CARDIOLOGY  Why:  to schedule appt with Dr. Mallory or next available cardiology provider for outpatient appt for recheck and to set up cardiac stress testing  Contact information:  [id number]  [place] [address]  [phone]                     Medical Decision Making    Medical Decision Making:   Clinical Tests:   Lab Tests: Ordered and Reviewed  Radiological Study: Ordered and Reviewed  Medical Tests: Ordered and Reviewed           Scribe Attestation:   Scribe #1: I performed the above scribed service and the documentation accurately describes the services I performed. I attest to the accuracy of the note.    Attending:   Physician Attestation  Statement for Scribe #1: I, Saad Howard Jr., MD, personally performed the services described in this documentation, as scribed by Meenakshi Baeza, in my presence, and it is both accurate and complete.       Scribe Attestation:   Scribe #2: I performed the above scribed service and the documentation accurately describes the services I performed. I attest to the accuracy of the note.    Attending Attestation:           Physician Attestation for Scribe:    Physician Attestation Statement for Scribe #2: I, Norbert Pedraza Jr., MD, reviewed documentation, as scribed by Corinne Mack in my presence, and it is both accurate and complete. I also acknowledge and confirm the content of the note done by Scribe #1.          Clinical Impression       ICD-10-CM ICD-9-CM   1. Chest pain R07.9 786.50       Disposition:   Disposition: Discharged  Condition: Stable         Norbert Pedraza Jr., MD  02/07/19 5130

## 2019-02-10 PROBLEM — I20.9 AP (ANGINA PECTORIS): Status: ACTIVE | Noted: 2019-02-10

## 2019-02-10 PROBLEM — R94.31 ABNORMAL ECG: Status: ACTIVE | Noted: 2019-02-10

## 2019-02-11 ENCOUNTER — OFFICE VISIT (OUTPATIENT)
Dept: CARDIOLOGY | Facility: CLINIC | Age: 64
End: 2019-02-11
Payer: COMMERCIAL

## 2019-02-11 ENCOUNTER — CLINICAL SUPPORT (OUTPATIENT)
Dept: CARDIOLOGY | Facility: CLINIC | Age: 64
End: 2019-02-11
Attending: INTERNAL MEDICINE
Payer: COMMERCIAL

## 2019-02-11 VITALS
SYSTOLIC BLOOD PRESSURE: 118 MMHG | HEIGHT: 66 IN | WEIGHT: 244.06 LBS | HEART RATE: 76 BPM | BODY MASS INDEX: 39.22 KG/M2 | DIASTOLIC BLOOD PRESSURE: 72 MMHG

## 2019-02-11 DIAGNOSIS — R00.2 PALPITATIONS: ICD-10-CM

## 2019-02-11 DIAGNOSIS — Z79.4 TYPE 2 DIABETES MELLITUS WITHOUT COMPLICATION, WITH LONG-TERM CURRENT USE OF INSULIN: ICD-10-CM

## 2019-02-11 DIAGNOSIS — I10 ESSENTIAL HYPERTENSION: ICD-10-CM

## 2019-02-11 DIAGNOSIS — E11.9 TYPE 2 DIABETES MELLITUS WITHOUT COMPLICATION, WITH LONG-TERM CURRENT USE OF INSULIN: ICD-10-CM

## 2019-02-11 DIAGNOSIS — I20.9 AP (ANGINA PECTORIS): ICD-10-CM

## 2019-02-11 DIAGNOSIS — R94.31 ABNORMAL ECG: ICD-10-CM

## 2019-02-11 DIAGNOSIS — E78.00 PURE HYPERCHOLESTEROLEMIA: ICD-10-CM

## 2019-02-11 DIAGNOSIS — I20.9 AP (ANGINA PECTORIS): Primary | ICD-10-CM

## 2019-02-11 LAB
DIASTOLIC DYSFUNCTION: NO
ESTIMATED PA SYSTOLIC PRESSURE: 33.81
RETIRED EF AND QEF - SEE NOTES: 60 (ref 55–65)

## 2019-02-11 PROCEDURE — 99245 OFF/OP CONSLTJ NEW/EST HI 55: CPT | Mod: S$GLB,,, | Performed by: INTERNAL MEDICINE

## 2019-02-11 PROCEDURE — 99999 PR PBB SHADOW E&M-EST. PATIENT-LVL III: CPT | Mod: PBBFAC,,, | Performed by: INTERNAL MEDICINE

## 2019-02-11 PROCEDURE — 99245 PR OFFICE CONSULTATION,LEVEL V: ICD-10-PCS | Mod: S$GLB,,, | Performed by: INTERNAL MEDICINE

## 2019-02-11 PROCEDURE — 93306 2D ECHO WITH COLOR FLOW DOPPLER: ICD-10-PCS | Mod: S$GLB,,, | Performed by: NUCLEAR MEDICINE

## 2019-02-11 PROCEDURE — 99999 PR PBB SHADOW E&M-EST. PATIENT-LVL III: ICD-10-PCS | Mod: PBBFAC,,, | Performed by: INTERNAL MEDICINE

## 2019-02-11 PROCEDURE — 93306 TTE W/DOPPLER COMPLETE: CPT | Mod: S$GLB,,, | Performed by: NUCLEAR MEDICINE

## 2019-02-11 RX ORDER — METOPROLOL SUCCINATE 25 MG/1
12.5 TABLET, EXTENDED RELEASE ORAL NIGHTLY
Qty: 15 TABLET | Refills: 11 | Status: ON HOLD | OUTPATIENT
Start: 2019-02-11 | End: 2019-02-20 | Stop reason: HOSPADM

## 2019-02-11 RX ORDER — ISOSORBIDE MONONITRATE 30 MG/1
30 TABLET, EXTENDED RELEASE ORAL DAILY
Qty: 30 TABLET | Refills: 11 | Status: ON HOLD | OUTPATIENT
Start: 2019-02-11 | End: 2019-02-20 | Stop reason: HOSPADM

## 2019-02-11 NOTE — PROGRESS NOTES
"Subjective:    Patient ID:  Latosha Enriquez is a 63 y.o. female who presents for evaluation of Chest Pain      Pt referred by Dr. Marlo Howard      HPI  Pt presents for cp.  Her current med conditions include DM, HTN, hyperlipidemia. Nonsmoker.   .   Pt seen in ER 2/7/19 for chest pressure, radiating to left jaw.  Pt released from ER and advised to see Cards.  BP high in ER.  ecg shows NSR, possible inferior infarct.  Troponin -.  BNP -.  Had palpitations and sweats with cp.   Took asa, maalox.  Cp subsided after 30 minutes.  Had similar episode abbie oumar, x 2 hours.        Past Medical History:   Diagnosis Date    Asthma     Colon polyps     Diabetes mellitus type II, uncontrolled     Diverticulosis of large intestine without hemorrhage 11/23/2015    Elevated liver enzymes     GERD (gastroesophageal reflux disease)     Gout, unspecified     Hemorrhoids, internal 11/23/2015    History of blood transfusion     Hyperlipidemia     Hypertension     IBS (irritable bowel syndrome)     Morbid obesity with BMI of 40.0-44.9, adult     Nasal vestibulitis     Osteoarthritis of multiple joints     Type 2 diabetes mellitus with both eyes affected by mild nonproliferative retinopathy and macular edema, with long-term current use of insulin 11/9/2016    Urolithiasis     Vitamin D deficiency disease        Current Outpatient Medications:     albuterol 90 mcg/actuation inhaler, Inhale 2 puffs into the lungs every 6 (six) hours as needed., Disp: 18 g, Rfl: 3    allopurinol (ZYLOPRIM) 100 MG tablet, Take 1 tablet (100 mg total) by mouth once daily., Disp: 90 tablet, Rfl: 0    BD ULTRA-FINE SHORT PEN NEEDLE 31 gauge x 5/16" Ndle, Inject 1 Units into the skin 2 (two) times daily., Disp: 100 each, Rfl: 0    budesonide 180mcg (PULMICORT FLEXHALER) 180 mcg/actuation AePB, Inhale 2 puffs into the lungs 2 (two) times daily. (Patient taking differently: Inhale 2 puffs into the lungs 2 (two) times daily " "as needed. ), Disp: 1 each, Rfl: 5    DULoxetine (CYMBALTA) 30 MG capsule, One po daily for one week then increase to 2 po daily., Disp: 60 capsule, Rfl: 2    esomeprazole (NEXIUM) 20 MG capsule, Take 1 capsule (20 mg total) by mouth before breakfast. (Patient taking differently: Take 20 mg by mouth as needed. ), Disp: 30 capsule, Rfl: 0    EYLEA 2 mg/0.05 mL Soln, 0.05 mLs (2 mg total) by Intravitreal route every 28 days. for 8 doses, Disp: 2 vial, Rfl: 3    ibuprofen (ADVIL,MOTRIN) 800 MG tablet, , Disp: , Rfl:     insulin lispro protamin-lispro (HUMALOG MIX 75-25 KWIKPEN) 100 unit/mL (75-25) InPn, INJECT 30 UNITS UNDER THE SKIN IN THE MORNING AND 20 UNITS IN THE EVENING, Disp: 45 mL, Rfl: 3    isosorbide mononitrate (IMDUR) 30 MG 24 hr tablet, Take 1 tablet (30 mg total) by mouth once daily., Disp: 30 tablet, Rfl: 11    losartan-hydrochlorothiazide 100-25 mg (HYZAAR) 100-25 mg per tablet, Take 1 tablet by mouth once daily., Disp: 90 tablet, Rfl: 3    metFORMIN (GLUCOPHAGE-XR) 500 MG 24 hr tablet, TAKE 2 TABLETS BY MOUTH DAILY WITH BREAKFAST AND 1 TABLET WITH DINNER, Disp: 90 tablet, Rfl: 5    metoprolol succinate (TOPROL-XL) 25 MG 24 hr tablet, Take 0.5 tablets (12.5 mg total) by mouth every evening., Disp: 15 tablet, Rfl: 11    pen needle, diabetic 31 gauge x 1/4" Ndle, 1 Device by Misc.(Non-Drug; Combo Route) route 2 (two) times daily., Disp: 60 each, Rfl: 0    potassium chloride (MICRO-K) 10 MEQ CpSR, Take 1 capsule (10 mEq total) by mouth once daily., Disp: 90 capsule, Rfl: 3    simvastatin (ZOCOR) 20 MG tablet, Take 1 tablet (20 mg total) by mouth every evening., Disp: 90 tablet, Rfl: 3    tamsulosin (FLOMAX) 0.4 mg Cp24, Take 1 capsule (0.4 mg total) by mouth once daily., Disp: 30 capsule, Rfl: 0    Current Facility-Administered Medications:     aflibercept Soln 2 mg, 2 mg, Intravitreal, Q28 Days, LINDSAY Bright MD, 2 mg at 11/13/18 1501      Review of Systems   Constitution: Negative. " "  HENT: Negative.    Eyes: Negative.    Cardiovascular: Positive for chest pain and palpitations.   Respiratory: Negative.    Endocrine: Negative.    Hematologic/Lymphatic: Negative.    Skin: Negative.    Musculoskeletal: Positive for arthritis and back pain.   Gastrointestinal: Negative.    Genitourinary: Negative.    Neurological: Negative.    Psychiatric/Behavioral: Negative.    Allergic/Immunologic: Negative.        /72 (BP Location: Right arm, Patient Position: Sitting, BP Method: Large (Manual))   Pulse 76   Ht 5' 6" (1.676 m)   Wt 110.7 kg (244 lb 0.8 oz)   BMI 39.39 kg/m²     Wt Readings from Last 3 Encounters:   02/11/19 110.7 kg (244 lb 0.8 oz)   02/07/19 110.2 kg (242 lb 13.4 oz)   12/07/18 109.5 kg (241 lb 6.5 oz)     Temp Readings from Last 3 Encounters:   02/07/19 98.6 °F (37 °C) (Oral)   12/07/18 98.2 °F (36.8 °C) (Oral)   07/27/18 98.1 °F (36.7 °C)     BP Readings from Last 3 Encounters:   02/11/19 118/72   02/07/19 (!) 105/53   12/07/18 112/66     Pulse Readings from Last 3 Encounters:   02/11/19 76   02/07/19 96   12/07/18 83          Objective:    Physical Exam   Constitutional: She is oriented to person, place, and time. Vital signs are normal. She appears well-developed and well-nourished. She is active and cooperative. She does not have a sickly appearance. She does not appear ill. No distress.   HENT:   Head: Normocephalic.   Neck: Neck supple. Normal carotid pulses, no hepatojugular reflux and no JVD present. Carotid bruit is not present. No thyromegaly present.   Cardiovascular: Normal rate, regular rhythm, S1 normal, S2 normal, normal heart sounds and normal pulses. PMI is not displaced. Exam reveals no gallop and no friction rub.   No murmur heard.  Pulses:       Radial pulses are 2+ on the right side, and 2+ on the left side.   Pulmonary/Chest: Effort normal and breath sounds normal. She has no wheezes. She has no rales.   Abdominal: Soft. Normal appearance, normal aorta and " bowel sounds are normal. She exhibits no pulsatile liver, no abdominal bruit, no ascites and no mass. There is no splenomegaly or hepatomegaly. There is no tenderness.   Musculoskeletal: She exhibits no edema.   Lymphadenopathy:     She has no cervical adenopathy.   Neurological: She is alert and oriented to person, place, and time.   Skin: Skin is warm. She is not diaphoretic.   Psychiatric: She has a normal mood and affect. Her behavior is normal.   Nursing note and vitals reviewed.      I have reviewed all pertinent labs and cardiac studies.    Lab Results   Component Value Date    HGBA1C 6.4 (H) 08/24/2018     Lab Results   Component Value Date    CHOL 165 08/24/2018    CHOL 171 07/25/2017    CHOL 215 (H) 07/07/2016     Lab Results   Component Value Date    HDL 46 08/24/2018    HDL 45 07/25/2017    HDL 54 07/07/2016     Lab Results   Component Value Date    LDLCALC 102.8 08/24/2018    LDLCALC 112.2 07/25/2017    LDLCALC 140.8 07/07/2016     Lab Results   Component Value Date    TRIG 81 08/24/2018    TRIG 69 07/25/2017    TRIG 101 07/07/2016     Lab Results   Component Value Date    CHOLHDL 27.9 08/24/2018    CHOLHDL 26.3 07/25/2017    CHOLHDL 25.1 07/07/2016       Chemistry        Component Value Date/Time     02/07/2019 1350    K 4.0 02/07/2019 1350     02/07/2019 1350    CO2 26 02/07/2019 1350    BUN 14 02/07/2019 1350    CREATININE 0.8 02/07/2019 1350    GLU 78 02/07/2019 1350        Component Value Date/Time    CALCIUM 9.7 02/07/2019 1350    ALKPHOS 88 02/07/2019 1350    AST 18 02/07/2019 1350    ALT 39 02/07/2019 1350    BILITOT 0.5 02/07/2019 1350    ESTGFRAFRICA >60 02/07/2019 1350    EGFRNONAA >60 02/07/2019 1350        Lab Results   Component Value Date    CALCIUM 9.7 02/07/2019           Assessment:       1. AP (angina pectoris)    2. Pure hypercholesterolemia    3. Essential hypertension    4. Type 2 diabetes mellitus without complication, with long-term current use of insulin    5.  Abnormal ECG    6. Palpitations         Plan:               New onset angina (dx with unstable angina).  Multiple risk factors for CAD.  abnl ecg, possible prior inferior infarct.  Discussed condition, need for further testing. Consider stress testing vs cath.  Would benefit from C in light of risk factors, new onset angina.  Add Imdur 30 mg qam and Toprol xl 12.5 mg qhs.  Indications, side effects discussed.  Cardiac/diabetic diet  Weight loss  Echo        Pt advised to undergo left heart catheterization with possible PCI if warranted.  Pt advised of all risks and benefits of procedure.  Pt advised of alternative approaches and treatment strategies to include medical therapy, PCI as well as surgical revascularization with possible CABG.  All questions answered in clinic.    Iodine allergy -- premedicate    Procedure scheduled Thurs, 2/21/19 9:30 am.

## 2019-02-15 ENCOUNTER — TELEPHONE (OUTPATIENT)
Dept: CARDIOLOGY | Facility: CLINIC | Age: 64
End: 2019-02-15

## 2019-02-15 ENCOUNTER — PATIENT MESSAGE (OUTPATIENT)
Dept: CARDIOLOGY | Facility: CLINIC | Age: 64
End: 2019-02-15

## 2019-02-15 DIAGNOSIS — Z88.8 ALLERGY TO IODINE: Primary | ICD-10-CM

## 2019-02-15 DIAGNOSIS — I20.9 AP (ANGINA PECTORIS): Primary | ICD-10-CM

## 2019-02-15 RX ORDER — FAMOTIDINE 40 MG/1
TABLET, FILM COATED ORAL
Qty: 3 TABLET | Refills: 0 | Status: SHIPPED | OUTPATIENT
Start: 2019-02-15 | End: 2022-02-24 | Stop reason: ALTCHOICE

## 2019-02-15 RX ORDER — RANOLAZINE 500 MG/1
500 TABLET, EXTENDED RELEASE ORAL 2 TIMES DAILY
Qty: 60 TABLET | Refills: 11 | Status: SHIPPED | OUTPATIENT
Start: 2019-02-15 | End: 2020-04-29 | Stop reason: SDUPTHER

## 2019-02-15 RX ORDER — DIPHENHYDRAMINE HCL 50 MG
CAPSULE ORAL
Qty: 3 CAPSULE | Refills: 0 | Status: SHIPPED | OUTPATIENT
Start: 2019-02-15 | End: 2021-12-15

## 2019-02-15 RX ORDER — PREDNISONE 50 MG/1
TABLET ORAL
Qty: 3 TABLET | Refills: 0 | Status: SHIPPED | OUTPATIENT
Start: 2019-02-15 | End: 2019-03-05

## 2019-02-15 RX ORDER — PREDNISONE 50 MG/1
50 TABLET ORAL
COMMUNITY
End: 2019-02-15 | Stop reason: SDUPTHER

## 2019-02-15 RX ORDER — FAMOTIDINE 40 MG/1
40 TABLET, FILM COATED ORAL
COMMUNITY
End: 2019-02-15 | Stop reason: SDUPTHER

## 2019-02-15 RX ORDER — DIPHENHYDRAMINE HCL 50 MG
50 CAPSULE ORAL
COMMUNITY
End: 2019-02-15 | Stop reason: SDUPTHER

## 2019-02-15 NOTE — TELEPHONE ENCOUNTER
Attempted to reach patient with instructions re: new medication  Left voice mail message for call back and sent patient portal message

## 2019-02-15 NOTE — TELEPHONE ENCOUNTER
Patient stated that you started her on isosorbide 30 MG and metoprolol 25 MG stated that it had her with tight chest pains and Dizziness for two days. Patient stated that she got off the Rx and fells better now. Wants to know if you can recommend something else.

## 2019-02-15 NOTE — TELEPHONE ENCOUNTER
----- Message from Emelyn Dee sent at 2/15/2019 12:48 PM CST -----  Contact: self [phone]  Type:  Needs Medical Advice    Who Called: Latosha Mina  Symptoms (please be specific): na   How long has patient had these symptoms:  na  Pharmacy name and phone #:  na  Would the patient rather a call back or a response via [facility]? Call back   Best Call Back Number: [phone]  Additional Information: states that she needs to speak to nurse regarding the medication that she is taking.

## 2019-02-18 ENCOUNTER — OFFICE VISIT (OUTPATIENT)
Dept: CARDIOLOGY | Facility: CLINIC | Age: 64
End: 2019-02-18
Payer: COMMERCIAL

## 2019-02-18 VITALS
HEIGHT: 66 IN | BODY MASS INDEX: 38.79 KG/M2 | WEIGHT: 241.38 LBS | DIASTOLIC BLOOD PRESSURE: 64 MMHG | SYSTOLIC BLOOD PRESSURE: 110 MMHG | HEART RATE: 88 BPM

## 2019-02-18 DIAGNOSIS — R94.31 ABNORMAL ECG: ICD-10-CM

## 2019-02-18 DIAGNOSIS — I20.9 AP (ANGINA PECTORIS): Primary | ICD-10-CM

## 2019-02-18 DIAGNOSIS — R06.09 DOE (DYSPNEA ON EXERTION): ICD-10-CM

## 2019-02-18 DIAGNOSIS — E78.00 PURE HYPERCHOLESTEROLEMIA: ICD-10-CM

## 2019-02-18 DIAGNOSIS — I10 ESSENTIAL HYPERTENSION: ICD-10-CM

## 2019-02-18 DIAGNOSIS — R00.2 PALPITATIONS: ICD-10-CM

## 2019-02-18 PROCEDURE — 99215 PR OFFICE/OUTPT VISIT, EST, LEVL V, 40-54 MIN: ICD-10-PCS | Mod: S$GLB,,, | Performed by: INTERNAL MEDICINE

## 2019-02-18 PROCEDURE — 3078F DIAST BP <80 MM HG: CPT | Mod: CPTII,S$GLB,, | Performed by: INTERNAL MEDICINE

## 2019-02-18 PROCEDURE — 99999 PR PBB SHADOW E&M-EST. PATIENT-LVL III: ICD-10-PCS | Mod: PBBFAC,,, | Performed by: INTERNAL MEDICINE

## 2019-02-18 PROCEDURE — 3074F PR MOST RECENT SYSTOLIC BLOOD PRESSURE < 130 MM HG: ICD-10-PCS | Mod: CPTII,S$GLB,, | Performed by: INTERNAL MEDICINE

## 2019-02-18 PROCEDURE — 3078F PR MOST RECENT DIASTOLIC BLOOD PRESSURE < 80 MM HG: ICD-10-PCS | Mod: CPTII,S$GLB,, | Performed by: INTERNAL MEDICINE

## 2019-02-18 PROCEDURE — 99999 PR PBB SHADOW E&M-EST. PATIENT-LVL III: CPT | Mod: PBBFAC,,, | Performed by: INTERNAL MEDICINE

## 2019-02-18 PROCEDURE — 3008F BODY MASS INDEX DOCD: CPT | Mod: CPTII,S$GLB,, | Performed by: INTERNAL MEDICINE

## 2019-02-18 PROCEDURE — 99215 OFFICE O/P EST HI 40 MIN: CPT | Mod: S$GLB,,, | Performed by: INTERNAL MEDICINE

## 2019-02-18 PROCEDURE — 3074F SYST BP LT 130 MM HG: CPT | Mod: CPTII,S$GLB,, | Performed by: INTERNAL MEDICINE

## 2019-02-18 PROCEDURE — 3008F PR BODY MASS INDEX (BMI) DOCUMENTED: ICD-10-PCS | Mod: CPTII,S$GLB,, | Performed by: INTERNAL MEDICINE

## 2019-02-18 RX ORDER — NITROGLYCERIN 0.4 MG/1
0.4 TABLET SUBLINGUAL EVERY 5 MIN PRN
Qty: 20 TABLET | Refills: 6 | Status: SHIPPED | OUTPATIENT
Start: 2019-02-18 | End: 2020-04-29 | Stop reason: SDUPTHER

## 2019-02-18 RX ORDER — ASPIRIN 81 MG/1
81 TABLET ORAL DAILY
Refills: 0 | COMMUNITY
Start: 2019-02-18 | End: 2020-07-07

## 2019-02-18 NOTE — PROGRESS NOTES
Subjective:    Patient ID:  Latosha Enriquez is a 63 y.o. female who presents for evaluation of Chest Pain      HPI Pt presents for fu.  Her current med conditions include DM, HTN, hyperlipidemia. Nonsmoker.   .   Pt seen last week as new pt and following is from her appt last week:  Pt seen in ER 2/7/19 for chest pressure, radiating to left jaw.  Pt released from ER and advised to see Cards.  BP high in ER.  ecg last visit showed NSR, possible inferior infarct.  Troponin -.  BNP -.  Had palpitations and sweats with cp.   Took asa, maalox.  Cp subsided after 30 minutes.  Had similar episode abbie oumar, x 2 hours.  Now here.  Imdur and Toprol added but she c/o side effects that it made cp sxs worsen and Ranexa added 500 mg bid.   Last weekend had cp sxs all day, some intermittent sxs, and took soda water, no help.  Seemed to resolve on its own.  She states she feels better since Ranexa added.  CP and dyspnea improved as well as palpitations last 2 days after Ranexa started Saturday.   She was scheduled for cath this week but rescheduled for next week.   She wanted to come in today and get rechecked on things.  Echo shows normal LVEF, LVH.  BP stable.  Seems to be tolerating Ranexa so far.   DM and lipids controlled on current meds.     Current Outpatient Medications:     albuterol 90 mcg/actuation inhaler, Inhale 2 puffs into the lungs every 6 (six) hours as needed., Disp: 18 g, Rfl: 3    allopurinol (ZYLOPRIM) 100 MG tablet, Take 1 tablet (100 mg total) by mouth once daily. (Patient taking differently: Take 100 mg by mouth as needed. ), Disp: 90 tablet, Rfl: 0    budesonide 180mcg (PULMICORT FLEXHALER) 180 mcg/actuation AePB, Inhale 2 puffs into the lungs 2 (two) times daily. (Patient taking differently: Inhale 2 puffs into the lungs 2 (two) times daily as needed. ), Disp: 1 each, Rfl: 5    diphenhydrAMINE (BENADRYL) 50 MG capsule, Begin [id number] the evening and morning before procedure with  Prednisone and Pepcid, Disp: 3 capsule, Rfl: 0    DULoxetine (CYMBALTA) 30 MG capsule, One po daily for one week then increase to 2 po daily., Disp: 60 capsule, Rfl: 2    esomeprazole (NEXIUM) 20 MG capsule, Take 1 capsule (20 mg total) by mouth before breakfast. (Patient taking differently: Take 20 mg by mouth as needed. ), Disp: 30 capsule, Rfl: 0    EYLEA 2 mg/0.05 mL Soln, 0.05 mLs (2 mg total) by Intravitreal route every 28 days. for 8 doses, Disp: 2 vial, Rfl: 3    famotidine (PEPCID) 40 MG tablet, Take [id number] the evening and morning before your procedure along with Benadryl and Prednisone, Disp: 3 tablet, Rfl: 0    ibuprofen (ADVIL,MOTRIN) 800 MG tablet, , Disp: , Rfl:     insulin lispro protamin-lispro (HUMALOG MIX 75-25 KWIKPEN) 100 unit/mL (75-25) InPn, INJECT 30 UNITS UNDER THE SKIN IN THE MORNING AND 20 UNITS IN THE EVENING, Disp: 45 mL, Rfl: 3    losartan-hydrochlorothiazide 100-25 mg (HYZAAR) 100-25 mg per tablet, Take 1 tablet by mouth once daily., Disp: 90 tablet, Rfl: 3    metFORMIN (GLUCOPHAGE-XR) 500 MG 24 hr tablet, TAKE 2 TABLETS BY MOUTH DAILY WITH BREAKFAST AND 1 TABLET WITH DINNER, Disp: 90 tablet, Rfl: 5    potassium chloride (MICRO-K) 10 MEQ CpSR, Take 1 capsule (10 mEq total) by mouth once daily., Disp: 90 capsule, Rfl: 3    predniSONE (DELTASONE) 50 MG Tab, Pre-Med take on tablet at [id number] evening and morning before procedure along with Benadryl 50 mg and Pepcid 40 mg, Disp: 3 tablet, Rfl: 0    ranolazine (RANEXA) 500 MG Tb12, Take 1 tablet (500 mg total) by mouth 2 (two) times daily., Disp: 60 tablet, Rfl: 11    simvastatin (ZOCOR) 20 MG tablet, Take 1 tablet (20 mg total) by mouth every evening., Disp: 90 tablet, Rfl: 3    tamsulosin (FLOMAX) 0.4 mg Cp24, Take 1 capsule (0.4 mg total) by mouth once daily., Disp: 30 capsule, Rfl: 0    aspirin (ECOTRIN) 81 MG EC tablet, Take 1 tablet (81 mg total) by mouth once daily., Disp: , Rfl: 0    BD ULTRA-FINE SHORT PEN  "NEEDLE 31 gauge x 5/16" Ndle, Inject 1 Units into the skin 2 (two) times daily., Disp: 100 each, Rfl: 0    isosorbide mononitrate (IMDUR) 30 MG 24 hr tablet, Take 1 tablet (30 mg total) by mouth once daily., Disp: 30 tablet, Rfl: 11    metoprolol succinate (TOPROL-XL) 25 MG 24 hr tablet, Take 0.5 tablets (12.5 mg total) by mouth every evening., Disp: 15 tablet, Rfl: 11    nitroGLYCERIN (NITROSTAT) 0.4 MG SL tablet, Place 1 tablet (0.4 mg total) under the tongue every 5 (five) minutes as needed for Chest pain., Disp: 20 tablet, Rfl: 6    pen needle, diabetic 31 gauge x 1/4" Ndle, 1 Device by Misc.(Non-Drug; Combo Route) route 2 (two) times daily., Disp: 60 each, Rfl: 0    Current Facility-Administered Medications:     aflibercept Soln 2 mg, 2 mg, Intravitreal, Q28 Days, LINDSAY Bright MD, 2 mg at 11/13/18 1501      Review of Systems   Constitution: Negative.   HENT: Negative.    Eyes: Negative.    Cardiovascular: Positive for chest pain, dyspnea on exertion and palpitations.   Respiratory: Positive for shortness of breath.    Endocrine: Negative.    Hematologic/Lymphatic: Negative.    Skin: Negative.    Musculoskeletal: Negative.    Gastrointestinal: Negative.    Genitourinary: Negative.    Neurological: Negative.    Psychiatric/Behavioral: Negative.    Allergic/Immunologic: Negative.        /64 (Patient Position: Sitting, BP Method: Small (Manual))   Pulse 88   Ht 5' 6" (1.676 m)   Wt 109.5 kg (241 lb 6.5 oz)   BMI 38.96 kg/m²     Wt Readings from Last 3 Encounters:   02/18/19 109.5 kg (241 lb 6.5 oz)   02/11/19 110.7 kg (244 lb 0.8 oz)   02/07/19 110.2 kg (242 lb 13.4 oz)     Temp Readings from Last 3 Encounters:   02/07/19 98.6 °F (37 °C) (Oral)   12/07/18 98.2 °F (36.8 °C) (Oral)   07/27/18 98.1 °F (36.7 °C)     BP Readings from Last 3 Encounters:   02/18/19 110/64   02/11/19 118/72   02/07/19 (!) 105/53     Pulse Readings from Last 3 Encounters:   02/18/19 88   02/11/19 76   02/07/19 96        "   Objective:    Physical Exam   Constitutional: She is oriented to person, place, and time. Vital signs are normal. She appears well-developed and well-nourished. She is active and cooperative. She does not have a sickly appearance. She does not appear ill. No distress.   HENT:   Head: Normocephalic.   Neck: Neck supple. Normal carotid pulses, no hepatojugular reflux and no JVD present. Carotid bruit is not present. No thyromegaly present.   Cardiovascular: Normal rate, regular rhythm, S1 normal, S2 normal, normal heart sounds and normal pulses. PMI is not displaced. Exam reveals no gallop and no friction rub.   No murmur heard.  Pulses:       Radial pulses are 2+ on the right side, and 2+ on the left side.   Pulmonary/Chest: Effort normal and breath sounds normal. She has no wheezes. She has no rales.   Abdominal: Soft. Normal appearance, normal aorta and bowel sounds are normal. She exhibits no pulsatile liver, no abdominal bruit, no ascites and no mass. There is no splenomegaly or hepatomegaly. There is no tenderness.   Musculoskeletal: She exhibits no edema.   Lymphadenopathy:     She has no cervical adenopathy.   Neurological: She is alert and oriented to person, place, and time.   Skin: Skin is warm. She is not diaphoretic.   Psychiatric: She has a normal mood and affect. Her behavior is normal.   Nursing note and vitals reviewed.      I have reviewed all pertinent labs and cardiac studies.      Chemistry        Component Value Date/Time     02/07/2019 1350    K 4.0 02/07/2019 1350     02/07/2019 1350    CO2 26 02/07/2019 1350    BUN 14 02/07/2019 1350    CREATININE 0.8 02/07/2019 1350    GLU 78 02/07/2019 1350        Component Value Date/Time    CALCIUM 9.7 02/07/2019 1350    ALKPHOS 88 02/07/2019 1350    AST 18 02/07/2019 1350    ALT 39 02/07/2019 1350    BILITOT 0.5 02/07/2019 1350    ESTGFRAFRICA >60 02/07/2019 1350    EGFRNONAA >60 02/07/2019 1350        Lab Results   Component Value Date     WBC 9.38 02/07/2019    HGB 13.4 02/07/2019    HCT 40.9 02/07/2019    MCV 94 02/07/2019     02/07/2019     Lab Results   Component Value Date    HGBA1C 6.4 (H) 08/24/2018     Lab Results   Component Value Date    CHOL 165 08/24/2018    CHOL 171 07/25/2017    CHOL 215 (H) 07/07/2016     Lab Results   Component Value Date    HDL 46 08/24/2018    HDL 45 07/25/2017    HDL 54 07/07/2016     Lab Results   Component Value Date    LDLCALC 102.8 08/24/2018    LDLCALC 112.2 07/25/2017    LDLCALC 140.8 07/07/2016     Lab Results   Component Value Date    TRIG 81 08/24/2018    TRIG 69 07/25/2017    TRIG 101 07/07/2016     Lab Results   Component Value Date    CHOLHDL 27.9 08/24/2018    CHOLHDL 26.3 07/25/2017    CHOLHDL 25.1 07/07/2016          Narrative     Date of Procedure: 02/11/2019        TEST DESCRIPTION       Aorta: The aortic root is normal in size, measuring 3.0 cm at sinotubular junction and 3.1 cm at Sinuses of Valsalva. The proximal ascending aorta is normal in size, measuring 3.8 cm across.     Left Atrium: The left atrial volume index is normal, measuring 22.82 cc/m2.     Left Ventricle: The left ventricle is normal in size, with an end-diastolic diameter of 3.8 cm, and an end-systolic diameter of 2.8 cm. LV wall thickness is normal, with the septum measuring 1.5 cm and the posterior wall measuring 1.2 cm across. Relative   wall thickness was increased at 0.63, and the LV mass index was 98.6 g/m2 consistent with concentric remodeling. There are no regional wall motion abnormalities. Left ventricular systolic function appears normal. Visually estimated ejection fraction is   60-65%. The LV Doppler derived stroke volume equals 69.0 ccs.     Diastolic indices: E wave velocity 0.6 m/s, E/A ratio 0.7,  msec., E/e' ratio(avg) 6. Diastolic function is normal.     Right Atrium: The right atrium is normal in size, measuring 4.7 cm in length and 2.7 cm in width in the apical view.     Right Ventricle: The right  ventricle is normal in size measuring 2.6 cm at the base in the apical right ventricle-focused view. Global right ventricular systolic function appears normal. The estimated PA systolic pressure is 34 mmHg.     Aortic Valve:  Aortic valve is normal in structure with normal leaflet mobility.     Mitral Valve:  Mitral valve is normal in structure with normal leaflet mobility.     Tricuspid Valve:  Tricuspid valve is normal in structure with normal leaflet mobility.     Pulmonary Valve:  Pulmonary valve is normal in structure with normal leaflet mobility.     IVC: IVC is enlarged but collapses > 50% with a sniff, suggesting intermediate right atrial pressure of 8 mmHg.     Intracavitary: There is no evidence of pericardial effusion, intracavity mass, thrombi, or vegetation.         CONCLUSIONS     1 - Concentric remodeling.     2 - No wall motion abnormalities.     3 - Normal left ventricular systolic function (EF 60-65%).     4 - Normal left ventricular diastolic function.     5 - Normal right ventricular systolic function .     6 - The estimated PA systolic pressure is 34 mmHg.     7 - Intermediate central venous pressure.             This document has been electronically    SIGNED BY: Bhaskar Cook MD On: 02/11/2019 11:10               Assessment:       1. AP (angina pectoris)    2. Abnormal ECG    3. Essential hypertension    4. Palpitations    5. Pure hypercholesterolemia    6. CANALES (dyspnea on exertion)         Plan:               Worsening cp sxs although improved now on Ranexa.  Dx with unstable angina with new onset angina last few months.  Continue Ranexa 500 mg bid.  Sl ntg prn for angina. Pt advised on how to use and when to go to ER.  Asa 81 mg qd.  Continue other meds  She wants to stay off Imdur and toprol.  Pt advised to undergo left heart catheterization with possible PCI if warranted.  Pt advised of all risks and benefits of procedure.  Pt advised of alternative approaches and treatment  strategies to include medical therapy, PCI as well as surgical revascularization with possible CABG.  All questions answered in clinic.   Will reschedule her cath again to move up due to her sxs.  Schedule Wed, 2/20/19 10:30 am.

## 2019-02-18 NOTE — H&P (VIEW-ONLY)
Subjective:    Patient ID:  Latosha Enriquez is a 63 y.o. female who presents for evaluation of Chest Pain      HPI Pt presents for fu.  Her current med conditions include DM, HTN, hyperlipidemia. Nonsmoker.   .   Pt seen last week as new pt and following is from her appt last week:  Pt seen in ER 2/7/19 for chest pressure, radiating to left jaw.  Pt released from ER and advised to see Cards.  BP high in ER.  ecg last visit showed NSR, possible inferior infarct.  Troponin -.  BNP -.  Had palpitations and sweats with cp.   Took asa, maalox.  Cp subsided after 30 minutes.  Had similar episode abbie oumar, x 2 hours.  Now here.  Imdur and Toprol added but she c/o side effects that it made cp sxs worsen and Ranexa added 500 mg bid.   Last weekend had cp sxs all day, some intermittent sxs, and took soda water, no help.  Seemed to resolve on its own.  She states she feels better since Ranexa added.  CP and dyspnea improved as well as palpitations last 2 days after Ranexa started Saturday.   She was scheduled for cath this week but rescheduled for next week.   She wanted to come in today and get rechecked on things.  Echo shows normal LVEF, LVH.  BP stable.  Seems to be tolerating Ranexa so far.   DM and lipids controlled on current meds.     Current Outpatient Medications:     albuterol 90 mcg/actuation inhaler, Inhale 2 puffs into the lungs every 6 (six) hours as needed., Disp: 18 g, Rfl: 3    allopurinol (ZYLOPRIM) 100 MG tablet, Take 1 tablet (100 mg total) by mouth once daily. (Patient taking differently: Take 100 mg by mouth as needed. ), Disp: 90 tablet, Rfl: 0    budesonide 180mcg (PULMICORT FLEXHALER) 180 mcg/actuation AePB, Inhale 2 puffs into the lungs 2 (two) times daily. (Patient taking differently: Inhale 2 puffs into the lungs 2 (two) times daily as needed. ), Disp: 1 each, Rfl: 5    diphenhydrAMINE (BENADRYL) 50 MG capsule, Begin [id number] the evening and morning before procedure with  Prednisone and Pepcid, Disp: 3 capsule, Rfl: 0    DULoxetine (CYMBALTA) 30 MG capsule, One po daily for one week then increase to 2 po daily., Disp: 60 capsule, Rfl: 2    esomeprazole (NEXIUM) 20 MG capsule, Take 1 capsule (20 mg total) by mouth before breakfast. (Patient taking differently: Take 20 mg by mouth as needed. ), Disp: 30 capsule, Rfl: 0    EYLEA 2 mg/0.05 mL Soln, 0.05 mLs (2 mg total) by Intravitreal route every 28 days. for 8 doses, Disp: 2 vial, Rfl: 3    famotidine (PEPCID) 40 MG tablet, Take [id number] the evening and morning before your procedure along with Benadryl and Prednisone, Disp: 3 tablet, Rfl: 0    ibuprofen (ADVIL,MOTRIN) 800 MG tablet, , Disp: , Rfl:     insulin lispro protamin-lispro (HUMALOG MIX 75-25 KWIKPEN) 100 unit/mL (75-25) InPn, INJECT 30 UNITS UNDER THE SKIN IN THE MORNING AND 20 UNITS IN THE EVENING, Disp: 45 mL, Rfl: 3    losartan-hydrochlorothiazide 100-25 mg (HYZAAR) 100-25 mg per tablet, Take 1 tablet by mouth once daily., Disp: 90 tablet, Rfl: 3    metFORMIN (GLUCOPHAGE-XR) 500 MG 24 hr tablet, TAKE 2 TABLETS BY MOUTH DAILY WITH BREAKFAST AND 1 TABLET WITH DINNER, Disp: 90 tablet, Rfl: 5    potassium chloride (MICRO-K) 10 MEQ CpSR, Take 1 capsule (10 mEq total) by mouth once daily., Disp: 90 capsule, Rfl: 3    predniSONE (DELTASONE) 50 MG Tab, Pre-Med take on tablet at [id number] evening and morning before procedure along with Benadryl 50 mg and Pepcid 40 mg, Disp: 3 tablet, Rfl: 0    ranolazine (RANEXA) 500 MG Tb12, Take 1 tablet (500 mg total) by mouth 2 (two) times daily., Disp: 60 tablet, Rfl: 11    simvastatin (ZOCOR) 20 MG tablet, Take 1 tablet (20 mg total) by mouth every evening., Disp: 90 tablet, Rfl: 3    tamsulosin (FLOMAX) 0.4 mg Cp24, Take 1 capsule (0.4 mg total) by mouth once daily., Disp: 30 capsule, Rfl: 0    aspirin (ECOTRIN) 81 MG EC tablet, Take 1 tablet (81 mg total) by mouth once daily., Disp: , Rfl: 0    BD ULTRA-FINE SHORT PEN  "NEEDLE 31 gauge x 5/16" Ndle, Inject 1 Units into the skin 2 (two) times daily., Disp: 100 each, Rfl: 0    isosorbide mononitrate (IMDUR) 30 MG 24 hr tablet, Take 1 tablet (30 mg total) by mouth once daily., Disp: 30 tablet, Rfl: 11    metoprolol succinate (TOPROL-XL) 25 MG 24 hr tablet, Take 0.5 tablets (12.5 mg total) by mouth every evening., Disp: 15 tablet, Rfl: 11    nitroGLYCERIN (NITROSTAT) 0.4 MG SL tablet, Place 1 tablet (0.4 mg total) under the tongue every 5 (five) minutes as needed for Chest pain., Disp: 20 tablet, Rfl: 6    pen needle, diabetic 31 gauge x 1/4" Ndle, 1 Device by Misc.(Non-Drug; Combo Route) route 2 (two) times daily., Disp: 60 each, Rfl: 0    Current Facility-Administered Medications:     aflibercept Soln 2 mg, 2 mg, Intravitreal, Q28 Days, LINDSAY Bright MD, 2 mg at 11/13/18 1501      Review of Systems   Constitution: Negative.   HENT: Negative.    Eyes: Negative.    Cardiovascular: Positive for chest pain, dyspnea on exertion and palpitations.   Respiratory: Positive for shortness of breath.    Endocrine: Negative.    Hematologic/Lymphatic: Negative.    Skin: Negative.    Musculoskeletal: Negative.    Gastrointestinal: Negative.    Genitourinary: Negative.    Neurological: Negative.    Psychiatric/Behavioral: Negative.    Allergic/Immunologic: Negative.        /64 (Patient Position: Sitting, BP Method: Small (Manual))   Pulse 88   Ht 5' 6" (1.676 m)   Wt 109.5 kg (241 lb 6.5 oz)   BMI 38.96 kg/m²     Wt Readings from Last 3 Encounters:   02/18/19 109.5 kg (241 lb 6.5 oz)   02/11/19 110.7 kg (244 lb 0.8 oz)   02/07/19 110.2 kg (242 lb 13.4 oz)     Temp Readings from Last 3 Encounters:   02/07/19 98.6 °F (37 °C) (Oral)   12/07/18 98.2 °F (36.8 °C) (Oral)   07/27/18 98.1 °F (36.7 °C)     BP Readings from Last 3 Encounters:   02/18/19 110/64   02/11/19 118/72   02/07/19 (!) 105/53     Pulse Readings from Last 3 Encounters:   02/18/19 88   02/11/19 76   02/07/19 96        "   Objective:    Physical Exam   Constitutional: She is oriented to person, place, and time. Vital signs are normal. She appears well-developed and well-nourished. She is active and cooperative. She does not have a sickly appearance. She does not appear ill. No distress.   HENT:   Head: Normocephalic.   Neck: Neck supple. Normal carotid pulses, no hepatojugular reflux and no JVD present. Carotid bruit is not present. No thyromegaly present.   Cardiovascular: Normal rate, regular rhythm, S1 normal, S2 normal, normal heart sounds and normal pulses. PMI is not displaced. Exam reveals no gallop and no friction rub.   No murmur heard.  Pulses:       Radial pulses are 2+ on the right side, and 2+ on the left side.   Pulmonary/Chest: Effort normal and breath sounds normal. She has no wheezes. She has no rales.   Abdominal: Soft. Normal appearance, normal aorta and bowel sounds are normal. She exhibits no pulsatile liver, no abdominal bruit, no ascites and no mass. There is no splenomegaly or hepatomegaly. There is no tenderness.   Musculoskeletal: She exhibits no edema.   Lymphadenopathy:     She has no cervical adenopathy.   Neurological: She is alert and oriented to person, place, and time.   Skin: Skin is warm. She is not diaphoretic.   Psychiatric: She has a normal mood and affect. Her behavior is normal.   Nursing note and vitals reviewed.      I have reviewed all pertinent labs and cardiac studies.      Chemistry        Component Value Date/Time     02/07/2019 1350    K 4.0 02/07/2019 1350     02/07/2019 1350    CO2 26 02/07/2019 1350    BUN 14 02/07/2019 1350    CREATININE 0.8 02/07/2019 1350    GLU 78 02/07/2019 1350        Component Value Date/Time    CALCIUM 9.7 02/07/2019 1350    ALKPHOS 88 02/07/2019 1350    AST 18 02/07/2019 1350    ALT 39 02/07/2019 1350    BILITOT 0.5 02/07/2019 1350    ESTGFRAFRICA >60 02/07/2019 1350    EGFRNONAA >60 02/07/2019 1350        Lab Results   Component Value Date     WBC 9.38 02/07/2019    HGB 13.4 02/07/2019    HCT 40.9 02/07/2019    MCV 94 02/07/2019     02/07/2019     Lab Results   Component Value Date    HGBA1C 6.4 (H) 08/24/2018     Lab Results   Component Value Date    CHOL 165 08/24/2018    CHOL 171 07/25/2017    CHOL 215 (H) 07/07/2016     Lab Results   Component Value Date    HDL 46 08/24/2018    HDL 45 07/25/2017    HDL 54 07/07/2016     Lab Results   Component Value Date    LDLCALC 102.8 08/24/2018    LDLCALC 112.2 07/25/2017    LDLCALC 140.8 07/07/2016     Lab Results   Component Value Date    TRIG 81 08/24/2018    TRIG 69 07/25/2017    TRIG 101 07/07/2016     Lab Results   Component Value Date    CHOLHDL 27.9 08/24/2018    CHOLHDL 26.3 07/25/2017    CHOLHDL 25.1 07/07/2016          Narrative     Date of Procedure: 02/11/2019        TEST DESCRIPTION       Aorta: The aortic root is normal in size, measuring 3.0 cm at sinotubular junction and 3.1 cm at Sinuses of Valsalva. The proximal ascending aorta is normal in size, measuring 3.8 cm across.     Left Atrium: The left atrial volume index is normal, measuring 22.82 cc/m2.     Left Ventricle: The left ventricle is normal in size, with an end-diastolic diameter of 3.8 cm, and an end-systolic diameter of 2.8 cm. LV wall thickness is normal, with the septum measuring 1.5 cm and the posterior wall measuring 1.2 cm across. Relative   wall thickness was increased at 0.63, and the LV mass index was 98.6 g/m2 consistent with concentric remodeling. There are no regional wall motion abnormalities. Left ventricular systolic function appears normal. Visually estimated ejection fraction is   60-65%. The LV Doppler derived stroke volume equals 69.0 ccs.     Diastolic indices: E wave velocity 0.6 m/s, E/A ratio 0.7,  msec., E/e' ratio(avg) 6. Diastolic function is normal.     Right Atrium: The right atrium is normal in size, measuring 4.7 cm in length and 2.7 cm in width in the apical view.     Right Ventricle: The right  ventricle is normal in size measuring 2.6 cm at the base in the apical right ventricle-focused view. Global right ventricular systolic function appears normal. The estimated PA systolic pressure is 34 mmHg.     Aortic Valve:  Aortic valve is normal in structure with normal leaflet mobility.     Mitral Valve:  Mitral valve is normal in structure with normal leaflet mobility.     Tricuspid Valve:  Tricuspid valve is normal in structure with normal leaflet mobility.     Pulmonary Valve:  Pulmonary valve is normal in structure with normal leaflet mobility.     IVC: IVC is enlarged but collapses > 50% with a sniff, suggesting intermediate right atrial pressure of 8 mmHg.     Intracavitary: There is no evidence of pericardial effusion, intracavity mass, thrombi, or vegetation.         CONCLUSIONS     1 - Concentric remodeling.     2 - No wall motion abnormalities.     3 - Normal left ventricular systolic function (EF 60-65%).     4 - Normal left ventricular diastolic function.     5 - Normal right ventricular systolic function .     6 - The estimated PA systolic pressure is 34 mmHg.     7 - Intermediate central venous pressure.             This document has been electronically    SIGNED BY: Bhaskar Cook MD On: 02/11/2019 11:10               Assessment:       1. AP (angina pectoris)    2. Abnormal ECG    3. Essential hypertension    4. Palpitations    5. Pure hypercholesterolemia    6. CANALES (dyspnea on exertion)         Plan:               Worsening cp sxs although improved now on Ranexa.  Dx with unstable angina with new onset angina last few months.  Continue Ranexa 500 mg bid.  Sl ntg prn for angina. Pt advised on how to use and when to go to ER.  Asa 81 mg qd.  Continue other meds  She wants to stay off Imdur and toprol.  Pt advised to undergo left heart catheterization with possible PCI if warranted.  Pt advised of all risks and benefits of procedure.  Pt advised of alternative approaches and treatment  strategies to include medical therapy, PCI as well as surgical revascularization with possible CABG.  All questions answered in clinic.   Will reschedule her cath again to move up due to her sxs.  Schedule Wed, 2/20/19 10:30 am.

## 2019-02-19 ENCOUNTER — TELEPHONE (OUTPATIENT)
Dept: OPHTHALMOLOGY | Facility: CLINIC | Age: 64
End: 2019-02-19

## 2019-02-19 NOTE — TELEPHONE ENCOUNTER
Called pt to schedule appt, no answer left message.----- Message from LINDSAY Bright MD sent at 2/19/2019 11:51 AM CST -----  Regarding: FW: Appointment      ----- Message -----  From: Mariely Cortes LPN  Sent: 2/18/2019   4:45 PM  To: LINDSAY Bright MD  Subject: Appointment                                      Dr. Bright and Staff,  Patient needs to reschedule 2/20 appointment due to LHC with Dr. Suggs.  Please contact patient or let me know what can be done.    Thanks, Mariely  [phone]

## 2019-02-20 ENCOUNTER — HOSPITAL ENCOUNTER (OUTPATIENT)
Facility: HOSPITAL | Age: 64
Discharge: HOME | End: 2019-02-20
Attending: INTERNAL MEDICINE | Admitting: INTERNAL MEDICINE
Payer: COMMERCIAL

## 2019-02-20 VITALS
SYSTOLIC BLOOD PRESSURE: 106 MMHG | HEIGHT: 66 IN | WEIGHT: 241 LBS | BODY MASS INDEX: 38.73 KG/M2 | DIASTOLIC BLOOD PRESSURE: 65 MMHG

## 2019-02-20 DIAGNOSIS — I20.0 UNSTABLE ANGINA: ICD-10-CM

## 2019-02-20 DIAGNOSIS — R07.9 CHEST PAIN, UNSPECIFIED TYPE: ICD-10-CM

## 2019-02-20 DIAGNOSIS — I20.9 ANGINA PECTORIS: ICD-10-CM

## 2019-02-20 DIAGNOSIS — I10 ESSENTIAL (PRIMARY) HYPERTENSION: ICD-10-CM

## 2019-02-20 LAB — POCT GLUCOSE: 192 MG/DL (ref 70–110)

## 2019-02-20 PROCEDURE — 93458 L HRT ARTERY/VENTRICLE ANGIO: CPT

## 2019-02-20 PROCEDURE — 99152 MOD SED SAME PHYS/QHP 5/>YRS: CPT | Mod: ,,, | Performed by: INTERNAL MEDICINE

## 2019-02-20 PROCEDURE — 99152 MOD SED SAME PHYS/QHP 5/>YRS: CPT

## 2019-02-20 PROCEDURE — 99152 CATH LAB PROCEDURE: ICD-10-PCS | Mod: ,,, | Performed by: INTERNAL MEDICINE

## 2019-02-20 PROCEDURE — 25500020 PHARM REV CODE 255

## 2019-02-20 PROCEDURE — 93458 CATH LAB PROCEDURE: ICD-10-PCS | Mod: 26,,, | Performed by: INTERNAL MEDICINE

## 2019-02-20 PROCEDURE — 93458 L HRT ARTERY/VENTRICLE ANGIO: CPT | Mod: 26,,, | Performed by: INTERNAL MEDICINE

## 2019-02-20 PROCEDURE — 25000003 PHARM REV CODE 250

## 2019-02-20 PROCEDURE — 63600175 PHARM REV CODE 636 W HCPCS

## 2019-02-20 RX ORDER — ASPIRIN 325 MG
325 TABLET ORAL ONCE
Status: COMPLETED | OUTPATIENT
Start: 2019-02-20 | End: 2019-02-20

## 2019-02-20 RX ORDER — DIAZEPAM 5 MG/1
5 TABLET ORAL
Status: COMPLETED | OUTPATIENT
Start: 2019-02-20 | End: 2019-02-20

## 2019-02-20 RX ORDER — SODIUM CHLORIDE 9 MG/ML
INJECTION, SOLUTION INTRAVENOUS CONTINUOUS
Status: DISCONTINUED | OUTPATIENT
Start: 2019-02-20 | End: 2019-02-20 | Stop reason: HOSPADM

## 2019-02-20 RX ADMIN — SODIUM CHLORIDE: 9 INJECTION, SOLUTION INTRAVENOUS at 09:02

## 2019-02-20 RX ADMIN — DIAZEPAM 5 MG: 5 TABLET ORAL at 10:02

## 2019-02-20 RX ADMIN — Medication 325 MG: at 09:02

## 2019-02-20 NOTE — DISCHARGE INSTRUCTIONS
"Post-op Heart Catheterization    1. DIET: It is advisable for you to follow a diet that limits the intake of salt, sugar, saturated fats and cholesterol.     2. DRIVING: Due to sedation you received during your procedure, DO NOT drive or operate machinery for 24 hours. Avoid making critical decisions or signing legal documents until tomorrow.    3. ACTIVITY: AVOID activities that require bending of the affected arm/wrist for 3 days and submerging the site in water for 3 days. REMOVE the dressing the day after  the procedure, you may apply a bandaid to the site if you desire. You may RESUME       your normal activities or prescribed exercise program as instructed by your physician after 3 days.            4. WOUND CARE: It is not unusual to have a small amount of bruising to appear at or near the puncture site. It is also common to have a tender "knot" develop beneath the skin at the puncture site of the wrist/arm. This is usually scar tissue and is not a cause for concern or alarm. This tender knot may take several weeks to fully resolve. The bruise will usually spread over several days. However, if the lump gets bigger, call your doctor immediately.    5. DISCOMFORT: For general discomfort at the puncture site, you may take 1 or 2 Acetaminophen (Tylenol) tablets every 4 - 6 hours as needed. (Do not take more than 4000 mg a day)    6. SIGNS AND SYMPTOMS TO REPORT:  Call your physician immediately if any of the following occur:                                            1. Loss of feeling, warmth or color to the affected arm/wrist                                                                                                          2. Mild beeding from the site               3. Pain that is sudden, sharp or persistent in the affected arm/wrist               4. Swelling or a change in "lump" size, increased redness or drainage at the puncture site               5. High fever (101 degrees or higher)    7. GO TO  THE " EMERGENCY ROOM OR CALL 911 IF YOU HAVE: Chest pains or discomforts not relieved with 3 nitroglycerin doses (sublingual tablets or spray), numbness or severe pain of limb, if your limb becomes cold or discolored or if you develop uncontrolled bleeding from the puncture site (quickly apply firm, direct pressure above the site).

## 2019-02-20 NOTE — OP NOTE
[facility] -   Cardiac Catheterization  Procedure & Discharge Note    SUMMARY     Latosha Enriquez  [id number]  Gabbie Ronquillo MD    Date of Procedure: 2/20/2019    Procedure:   Coronary angiogram    Provider: Haile Suggs MD    Assisting Provider:  Cameron Palacio    Indications: She was referred for cardiac catheterization to evaluate angina.    Pre-Procedure Diagnosis:  New onset angina    Post-Procedure Diagnosis:  Widely patent coronary arteries    Anesthesia: RN IV Sedation    Description of the Findings of the Procedure:     The risks, benefits, complications, treatment options, and expected outcomes were discussed with the patient. The patient and/or family concurred with the proposed plan, giving informed consent. Patient was brought to the cath lab after IV hydration was begun and oral premedication was given.    Findings:  Angiographically normal coronary arteries (tortuous) overall  Left radial TR band  See report    Complications: None; patient tolerated the procedure well.    Estimated Blood Loss (EBL): Minimal           Implants: none    Specimens: none    Condition: stable    Disposition: PACU - hemodynamically stable.    Attestation: I was present and scrubbed for the entire procedure.     Recommendations:    Usual post cath care  Continue risk factor modification  Continue current meds  Diabetic diet  D/c pt home  F/u clinic 1 week

## 2019-02-20 NOTE — INTERVAL H&P NOTE
The patient has been examined and the H&P has been reviewed:    I concur with the findings and no changes have occurred since H&P was written.    Anesthesia/Surgery risks, benefits and alternative options discussed and understood by patient/family.          Active Hospital Problems    Diagnosis  POA    Unstable angina [I20.0]  Yes     Priority: High      Resolved Hospital Problems   No resolved problems to display.

## 2019-02-22 ENCOUNTER — TELEPHONE (OUTPATIENT)
Dept: OPHTHALMOLOGY | Facility: CLINIC | Age: 64
End: 2019-02-22

## 2019-03-04 ENCOUNTER — TELEPHONE (OUTPATIENT)
Dept: FAMILY MEDICINE | Facility: CLINIC | Age: 64
End: 2019-03-04

## 2019-03-04 NOTE — TELEPHONE ENCOUNTER
----- Message from Neelam Daniels sent at 3/4/2019  2:47 PM CST -----  Contact: pt  Pt want to see if she can change her appointment to a later time pt at the hospital with her mother, she can be reached at  [phone] Thanks

## 2019-03-05 ENCOUNTER — PROCEDURE VISIT (OUTPATIENT)
Dept: OPHTHALMOLOGY | Facility: CLINIC | Age: 64
End: 2019-03-05
Payer: COMMERCIAL

## 2019-03-05 ENCOUNTER — OFFICE VISIT (OUTPATIENT)
Dept: FAMILY MEDICINE | Facility: CLINIC | Age: 64
End: 2019-03-05
Payer: COMMERCIAL

## 2019-03-05 VITALS
OXYGEN SATURATION: 98 % | SYSTOLIC BLOOD PRESSURE: 128 MMHG | RESPIRATION RATE: 17 BRPM | HEART RATE: 87 BPM | WEIGHT: 241.38 LBS | DIASTOLIC BLOOD PRESSURE: 80 MMHG | BODY MASS INDEX: 38.79 KG/M2 | HEIGHT: 66 IN | TEMPERATURE: 98 F

## 2019-03-05 DIAGNOSIS — E11.3213 TYPE 2 DIABETES MELLITUS WITH BOTH EYES AFFECTED BY MILD NONPROLIFERATIVE RETINOPATHY AND MACULAR EDEMA, WITH LONG-TERM CURRENT USE OF INSULIN: Primary | ICD-10-CM

## 2019-03-05 DIAGNOSIS — Z79.4 TYPE 2 DIABETES MELLITUS WITH BOTH EYES AFFECTED BY MILD NONPROLIFERATIVE RETINOPATHY AND MACULAR EDEMA, WITH LONG-TERM CURRENT USE OF INSULIN: Primary | ICD-10-CM

## 2019-03-05 DIAGNOSIS — J45.909 ACUTE ASTHMATIC BRONCHITIS: Primary | ICD-10-CM

## 2019-03-05 DIAGNOSIS — E66.01 SEVERE OBESITY (BMI 35.0-35.9 WITH COMORBIDITY): ICD-10-CM

## 2019-03-05 PROBLEM — R94.31 ABNORMAL ECG: Status: RESOLVED | Noted: 2019-02-10 | Resolved: 2019-03-05

## 2019-03-05 PROCEDURE — 3008F PR BODY MASS INDEX (BMI) DOCUMENTED: ICD-10-PCS | Mod: CPTII,S$GLB,, | Performed by: FAMILY MEDICINE

## 2019-03-05 PROCEDURE — 99999 PR PBB SHADOW E&M-EST. PATIENT-LVL III: ICD-10-PCS | Mod: PBBFAC,,, | Performed by: FAMILY MEDICINE

## 2019-03-05 PROCEDURE — 99499 UNLISTED E&M SERVICE: CPT | Mod: S$GLB,,, | Performed by: OPHTHALMOLOGY

## 2019-03-05 PROCEDURE — 3008F BODY MASS INDEX DOCD: CPT | Mod: CPTII,S$GLB,, | Performed by: FAMILY MEDICINE

## 2019-03-05 PROCEDURE — 3074F PR MOST RECENT SYSTOLIC BLOOD PRESSURE < 130 MM HG: ICD-10-PCS | Mod: CPTII,S$GLB,, | Performed by: FAMILY MEDICINE

## 2019-03-05 PROCEDURE — 67028 PR INJECT INTRAVITREAL PHARMCOLOGIC: ICD-10-PCS | Mod: RT,S$GLB,, | Performed by: OPHTHALMOLOGY

## 2019-03-05 PROCEDURE — 92134 CPTRZ OPH DX IMG PST SGM RTA: CPT | Mod: S$GLB,,, | Performed by: OPHTHALMOLOGY

## 2019-03-05 PROCEDURE — 3079F DIAST BP 80-89 MM HG: CPT | Mod: CPTII,S$GLB,, | Performed by: FAMILY MEDICINE

## 2019-03-05 PROCEDURE — 3079F PR MOST RECENT DIASTOLIC BLOOD PRESSURE 80-89 MM HG: ICD-10-PCS | Mod: CPTII,S$GLB,, | Performed by: FAMILY MEDICINE

## 2019-03-05 PROCEDURE — 99499 NO LOS: ICD-10-PCS | Mod: S$GLB,,, | Performed by: OPHTHALMOLOGY

## 2019-03-05 PROCEDURE — 99213 PR OFFICE/OUTPT VISIT, EST, LEVL III, 20-29 MIN: ICD-10-PCS | Mod: 25,S$GLB,, | Performed by: FAMILY MEDICINE

## 2019-03-05 PROCEDURE — 99213 OFFICE O/P EST LOW 20 MIN: CPT | Mod: 25,S$GLB,, | Performed by: FAMILY MEDICINE

## 2019-03-05 PROCEDURE — 96372 PR INJECTION,THERAP/PROPH/DIAG2ST, IM OR SUBCUT: ICD-10-PCS | Mod: S$GLB,,, | Performed by: FAMILY MEDICINE

## 2019-03-05 PROCEDURE — 96372 THER/PROPH/DIAG INJ SC/IM: CPT | Mod: S$GLB,,, | Performed by: FAMILY MEDICINE

## 2019-03-05 PROCEDURE — 67028 INJECTION EYE DRUG: CPT | Mod: RT,S$GLB,, | Performed by: OPHTHALMOLOGY

## 2019-03-05 PROCEDURE — 92134 POSTERIOR SEGMENT OCT RETINA (OCULAR COHERENCE TOMOGRAPHY)-BOTH EYES: ICD-10-PCS | Mod: S$GLB,,, | Performed by: OPHTHALMOLOGY

## 2019-03-05 PROCEDURE — 3074F SYST BP LT 130 MM HG: CPT | Mod: CPTII,S$GLB,, | Performed by: FAMILY MEDICINE

## 2019-03-05 PROCEDURE — 99999 PR PBB SHADOW E&M-EST. PATIENT-LVL III: CPT | Mod: PBBFAC,,, | Performed by: FAMILY MEDICINE

## 2019-03-05 RX ORDER — METHYLPREDNISOLONE ACETATE 80 MG/ML
80 INJECTION, SUSPENSION INTRA-ARTICULAR; INTRALESIONAL; INTRAMUSCULAR; SOFT TISSUE
Status: COMPLETED | OUTPATIENT
Start: 2019-03-05 | End: 2019-03-05

## 2019-03-05 RX ORDER — ALBUTEROL SULFATE 90 UG/1
2 AEROSOL, METERED RESPIRATORY (INHALATION) EVERY 6 HOURS PRN
Qty: 18 G | Refills: 3 | Status: SHIPPED | OUTPATIENT
Start: 2019-03-05 | End: 2022-05-16 | Stop reason: SDUPTHER

## 2019-03-05 RX ADMIN — METHYLPREDNISOLONE ACETATE 80 MG: 80 INJECTION, SUSPENSION INTRA-ARTICULAR; INTRALESIONAL; INTRAMUSCULAR; SOFT TISSUE at 03:03

## 2019-03-05 NOTE — PROGRESS NOTES
CHIEF COMPLAINT:  This is a 63-year-old female complaining of respiratory illness.    SUBJECTIVE:  The patient complains of five-day history of nasal congestion, cough productive of clear mucus, chest congestion and slight wheezing.  She denies fever or chills.  Patient has been staying with her mother in the hospital.  Her mother had amputation of her lower leg.  Positive ill contacts.  Patient has been taking no medication.  She did not receive influenza vaccine.    ROS:  GENERAL: Patient denies fever, chills, night sweats. Patient denies weight gain or loss. Patient denies anorexia, fatigue, weakness or swollen glands.  SKIN: Patient denies rash.    HEENT: Patient denies sore throat, ear pain, hearing loss, n visual disturbance, eye irritation or discharge.  LUNGS: Patient denies hemoptysis.  Positive for cough and wheezing.  CARDIOVASCULAR: Patient denies chest pain, shortness of breath, palpitations, syncope or lower extremity edema.  GI: Patient denies abdominal pain, nausea, vomiting, diarrhea, blood in stool or melena.     OBJECTIVE:   GENERAL: Well-developed well-nourished, obese, black female alert and oriented x3, in no acute distress. Memory, judgment and cognition without deficit.  Audible wheezing.  SKIN: Clear without rash. Normal color and tone.   HEENT: Eyes: No scleral icterus. Clear conjunctivae. Pupils equal reactive to light and accommodation. Ears:  Clear canals.  Clear TMs.  Nose:  Mild bilateral congestion. Pharynx: Without injection or exudates.  NECK: Supple, normal range of motion. No lymphadenopathy.  LUNGS:  Scattered expiratory wheezes. Normal respiratory effort.  CARDIOVASCULAR: Regular rhythm, normal S1, S2 without murmur, gallop or rub.    ASSESSMENT:  1. Acute asthmatic bronchitis    2. Severe obesity (BMI 35.0-35.9 with comorbidity)      PLAN:   1.  Depo-Medrol 80 mg IM.  2.  Symptomatic treatment.  3.  Increase fluid intake daily.  4.  Refill albuterol inhaler.  5.  Follow-up if  no improvement or worsening symptoms.

## 2019-03-05 NOTE — PROGRESS NOTES
===============================  03/05/2019   Latosha Enriquez,   63 y.o. female   Last visit [facility]: :12/31/2018   Last visit eye dept. Visit date not found  [facility]:  Corrected distance visual acuity was 20/40 in the right eye and 20/20 in the left eye.   Not recorded         Not recorded         Not recorded        Chief Complaint   Patient presents with    DME     EYLEA OD        HPI     DME      Additional comments: EYLEA OD              Comments     (No Betadine - Vigamox Prep))    DM  DME OD  EYLEA OD last 12/31/18          Last edited by Patricia Slater on 3/5/2019  1:16 PM. (History)          ________________  3/5/2019  Problem List Items Addressed This Visit        Eye/Vision problems    Type 2 diabetes mellitus with both eyes affected by mild nonproliferative retinopathy and macular edema, with long-term current use of insulin - Primary (Chronic)    Relevant Medications    aflibercept Soln 2 mg (Completed)    Other Relevant Orders    Prior Authorization Order    Posterior Segment OCT Retina-Both eyes (Completed)          .  Ol dme worse      3/5/2019  Diagnosis :  od cassie  Today:   Eylea (afibercept) 2 mg/0.05 ml Intravitreal Injection , OD   Follow up: rtgc  1mo        Instructed to call 24/7 for any worsening of vision. Check Both eyes daily. Gave patient my home phone number.      Procedure  Note:   OD}  Eylea (afibercept) 2 mg/0.05 ml Intravitreal Injection    I have explained the Risks, Benefits and Alternatives of the procedure in detail.  The patient voices understanding and all questions have been answered.  The patient agrees to proceed as discussed.  Xylocaine with Epi 2%  subconj bleb  was used for anesthesia.  Topical vigamox was used for antisepsis.  0.05 cc was  injected 3.7 mm from corneal limbus in the inferotemporal quadrant.  Following injection the IOP was less than thirty (<30) by tonopen.  The eye was then thoroughly irrigated with BSS.  Patient tolerated procedure well.  No complications  were observed.  The Patient was educated that mild irritation tonight was normal secondary to topical antispsis use.  Pt was advised to call at any time day or night for pain, redness, or any decline in vision. I gave the patient my home number as well as the clinic on call number. Daily visual checks and Amsler grid testing were reviewed.  polytrim Antibiotic Drops to be used 4 times daily for 4 days  LINDSAY Bright MD  Procedure ordered: y  Consent: y  Pre auth: y  MAR:y  Opnote: y  Charge capture:y         ===========================

## 2019-03-06 ENCOUNTER — OFFICE VISIT (OUTPATIENT)
Dept: CARDIOLOGY | Facility: CLINIC | Age: 64
End: 2019-03-06
Payer: COMMERCIAL

## 2019-03-06 VITALS
WEIGHT: 244.5 LBS | HEIGHT: 66 IN | DIASTOLIC BLOOD PRESSURE: 82 MMHG | HEART RATE: 84 BPM | BODY MASS INDEX: 39.29 KG/M2 | SYSTOLIC BLOOD PRESSURE: 124 MMHG

## 2019-03-06 DIAGNOSIS — I20.9 AP (ANGINA PECTORIS): ICD-10-CM

## 2019-03-06 DIAGNOSIS — E78.00 PURE HYPERCHOLESTEROLEMIA: ICD-10-CM

## 2019-03-06 DIAGNOSIS — E66.01 SEVERE OBESITY (BMI 35.0-35.9 WITH COMORBIDITY): ICD-10-CM

## 2019-03-06 DIAGNOSIS — I10 ESSENTIAL HYPERTENSION: Primary | ICD-10-CM

## 2019-03-06 PROBLEM — I20.0 UNSTABLE ANGINA: Status: RESOLVED | Noted: 2019-02-20 | Resolved: 2019-03-06

## 2019-03-06 PROCEDURE — 99999 PR PBB SHADOW E&M-EST. PATIENT-LVL IV: ICD-10-PCS | Mod: PBBFAC,,, | Performed by: PHYSICIAN ASSISTANT

## 2019-03-06 PROCEDURE — 3074F PR MOST RECENT SYSTOLIC BLOOD PRESSURE < 130 MM HG: ICD-10-PCS | Mod: CPTII,S$GLB,, | Performed by: PHYSICIAN ASSISTANT

## 2019-03-06 PROCEDURE — 3079F PR MOST RECENT DIASTOLIC BLOOD PRESSURE 80-89 MM HG: ICD-10-PCS | Mod: CPTII,S$GLB,, | Performed by: PHYSICIAN ASSISTANT

## 2019-03-06 PROCEDURE — 3008F BODY MASS INDEX DOCD: CPT | Mod: CPTII,S$GLB,, | Performed by: PHYSICIAN ASSISTANT

## 2019-03-06 PROCEDURE — 3079F DIAST BP 80-89 MM HG: CPT | Mod: CPTII,S$GLB,, | Performed by: PHYSICIAN ASSISTANT

## 2019-03-06 PROCEDURE — 3074F SYST BP LT 130 MM HG: CPT | Mod: CPTII,S$GLB,, | Performed by: PHYSICIAN ASSISTANT

## 2019-03-06 PROCEDURE — 99213 OFFICE O/P EST LOW 20 MIN: CPT | Mod: S$GLB,,, | Performed by: PHYSICIAN ASSISTANT

## 2019-03-06 PROCEDURE — 99213 PR OFFICE/OUTPT VISIT, EST, LEVL III, 20-29 MIN: ICD-10-PCS | Mod: S$GLB,,, | Performed by: PHYSICIAN ASSISTANT

## 2019-03-06 PROCEDURE — 99999 PR PBB SHADOW E&M-EST. PATIENT-LVL IV: CPT | Mod: PBBFAC,,, | Performed by: PHYSICIAN ASSISTANT

## 2019-03-06 PROCEDURE — 3008F PR BODY MASS INDEX (BMI) DOCUMENTED: ICD-10-PCS | Mod: CPTII,S$GLB,, | Performed by: PHYSICIAN ASSISTANT

## 2019-03-06 NOTE — PROGRESS NOTES
Subjective:    Patient ID:  Latosha Enriquez is a 63 y.o. female who presents for follow-up of Hospital Follow Up      HPI   Ms. Enriquez is a 63 year old female patient whose current medical conditions include DM, HTN, and hyperlipidemia who presents today for hospital follow-up. Patient recently underwent LHC by Dr. Suggs on 2/20/19 for further evaluation of chest pain symptoms and CANALES. LHC showed widely patent coronaries, medical mgmt/risk factor modification recommended. Patient returns today and states she is doing ok. Recently diagnosed with URI, on steroids and also having issues with kidney stones. Cardiac wise, seems stable. No complaints. No chest pain or SOB. No palpitations, near syncope, or syncope. BP stable. No radial access site complaints. Patient is compliant with her medications, would like to see how she feels off of Ranexa.       Review of Systems   Constitution: Negative for chills, decreased appetite, fever, weakness and malaise/fatigue.   HENT: Positive for congestion. Negative for hoarse voice and sore throat.    Eyes: Negative for blurred vision and discharge.   Cardiovascular: Negative for chest pain, claudication, cyanosis, dyspnea on exertion, irregular heartbeat, leg swelling, near-syncope, orthopnea, palpitations and paroxysmal nocturnal dyspnea.   Respiratory: Positive for cough. Negative for hemoptysis, shortness of breath, snoring, sputum production and wheezing.    Endocrine: Negative for cold intolerance and heat intolerance.   Hematologic/Lymphatic: Negative for bleeding problem. Does not bruise/bleed easily.   Skin: Negative for rash.   Musculoskeletal: Positive for back pain. Negative for arthritis, joint pain, joint swelling, muscle cramps, muscle weakness and myalgias.   Gastrointestinal: Negative for abdominal pain, constipation, diarrhea, heartburn, melena and nausea.   Genitourinary: Negative for hematuria.   Neurological: Negative for dizziness, focal weakness, headaches,  "light-headedness, loss of balance, numbness, paresthesias and seizures.   Psychiatric/Behavioral: Negative for memory loss. The patient does not have insomnia.    Allergic/Immunologic: Negative for hives.     /82 (BP Location: Right arm, Patient Position: Sitting, BP Method: Large (Manual))   Pulse 84   Ht 5' 6" (1.676 m)   Wt 110.9 kg (244 lb 7.8 oz)   BMI 39.46 kg/m²     Objective:    Physical Exam   Constitutional: She is oriented to person, place, and time. She appears well-developed and well-nourished. No distress.   HENT:   Head: Normocephalic and atraumatic.   Eyes: Pupils are equal, round, and reactive to light. Right eye exhibits no discharge. Left eye exhibits no discharge.   Neck: Neck supple. No JVD present.   Cardiovascular: Normal rate, regular rhythm, S1 normal, S2 normal and normal heart sounds.   No murmur heard.  Pulmonary/Chest: Effort normal and breath sounds normal. No respiratory distress. She has no wheezes. She has no rales.   Abdominal: Soft. She exhibits no distension. There is no tenderness.   Musculoskeletal: She exhibits no edema.   Neurological: She is alert and oriented to person, place, and time.   Skin: Skin is warm and dry. She is not diaphoretic. No erythema.   Left radial access site C/D/I; no bleeding, erythema, or drainage   Psychiatric: She has a normal mood and affect. Her behavior is normal. Thought content normal.   Nursing note and vitals reviewed.      Diagnostic:   Tortuous coronary arteries.         Patient has a right dominant coronary artery.        - Left Main Coronary Artery:             The LM is normal. There is RAÚL 3 flow.     - Left Anterior Descending Artery:             The LAD is normal. There is RAÚL 3 flow.     - D1:             The D1 is normal. There is RAÚL 3 flow.     - D2:             The D2 is normal. There is RAÚL 3 flow.     - Left Circumflex Artery:             The LCX is normal. There is RAÚL 3 flow.     - OM1:             The OM1 is " normal. There is RAÚL 3 flow.     - OM2:             The OM2 is normal. There is RAÚL 3 flow.     - Ramus:             The ramus is normal. There is RAÚL 3 flow.     - Right Coronary Artery:             The RCA has luminal irregularities. There is RAÚL 3 flow. Rucker's crook anatomy.     2D Echo CONCLUSIONS     1 - Concentric remodeling.     2 - No wall motion abnormalities.     3 - Normal left ventricular systolic function (EF 60-65%).     4 - Normal left ventricular diastolic function.     5 - Normal right ventricular systolic function .     6 - The estimated PA systolic pressure is 34 mmHg.     7 - Intermediate central venous pressure.   Assessment:       1. Essential hypertension    2. AP (angina pectoris)    3. Pure hypercholesterolemia    4. Severe obesity (BMI 35.0-35.9 with comorbidity)       Doing clinically well. No cardiac complaints. Ok to stop Ranexa, she will let me know if she feels she needs to re-start med. Counseled on risk factor modification. Continue other meds.   Plan:   -Continue current medical management and risk factor modification  -Cardiac, low salt diet  -Increase activity level, weight loss  -Lipid, cmp in 6 months, phone review  -RTC 1 year with PA with EKG      Chart reviewed. Dr. Suggs agrees with plan outlined above.

## 2019-03-07 DIAGNOSIS — E11.9 TYPE 2 DIABETES MELLITUS WITHOUT COMPLICATION: ICD-10-CM

## 2019-04-01 RX ORDER — METFORMIN HYDROCHLORIDE 500 MG/1
TABLET, EXTENDED RELEASE ORAL
Qty: 270 TABLET | Refills: 0 | Status: SHIPPED | OUTPATIENT
Start: 2019-04-01 | End: 2019-10-14

## 2019-04-01 RX ORDER — PEN NEEDLE, DIABETIC 31 GX5/16"
NEEDLE, DISPOSABLE MISCELLANEOUS
Qty: 90 EACH | Refills: 3 | Status: SHIPPED | OUTPATIENT
Start: 2019-04-01 | End: 2020-12-14 | Stop reason: SDUPTHER

## 2019-04-02 ENCOUNTER — TELEPHONE (OUTPATIENT)
Dept: OPHTHALMOLOGY | Facility: CLINIC | Age: 64
End: 2019-04-02

## 2019-04-02 NOTE — TELEPHONE ENCOUNTER
----- Message from Vangie Perez sent at 4/2/2019  2:20 PM CDT -----  Contact: pt  Pt is requesting a call from nurse to discuss r/s proc  April 19-26 any time.       Please call pt back at [phone]

## 2019-04-02 NOTE — TELEPHONE ENCOUNTER
Called ms boo back at 2:32 this evening and left a detailed voicemessage for her.  She wants to reschedule her procedure. I told her that I will try her back before we leave today. If we dont connect for her to call us back tomorrow and we will reshcedule her. kf

## 2019-04-03 ENCOUNTER — TELEPHONE (OUTPATIENT)
Dept: OPHTHALMOLOGY | Facility: CLINIC | Age: 64
End: 2019-04-03

## 2019-04-03 NOTE — TELEPHONE ENCOUNTER
----- Message from Vangie Perez sent at 4/3/2019 11:01 AM CDT -----  Contact: pt  Patient needs to change her procedure to the week of 4/19- 4/26, please call her back at [phone]. Thank you

## 2019-04-03 NOTE — TELEPHONE ENCOUNTER
I returned call to Ms. Enriquez.  I left message that her appointment has been rescheduled as requested to Tuesday April 23 @ 1:15.

## 2019-04-23 ENCOUNTER — PROCEDURE VISIT (OUTPATIENT)
Dept: OPHTHALMOLOGY | Facility: CLINIC | Age: 64
End: 2019-04-23
Payer: COMMERCIAL

## 2019-04-23 DIAGNOSIS — E11.3213 TYPE 2 DIABETES MELLITUS WITH BOTH EYES AFFECTED BY MILD NONPROLIFERATIVE RETINOPATHY AND MACULAR EDEMA, WITH LONG-TERM CURRENT USE OF INSULIN: Primary | ICD-10-CM

## 2019-04-23 DIAGNOSIS — Z79.4 TYPE 2 DIABETES MELLITUS WITH BOTH EYES AFFECTED BY MILD NONPROLIFERATIVE RETINOPATHY AND MACULAR EDEMA, WITH LONG-TERM CURRENT USE OF INSULIN: Primary | ICD-10-CM

## 2019-04-23 PROCEDURE — 67028 INJECTION EYE DRUG: CPT | Mod: RT,S$GLB,, | Performed by: OPHTHALMOLOGY

## 2019-04-23 PROCEDURE — 99499 UNLISTED E&M SERVICE: CPT | Mod: S$GLB,,, | Performed by: OPHTHALMOLOGY

## 2019-04-23 PROCEDURE — 92134 CPTRZ OPH DX IMG PST SGM RTA: CPT | Mod: S$GLB,,, | Performed by: OPHTHALMOLOGY

## 2019-04-23 PROCEDURE — 99499 NO LOS: ICD-10-PCS | Mod: S$GLB,,, | Performed by: OPHTHALMOLOGY

## 2019-04-23 PROCEDURE — 67028 PR INJECT INTRAVITREAL PHARMCOLOGIC: ICD-10-PCS | Mod: RT,S$GLB,, | Performed by: OPHTHALMOLOGY

## 2019-04-23 PROCEDURE — 92134 POSTERIOR SEGMENT OCT RETINA (OCULAR COHERENCE TOMOGRAPHY)-BOTH EYES: ICD-10-PCS | Mod: S$GLB,,, | Performed by: OPHTHALMOLOGY

## 2019-04-23 RX ORDER — POLYMYXIN B SULFATE AND TRIMETHOPRIM 1; 10000 MG/ML; [USP'U]/ML
1 SOLUTION OPHTHALMIC 4 TIMES DAILY
Qty: 1 BOTTLE | Refills: 0 | Status: SHIPPED | OUTPATIENT
Start: 2019-04-23 | End: 2019-04-27

## 2019-04-23 RX ORDER — GABAPENTIN 100 MG/1
CAPSULE ORAL
Refills: 2 | COMMUNITY
Start: 2019-02-04 | End: 2020-07-13 | Stop reason: SDUPTHER

## 2019-04-23 NOTE — PROGRESS NOTES
===============================  04/23/2019   Latosha Enriquez,   63 y.o. female   Last visit [facility]: :3/5/2019   [facility]  [facility]  [place] distance visual acuity was 20/40 in the right eye and 20/20 in the left eye.   Not recorded         Not recorded         Not recorded        Chief Complaint   Patient presents with    PDR/ME     EYLEA OD        HPI     PDR/ME      Additional comments: EYLEA OD              Comments     (No Betadine - Vigamox Prep))    DM  DME OD  EYLEA OD 3/5/19          Last edited by Patricia Slater on 4/23/2019  1:33 PM. (History)          ________________  4/23/2019  Problem List Items Addressed This Visit        Eye/Vision problems    Type 2 diabetes mellitus with both eyes affected by mild nonproliferative retinopathy and macular edema, with long-term current use of insulin - Primary (Chronic)    Relevant Medications    polymyxin B sulf-trimethoprim (POLYTRIM) 10,000 unit- 1 mg/mL Drop    aflibercept Soln 2 mg (Completed)    Other Relevant Orders    Posterior Segment OCT Retina-Both eyes (Completed)    Prior Authorization Order        treating od dme   better  rtc 1 bartolo parsons do     4/23/2019  Diagnosis :  od dme  Today:   Eylea (afibercept) 2 mg/0.05 ml Intravitreal Injection , OD   Follow up: rtc 1 evelyn        Instructed to call 24/7 for any worsening of vision. Check Both eyes daily. Gave patient my home phone number.      Procedure  Note:   OD}  Eylea (afibercept) 2 mg/0.05 ml Intravitreal Injection    I have explained the Risks, Benefits and Alternatives of the procedure in detail.  The patient voices understanding and all questions have been answered.  The patient agrees to proceed as discussed.  Xylocaine with Epi 2%  subconj bleb  was used for anesthesia.  Topical vigamox was used for antisepsis.  0.05 cc was  injected 3.7 mm from corneal limbus in the inferotemporal quadrant.  Following injection the IOP was less than thirty (<30) by tonopen.  The eye was then  thoroughly irrigated with BSS.  Patient tolerated procedure well.  No complications were observed.  The Patient was educated that mild irritation tonight was normal secondary to topical antispsis use.  Pt was advised to call at any time day or night for pain, redness, or any decline in vision. I gave the patient my home number as well as the clinic on call number. Daily visual checks and Amsler grid testing were reviewed.  polytrim Antibiotic Drops to be used 4 times daily for 4 days  LINDSAY Bright MD  Procedure ordered: y  Consent: y  Pre auth: y  MAR:y  Opnote: y  Charge capture:y  '    .       ===========================

## 2019-04-29 ENCOUNTER — TELEPHONE (OUTPATIENT)
Dept: OPHTHALMOLOGY | Facility: CLINIC | Age: 64
End: 2019-04-29

## 2019-04-29 NOTE — TELEPHONE ENCOUNTER
Spoke with pt.  She has redness since injection last week that hasn't resolved.  No pain or change in vision per pt.  Will discuss with Dr. Bright tomorrow.

## 2019-04-29 NOTE — TELEPHONE ENCOUNTER
----- Message from Lakshmi Rivera sent at 4/29/2019  4:27 PM CDT -----  Contact: Pt   Pt is calling regarding requesting to have nurse call pt. Pt is calling concerning eye injection. Pt states that eye is still red and and redness is not going away  ..[phone] (home)           .Thank You  Jennifer Rivera

## 2019-05-28 RX ORDER — TAMSULOSIN HYDROCHLORIDE 0.4 MG/1
0.4 CAPSULE ORAL DAILY
Qty: 30 CAPSULE | Refills: 1 | Status: SHIPPED | OUTPATIENT
Start: 2019-05-28 | End: 2019-07-30 | Stop reason: SDUPTHER

## 2019-05-28 NOTE — TELEPHONE ENCOUNTER
----- Message from Lakshmi Rivera sent at 5/28/2019  4:23 PM CDT -----  Contact: Pt   Pt is calling .Type:  RX Refill Request    Who Called:  Pt   Refill or New Rx:Refill   RX Name and Strength: tamsulosin (FLOMAX) 0.4 mg Cp24  How is the patient currently taking it? (ex. 1XDay): once daily   Is this a 30 day or 90 day RX: 30 daily   Preferred Pharmacy with phone number: .  [id number] - [facility] [address] AT [facility] & [place] [address] [address]  Phone: [phone] Fax: [phone]  Local or Mail Order: Local   Ordering Provider: Dr. Ronquillo   Would the patient rather a call back or a response via [facility]? Call Back   Best Call Back Number: [phone]         .Thank You  Jennifer Rivera

## 2019-06-10 ENCOUNTER — TELEPHONE (OUTPATIENT)
Dept: OPHTHALMOLOGY | Facility: CLINIC | Age: 64
End: 2019-06-10

## 2019-06-10 NOTE — TELEPHONE ENCOUNTER
Called ms boo back at 12:11 to reschedule her procedure, but no answer, I left a detailed voicmessage. kf

## 2019-06-10 NOTE — TELEPHONE ENCOUNTER
----- Message from Vangie Perez sent at 6/10/2019 11:53 AM CDT -----  Contact: pt  Call pt regarding rescheduling proc that she missed.   .[phone] (home)

## 2019-06-11 ENCOUNTER — TELEPHONE (OUTPATIENT)
Dept: OPHTHALMOLOGY | Facility: CLINIC | Age: 64
End: 2019-06-11

## 2019-06-11 NOTE — TELEPHONE ENCOUNTER
Called ms boo back to reschedule her procedure. I left a detailed voice message for her to call us back. kf

## 2019-06-18 ENCOUNTER — TELEPHONE (OUTPATIENT)
Dept: OPHTHALMOLOGY | Facility: CLINIC | Age: 64
End: 2019-06-18

## 2019-06-18 NOTE — TELEPHONE ENCOUNTER
----- Message from Lakshmi Rivera sent at 6/18/2019  3:50 PM CDT -----  Contact: Pt   Pt is calling regarding requesting that Dr. Bright nurse pleaseschedule an appt,  With date and time (leave on voicemail) and pt states that she will be at the appt. 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 Pt states that any date other than the morning of the June 27, 2019 will work  ..[phone] (home)         .Thank You  Jennifer Rivera

## 2019-06-19 ENCOUNTER — TELEPHONE (OUTPATIENT)
Dept: OPHTHALMOLOGY | Facility: CLINIC | Age: 64
End: 2019-06-19

## 2019-06-19 NOTE — TELEPHONE ENCOUNTER
I spoke with Ms. Enriquez and rescheduled the appointment she missed a few weeks ago.   Problem: Patient Care Overview  Goal: Plan of Care/Patient Progress Review  Outcome: Improving  Pt. A & O x 4.  VSS.  CMS intact.  Pain controlled with PRN Tramadol and oxy. Lap sites x4 CDI, Rectal dressing CDI.  JONAH x 2 with minimal output (serosanguious).  Ostomy putting out brown stool; pt. Is attending to own ostomy.  Stoma is pick and protruding.  BS active in all quadrants; passing flatus.  Pt. Is voiding adequately.  Pt. Is tolerating regular diet.

## 2019-07-12 ENCOUNTER — PROCEDURE VISIT (OUTPATIENT)
Dept: OPHTHALMOLOGY | Facility: CLINIC | Age: 64
End: 2019-07-12
Payer: COMMERCIAL

## 2019-07-12 DIAGNOSIS — E11.3213 TYPE 2 DIABETES MELLITUS WITH BOTH EYES AFFECTED BY MILD NONPROLIFERATIVE RETINOPATHY AND MACULAR EDEMA, WITH LONG-TERM CURRENT USE OF INSULIN: Primary | ICD-10-CM

## 2019-07-12 DIAGNOSIS — Z79.4 TYPE 2 DIABETES MELLITUS WITH BOTH EYES AFFECTED BY MILD NONPROLIFERATIVE RETINOPATHY AND MACULAR EDEMA, WITH LONG-TERM CURRENT USE OF INSULIN: Primary | ICD-10-CM

## 2019-07-12 PROCEDURE — 92134 POSTERIOR SEGMENT OCT RETINA (OCULAR COHERENCE TOMOGRAPHY)-BOTH EYES: ICD-10-PCS | Mod: S$GLB,,, | Performed by: OPHTHALMOLOGY

## 2019-07-12 PROCEDURE — 67028 PR INJECT INTRAVITREAL PHARMCOLOGIC: ICD-10-PCS | Mod: RT,S$GLB,, | Performed by: OPHTHALMOLOGY

## 2019-07-12 PROCEDURE — 67028 INJECTION EYE DRUG: CPT | Mod: RT,S$GLB,, | Performed by: OPHTHALMOLOGY

## 2019-07-12 PROCEDURE — 99499 NO LOS: ICD-10-PCS | Mod: S$GLB,,, | Performed by: OPHTHALMOLOGY

## 2019-07-12 PROCEDURE — 92134 CPTRZ OPH DX IMG PST SGM RTA: CPT | Mod: S$GLB,,, | Performed by: OPHTHALMOLOGY

## 2019-07-12 PROCEDURE — 99499 UNLISTED E&M SERVICE: CPT | Mod: S$GLB,,, | Performed by: OPHTHALMOLOGY

## 2019-07-12 RX ORDER — TOBRAMYCIN 3 MG/ML
SOLUTION/ DROPS OPHTHALMIC
Refills: 0 | COMMUNITY
Start: 2019-07-01 | End: 2021-01-05

## 2019-07-12 NOTE — PROGRESS NOTES
===============================  07/12/2019   Latosha Enriquez,   63 y.o. female   Last visit [facility]: :4/23/2019   [facility]  [place] visual acuity was 20/40 in the right eye and 20/20 in the left eye.   Not recorded         Not recorded         Not recorded        Chief Complaint   Patient presents with    PDR/ME     LOST TO FOLLOW UP/ LAST EYLEA OD 4/23/19        HPI     PDR/ME      Additional comments: LOST TO FOLLOW UP/ LAST EYLEA OD 4/23/19              Comments     (No Betadine - Vigamox Prep))    DM  DME OD  EYLEA OD 3/5/19          Last edited by Patricia Slater on 7/12/2019  2:28 PM. (History)          ________________  7/12/2019  Problem List Items Addressed This Visit        Eye/Vision problems    Type 2 diabetes mellitus with both eyes affected by mild nonproliferative retinopathy and macular edema, with long-term current use of insulin - Primary (Chronic)    Relevant Medications    aflibercept Soln 2 mg    Other Relevant Orders    Prior Authorization Order    Posterior Segment OCT Retina-Both eyes (Completed)        Lost to follow up    od dme - sl worse     7/12/2019  Diagnosis :  od dem  Today:   Eylea (afibercept) 2 mg/0.05 ml Intravitreal Injection , OD   Follow up: rtc  1mo        Instructed to call 24/7 for any worsening of vision. Check Both eyes daily. Gave patient my home phone number.      Procedure  Note:   OD}  Eylea (afibercept) 2 mg/0.05 ml Intravitreal Injection    I have explained the Risks, Benefits and Alternatives of the procedure in detail.  The patient voices understanding and all questions have been answered.  The patient agrees to proceed as discussed.  Xylocaine with Epi 2%  subconj bleb  was used for anesthesia.  Topical vigamox was used for antisepsis.  0.05 cc was  injected 3.7 mm from corneal limbus in the inferotemporal quadrant.  Following injection the IOP was less than thirty (<30) by tonopen.  The eye was then thoroughly irrigated with  BSS.  Patient tolerated procedure well.  No complications were observed.  The Patient was educated that mild irritation tonight was normal secondary to topical antispsis use.  Pt was advised to call at any time day or night for pain, redness, or any decline in vision. I gave the patient my home number as well as the clinic on call number. Daily visual checks and Amsler grid testing were reviewed.  ciloxan Antibiotic Drops to be used 4 times daily for 4 days  LINDSAY Bright MD  Procedure ordered: y  Consent: y  Pre auth: y  MAR:y  Opnote: y  Charge capture:y          .       ===============================

## 2019-07-29 ENCOUNTER — TELEPHONE (OUTPATIENT)
Dept: CARDIOLOGY | Facility: CLINIC | Age: 64
End: 2019-07-29

## 2019-07-29 NOTE — TELEPHONE ENCOUNTER
I tried reaching pt to reschedule appt with [facility] on 9/10/19 due to [facility] schedule starts in afternoon no answer, pt's mailbox is full to leave message.

## 2019-07-30 ENCOUNTER — TELEPHONE (OUTPATIENT)
Dept: CARDIOLOGY | Facility: CLINIC | Age: 64
End: 2019-07-30

## 2019-07-30 NOTE — TELEPHONE ENCOUNTER
Second attempt trying to reach pt to reschedule appt with Karly on 9/10/19 due to [facility] schedule starts in afternoon no answer, pt's mailbox is full to leave message.

## 2019-08-07 RX ORDER — TAMSULOSIN HYDROCHLORIDE 0.4 MG/1
CAPSULE ORAL
Qty: 30 CAPSULE | Refills: 2 | Status: SHIPPED | OUTPATIENT
Start: 2019-08-07 | End: 2022-05-16 | Stop reason: SDUPTHER

## 2019-08-09 RX ORDER — POTASSIUM CHLORIDE 750 MG/1
CAPSULE, EXTENDED RELEASE ORAL
Qty: 90 CAPSULE | Refills: 0 | Status: SHIPPED | OUTPATIENT
Start: 2019-08-09 | End: 2019-11-17 | Stop reason: SDUPTHER

## 2019-08-23 ENCOUNTER — PROCEDURE VISIT (OUTPATIENT)
Dept: OPHTHALMOLOGY | Facility: CLINIC | Age: 64
End: 2019-08-23
Payer: COMMERCIAL

## 2019-08-23 DIAGNOSIS — Z79.4 TYPE 2 DIABETES MELLITUS WITH BOTH EYES AFFECTED BY MILD NONPROLIFERATIVE RETINOPATHY AND MACULAR EDEMA, WITH LONG-TERM CURRENT USE OF INSULIN: Primary | ICD-10-CM

## 2019-08-23 DIAGNOSIS — E11.3213 TYPE 2 DIABETES MELLITUS WITH BOTH EYES AFFECTED BY MILD NONPROLIFERATIVE RETINOPATHY AND MACULAR EDEMA, WITH LONG-TERM CURRENT USE OF INSULIN: Primary | ICD-10-CM

## 2019-08-23 PROCEDURE — 92134 CPTRZ OPH DX IMG PST SGM RTA: CPT | Mod: S$GLB,,, | Performed by: OPHTHALMOLOGY

## 2019-08-23 PROCEDURE — 67028 PR INJECT INTRAVITREAL PHARMCOLOGIC: ICD-10-PCS | Mod: RT,S$GLB,, | Performed by: OPHTHALMOLOGY

## 2019-08-23 PROCEDURE — 67028 INJECTION EYE DRUG: CPT | Mod: RT,S$GLB,, | Performed by: OPHTHALMOLOGY

## 2019-08-23 PROCEDURE — 99499 UNLISTED E&M SERVICE: CPT | Mod: S$GLB,,, | Performed by: OPHTHALMOLOGY

## 2019-08-23 PROCEDURE — 99499 NO LOS: ICD-10-PCS | Mod: S$GLB,,, | Performed by: OPHTHALMOLOGY

## 2019-08-23 PROCEDURE — 92134 POSTERIOR SEGMENT OCT RETINA (OCULAR COHERENCE TOMOGRAPHY)-BOTH EYES: ICD-10-PCS | Mod: S$GLB,,, | Performed by: OPHTHALMOLOGY

## 2019-08-23 RX ORDER — AFLIBERCEPT 40 MG/ML
2 INJECTION, SOLUTION INTRAVITREAL
Qty: 1 VIAL | Refills: 3 | Status: SHIPPED | OUTPATIENT
Start: 2019-08-23 | End: 2020-02-26 | Stop reason: SDUPTHER

## 2019-08-23 RX ORDER — CIPROFLOXACIN HYDROCHLORIDE 3 MG/ML
SOLUTION/ DROPS OPHTHALMIC
Qty: 5 ML | Refills: 0 | Status: SHIPPED | OUTPATIENT
Start: 2019-08-23 | End: 2019-08-30

## 2019-08-23 NOTE — PROGRESS NOTES
===============================  Latosha Enriquez,   63 y.o. female   Last visit [facility]: :7/12/2019   [facility]  Uncorrected distance visual acuity was 20/30 in the right eye and 20/20 in the left eye.   Not recorded         Not recorded         Not recorded         Not recorded        Chief Complaint   Patient presents with    PDR/ME     EYLEA OD          ________________  8/23/2019  HPI     PDR/ME      Additional comments: EYLEA OD              Comments     (No Betadine - Vigamox Prep))    DM  DME OD  EYLEA OD 7/12/19          Last edited by Patricia Slater on 8/23/2019  2:41 PM. (History)      Problem List Items Addressed This Visit        Eye/Vision problems    Type 2 diabetes mellitus with both eyes affected by mild nonproliferative retinopathy and macular edema, with long-term current use of insulin - Primary (Chronic)    Relevant Medications    aflibercept Soln 2 mg (Completed) (Start on 8/23/2019  3:30 PM)    EYLEA 2 mg/0.05 mL Soln    Other Relevant Orders    Posterior Segment OCT Retina-Both eyes    Prior Authorization Order        od dme better   .rec eylea again today ' may be near plateau od      8/23/2019  Diagnosis :  oddme  Today:   Eylea (afibercept) 2 mg/0.05 ml Intravitreal Injection , OD   Follow up: rtc 1 m o        Instructed to call 24/7 for any worsening of vision. Check Both eyes daily. Gave patient my home phone number.      Procedure  Note:   OD}  Eylea (afibercept) 2 mg/0.05 ml Intravitreal Injection    I have explained the Risks, Benefits and Alternatives of the procedure in detail.  The patient voices understanding and all questions have been answered.  The patient agrees to proceed as discussed.  Xylocaine with Epi 2% subconj bleb was used for anesthesia.  Topical vigamox was used for antisepsis.  0.05 cc was  injected 3.7 mm from corneal limbus in the inferotemporal quadrant.  Following injection the IOP was less than thirty (<30) by tonopen.  The eye was  then thoroughly irrigated with BSS.  Patient tolerated procedure well.  No complications were observed.  The Patient was educated that mild irritation tonight was normal secondary to topical antispsis use.  Pt was advised to call at any time day or night for pain, redness, or any decline in vision. I gave the patient my home number as well as the clinic on call number. Daily visual checks and Amsler grid testing were reviewed.  ciloxan Antibiotic Drops to be used 4 times daily for 4 days  LINDSAY Bright MD  Procedure ordered: y  Consent: y  Pre auth: y  MAR:y  Opnote: y  Charge capture:y           ===========================

## 2019-09-09 ENCOUNTER — TELEPHONE (OUTPATIENT)
Dept: CARDIOLOGY | Facility: CLINIC | Age: 64
End: 2019-09-09

## 2019-09-09 NOTE — TELEPHONE ENCOUNTER
I left a voicemail for the patient to call the office. [facility]'s clinic will not start until 1pm on Tuesday due to rounding on patients. We have a 2 pm slot available for the patient.

## 2019-09-12 ENCOUNTER — TELEPHONE (OUTPATIENT)
Dept: FAMILY MEDICINE | Facility: CLINIC | Age: 64
End: 2019-09-12

## 2019-09-12 NOTE — TELEPHONE ENCOUNTER
----- Message from Shadia Tellez sent at 9/12/2019 11:31 AM CDT -----  ..Type:  RX Refill Request    Who Called: pt   Refill or New Rx:refill   RX Name and Strength: cough med   How is the patient currently taking it? (ex. 1XDay):  Is this a 30 day or 90 day RX:30  Preferred Pharmacy with phone number:.  [address] [address]  Phone: [phone] Fax: [phone]      [id number] - [place], [place] - [address] AT [facility] & [place] [address] [address]  Phone: [phone] Fax: [phone]        Local or Mail Order:local   Ordering Provider:Yg   Would the patient rather a call back or a response via [facility]? Call back   Best Call Back Number:[phone]  Additional Information: pt is requesting a call from nurse to discuss a refill on a cough med in the system.

## 2019-09-25 ENCOUNTER — OFFICE VISIT (OUTPATIENT)
Dept: FAMILY MEDICINE | Facility: CLINIC | Age: 64
End: 2019-09-25
Payer: COMMERCIAL

## 2019-09-25 VITALS
HEART RATE: 90 BPM | HEIGHT: 66 IN | TEMPERATURE: 97 F | SYSTOLIC BLOOD PRESSURE: 118 MMHG | DIASTOLIC BLOOD PRESSURE: 77 MMHG | BODY MASS INDEX: 39.46 KG/M2 | WEIGHT: 245.56 LBS

## 2019-09-25 DIAGNOSIS — R42 DIZZINESS: ICD-10-CM

## 2019-09-25 DIAGNOSIS — K21.9 GASTROESOPHAGEAL REFLUX DISEASE, ESOPHAGITIS PRESENCE NOT SPECIFIED: ICD-10-CM

## 2019-09-25 DIAGNOSIS — S06.0X0A CONCUSSION WITHOUT LOSS OF CONSCIOUSNESS, INITIAL ENCOUNTER: Primary | ICD-10-CM

## 2019-09-25 DIAGNOSIS — J06.9 VIRAL URI WITH COUGH: ICD-10-CM

## 2019-09-25 PROCEDURE — 99214 OFFICE O/P EST MOD 30 MIN: CPT | Mod: S$GLB,,, | Performed by: REGISTERED NURSE

## 2019-09-25 PROCEDURE — 3008F BODY MASS INDEX DOCD: CPT | Mod: CPTII,S$GLB,, | Performed by: REGISTERED NURSE

## 2019-09-25 PROCEDURE — 99999 PR PBB SHADOW E&M-EST. PATIENT-LVL V: CPT | Mod: PBBFAC,,, | Performed by: REGISTERED NURSE

## 2019-09-25 PROCEDURE — 3078F DIAST BP <80 MM HG: CPT | Mod: CPTII,S$GLB,, | Performed by: REGISTERED NURSE

## 2019-09-25 PROCEDURE — 99214 PR OFFICE/OUTPT VISIT, EST, LEVL IV, 30-39 MIN: ICD-10-PCS | Mod: S$GLB,,, | Performed by: REGISTERED NURSE

## 2019-09-25 PROCEDURE — 3074F SYST BP LT 130 MM HG: CPT | Mod: CPTII,S$GLB,, | Performed by: REGISTERED NURSE

## 2019-09-25 PROCEDURE — 3008F PR BODY MASS INDEX (BMI) DOCUMENTED: ICD-10-PCS | Mod: CPTII,S$GLB,, | Performed by: REGISTERED NURSE

## 2019-09-25 PROCEDURE — 3078F PR MOST RECENT DIASTOLIC BLOOD PRESSURE < 80 MM HG: ICD-10-PCS | Mod: CPTII,S$GLB,, | Performed by: REGISTERED NURSE

## 2019-09-25 PROCEDURE — 99999 PR PBB SHADOW E&M-EST. PATIENT-LVL V: ICD-10-PCS | Mod: PBBFAC,,, | Performed by: REGISTERED NURSE

## 2019-09-25 PROCEDURE — 3074F PR MOST RECENT SYSTOLIC BLOOD PRESSURE < 130 MM HG: ICD-10-PCS | Mod: CPTII,S$GLB,, | Performed by: REGISTERED NURSE

## 2019-09-25 RX ORDER — HYDROGEN PEROXIDE 3 %
20 SOLUTION, NON-ORAL MISCELLANEOUS
Qty: 30 CAPSULE | Refills: 0 | Status: SHIPPED | OUTPATIENT
Start: 2019-09-25 | End: 2019-11-18 | Stop reason: SDUPTHER

## 2019-09-25 RX ORDER — ERGOCALCIFEROL (VITAMIN D2) 10 MCG
TABLET ORAL
COMMUNITY

## 2019-09-25 RX ORDER — PROMETHAZINE HYDROCHLORIDE AND CODEINE PHOSPHATE 6.25; 1 MG/5ML; MG/5ML
5 SOLUTION ORAL
Qty: 150 ML | Refills: 0 | Status: SHIPPED | OUTPATIENT
Start: 2019-09-25 | End: 2020-07-07

## 2019-09-25 RX ORDER — MECLIZINE HCL 12.5 MG 12.5 MG/1
12.5 TABLET ORAL 3 TIMES DAILY PRN
Qty: 30 TABLET | Refills: 0 | Status: SHIPPED | OUTPATIENT
Start: 2019-09-25 | End: 2020-07-07

## 2019-09-25 RX ORDER — PNV NO.95/FERROUS FUM/FOLIC AC 28MG-0.8MG
TABLET ORAL
COMMUNITY

## 2019-09-25 RX ORDER — INSULIN LISPRO 100 [IU]/ML
INJECTION, SOLUTION INTRAVENOUS; SUBCUTANEOUS
COMMUNITY
End: 2020-04-29
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
## 2019-10-14 RX ORDER — METFORMIN HYDROCHLORIDE 500 MG/1
TABLET, EXTENDED RELEASE ORAL
Qty: 90 TABLET | Refills: 0 | Status: SHIPPED | OUTPATIENT
Start: 2019-10-14 | End: 2019-11-05 | Stop reason: SDUPTHER

## 2019-10-15 ENCOUNTER — TELEPHONE (OUTPATIENT)
Dept: OPHTHALMOLOGY | Facility: CLINIC | Age: 64
End: 2019-10-15
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
## 2019-10-16 ENCOUNTER — TELEPHONE (OUTPATIENT)
Dept: OPHTHALMOLOGY | Facility: CLINIC | Age: 64
End: 2019-10-16

## 2019-10-16 NOTE — TELEPHONE ENCOUNTER
----- Message from Giulia New sent at 10/16/2019  4:04 PM CDT -----  Contact: Pt  Pt is requesting call back in regards to appt in November          [facility] call back at [phone]

## 2019-10-20 RX ORDER — LOSARTAN POTASSIUM AND HYDROCHLOROTHIAZIDE 25; 100 MG/1; MG/1
TABLET ORAL
Qty: 30 TABLET | Refills: 0 | Status: SHIPPED | OUTPATIENT
Start: 2019-10-20 | End: 2019-11-27 | Stop reason: SDUPTHER

## 2019-11-05 RX ORDER — INSULIN LISPRO 100 [IU]/ML
INJECTION, SUSPENSION SUBCUTANEOUS
Qty: 45 ML | Refills: 0 | Status: SHIPPED | OUTPATIENT
Start: 2019-11-05 | End: 2020-04-06 | Stop reason: SDUPTHER

## 2019-11-05 RX ORDER — METFORMIN HYDROCHLORIDE 500 MG/1
TABLET, EXTENDED RELEASE ORAL
Qty: 270 TABLET | Refills: 0 | Status: SHIPPED | OUTPATIENT
Start: 2019-11-05 | End: 2020-03-24

## 2019-11-14 ENCOUNTER — PROCEDURE VISIT (OUTPATIENT)
Dept: OPHTHALMOLOGY | Facility: CLINIC | Age: 64
End: 2019-11-14
Payer: COMMERCIAL

## 2019-11-14 DIAGNOSIS — Z79.4 TYPE 2 DIABETES MELLITUS WITH BOTH EYES AFFECTED BY MILD NONPROLIFERATIVE RETINOPATHY AND MACULAR EDEMA, WITH LONG-TERM CURRENT USE OF INSULIN: Primary | ICD-10-CM

## 2019-11-14 DIAGNOSIS — E11.3213 TYPE 2 DIABETES MELLITUS WITH BOTH EYES AFFECTED BY MILD NONPROLIFERATIVE RETINOPATHY AND MACULAR EDEMA, WITH LONG-TERM CURRENT USE OF INSULIN: Primary | ICD-10-CM

## 2019-11-14 PROCEDURE — 92134 CPTRZ OPH DX IMG PST SGM RTA: CPT | Mod: S$GLB,,, | Performed by: OPHTHALMOLOGY

## 2019-11-14 PROCEDURE — 92014 COMPRE OPH EXAM EST PT 1/>: CPT | Mod: 25,S$GLB,, | Performed by: OPHTHALMOLOGY

## 2019-11-14 PROCEDURE — 92134 POSTERIOR SEGMENT OCT RETINA (OCULAR COHERENCE TOMOGRAPHY)-BOTH EYES: ICD-10-PCS | Mod: S$GLB,,, | Performed by: OPHTHALMOLOGY

## 2019-11-14 PROCEDURE — 92014 PR EYE EXAM, EST PATIENT,COMPREHESV: ICD-10-PCS | Mod: 25,S$GLB,, | Performed by: OPHTHALMOLOGY

## 2019-11-14 PROCEDURE — 67028 INJECTION EYE DRUG: CPT | Mod: RT,S$GLB,, | Performed by: OPHTHALMOLOGY

## 2019-11-14 PROCEDURE — 67028 PR INJECT INTRAVITREAL PHARMCOLOGIC: ICD-10-PCS | Mod: RT,S$GLB,, | Performed by: OPHTHALMOLOGY

## 2019-11-14 NOTE — PROGRESS NOTES
"HPI     dme      Additional comments: eylea od              chalazion      Additional comments: pt c/o "bump" left lower lid              Comments     DM  DME OD  EYLEA OD 8/23/19          Last edited by KILEY Aguirre on 11/14/2019  3:23 PM. (History)        OCT -       DME OD    No F/U since 8/19 - resume Eylea OD today    Chalazion LLL  Starting to express - continue warm compressess      1 month OCT    Risks, benefits, and alternatives to treatment discussed in detail with the patient.  The patient voiced understanding and wished to proceed with the procedure    Injection Procedure Note:  Diagnosis: DME OD    Patient Identified and Time Out complete  Pt Prefers to be marked with sticker rather than ink marker ([email or web address] #[id number])  Topical Proparacaine and Betadine.  Inject EYlea OD at 6:00 @ 3.5-4mm posterior to limbus  Post Operative Dx: Same  Complications: None  Follow up as above.  o  "

## 2019-11-18 DIAGNOSIS — K21.9 GASTROESOPHAGEAL REFLUX DISEASE, ESOPHAGITIS PRESENCE NOT SPECIFIED: ICD-10-CM

## 2019-11-18 RX ORDER — POTASSIUM CHLORIDE 750 MG/1
CAPSULE, EXTENDED RELEASE ORAL
Qty: 90 CAPSULE | Refills: 0 | Status: SHIPPED | OUTPATIENT
Start: 2019-11-18 | End: 2019-11-29 | Stop reason: SDUPTHER

## 2019-11-18 RX ORDER — HYDROGEN PEROXIDE 3 %
SOLUTION, NON-ORAL MISCELLANEOUS
Qty: 30 CAPSULE | Refills: 0 | Status: SHIPPED | OUTPATIENT
Start: 2019-11-18 | End: 2021-04-30

## 2019-11-18 RX ORDER — METFORMIN HYDROCHLORIDE 500 MG/1
TABLET, EXTENDED RELEASE ORAL
Qty: 90 TABLET | Refills: 0 | Status: SHIPPED | OUTPATIENT
Start: 2019-11-18 | End: 2019-11-29

## 2019-11-27 DIAGNOSIS — Z79.4 TYPE 2 DIABETES MELLITUS WITH BOTH EYES AFFECTED BY MILD NONPROLIFERATIVE RETINOPATHY AND MACULAR EDEMA, WITH LONG-TERM CURRENT USE OF INSULIN: Chronic | ICD-10-CM

## 2019-11-27 DIAGNOSIS — E11.3213 TYPE 2 DIABETES MELLITUS WITH BOTH EYES AFFECTED BY MILD NONPROLIFERATIVE RETINOPATHY AND MACULAR EDEMA, WITH LONG-TERM CURRENT USE OF INSULIN: Chronic | ICD-10-CM

## 2019-11-27 DIAGNOSIS — Z00.00 PREVENTATIVE HEALTH CARE: Primary | ICD-10-CM

## 2019-11-27 RX ORDER — LOSARTAN POTASSIUM AND HYDROCHLOROTHIAZIDE 25; 100 MG/1; MG/1
TABLET ORAL
Qty: 30 TABLET | Refills: 0 | Status: SHIPPED | OUTPATIENT
Start: 2019-11-27 | End: 2019-11-29

## 2019-11-27 NOTE — TELEPHONE ENCOUNTER
----- Message from Beba Gray sent at 11/27/2019  9:28 AM CST -----  Contact: self  Requesting call back regarding status of orders being put in for pt to have blood work done. Please call back at [phone].      Thanks,  Beba Gray

## 2019-11-29 ENCOUNTER — LAB VISIT (OUTPATIENT)
Dept: LAB | Facility: HOSPITAL | Age: 64
End: 2019-11-29
Attending: FAMILY MEDICINE
Payer: COMMERCIAL

## 2019-11-29 ENCOUNTER — OFFICE VISIT (OUTPATIENT)
Dept: FAMILY MEDICINE | Facility: CLINIC | Age: 64
End: 2019-11-29
Payer: COMMERCIAL

## 2019-11-29 VITALS
BODY MASS INDEX: 40.53 KG/M2 | TEMPERATURE: 98 F | DIASTOLIC BLOOD PRESSURE: 70 MMHG | SYSTOLIC BLOOD PRESSURE: 122 MMHG | WEIGHT: 252.19 LBS | OXYGEN SATURATION: 97 % | HEART RATE: 100 BPM | RESPIRATION RATE: 18 BRPM | HEIGHT: 66 IN

## 2019-11-29 DIAGNOSIS — Z79.4 TYPE 2 DIABETES MELLITUS WITH BOTH EYES AFFECTED BY MILD NONPROLIFERATIVE RETINOPATHY AND MACULAR EDEMA, WITH LONG-TERM CURRENT USE OF INSULIN: Chronic | ICD-10-CM

## 2019-11-29 DIAGNOSIS — I10 ESSENTIAL HYPERTENSION: ICD-10-CM

## 2019-11-29 DIAGNOSIS — Z00.00 PREVENTATIVE HEALTH CARE: ICD-10-CM

## 2019-11-29 DIAGNOSIS — Z00.00 PREVENTATIVE HEALTH CARE: Primary | ICD-10-CM

## 2019-11-29 DIAGNOSIS — E66.01 MORBID OBESITY WITH BMI OF 40.0-44.9, ADULT: ICD-10-CM

## 2019-11-29 DIAGNOSIS — Z12.31 ENCOUNTER FOR SCREENING MAMMOGRAM FOR MALIGNANT NEOPLASM OF BREAST: ICD-10-CM

## 2019-11-29 DIAGNOSIS — E11.3213 TYPE 2 DIABETES MELLITUS WITH BOTH EYES AFFECTED BY MILD NONPROLIFERATIVE RETINOPATHY AND MACULAR EDEMA, WITH LONG-TERM CURRENT USE OF INSULIN: Chronic | ICD-10-CM

## 2019-11-29 LAB
ALBUMIN SERPL BCP-MCNC: 3.5 G/DL (ref 3.5–5.2)
ALP SERPL-CCNC: 79 U/L (ref 55–135)
ALT SERPL W/O P-5'-P-CCNC: 29 U/L (ref 10–44)
ANION GAP SERPL CALC-SCNC: 6 MMOL/L (ref 8–16)
AST SERPL-CCNC: 15 U/L (ref 10–40)
BASOPHILS # BLD AUTO: 0.04 K/UL (ref 0–0.2)
BASOPHILS NFR BLD: 0.5 % (ref 0–1.9)
BILIRUB SERPL-MCNC: 0.4 MG/DL (ref 0.1–1)
BUN SERPL-MCNC: 22 MG/DL (ref 8–23)
CALCIUM SERPL-MCNC: 9.2 MG/DL (ref 8.7–10.5)
CHLORIDE SERPL-SCNC: 107 MMOL/L (ref 95–110)
CHOLEST SERPL-MCNC: 182 MG/DL (ref 120–199)
CHOLEST/HDLC SERPL: 3.6 {RATIO} (ref 2–5)
CO2 SERPL-SCNC: 28 MMOL/L (ref 23–29)
CREAT SERPL-MCNC: 0.8 MG/DL (ref 0.5–1.4)
DIFFERENTIAL METHOD: ABNORMAL
EOSINOPHIL # BLD AUTO: 0.2 K/UL (ref 0–0.5)
EOSINOPHIL NFR BLD: 2.6 % (ref 0–8)
ERYTHROCYTE [DISTWIDTH] IN BLOOD BY AUTOMATED COUNT: 13.4 % (ref 11.5–14.5)
EST. GFR  (AFRICAN AMERICAN): >60 ML/MIN/1.73 M^2
EST. GFR  (NON AFRICAN AMERICAN): >60 ML/MIN/1.73 M^2
ESTIMATED AVG GLUCOSE: 143 MG/DL (ref 68–131)
GLUCOSE SERPL-MCNC: 131 MG/DL (ref 70–110)
HBA1C MFR BLD HPLC: 6.6 % (ref 4–5.6)
HCT VFR BLD AUTO: 39.6 % (ref 37–48.5)
HDLC SERPL-MCNC: 51 MG/DL (ref 40–75)
HDLC SERPL: 28 % (ref 20–50)
HGB BLD-MCNC: 11.8 G/DL (ref 12–16)
IMM GRANULOCYTES # BLD AUTO: 0.02 K/UL (ref 0–0.04)
IMM GRANULOCYTES NFR BLD AUTO: 0.2 % (ref 0–0.5)
LDLC SERPL CALC-MCNC: 116.4 MG/DL (ref 63–159)
LYMPHOCYTES # BLD AUTO: 2.6 K/UL (ref 1–4.8)
LYMPHOCYTES NFR BLD: 32.7 % (ref 18–48)
MCH RBC QN AUTO: 30.1 PG (ref 27–31)
MCHC RBC AUTO-ENTMCNC: 29.8 G/DL (ref 32–36)
MCV RBC AUTO: 101 FL (ref 82–98)
MONOCYTES # BLD AUTO: 0.6 K/UL (ref 0.3–1)
MONOCYTES NFR BLD: 7.1 % (ref 4–15)
NEUTROPHILS # BLD AUTO: 4.6 K/UL (ref 1.8–7.7)
NEUTROPHILS NFR BLD: 56.9 % (ref 38–73)
NONHDLC SERPL-MCNC: 131 MG/DL
NRBC BLD-RTO: 0 /100 WBC
PLATELET # BLD AUTO: 233 K/UL (ref 150–350)
PMV BLD AUTO: 10.6 FL (ref 9.2–12.9)
POTASSIUM SERPL-SCNC: 3.9 MMOL/L (ref 3.5–5.1)
PROT SERPL-MCNC: 6.7 G/DL (ref 6–8.4)
RBC # BLD AUTO: 3.92 M/UL (ref 4–5.4)
SODIUM SERPL-SCNC: 141 MMOL/L (ref 136–145)
TRIGL SERPL-MCNC: 73 MG/DL (ref 30–150)
TSH SERPL DL<=0.005 MIU/L-ACNC: 1 UIU/ML (ref 0.4–4)
WBC # BLD AUTO: 8.08 K/UL (ref 3.9–12.7)

## 2019-11-29 PROCEDURE — 99999 PR PBB SHADOW E&M-EST. PATIENT-LVL III: ICD-10-PCS | Mod: PBBFAC,,, | Performed by: FAMILY MEDICINE

## 2019-11-29 PROCEDURE — 99396 PREV VISIT EST AGE 40-64: CPT | Mod: S$GLB,,, | Performed by: FAMILY MEDICINE

## 2019-11-29 PROCEDURE — 3078F DIAST BP <80 MM HG: CPT | Mod: CPTII,S$GLB,, | Performed by: FAMILY MEDICINE

## 2019-11-29 PROCEDURE — 80061 LIPID PANEL: CPT

## 2019-11-29 PROCEDURE — 80053 COMPREHEN METABOLIC PANEL: CPT

## 2019-11-29 PROCEDURE — 83036 HEMOGLOBIN GLYCOSYLATED A1C: CPT

## 2019-11-29 PROCEDURE — 36415 COLL VENOUS BLD VENIPUNCTURE: CPT | Mod: PO

## 2019-11-29 PROCEDURE — 3078F PR MOST RECENT DIASTOLIC BLOOD PRESSURE < 80 MM HG: ICD-10-PCS | Mod: CPTII,S$GLB,, | Performed by: FAMILY MEDICINE

## 2019-11-29 PROCEDURE — 3074F SYST BP LT 130 MM HG: CPT | Mod: CPTII,S$GLB,, | Performed by: FAMILY MEDICINE

## 2019-11-29 PROCEDURE — 3074F PR MOST RECENT SYSTOLIC BLOOD PRESSURE < 130 MM HG: ICD-10-PCS | Mod: CPTII,S$GLB,, | Performed by: FAMILY MEDICINE

## 2019-11-29 PROCEDURE — 85025 COMPLETE CBC W/AUTO DIFF WBC: CPT

## 2019-11-29 PROCEDURE — 84443 ASSAY THYROID STIM HORMONE: CPT

## 2019-11-29 PROCEDURE — 99999 PR PBB SHADOW E&M-EST. PATIENT-LVL III: CPT | Mod: PBBFAC,,, | Performed by: FAMILY MEDICINE

## 2019-11-29 PROCEDURE — 99396 PR PREVENTIVE VISIT,EST,40-64: ICD-10-PCS | Mod: S$GLB,,, | Performed by: FAMILY MEDICINE

## 2019-11-29 RX ORDER — TERBINAFINE HYDROCHLORIDE 250 MG/1
250 TABLET ORAL DAILY
Qty: 30 TABLET | Refills: 1 | Status: SHIPPED | OUTPATIENT
Start: 2019-11-29 | End: 2020-07-07

## 2019-11-29 RX ORDER — LOSARTAN POTASSIUM AND HYDROCHLOROTHIAZIDE 25; 100 MG/1; MG/1
TABLET ORAL
Qty: 90 TABLET | Refills: 3 | Status: SHIPPED | OUTPATIENT
Start: 2019-11-29 | End: 2020-04-29 | Stop reason: SDUPTHER

## 2019-11-29 RX ORDER — PHENTERMINE HYDROCHLORIDE 37.5 MG/1
37.5 CAPSULE ORAL EVERY MORNING
Qty: 30 CAPSULE | Refills: 0 | Status: SHIPPED | OUTPATIENT
Start: 2019-11-29 | End: 2019-12-29

## 2019-11-29 RX ORDER — CEPHALEXIN 500 MG/1
500 CAPSULE ORAL 2 TIMES DAILY
Qty: 20 CAPSULE | Refills: 0 | Status: SHIPPED | OUTPATIENT
Start: 2019-11-29 | End: 2019-12-09

## 2019-11-29 RX ORDER — POTASSIUM CHLORIDE 750 MG/1
10 CAPSULE, EXTENDED RELEASE ORAL DAILY
Qty: 90 CAPSULE | Refills: 3 | Status: SHIPPED | OUTPATIENT
Start: 2019-11-29 | End: 2020-04-29 | Stop reason: SDUPTHER

## 2019-11-29 RX ORDER — SIMVASTATIN 20 MG/1
20 TABLET, FILM COATED ORAL NIGHTLY
Qty: 90 TABLET | Refills: 3 | Status: SHIPPED | OUTPATIENT
Start: 2019-11-29 | End: 2020-07-21 | Stop reason: SDUPTHER

## 2019-11-29 NOTE — PROGRESS NOTES
CHIEF COMPLAINT:  This is a 63-year-old female here for preventive health exam.       SUBJECTIVE:  The patient reports that her blood sugars have been elevated because of poor dietary choices.  She has type 2 diabetes.  Blood sugars were as high as 501-2 months ago.   She denies polyuria, polydipsia or polyphagia.  Last A1c was 6.4% over 1 year ago.  She takes metformin 1000 mg  twice daily and injects Humalog mix 75-25 twice daily.  Her blood pressure is controlled on losartan HCT.  She takes simvastatin for hyperlipidemia.  Patient has vitamin D deficiency and takes vitamin-D supplement daily. She has a history of uric acid kidney stones for which she takes allopurinol daily.  She takes Nexium as needed for GERD.  The patient is morbidly obese with a BMI of 40.71.  She requests appetite suppressant.  Patient is also requesting medication for toenail fungus left great toenail.  Patient complains of persistent nasal vestibulitis which is also affecting her left eye.  She has been using mupirocin ointment without improvement and requests oral antibiotics.      Eye exam October 2019.  Mammogram December 2018. Colonoscopy November 2015, due again in November 2025.  Tdap July 2017.     ROS:  GENERAL: Patient denies fever, chills, night sweats. Patient denies weight gain or loss. Patient denies anorexia, fatigue, weakness or swollen glands.  SKIN: Patient denies rash or hair loss.  HEENT: Patient denies sore throat, ear pain, hearing loss, nasal congestion, or runny nose. Patient denies visual disturbance, eye irritation or discharge.  LUNGS: Patient denies cough, wheeze or hemoptysis.  CARDIOVASCULAR: Patient denies chest pain, shortness of breath, palpitations, syncope or lower extremity edema.  GI: Patient denies abdominal pain, nausea, vomiting, diarrhea, blood in stool or melena.  GENITOURINARY: Patient denies pelvic pain, vaginal discharge, itch or odor. Patient denies irregular vaginal bleeding. Patient denies  dysuria, frequency, hematuria, nocturia, urgency or incontinence.  BREASTS: Patient denies breast pain, mass or nipple discharge.  MUSCULOSKELETAL: Patient denies joint pain, swelling, redness or warmth.  NEUROLOGIC: Patient denies headache, vertigo, paresthesias, weakness in limb, dysarthria, dysphagia or abnormality of gait.  PSYCHIATRIC: Patient denies anxiety, depression, or memory loss.     OBJECTIVE:   GENERAL: Well-developed well-nourished, morbidly obese, black female alert and oriented x3, in no acute distress. Memory, judgment and cognition without deficit.   SKIN: Clear without rash. Normal color and tone.  Onychomycosis left great toenail.  HEENT: Eyes: No scleral icterus. Clear conjunctivae. Pupils equal reactive to light and accommodation. Ears: Clear canals.  Clear TMs.  Nose: Without congestion. Crusty scabs in nares. Pharynx: Without injection or exudates.  NECK: Supple, normal range of motion. No masses, nodes or enlarged thyroid. No JVD. Carotids 2+ and equal. No bruits.  LUNGS: Clear to auscultation. Normal respiratory effort.  CARDIOVASCULAR: Regular rhythm, normal S1, S2 without murmur, gallop or rub.  BACK: No CVA or spinal tenderness.  BREASTS: No masses, tenderness or nipple discharge.  ABDOMEN: Normal appearance. Active bowel sounds. Soft, nontender without mass or organomegaly. No rebound or guarding.  EXTREMITIES: Without cyanosis, clubbing or edema. Distal pulses 2+ and equal. Normal range of motion in all extremities. No joint effusion, erythema or warmth.  NEUROLOGIC: Cranial nerves II through XII without deficit. Motor strength equal bilaterally. Sensation normal to touch. Deep tendon reflexes 2+ and equal. Gait without abnormality. No tremor. Negative cerebellar signs.  FOOT EVALUATION: 10 gram monofilament exam with protective sensation intact bilaterally. Nails appropriately trimmed. No ulcers. Distal pulses palpable.  PELVIC: External: Without lesions or inflammation. Vaginal:  Atrophic changes, malodor, cuff intact. Bimanual: Non-tender, without masses. Rectovaginal: Confirms, heme-negative stool x2.     Discussed weight management issues, including low fat, limited carbohydrate diet, portion control and regular exercise.  Discussed counting calories, carbs, fat grams, or points (as in Weight Watchers) as a way to stay on track with weight reduction.  Reviewed the use of phentermine, including pharmacology, side effects and addictive potential.    ASSESSMENT:  1. Preventative health care    2. Type 2 diabetes mellitus with both eyes affected by mild nonproliferative retinopathy and macular edema, with long-term current use of insulin    3. Essential hypertension    4. Morbid obesity with BMI of 40.0-44.9, adult    5. Encounter for screening mammogram for malignant neoplasm of breast      PLAN:  1.  Weight reduction. Exercise regularly.  2.  Age-appropriate counseling.  3.  Fasting lab.  4.  Mammogram.  5.  Terbinafine 250 mg daily 1 month.  Refill x1 then repeat liver enzymes.  6.  Keflex 500 mg twice daily for 10 days.  7.  Phentermine 37.9 mg daily in mid morning.  Follow-up in 4 weeks.  8.  Refill simvastatin and potassium supplement.    This note is generated with speech recognition software and is subject to transcription error and sound alike phrases that may be missed by proofreading.

## 2019-12-09 ENCOUNTER — TELEPHONE (OUTPATIENT)
Dept: FAMILY MEDICINE | Facility: CLINIC | Age: 64
End: 2019-12-09

## 2019-12-09 NOTE — TELEPHONE ENCOUNTER
----- Message from Pina Felipe sent at 12/9/2019  1:49 PM CST -----  Contact: [facility] Aaikwvog-[phone]  .Type:  Pharmacy Calling to Clarify an RX    Name of Genovevabenito  Pharmacy Name:[facility]  Prescription Name:Losartan Hctz  What do they need to clarify?:medication is on back order, would like to change to 2 separate medications  Best Call Back Number:[phone]  Additional Information:

## 2019-12-23 RX ORDER — HYDROCODONE BITARTRATE AND ACETAMINOPHEN 5; 325 MG/1; MG/1
1-2 TABLET ORAL EVERY 6 HOURS PRN
Qty: 30 TABLET | Refills: 0 | OUTPATIENT
Start: 2019-12-23 | End: 2020-01-02

## 2019-12-23 NOTE — TELEPHONE ENCOUNTER
----- Message from Muriel Toussaint sent at 12/23/2019  1:02 PM CST -----  Contact: pt  .Type:  RX Refill Request    Who Called: pt  Refill or New Rx:refill  RX Name and Strength:Hydrocodne   How is the patient currently taking it? (ex. 1XDay):as needed  Is this a 30 day or 90 day RX:30  Preferred Pharmacy with phone number:.  [id number] - [place], [place] - [address] AT [facility] [address]  [address]  [facility] [address]  Phone: [phone] Fax: [phone]  Local or Mail Order:local  Ordering Provider:Yg  Would the patient rather a call back or a response via [facility]? Call back  Best Call Back Number:[phone]  Additional Information:

## 2019-12-23 NOTE — TELEPHONE ENCOUNTER
Please advise pt I will not be able to honor refill. I don't see this medication as current medication prescribed by Dr. Ronquillo. Thanks.

## 2019-12-24 RX ORDER — HYDROCODONE BITARTRATE AND ACETAMINOPHEN 5; 325 MG/1; MG/1
1-2 TABLET ORAL EVERY 6 HOURS PRN
Qty: 30 TABLET | Refills: 0 | Status: SHIPPED | OUTPATIENT
Start: 2019-12-24 | End: 2020-01-03

## 2019-12-24 NOTE — TELEPHONE ENCOUNTER
----- Message from Simon Duarte sent at 12/24/2019  9:07 AM CST -----  Contact: PT   Type:  Patient Returning Call    Who Called: PT   Who Left Message for Patient: Elsy   Does the patient know what this is regarding?: yes   Would the patient rather a call back or a response via [facility]? Call back   Best Call Back Number: [phone] (home)   Additional Information: n/a

## 2019-12-31 ENCOUNTER — HOSPITAL ENCOUNTER (OUTPATIENT)
Dept: RADIOLOGY | Facility: HOSPITAL | Age: 64
Discharge: HOME | End: 2019-12-31
Attending: FAMILY MEDICINE
Payer: COMMERCIAL

## 2019-12-31 DIAGNOSIS — Z12.31 ENCOUNTER FOR SCREENING MAMMOGRAM FOR MALIGNANT NEOPLASM OF BREAST: ICD-10-CM

## 2019-12-31 PROCEDURE — 77067 MAMMO DIGITAL SCREENING BILAT WITH TOMOSYNTHESIS_CAD: ICD-10-PCS | Mod: 26,,, | Performed by: RADIOLOGY

## 2019-12-31 PROCEDURE — 77067 SCR MAMMO BI INCL CAD: CPT | Mod: TC

## 2019-12-31 PROCEDURE — 77063 MAMMO DIGITAL SCREENING BILAT WITH TOMOSYNTHESIS_CAD: ICD-10-PCS | Mod: 26,,, | Performed by: RADIOLOGY

## 2019-12-31 PROCEDURE — 77067 SCR MAMMO BI INCL CAD: CPT | Mod: 26,,, | Performed by: RADIOLOGY

## 2019-12-31 PROCEDURE — 77063 BREAST TOMOSYNTHESIS BI: CPT | Mod: 26,,, | Performed by: RADIOLOGY

## 2020-01-10 ENCOUNTER — TELEPHONE (OUTPATIENT)
Dept: OPHTHALMOLOGY | Facility: CLINIC | Age: 65
End: 2020-01-10

## 2020-01-10 NOTE — TELEPHONE ENCOUNTER
I called twice today and left voice mail for her to call when she can stay on the phone long enough to make appt.

## 2020-01-10 NOTE — TELEPHONE ENCOUNTER
----- Message from Radha Gaines sent at 1/10/2020 11:50 AM CST -----  Contact: pt  Pt stated that she was in [facility] on 1/2/20 and missed her procedure and would like a call back to get r/s. Pt can be reached at [phone]    Thanks,  Radha Gaines

## 2020-01-15 ENCOUNTER — TELEPHONE (OUTPATIENT)
Dept: OPHTHALMOLOGY | Facility: CLINIC | Age: 65
End: 2020-01-15

## 2020-01-15 NOTE — TELEPHONE ENCOUNTER
----- Message from Vangie Perez sent at 1/15/2020  4:00 PM CST -----  Contact: pt  Calling to schedule proc [facility] call [phone]

## 2020-01-17 ENCOUNTER — TELEPHONE (OUTPATIENT)
Dept: OPHTHALMOLOGY | Facility: CLINIC | Age: 65
End: 2020-01-17

## 2020-01-17 NOTE — TELEPHONE ENCOUNTER
----- Message from Beba Gray sent at 1/17/2020  1:55 PM CST -----  Contact: self  Requesting call back regarding getting an appt with Dr. Bright. Pt states she has called several times this week but has not gotten a response. Please call back at [phone].    Thanks,  Beba Gray

## 2020-01-29 ENCOUNTER — PROCEDURE VISIT (OUTPATIENT)
Dept: OPHTHALMOLOGY | Facility: CLINIC | Age: 65
End: 2020-01-29
Payer: COMMERCIAL

## 2020-01-29 DIAGNOSIS — E11.3213 TYPE 2 DIABETES MELLITUS WITH BOTH EYES AFFECTED BY MILD NONPROLIFERATIVE RETINOPATHY AND MACULAR EDEMA, WITH LONG-TERM CURRENT USE OF INSULIN: Primary | ICD-10-CM

## 2020-01-29 DIAGNOSIS — I10 ESSENTIAL HYPERTENSION: Primary | ICD-10-CM

## 2020-01-29 DIAGNOSIS — Z79.4 TYPE 2 DIABETES MELLITUS WITH BOTH EYES AFFECTED BY MILD NONPROLIFERATIVE RETINOPATHY AND MACULAR EDEMA, WITH LONG-TERM CURRENT USE OF INSULIN: Primary | ICD-10-CM

## 2020-01-29 PROCEDURE — 92134 POSTERIOR SEGMENT OCT RETINA (OCULAR COHERENCE TOMOGRAPHY)-BOTH EYES: ICD-10-PCS | Mod: S$GLB,,, | Performed by: OPHTHALMOLOGY

## 2020-01-29 PROCEDURE — 67028 PR INJECT INTRAVITREAL PHARMCOLOGIC: ICD-10-PCS | Mod: RT,S$GLB,, | Performed by: OPHTHALMOLOGY

## 2020-01-29 PROCEDURE — 67028 INJECTION EYE DRUG: CPT | Mod: RT,S$GLB,, | Performed by: OPHTHALMOLOGY

## 2020-01-29 PROCEDURE — 99499 UNLISTED E&M SERVICE: CPT | Mod: S$GLB,,, | Performed by: OPHTHALMOLOGY

## 2020-01-29 PROCEDURE — 99499 NO LOS: ICD-10-PCS | Mod: S$GLB,,, | Performed by: OPHTHALMOLOGY

## 2020-01-29 PROCEDURE — 92134 CPTRZ OPH DX IMG PST SGM RTA: CPT | Mod: S$GLB,,, | Performed by: OPHTHALMOLOGY

## 2020-01-29 NOTE — PROGRESS NOTES
===============================  Latosha Enriquez,  1/29/2020 today   64 y.o. female   Last visit [facility]: :8/23/2019   Last visit eye dept. 11/14/2019  [facility]:  Uncorrected distance visual acuity was 20/30 -1 in the right eye and 20/25 in the left eye.  Tonometry     Tonometry       Right Left    Pressure 18                Not recorded         Not recorded         Not recorded        No chief complaint on file.      ________________  1/29/2020 today  HPI     DM  DME OD  EYLEA OD 11/14/19    Last edited by LINDSAY Bright MD on 1/29/2020  3:04 PM. (History)      Problem List Items Addressed This Visit        Eye/Vision problems    Type 2 diabetes mellitus with both eyes affected by mild nonproliferative retinopathy and macular edema, with long-term current use of insulin - Primary (Chronic)    Relevant Medications    aflibercept Soln 2 mg (Completed)    Other Relevant Orders    Prior Authorization Order    Posterior Segment OCT Retina-Both eyes        Has not had a structured q 4 week injection treatment  Recommend q4w for josé miguel templetontg      1/29/2020  Diagnosis :  dme  Today:   Eylea (afibercept) 2 mg/0.05 ml Intravitreal Injection , OD   Follow up: 1 month        Instructed to call 24/7 for any worsening of vision. Check Both eyes daily. Gave patient my home phone number.  Risks, benefits, and alternatives to treatment discussed in detail with the patient.  The patient voiced understanding and wished to proceed with the procedure     Injection Procedure Note:       Patient Identified and Time Out complete  Subconjunctival bleb - xylocaine with epi 2%   and Betadine.  Inject eylea od at 6:00 @ 3.5-4mm posterior to limbus  Post Operative Dx: Same  Complications: None  Follow up as above.        ===========================

## 2020-02-06 ENCOUNTER — OFFICE VISIT (OUTPATIENT)
Dept: FAMILY MEDICINE | Facility: CLINIC | Age: 65
End: 2020-02-06
Payer: COMMERCIAL

## 2020-02-06 VITALS
DIASTOLIC BLOOD PRESSURE: 78 MMHG | SYSTOLIC BLOOD PRESSURE: 122 MMHG | HEART RATE: 96 BPM | HEIGHT: 66 IN | WEIGHT: 245.38 LBS | BODY MASS INDEX: 39.43 KG/M2 | TEMPERATURE: 98 F | OXYGEN SATURATION: 96 %

## 2020-02-06 DIAGNOSIS — J30.9 ALLERGIC RHINITIS, UNSPECIFIED SEASONALITY, UNSPECIFIED TRIGGER: Primary | ICD-10-CM

## 2020-02-06 PROCEDURE — 3078F DIAST BP <80 MM HG: CPT | Mod: CPTII,S$GLB,, | Performed by: REGISTERED NURSE

## 2020-02-06 PROCEDURE — 99214 PR OFFICE/OUTPT VISIT, EST, LEVL IV, 30-39 MIN: ICD-10-PCS | Mod: 25,S$GLB,, | Performed by: REGISTERED NURSE

## 2020-02-06 PROCEDURE — 96372 PR INJECTION,THERAP/PROPH/DIAG2ST, IM OR SUBCUT: ICD-10-PCS | Mod: S$GLB,,, | Performed by: REGISTERED NURSE

## 2020-02-06 PROCEDURE — 3074F PR MOST RECENT SYSTOLIC BLOOD PRESSURE < 130 MM HG: ICD-10-PCS | Mod: CPTII,S$GLB,, | Performed by: REGISTERED NURSE

## 2020-02-06 PROCEDURE — 99999 PR PBB SHADOW E&M-EST. PATIENT-LVL V: ICD-10-PCS | Mod: PBBFAC,,, | Performed by: REGISTERED NURSE

## 2020-02-06 PROCEDURE — 3078F PR MOST RECENT DIASTOLIC BLOOD PRESSURE < 80 MM HG: ICD-10-PCS | Mod: CPTII,S$GLB,, | Performed by: REGISTERED NURSE

## 2020-02-06 PROCEDURE — 99214 OFFICE O/P EST MOD 30 MIN: CPT | Mod: 25,S$GLB,, | Performed by: REGISTERED NURSE

## 2020-02-06 PROCEDURE — 3008F PR BODY MASS INDEX (BMI) DOCUMENTED: ICD-10-PCS | Mod: CPTII,S$GLB,, | Performed by: REGISTERED NURSE

## 2020-02-06 PROCEDURE — 3008F BODY MASS INDEX DOCD: CPT | Mod: CPTII,S$GLB,, | Performed by: REGISTERED NURSE

## 2020-02-06 PROCEDURE — 99999 PR PBB SHADOW E&M-EST. PATIENT-LVL V: CPT | Mod: PBBFAC,,, | Performed by: REGISTERED NURSE

## 2020-02-06 PROCEDURE — 96372 THER/PROPH/DIAG INJ SC/IM: CPT | Mod: S$GLB,,, | Performed by: REGISTERED NURSE

## 2020-02-06 PROCEDURE — 3074F SYST BP LT 130 MM HG: CPT | Mod: CPTII,S$GLB,, | Performed by: REGISTERED NURSE

## 2020-02-06 RX ORDER — BETAMETHASONE SODIUM PHOSPHATE AND BETAMETHASONE ACETATE 3; 3 MG/ML; MG/ML
12 INJECTION, SUSPENSION INTRA-ARTICULAR; INTRALESIONAL; INTRAMUSCULAR; SOFT TISSUE
Status: COMPLETED | OUTPATIENT
Start: 2020-02-06 | End: 2020-02-06

## 2020-02-06 RX ORDER — MONTELUKAST SODIUM 10 MG/1
10 TABLET ORAL DAILY
Qty: 30 TABLET | Refills: 6 | Status: SHIPPED | OUTPATIENT
Start: 2020-02-06 | End: 2020-03-07

## 2020-02-06 RX ORDER — LOSARTAN POTASSIUM 100 MG/1
TABLET ORAL
COMMUNITY
Start: 2019-12-09 | End: 2020-04-29

## 2020-02-06 RX ADMIN — BETAMETHASONE SODIUM PHOSPHATE AND BETAMETHASONE ACETATE 12 MG: 3; 3 INJECTION, SUSPENSION INTRA-ARTICULAR; INTRALESIONAL; INTRAMUSCULAR; SOFT TISSUE at 11:02

## 2020-02-12 NOTE — PROGRESS NOTES
Subjective:       Patient ID: Latosha Enriquez is a 64 y.o. female.    Chief Complaint   Patient presents with    Nasal Congestion       HPI    Latosha Enriquez is here today with c/o not feeling well over the past month.  Taking Tylenol and flushing nose out with saline solution.  Reports sneezing, RN, NC, occ cough clear mucus with itchy water eyes.      Review of Systems   Constitutional: Positive for fatigue.   HENT: Positive for congestion, postnasal drip, rhinorrhea, sneezing and sore throat. Negative for ear pain, sinus pain, trouble swallowing and voice change.    Eyes: Positive for discharge, redness and itching.   Respiratory: Positive for cough. Negative for shortness of breath and wheezing.    Cardiovascular: Negative for chest pain, palpitations and leg swelling.   Gastrointestinal: Negative for abdominal pain, nausea and vomiting.   Musculoskeletal: Negative.    Skin: Negative.    Allergic/Immunologic: Positive for environmental allergies.   Neurological: Negative for weakness, light-headedness, numbness and headaches.   Hematological: Negative for adenopathy.         Review of patient's allergies indicates:   Allergen Reactions    Antihistamines - alkylamine Anaphylaxis and Itching     Cough, throat itches    Chocolate flavor Other (See Comments)     disoriented    Doxycycline Other (See Comments)     Stomach pain    Iodine and iodide containing products Other (See Comments)     Tongue swelling    Tramadol Nausea Only    Yeast Itching     Facial swelling, constipation         Patient Active Problem List   Diagnosis    Hemorrhoids, internal    Essential hypertension    Morbid obesity with BMI of 40.0-44.9, adult    Vitamin D deficiency disease    Primary osteoarthritis of both knees    GERD (gastroesophageal reflux disease)    Hyperlipidemia    Type 2 diabetes mellitus with both eyes affected by mild nonproliferative retinopathy and macular edema, with long-term current use of insulin     "Kidney stones, calcium oxalate    AP (angina pectoris)    Palpitations    CANALES (dyspnea on exertion)       Medication List with Changes/Refills   New Medications    MONTELUKAST (SINGULAIR) 10 MG TABLET    Take 1 tablet (10 mg total) by mouth once daily.   Current Medications    ALBUTEROL (PROVENTIL/VENTOLIN HFA) 90 MCG/ACTUATION INHALER    Inhale 2 puffs into the lungs every 6 (six) hours as needed.    ALLOPURINOL (ZYLOPRIM) 100 MG TABLET    Take 1 tablet (100 mg total) by mouth once daily.    ASPIRIN (ECOTRIN) 81 MG EC TABLET    Take 1 tablet (81 mg total) by mouth once daily.    BD ULTRA-FINE SHORT PEN NEEDLE 31 GAUGE X 5/16" NDLE    USE 1 PEN NEEDLE UNDER THE SKIN TWICE A DAY    BUDESONIDE 180MCG (PULMICORT FLEXHALER) 180 MCG/ACTUATION AEPB    Inhale 2 puffs into the lungs 2 (two) times daily.    CYANOCOBALAMIN (VITAMIN B-12) 100 MCG TABLET    Take by mouth.    DIPHENHYDRAMINE (BENADRYL) 50 MG CAPSULE    Begin [id number] the evening and morning before procedure with Prednisone and Pepcid    DULOXETINE (CYMBALTA) 30 MG CAPSULE    One po daily for one week then increase to 2 po daily.    ERGOCALCIFEROL, VITAMIN D2, 400 UNIT TAB    Take by mouth.    ESOMEPRAZOLE (NEXIUM) 20 MG CAPSULE    TAKE 1 CAPSULE(20 MG) BY MOUTH BEFORE BREAKFAST    EYLEA 2 MG/0.05 ML SOLN    0.05 mLs (2 mg total) by Intravitreal route every 28 days. for 8 doses    FAMOTIDINE (PEPCID) 40 MG TABLET    Take [id number] the evening and morning before your procedure along with Benadryl and Prednisone    FERROUS SULFATE, DRIED (SLOW FE) 160 MG (50 MG IRON) TBSR    Take by mouth.    GABAPENTIN (NEURONTIN) 100 MG CAPSULE    TK ONE C PO  TID    IBUPROFEN (ADVIL,MOTRIN) 800 MG TABLET        INSULIN LISPRO 100 UNIT/ML INJECTION    Inject into the skin.    INSULIN LISPRO PROTAMIN-LISPRO (HUMALOG MIX 75-25 KWIKPEN) 100 UNIT/ML (75-25) INPN    INJECT 30 UNITS UNDER THE SKIN IN THE MORNING AND 20 UNITS IN THE EVENING    LOSARTAN (COZAAR) 100 MG TABLET   " "     LOSARTAN-HYDROCHLOROTHIAZIDE 100-25 MG (HYZAAR) 100-25 MG PER TABLET    TAKE 1 TABLET BY MOUTH EVERY DAY    MECLIZINE (ANTIVERT) 12.5 MG TABLET    Take 1 tablet (12.5 mg total) by mouth 3 (three) times daily as needed for Dizziness or Nausea.    METFORMIN (GLUCOPHAGE-XR) 500 MG 24 HR TABLET    TAKE 2 TABLETS DAILY WITH BREAKFAST AND 1 TABLET WITH DINNER    NITROGLYCERIN (NITROSTAT) 0.4 MG SL TABLET    Place 1 tablet (0.4 mg total) under the tongue every 5 (five) minutes as needed for Chest pain.    POTASSIUM CHLORIDE (MICRO-K) 10 MEQ CPSR    Take 1 capsule (10 mEq total) by mouth once daily.    PROMETHAZINE-CODEINE 6.25-10 MG/5 ML (PHENERGAN WITH CODEINE) 6.25-10 MG/5 ML SYRUP    Take 5 mLs by mouth every 4 to 6 hours as needed for Cough.    RANOLAZINE (RANEXA) 500 MG TB12    Take 1 tablet (500 mg total) by mouth 2 (two) times daily.    SIMVASTATIN (ZOCOR) 20 MG TABLET    Take 1 tablet (20 mg total) by mouth every evening.    TAMSULOSIN (FLOMAX) 0.4 MG CAP    TAKE 1 CAPSULE(0.4 MG) BY MOUTH EVERY DAY    TERBINAFINE HCL (LAMISIL) 250 MG TABLET    Take 1 tablet (250 mg total) by mouth once daily. For toenail fungus    TOBRAMYCIN SULFATE 0.3% (TOBREX) 0.3 % OPHTHALMIC SOLUTION    PLACE 1 GTT IN OU QID FOR 5 DAYS             Past medical, surgical, family and social histories have been reviewed today.        Objective:     Vitals:    02/06/20 1019   BP: 122/78   Pulse: 96   Temp: 97.9 °F (36.6 °C)   SpO2: 96%   Weight: 111.3 kg (245 lb 6 oz)   Height: 5' 6" (1.676 m)   PainSc: 0-No pain       Estimated body mass index is 39.6 kg/m² as calculated from the following:    Height as of this encounter: 5' 6" (1.676 m).    Weight as of this encounter: 111.3 kg (245 lb 6 oz).      Physical Exam   Constitutional: She is oriented to person, place, and time. She appears well-developed and well-nourished.   HENT:   Head: Normocephalic and atraumatic.   Right Ear: Tympanic membrane normal.   Left Ear: Tympanic membrane normal. "   Nose: Mucosal edema and rhinorrhea (boggy//clear RN) present.   Mouth/Throat: Oropharynx is clear and moist and mucous membranes are normal.   Eyes: Pupils are equal, round, and reactive to light. Right eye exhibits no discharge. Left eye exhibits no discharge. Right conjunctiva is injected (had eye injection per Opth).   Cardiovascular: Normal rate and regular rhythm.   Pulmonary/Chest: Effort normal and breath sounds normal.   Musculoskeletal: Normal range of motion. She exhibits no edema.   Lymphadenopathy:     She has no cervical adenopathy.   Neurological: She is alert and oriented to person, place, and time.   Skin: Skin is warm and dry. Capillary refill takes less than 2 seconds. No rash noted.   Psychiatric: She has a normal mood and affect. Her behavior is normal. Judgment and thought content normal.   Vitals reviewed.        Diagnosis       1. Allergic rhinitis, unspecified seasonality, unspecified trigger          Assessment/ Plan     Allergic rhinitis, unspecified seasonality, unspecified trigger  -     betamethasone acetate-betamethasone sodium phosphate injection 12 mg  -     montelukast (SINGULAIR) 10 mg tablet; Take 1 tablet (10 mg total) by mouth once daily.  Dispense: 30 tablet; Refill: 6        Injection today.  Claritin or Zyrtec once daily in AM.  Singulair pm.  Symptomatic care, rest and fluids.  Follow-up in clinic as needed.          Patient Care Team:  Gabbie Ronquillo MD as PCP - General (Family Medicine)  Kaia Lema LPN as Care Coordinator (Internal Medicine)      LUIS Erickson  [facility]

## 2020-02-26 ENCOUNTER — PROCEDURE VISIT (OUTPATIENT)
Dept: OPHTHALMOLOGY | Facility: CLINIC | Age: 65
End: 2020-02-26
Payer: COMMERCIAL

## 2020-02-26 DIAGNOSIS — E11.3213 TYPE 2 DIABETES MELLITUS WITH BOTH EYES AFFECTED BY MILD NONPROLIFERATIVE RETINOPATHY AND MACULAR EDEMA, WITH LONG-TERM CURRENT USE OF INSULIN: Primary | ICD-10-CM

## 2020-02-26 DIAGNOSIS — Z79.4 TYPE 2 DIABETES MELLITUS WITH BOTH EYES AFFECTED BY MILD NONPROLIFERATIVE RETINOPATHY AND MACULAR EDEMA, WITH LONG-TERM CURRENT USE OF INSULIN: Primary | ICD-10-CM

## 2020-02-26 PROCEDURE — 67028 INJECTION EYE DRUG: CPT | Mod: RT,S$GLB,, | Performed by: OPHTHALMOLOGY

## 2020-02-26 PROCEDURE — 92134 POSTERIOR SEGMENT OCT RETINA (OCULAR COHERENCE TOMOGRAPHY)-BOTH EYES: ICD-10-PCS | Mod: S$GLB,,, | Performed by: OPHTHALMOLOGY

## 2020-02-26 PROCEDURE — 99499 NO LOS: ICD-10-PCS | Mod: S$GLB,,, | Performed by: OPHTHALMOLOGY

## 2020-02-26 PROCEDURE — 67028 PR INJECT INTRAVITREAL PHARMCOLOGIC: ICD-10-PCS | Mod: RT,S$GLB,, | Performed by: OPHTHALMOLOGY

## 2020-02-26 PROCEDURE — 99499 UNLISTED E&M SERVICE: CPT | Mod: S$GLB,,, | Performed by: OPHTHALMOLOGY

## 2020-02-26 PROCEDURE — 92134 CPTRZ OPH DX IMG PST SGM RTA: CPT | Mod: S$GLB,,, | Performed by: OPHTHALMOLOGY

## 2020-02-26 RX ORDER — AFLIBERCEPT 40 MG/ML
2 INJECTION, SOLUTION INTRAVITREAL
Qty: 1 VIAL | Refills: 3 | Status: SHIPPED | OUTPATIENT
Start: 2020-02-26 | End: 2021-01-14

## 2020-02-26 NOTE — PROGRESS NOTES
===============================  Latosha Enriquez,  2/26/2020 today   64 y.o. female   Last visit [facility]: :1/29/2020   Last [facility] dept[facility]/29/[facility]  [facility]:  Corrected distance visual acuity was 20/40 in the right eye and not recorded in the left eye.   Not recorded         Not recorded         Not recorded         Not recorded        No chief complaint on file.      ________________  2/26/2020 today  HPI     EYlea OD.        (No Betadine - Vigamox Prep))    DM  DME OD  EYLEA OD 11/14/19      Last edited by GILSON Phillips on 2/26/2020  3:04 PM. (History)      Problem List Items Addressed This Visit        Eye/Vision problems    Type 2 diabetes mellitus with both eyes affected by mild nonproliferative retinopathy and macular edema, with long-term current use of insulin - Primary (Chronic)    Relevant Medications    aflibercept Soln 2 mg (Start on 2/26/2020  4:15 PM)    EYLEA 2 mg/0.05 mL Soln    Other Relevant Orders    Prior authorization Order    Posterior Segment OCT Retina-Both eyes           .slight better od oct today  Continue eylea monthly as planned    2/26/2020  Diagnosis :  dme  Today:   Eylea (afibercept) 2 mg/0.05 ml Intravitreal Injection , OD   Follow up: 1 mo  Wants pledget, no bleb    Instructed to call 24/7 for any worsening of vision. Check Both eyes daily. Gave patient my home phone number.  Risks, benefits, and alternatives to treatment discussed in detail with the patient.  The patient voiced understanding and wished to proceed with the procedure     Injection Procedure Note:     Patient Identified and Time Out complete  Pledget - xylocaine with epi 2%   and Betadine.  Inject eylea od at 6:00 @ 3.5-4mm posterior to limbus  Post Operative Dx: Same  Complications: None  Follow up as above.      ===============================

## 2020-03-24 RX ORDER — METFORMIN HYDROCHLORIDE 500 MG/1
TABLET, EXTENDED RELEASE ORAL
Qty: 90 TABLET | Refills: 2 | Status: SHIPPED | OUTPATIENT
Start: 2020-03-24 | End: 2020-07-08

## 2020-04-06 RX ORDER — INSULIN LISPRO 100 [IU]/ML
INJECTION, SUSPENSION SUBCUTANEOUS
Qty: 45 ML | Refills: 2 | Status: SHIPPED | OUTPATIENT
Start: 2020-04-06 | End: 2020-07-21 | Stop reason: SDUPTHER

## 2020-04-06 NOTE — TELEPHONE ENCOUNTER
Last annual: 11/29/19    ----- Message from Silvestre Anaya sent at 4/6/2020  8:51 AM CDT -----  Contact: Pt   Type:  RX Refill Request    Who Called: Latosha Enriquez  Refill or New Rx:Refill  RX Name and Strength:insulin lispro protamin-lispro (HUMALOG MIX 75-25 KWIKPEN) 100 unit/mL (75-25) InPn   How is the patient currently taking it? (ex. 1XDay): INJECT 30 UNITS UNDER THE SKIN IN THE MORNING AND 20 UNITS IN THE EVENING  Is this a 30 day or 90 day RX:  Preferred Pharmacy with phone number:  [id number] [place], [place] - [address] AT [address] [facility] [address]  [address]  [place] [address]  Phone: [phone] Fax: [phone]  Local or Mail Order:Local  Ordering Provider:  Would the patient rather a call back or a response via [facility]? Call Back  Best Call Back Number: [phone] (home)   Additional Information: Pt has one pen left.

## 2020-04-28 ENCOUNTER — TELEPHONE (OUTPATIENT)
Dept: FAMILY MEDICINE | Facility: CLINIC | Age: 65
End: 2020-04-28

## 2020-04-28 NOTE — TELEPHONE ENCOUNTER
----- Message from Mariela Pagan sent at 4/28/2020 12:49 PM CDT -----  Contact: patient  Type:  Needs Medical Advice    Who Called: patient  Symptoms (please be specific): took her medication and now feeling like it is stuck in her chest   How long has patient had these symptoms:  Sunday  Pharmacy name and phone #:      [id number] - [place] - [address] [facility] [address]  [address]  [place] [address]  Phone: [phone] Fax: [phone]    Would the patient rather a call back or a response via [facility]? call  Best Call Back Number: [phone]  Additional Information: please call back today. States she want to be seen today ASAP URGENT!!

## 2020-04-28 NOTE — TELEPHONE ENCOUNTER
----- Message from Pina Felipe sent at 4/28/2020  2:31 PM CDT -----  ..Type:  Patient Returning Call    Who Called:Latosha Enriquez  Who Left Message for Patient:Elsy  Does the patient know what this is regarding?:appt  Would the patient rather a call back or a response via [facility]? Call back  Best Call Back Number:[phone]  Additional Information:

## 2020-04-28 NOTE — TELEPHONE ENCOUNTER
----- Message from Muriel Toussaint sent at 4/28/2020  3:46 PM CDT -----  Contact: pt  .Type:  Patient Returning Call    Who Called:pt  Who Left Message for Patient:Elsy  Does the patient know what this is regarding?:yes  Would the patient rather a call back or a response via [facility]? Call back  Best Call Back Number:[phone]  Additional Information:

## 2020-04-29 ENCOUNTER — OFFICE VISIT (OUTPATIENT)
Dept: CARDIOLOGY | Facility: CLINIC | Age: 65
End: 2020-04-29
Payer: COMMERCIAL

## 2020-04-29 ENCOUNTER — HOSPITAL ENCOUNTER (EMERGENCY)
Facility: HOSPITAL | Age: 65
Discharge: HOME | End: 2020-04-29
Attending: EMERGENCY MEDICINE
Payer: COMMERCIAL

## 2020-04-29 VITALS
SYSTOLIC BLOOD PRESSURE: 107 MMHG | HEART RATE: 88 BPM | RESPIRATION RATE: 19 BRPM | HEIGHT: 66 IN | OXYGEN SATURATION: 98 % | TEMPERATURE: 99 F | BODY MASS INDEX: 39.65 KG/M2 | WEIGHT: 246.69 LBS | DIASTOLIC BLOOD PRESSURE: 68 MMHG

## 2020-04-29 DIAGNOSIS — E78.00 PURE HYPERCHOLESTEROLEMIA: Chronic | ICD-10-CM

## 2020-04-29 DIAGNOSIS — I10 ESSENTIAL HYPERTENSION: Chronic | ICD-10-CM

## 2020-04-29 DIAGNOSIS — E66.01 MORBID OBESITY WITH BMI OF 40.0-44.9, ADULT: ICD-10-CM

## 2020-04-29 DIAGNOSIS — R06.09 DOE (DYSPNEA ON EXERTION): ICD-10-CM

## 2020-04-29 DIAGNOSIS — R07.9 CHEST PAIN: ICD-10-CM

## 2020-04-29 DIAGNOSIS — E11.3213 TYPE 2 DIABETES MELLITUS WITH BOTH EYES AFFECTED BY MILD NONPROLIFERATIVE RETINOPATHY AND MACULAR EDEMA, WITH LONG-TERM CURRENT USE OF INSULIN: Chronic | ICD-10-CM

## 2020-04-29 DIAGNOSIS — I20.9 AP (ANGINA PECTORIS): ICD-10-CM

## 2020-04-29 DIAGNOSIS — I48.0 PAROXYSMAL ATRIAL FIBRILLATION: Primary | ICD-10-CM

## 2020-04-29 DIAGNOSIS — Z79.4 TYPE 2 DIABETES MELLITUS WITH BOTH EYES AFFECTED BY MILD NONPROLIFERATIVE RETINOPATHY AND MACULAR EDEMA, WITH LONG-TERM CURRENT USE OF INSULIN: Chronic | ICD-10-CM

## 2020-04-29 DIAGNOSIS — R00.2 PALPITATIONS: Primary | ICD-10-CM

## 2020-04-29 DIAGNOSIS — R07.9 CHEST PAIN, UNSPECIFIED TYPE: ICD-10-CM

## 2020-04-29 LAB
ALBUMIN SERPL BCP-MCNC: 3.5 G/DL (ref 3.5–5.2)
ALP SERPL-CCNC: 81 U/L (ref 55–135)
ALT SERPL W/O P-5'-P-CCNC: 51 U/L (ref 10–44)
ANION GAP SERPL CALC-SCNC: 8 MMOL/L (ref 8–16)
AST SERPL-CCNC: 18 U/L (ref 10–40)
BASOPHILS # BLD AUTO: 0.04 K/UL (ref 0–0.2)
BASOPHILS NFR BLD: 0.5 % (ref 0–1.9)
BILIRUB SERPL-MCNC: 0.4 MG/DL (ref 0.1–1)
BNP SERPL-MCNC: 197 PG/ML (ref 0–99)
BUN SERPL-MCNC: 15 MG/DL (ref 8–23)
CALCIUM SERPL-MCNC: 9.4 MG/DL (ref 8.7–10.5)
CHLORIDE SERPL-SCNC: 107 MMOL/L (ref 95–110)
CO2 SERPL-SCNC: 26 MMOL/L (ref 23–29)
CREAT SERPL-MCNC: 0.8 MG/DL (ref 0.5–1.4)
DIFFERENTIAL METHOD: ABNORMAL
EOSINOPHIL # BLD AUTO: 0.1 K/UL (ref 0–0.5)
EOSINOPHIL NFR BLD: 1.7 % (ref 0–8)
ERYTHROCYTE [DISTWIDTH] IN BLOOD BY AUTOMATED COUNT: 12.9 % (ref 11.5–14.5)
EST. GFR  (AFRICAN AMERICAN): >60 ML/MIN/1.73 M^2
EST. GFR  (NON AFRICAN AMERICAN): >60 ML/MIN/1.73 M^2
GLUCOSE SERPL-MCNC: 156 MG/DL (ref 70–110)
HCT VFR BLD AUTO: 39.6 % (ref 37–48.5)
HGB BLD-MCNC: 12.4 G/DL (ref 12–16)
IMM GRANULOCYTES # BLD AUTO: 0.03 K/UL (ref 0–0.04)
IMM GRANULOCYTES NFR BLD AUTO: 0.4 % (ref 0–0.5)
LYMPHOCYTES # BLD AUTO: 3 K/UL (ref 1–4.8)
LYMPHOCYTES NFR BLD: 37.9 % (ref 18–48)
MCH RBC QN AUTO: 30.2 PG (ref 27–31)
MCHC RBC AUTO-ENTMCNC: 31.3 G/DL (ref 32–36)
MCV RBC AUTO: 97 FL (ref 82–98)
MONOCYTES # BLD AUTO: 0.4 K/UL (ref 0.3–1)
MONOCYTES NFR BLD: 5.6 % (ref 4–15)
NEUTROPHILS # BLD AUTO: 4.3 K/UL (ref 1.8–7.7)
NEUTROPHILS NFR BLD: 53.9 % (ref 38–73)
NRBC BLD-RTO: 0 /100 WBC
PLATELET # BLD AUTO: 251 K/UL (ref 150–350)
PMV BLD AUTO: 10.5 FL (ref 9.2–12.9)
POTASSIUM SERPL-SCNC: 3.7 MMOL/L (ref 3.5–5.1)
PROT SERPL-MCNC: 6.8 G/DL (ref 6–8.4)
RBC # BLD AUTO: 4.1 M/UL (ref 4–5.4)
SODIUM SERPL-SCNC: 141 MMOL/L (ref 136–145)
TROPONIN I SERPL DL<=0.01 NG/ML-MCNC: <0.006 NG/ML (ref 0–0.03)
WBC # BLD AUTO: 7.87 K/UL (ref 3.9–12.7)

## 2020-04-29 PROCEDURE — 99284 EMERGENCY DEPT VISIT MOD MDM: CPT

## 2020-04-29 PROCEDURE — 85025 COMPLETE CBC W/AUTO DIFF WBC: CPT

## 2020-04-29 PROCEDURE — 93010 ELECTROCARDIOGRAM REPORT: CPT | Mod: ,,, | Performed by: INTERNAL MEDICINE

## 2020-04-29 PROCEDURE — 80053 COMPREHEN METABOLIC PANEL: CPT

## 2020-04-29 PROCEDURE — 93010 EKG 12-LEAD: ICD-10-PCS | Mod: ,,, | Performed by: INTERNAL MEDICINE

## 2020-04-29 PROCEDURE — 3044F HG A1C LEVEL LT 7.0%: CPT | Mod: 95,CPTII,, | Performed by: INTERNAL MEDICINE

## 2020-04-29 PROCEDURE — 83880 ASSAY OF NATRIURETIC PEPTIDE: CPT

## 2020-04-29 PROCEDURE — 3044F PR MOST RECENT HEMOGLOBIN A1C LEVEL <7.0%: ICD-10-PCS | Mod: 95,CPTII,, | Performed by: INTERNAL MEDICINE

## 2020-04-29 PROCEDURE — 84484 ASSAY OF TROPONIN QUANT: CPT

## 2020-04-29 PROCEDURE — 99214 PR OFFICE/OUTPT VISIT, EST, LEVL IV, 30-39 MIN: ICD-10-PCS | Mod: 25,95,, | Performed by: INTERNAL MEDICINE

## 2020-04-29 PROCEDURE — 93005 ELECTROCARDIOGRAM TRACING: CPT

## 2020-04-29 PROCEDURE — 99214 OFFICE O/P EST MOD 30 MIN: CPT | Mod: 25,95,, | Performed by: INTERNAL MEDICINE

## 2020-04-29 RX ORDER — METOPROLOL TARTRATE 25 MG/1
12.5 TABLET, FILM COATED ORAL NIGHTLY
COMMUNITY
End: 2020-05-07 | Stop reason: SDUPTHER

## 2020-04-29 RX ORDER — POTASSIUM CHLORIDE 750 MG/1
10 CAPSULE, EXTENDED RELEASE ORAL DAILY
Qty: 90 CAPSULE | Refills: 3 | Status: SHIPPED | OUTPATIENT
Start: 2020-04-29 | End: 2020-07-21 | Stop reason: SDUPTHER

## 2020-04-29 RX ORDER — ASPIRIN 325 MG
325 TABLET ORAL
Status: DISCONTINUED | OUTPATIENT
Start: 2020-04-29 | End: 2020-04-29

## 2020-04-29 RX ORDER — NITROGLYCERIN 0.4 MG/1
0.4 TABLET SUBLINGUAL EVERY 5 MIN PRN
Qty: 20 TABLET | Refills: 6 | Status: SHIPPED | OUTPATIENT
Start: 2020-04-29 | End: 2020-05-07 | Stop reason: SDUPTHER

## 2020-04-29 RX ORDER — LOSARTAN POTASSIUM AND HYDROCHLOROTHIAZIDE 25; 100 MG/1; MG/1
1 TABLET ORAL DAILY
Qty: 90 TABLET | Refills: 1 | Status: SHIPPED | OUTPATIENT
Start: 2020-04-29 | End: 2020-12-14 | Stop reason: SDUPTHER

## 2020-04-29 RX ORDER — RANOLAZINE 500 MG/1
500 TABLET, EXTENDED RELEASE ORAL 2 TIMES DAILY
Qty: 60 TABLET | Refills: 0 | Status: SHIPPED | OUTPATIENT
Start: 2020-04-29 | End: 2020-05-07 | Stop reason: SDUPTHER

## 2020-04-29 NOTE — PROGRESS NOTES
Established Patient - Audio Only Telehealth Visit     The patient location is: home  The chief complaint leading to consultation is: CV follow up  Visit type: Virtual visit with audio only (telephone)     The reason for the audio only service rather than synchronous audio and video virtual visit was related to technical difficulties or patient preference/necessity.     Each patient to whom I provide medical services by telemedicine is:  (1) informed of the relationship between the physician and patient and the respective role of any other health care provider with respect to management of the patient; and (2) notified that they may decline to receive medical services by telemedicine and may withdraw from such care at any time. Patient verbally consented to receive this service via voice-only telephone call.       Subjective:   Patient ID:  Latosha Enriquez is a 64 y.o. female who presents for cardiac consult of Follow-up      HPI  The patient came in today for cardiac consult of Follow-up    Latosha Enriquez is a 64 y.o. female HTN, HLD, DM2, obesity presents for CV Follow up.     3/6/19 Visit - Karly Garrett  Ms. Enriquez is a 63 year old female patient whose current medical conditions include DM, HTN, and hyperlipidemia who presents today for hospital follow-up. Patient recently underwent LHC by Dr. Suggs on 2/20/19 for further evaluation of chest pain symptoms and CANALES. LHC showed widely patent coronaries, medical mgmt/risk factor modification recommended. Patient returns today and states she is doing ok. Recently diagnosed with URI, on steroids and also having issues with kidney stones. Cardiac wise, seems stable. No complaints. No chest pain or SOB. No palpitations, near syncope, or syncope. BP stable. No radial access site complaints. Patient is compliant with her medications, would like to see how she feels off of Ranexa.     4/29/20 Sunday night she was drinking a wild raspberry crystal light with caffeine and took  her home meds - metformin, gapapentin, singular and then she started having chest pain. She had angina in the past and has been on losartan. She has been getting tired lately and more CANALES. She took her mother to a cardiologist recently and had ECG there with possible Afib. She took Ranexa. Her mother has Afib as well. Prior cath and ECHO in 2019 neg.     Patient feels  no leg swelling, no PND, no dizziness, no syncope, no CNS symptoms.    Patient has fairly good exercise tolerance.    Patient is compliant with medications.    2/20/19 [facility]  - Left Main Coronary Artery:             The LM is normal. There is RAÚL 3 flow.     - Left Anterior Descending Artery:             The LAD is normal. There is RAÚL 3 flow.     - D1:             The D1 is normal. There is RAÚL 3 flow.     - D2:             The D2 is normal. There is RAÚL 3 flow.     - Left Circumflex Artery:             The LCX is normal. There is RAÚL 3 flow.     - OM1:             The OM1 is normal. There is RAÚL 3 flow.     - OM2:             The OM2 is normal. There is RAÚL 3 flow.     - Ramus:             The ramus is normal. There is RAÚL 3 flow.     - Right Coronary Artery:             The RCA has luminal irregularities. There is RAÚL 3 flow. Rucker's crook anatomy.       CONCLUSIONS     1 - Concentric remodeling.     2 - No wall motion abnormalities.     3 - Normal left ventricular systolic function (EF 60-65%).     4 - Normal left ventricular diastolic function.     5 - Normal right ventricular systolic function .     6 - The estimated PA systolic pressure is 34 mmHg.     7 - Intermediate central venous pressure.         This document has been electronically    SIGNED BY: Bhaskar Cook MD On: 02/11/2019 11:10      Past Medical History:   Diagnosis Date    Asthma     Colon polyps     Diabetes mellitus type II, uncontrolled     Diverticulosis of large intestine without hemorrhage 11/23/2015    Elevated liver enzymes     GERD (gastroesophageal  reflux disease)     Gout, unspecified     Hemorrhoids, internal 2015    History of blood transfusion     Hyperlipidemia     Hypertension     IBS (irritable bowel syndrome)     Morbid obesity with BMI of 40.0-44.9, adult     Nasal vestibulitis     Osteoarthritis of multiple joints     Type 2 diabetes mellitus with both eyes affected by mild nonproliferative retinopathy and macular edema, with long-term current use of insulin 2016    Urolithiasis     Vitamin D deficiency disease        Past Surgical History:   Procedure Laterality Date    BREAST BIOPSY Right      SECTION, CLASSIC      COLONOSCOPY N/A 2015    Procedure: COLONOSCOPY;  Surgeon: Stanislav Pickard MD;  Location: [facility] ENDO;  Service: Endoscopy;  Laterality: N/A;    CYSTOSCOPY W/ URETERAL STENT PLACEMENT  2017    EXTRACORPOREAL SHOCK WAVE LITHOTRIPSY      LEFT HEART CATHETERIZATION Left 2019    Procedure: CATHETERIZATION, HEART, LEFT;  Surgeon: Haile Suggs MD;  Location: [facility] CATH LAB;  Service: Cardiology;  Laterality: Left;  10:30-11am start/poss rt radial approach    TOTAL ABDOMINAL HYSTERECTOMY      URETEROSCOPY  2017       Social History     Tobacco Use    Smoking status: Never Smoker    Smokeless tobacco: Never Used   Substance Use Topics    Alcohol use: No    Drug use: No       Family History   Problem Relation Age of Onset    Hypertension Mother     Heart disease Mother     Diabetes Mother     Colon polyps Mother     Other Mother         Lower limb amputation    Hypertension Brother     Stroke Brother     Colon cancer Maternal Grandmother     Diabetes Brother     Breast cancer Maternal Cousin        Patient's Medications   New Prescriptions    No medications on file   Previous Medications    ALBUTEROL (PROVENTIL/VENTOLIN HFA) 90 MCG/ACTUATION INHALER    Inhale 2 puffs into the lungs every 6 (six) hours as needed.    ALLOPURINOL (ZYLOPRIM) 100 MG TABLET    Take 1 tablet (100 mg  "total) by mouth once daily.    ASPIRIN (ECOTRIN) 81 MG EC TABLET    Take 1 tablet (81 mg total) by mouth once daily.    BD ULTRA-FINE SHORT PEN NEEDLE 31 GAUGE X 5/16" NDLE    USE 1 PEN NEEDLE UNDER THE SKIN TWICE A DAY    BUDESONIDE 180MCG (PULMICORT FLEXHALER) 180 MCG/ACTUATION AEPB    Inhale 2 puffs into the lungs 2 (two) times daily.    CYANOCOBALAMIN (VITAMIN B-12) 100 MCG TABLET    Take by mouth.    DIPHENHYDRAMINE (BENADRYL) 50 MG CAPSULE    Begin [id number] the evening and morning before procedure with Prednisone and Pepcid    DULOXETINE (CYMBALTA) 30 MG CAPSULE    One po daily for one week then increase to 2 po daily.    ERGOCALCIFEROL, VITAMIN D2, 400 UNIT TAB    Take by mouth.    ESOMEPRAZOLE (NEXIUM) 20 MG CAPSULE    TAKE 1 CAPSULE(20 MG) BY MOUTH BEFORE BREAKFAST    EYLEA 2 MG/0.05 ML SOLN    0.05 mLs (2 mg total) by Intravitreal route every 28 days. for 8 doses    FAMOTIDINE (PEPCID) 40 MG TABLET    Take [id number] the evening and morning before your procedure along with Benadryl and Prednisone    FERROUS SULFATE, DRIED (SLOW FE) 160 MG (50 MG IRON) TBSR    Take by mouth.    GABAPENTIN (NEURONTIN) 100 MG CAPSULE    TK ONE C PO  TID    IBUPROFEN (ADVIL,MOTRIN) 800 MG TABLET        INSULIN LISPRO 100 UNIT/ML INJECTION    Inject into the skin.    INSULIN LISPRO PROTAMIN-LISPRO (HUMALOG MIX 75-25 KWIKPEN) 100 UNIT/ML (75-25) INPN    INJECT 30 UNITS UNDER THE SKIN IN THE MORNING AND 20 UNITS IN THE EVENING    MECLIZINE (ANTIVERT) 12.5 MG TABLET    Take 1 tablet (12.5 mg total) by mouth 3 (three) times daily as needed for Dizziness or Nausea.    METFORMIN (GLUCOPHAGE-XR) 500 MG XR 24HR TABLET    TAKE 2 TABLETS BY MOUTH WITH BREAKFAST AND 1 TABLET WITH DINNER    PROMETHAZINE-CODEINE 6.25-10 MG/5 ML (PHENERGAN WITH CODEINE) 6.25-10 MG/5 ML SYRUP    Take 5 mLs by mouth every 4 to 6 hours as needed for Cough.    RANOLAZINE (RANEXA) 500 MG TB12    Take 1 tablet (500 mg total) by mouth 2 (two) times daily.    " SIMVASTATIN (ZOCOR) 20 MG TABLET    Take 1 tablet (20 mg total) by mouth every evening.    TAMSULOSIN (FLOMAX) 0.4 MG CAP    TAKE 1 CAPSULE(0.4 MG) BY MOUTH EVERY DAY    TERBINAFINE HCL (LAMISIL) 250 MG TABLET    Take 1 tablet (250 mg total) by mouth once daily. For toenail fungus    TOBRAMYCIN SULFATE 0.3% (TOBREX) 0.3 % OPHTHALMIC SOLUTION    PLACE 1 GTT IN OU QID FOR 5 DAYS   Modified Medications    Modified Medication Previous Medication    LOSARTAN-HYDROCHLOROTHIAZIDE 100-25 MG (HYZAAR) 100-25 MG PER TABLET losartan-hydrochlorothiazide 100-25 mg (HYZAAR) 100-25 mg per tablet       Take 1 tablet by mouth once daily.    TAKE 1 TABLET BY MOUTH EVERY DAY    NITROGLYCERIN (NITROSTAT) 0.4 MG SL TABLET nitroGLYCERIN (NITROSTAT) 0.4 MG SL tablet       Place 1 tablet (0.4 mg total) under the tongue every 5 (five) minutes as needed for Chest pain.    Place 1 tablet (0.4 mg total) under the tongue every 5 (five) minutes as needed for Chest pain.    POTASSIUM CHLORIDE (MICRO-K) 10 MEQ CPSR potassium chloride (MICRO-K) 10 MEQ CpSR       Take 1 capsule (10 mEq total) by mouth once daily.    Take 1 capsule (10 mEq total) by mouth once daily.   Discontinued Medications    LOSARTAN (COZAAR) 100 MG TABLET           Review of Systems   Constitutional: Positive for malaise/fatigue.   HENT: Negative.    Eyes: Negative.    Respiratory: Positive for shortness of breath.    Cardiovascular: Positive for chest pain and palpitations.   Gastrointestinal: Negative.    Genitourinary: Negative.    Musculoskeletal: Negative.    Skin: Negative.    Neurological: Negative.    Endo/Heme/Allergies: Negative.    Psychiatric/Behavioral: Negative.    All 12 systems otherwise negative.      Wt Readings from Last 3 Encounters:   02/06/20 111.3 kg (245 lb 6 oz)   11/29/19 114.4 kg (252 lb 3.3 oz)   09/25/19 111.4 kg (245 lb 9.5 oz)     Temp Readings from Last 3 Encounters:   02/06/20 97.9 °F (36.6 °C)   11/29/19 98 °F (36.7 °C) (Oral)   09/25/19 97.1 °F  (36.2 °C)     BP Readings from Last 3 Encounters:   02/06/20 122/78   11/29/19 122/70   09/25/19 118/77     Pulse Readings from Last 3 Encounters:   02/06/20 96   11/29/19 100   09/25/19 90       There were no vitals taken for this visit.    Objective:   Physical Exam   Constitutional: She is oriented to person, place, and time.   Neurological: She is alert and oriented to person, place, and time.   Psychiatric: She has a normal mood and affect. Her behavior is normal. Judgment and thought content normal.       Lab Results   Component Value Date     11/29/2019    K 3.9 11/29/2019     11/29/2019    CO2 28 11/29/2019    BUN 22 11/29/2019    CREATININE 0.8 11/29/2019     (H) 11/29/2019    HGBA1C 6.6 (H) 11/29/2019    AST 15 11/29/2019    ALT 29 11/29/2019    ALBUMIN 3.5 11/29/2019    PROT 6.7 11/29/2019    BILITOT 0.4 11/29/2019    WBC 8.08 11/29/2019    HGB 11.8 (L) 11/29/2019    HCT 39.6 11/29/2019     (H) 11/29/2019     11/29/2019    INR 0.9 05/30/2017    TSH 0.996 11/29/2019    CHOL 182 11/29/2019    HDL 51 11/29/2019    LDLCALC 116.4 11/29/2019    TRIG 73 11/29/2019    BNP 26 02/07/2019     Assessment:      1. Palpitations    2. Essential hypertension    3. Pure hypercholesterolemia    4. AP (angina pectoris)    5. Morbid obesity with BMI of 40.0-44.9, adult    6. CANALES (dyspnea on exertion)    7. Type 2 diabetes mellitus with both eyes affected by mild nonproliferative retinopathy and macular edema, with long-term current use of insulin        Plan:   1. Palpitations, CP/CANALES  - concern for Afib based on ECG per pt  - rec ER eval for AFib and possible admission  - discussed may need cardioversion if in Afib and anticoag  - cont current meds  - prior Cath neg, repeat ECHO based on symptoms  - needs pulm eval as well for KATHERINE    2. HTN  - cont meds    3. Obesity  - needs weight loss    4. DM2 - 6.6  - cont meds per PCP    5. HLD   - cont statin    ER today for further workup for  Afib/arrythmia noted per pt    Thank you for allowing me to participate in this patient's care. Please do not hesitate to contact me with any questions or concerns. Consult note has been forwarded to the referral physician.                    This service was not originating from a related E/M service provided within the previous 7 days nor will  to an E/M service or procedure within the next 24 hours or my soonest available appointment.  Prevailing standard of care was able to be met in this audio-only visit.

## 2020-04-29 NOTE — ED PROVIDER NOTES
"4/29/2020, 4:18 PM   History obtained from the patient      History of Present Illness: Latosha Enriquez is a 64 y.o. female patient who presents to the Emergency Department for  CP and SOB which onset gradually Sunday. Sent by Dr. Ryan for further eval.    4:19 PM: Pt was placed back in [facility]. I explained to pt that due to lack of available rooms pt was seen by myself to begin the workup. Pt understands they will be seen and dispositioned by the next available physician. Pt voices understanding and agrees with plan. Pt also understands the longer than normal wait time. I am removing myself from the care of pt and pt will now be assigned to the next available physician.     LINDSAY Funes FNP-C        SCRIBE #1 NOTE: I, Joni Mauro, am scribing for, and in the presence of, Ike Anthony MD. I have scribed the entire note.       History     Chief Complaint   Patient presents with    Chest Pain     began sunday/ had caffiene crystal light & "dragging ever since." pain sun mon tues but resolved now. reports pain as "pressure/heaviness & a little tingle in left arm, a little sweat"      Review of patient's allergies indicates:   Allergen Reactions    Antihistamines - alkylamine Anaphylaxis and Itching     Cough, throat itches    Chocolate flavor Other (See Comments)     disoriented    Doxycycline Other (See Comments)     Stomach pain    Betadine [povidone-iodine] Itching    Iodine and iodide containing products Other (See Comments)     Tongue swelling    Tramadol Nausea Only    Yeast Itching     Facial swelling, constipation         History of Present Illness     HPI    4/29/2020, 5:34 PM  History obtained from the patient      History of Present Illness: Latosha Enriquez is a 64 y.o. female patient with a history of CAD who presents to the Emergency Department for evaluation of palpitations which onset 3 days ago after consuming new caffeine drink. Symptoms are constant and moderate in severity. No mitigating " or exacerbating factors reported. Associated sxs include chest pain onset 3 days ago which resolved after taking  home [place] and sudden right eye blindness 2 days ago. Patient denies any diaphoresis, cough, SOB, n/v, dizziness, weakness, and all other sxs at this time. No further complaints or concerns at this time. Patient went with friend to Cardiologist appointment earlier today; patient has ECG done as courtesy, which showed AF. Patient referred to ED by Cardiologist for further evaluation.      Arrival mode: Personal transportation     PCP: Gabbie Ronquillo MD      Past Medical History:  Past Medical History:   Diagnosis Date    Asthma     Colon polyps     Diabetes mellitus type II, uncontrolled     Diverticulosis of large intestine without hemorrhage 2015    Elevated liver enzymes     GERD (gastroesophageal reflux disease)     Gout, unspecified     Hemorrhoids, internal 2015    History of blood transfusion     Hyperlipidemia     Hypertension     IBS (irritable bowel syndrome)     Morbid obesity with BMI of 40.0-44.9, adult     Nasal vestibulitis     Osteoarthritis of multiple joints     Type 2 diabetes mellitus with both eyes affected by mild nonproliferative retinopathy and macular edema, with long-term current use of insulin 2016    Urolithiasis     Vitamin D deficiency disease        Past Surgical History:  Past Surgical History:   Procedure Laterality Date    BREAST BIOPSY Right      SECTION, CLASSIC      COLONOSCOPY N/A 2015    Procedure: COLONOSCOPY;  Surgeon: Stanislav Pickard MD;  Location: [facility] ENDO;  Service: Endoscopy;  Laterality: N/A;    CYSTOSCOPY W/ URETERAL STENT PLACEMENT  2017    EXTRACORPOREAL SHOCK WAVE LITHOTRIPSY      LEFT HEART CATHETERIZATION Left 2019    Procedure: CATHETERIZATION, HEART, LEFT;  Surgeon: Haile Suggs MD;  Location: [facility] CATH LAB;  Service: Cardiology;  Laterality: Left;  10:30-11am start/poss rt  radial approach    TOTAL ABDOMINAL HYSTERECTOMY      URETEROSCOPY  05/2017         Family History:  Family History   Problem Relation Age of Onset    Hypertension Mother     Heart disease Mother     Diabetes Mother     Colon polyps Mother     Other Mother         Lower limb amputation    Hypertension Brother     Stroke Brother     Colon cancer Maternal Grandmother     Diabetes Brother     Breast cancer Maternal Cousin        Social History:   Social History     Tobacco Use    Smoking status: Never Smoker    Smokeless tobacco: Never Used   Substance and Sexual Activity    Alcohol use: No    Drug use: No    Sexual activity: Never        Review of Systems     Review of Systems   Constitutional: Negative for diaphoresis and fever.   HENT: Negative for sore throat.    Eyes: Positive for visual disturbance.   Respiratory: Negative for cough and shortness of breath.    Cardiovascular: Positive for chest pain and palpitations.   Gastrointestinal: Negative for nausea and vomiting.   Genitourinary: Negative for dysuria.   Musculoskeletal: Negative for back pain.   Skin: Negative for rash.   Neurological: Negative for dizziness and weakness.   Hematological: Does not bruise/bleed easily.   All other systems reviewed and are negative.       Physical Exam     Initial Vitals [04/29/20 1536]   BP Pulse Resp Temp SpO2   119/69 102 19 98.7 °F (37.1 °C) 97 %      MAP       --          Physical Exam  Nursing Notes and Vital Signs Reviewed.  Constitutional: Well-developed and well-nourished. Patient is in NAD.  Head: Atraumatic. Normocephalic.  Eyes: PERRL. EOM intact. Conjunctivae are not pale. No scleral icterus.  ENT: Mucous membranes are moist. Oropharynx is clear and symmetric.    Neck: Supple. Full ROM. No lymphadenopathy.  Cardiovascular: Regular rate. Regular rhythm. No murmurs, rubs, or gallops. Distal pulses are 2+ and symmetric.  Pulmonary/Chest: No respiratory distress. Clear to auscultation bilaterally. No  "wheezing or rales.  Abdominal: Soft and non-distended.  There is no tenderness.  No rebound, guarding, or rigidity. Good bowel sounds.  Genitourinary: No CVA tenderness  Musculoskeletal: Moves all extremities. No obvious deformities. No calf tenderness.  Skin: Warm and dry.  Neurological:  Alert, awake, and appropriate.  Normal speech.  No acute focal neurological deficits are appreciated.  Psychiatric: Normal affect. Good eye contact. Appropriate in content.     ED Course   Procedures  ED Vital Signs:  Vitals:    04/29/20 1536 04/29/20 1735 04/29/20 1801   BP: 119/69 109/76 107/68   Pulse: 102 95 88   Resp: 19     Temp: 98.7 °F (37.1 °C)     TempSrc: Oral     SpO2: 97% 98% 98%   Weight: 111.9 kg (246 lb 11.1 oz)     Height: 5' 6" (1.676 m)         Abnormal Lab Results:  Labs Reviewed   CBC W/ AUTO DIFFERENTIAL - Abnormal; Notable for the following components:       Result Value    Mean Corpuscular Hemoglobin Conc 31.3 (*)     All other components within normal limits   COMPREHENSIVE METABOLIC PANEL - Abnormal; Notable for the following components:    Glucose 156 (*)     ALT 51 (*)     All other components within normal limits   B-TYPE NATRIURETIC PEPTIDE - Abnormal; Notable for the following components:     (*)     All other components within normal limits   TROPONIN I   TROPONIN I        All Lab Results:  Results for orders placed or performed during the hospital encounter of 04/29/20   CBC auto differential   Result Value Ref Range    WBC 7.87 3.90 - 12.70 K/uL    RBC 4.10 4.00 - 5.40 M/uL    Hemoglobin 12.4 12.0 - 16.0 g/dL    Hematocrit 39.6 37.0 - 48.5 %    Mean Corpuscular Volume 97 82 - 98 fL    Mean Corpuscular Hemoglobin 30.2 27.0 - 31.0 pg    Mean Corpuscular Hemoglobin Conc 31.3 (L) 32.0 - 36.0 g/dL    RDW 12.9 11.5 - 14.5 %    Platelets 251 150 - 350 K/uL    MPV 10.5 9.2 - 12.9 fL    Immature Granulocytes 0.4 0.0 - 0.5 %    Gran # (ANC) 4.3 1.8 - 7.7 K/uL    Immature Grans (Abs) 0.03 0.00 - 0.04 " K/uL    Lymph # 3.0 1.0 - 4.8 K/uL    Mono # 0.4 0.3 - 1.0 K/uL    Eos # 0.1 0.0 - 0.5 K/uL    Baso # 0.04 0.00 - 0.20 K/uL    nRBC 0 0 /100 WBC    Gran% 53.9 38.0 - 73.0 %    Lymph% 37.9 18.0 - 48.0 %    Mono% 5.6 4.0 - 15.0 %    Eosinophil% 1.7 0.0 - 8.0 %    Basophil% 0.5 0.0 - 1.9 %    Differential Method Automated    Comprehensive metabolic panel   Result Value Ref Range    Sodium 141 136 - 145 mmol/L    Potassium 3.7 3.5 - 5.1 mmol/L    Chloride 107 95 - 110 mmol/L    CO2 26 23 - 29 mmol/L    Glucose 156 (H) 70 - 110 mg/dL    BUN, Bld 15 8 - 23 mg/dL    Creatinine 0.8 0.5 - 1.4 mg/dL    Calcium 9.4 8.7 - 10.5 mg/dL    Total Protein 6.8 6.0 - 8.4 g/dL    Albumin 3.5 3.5 - 5.2 g/dL    Total Bilirubin 0.4 0.1 - 1.0 mg/dL    Alkaline Phosphatase 81 55 - 135 U/L    AST 18 10 - 40 U/L    ALT 51 (H) 10 - 44 U/L    Anion Gap 8 8 - 16 mmol/L    eGFR if African American >60 >60 mL/min/1.73 m^2    eGFR if non African American >60 >60 mL/min/1.73 m^2   Troponin I #1   Result Value Ref Range    Troponin I <0.006 0.000 - 0.026 ng/mL   B-Type natriuretic peptide (BNP)   Result Value Ref Range     (H) 0 - 99 pg/mL       The EKG was ordered, reviewed, and independently interpreted by the ED provider.  Interpretation time: 1540  Rate: 98 BPM  Rhythm: NSR  Interpretation: possible left atrial enlargement. Low voltage QRS. Cannot rule out anterior infarct, age undetermined. No STEMI.      The Emergency Provider reviewed the vital signs and test results, which are outlined above.     ED Discussion     6:25 PM: Discussed pt's case with Dr. Ryan (Cardiology) who recommends discharge home with b blocker and eliquis. Patient states she still has her beta blocker left; Eliquis Rx written after discussion of risks/benefits.     6:50 PM: Reassessed pt at this time.  Pt states her condition has improved at this time. Discussed with pt all pertinent ED information and results. Discussed pt dx and plan of tx. Gave pt all f/u and  return to the ED instructions. All questions and concerns were addressed at this time. Pt expresses understanding of information and instructions, and is comfortable with plan to discharge. Pt is stable for discharge.    I discussed with patient and/or family/caretaker that evaluation in the ED does not suggest any emergent or life threatening medical conditions requiring immediate intervention beyond what was provided in the ED, and I believe patient is safe for discharge.  Regardless, an unremarkable evaluation in the ED does not preclude the development or presence of a serious of life threatening condition. As such, patient was instructed to return immediately for any worsening or change in current symptoms.    I have discussed with patient and/or family/caretaker chest pain precautions, specifically to return for worsening chest pain, shortness of breath, fever, or any concern.  I have low suspicion for cardiopulmonary, vascular, infectious, respiratory, or other emergent medical condition based on my evaluation in the ED.       MDM        Medical Decision Making:   Clinical Tests:   Lab Tests: Ordered and Reviewed  Medical Tests: Reviewed and Ordered           ED Medication(s):  Medications - No data to display    Discharge Medication List as of 4/29/2020  6:44 PM      START taking these medications    Details   apixaban (ELIQUIS) 5 mg Tab Take 1 tablet (5 mg total) by mouth 2 (two) times daily., Starting Wed 4/29/2020, Until Fri 5/29/2020, Print             Follow-up Information     Pratik Ryan Md, MD In 1 week.    Specialties:  Cardiology, Internal Medicine  Contact information:  [address]  [place] [address] [phone]             [facility] - .    Specialty:  Emergency Medicine  Why:  As needed, If symptoms worsen  Contact information:  [address] [facility] [address] [phone]                     Scribe Attestation:   Scribe #1: I performed  the above scribed service and the documentation accurately describes the services I performed. I attest to the accuracy of the note.     Attending:   Physician Attestation Statement for Scribe #1: I, Ike Anthony MD, personally performed the services described in this documentation, as scribed by Joni Mauro, in my presence, and it is both accurate and complete.           Clinical Impression       ICD-10-CM ICD-9-CM   1. Paroxysmal atrial fibrillation I48.0 427.31   2. Chest pain R07.9 786.50   3. AP (angina pectoris) I20.9 413.9   4. Chest pain, unspecified type R07.9 786.50       Disposition:   Disposition: Discharged  Condition: Stable         Ike Anthony MD  05/02/20 1936

## 2020-04-29 NOTE — ED NOTES
Dr. Anthony at  discussing POC and  DC  with patient. Patient states that she has been having episodes of vision loss in her right eye over the last few weeks as well as chest pain that start several days ago. She is currently not complaining of any symptoms.

## 2020-04-29 NOTE — TELEPHONE ENCOUNTER
mom asking patient to give us a call back    ----- Message from Briseida Perez sent at 4/29/2020 12:56 PM CDT -----  Contact: pt  The pt states she had a appt on 05/24/2020 and states she needs to be seen before that time die to chest pain, the pt can be reached at [phone]///[id number]

## 2020-05-01 ENCOUNTER — TELEPHONE (OUTPATIENT)
Dept: FAMILY MEDICINE | Facility: CLINIC | Age: 65
End: 2020-05-01

## 2020-05-01 NOTE — TELEPHONE ENCOUNTER
----- Message from Snow Ruvalcaba sent at 5/1/2020 11:40 AM CDT -----  Contact: Pharmacy  Type:  Pharmacy Calling to Clarify an RX    Name of Caller: Nathaniel  Pharmacy Name:   [id number] - [place] - [address] AT [facility]  [address] [place] [address]  Phone: [phone] Fax: [phone]  Prescription Name: Eliquis 5 mg  What do they need to clarify?: A prior authorization is needed patient was seen in the ER on last night and was giving this to take  Best Call Back Number: Please call him at [phone]  Additional Information: n/a

## 2020-05-07 ENCOUNTER — OFFICE VISIT (OUTPATIENT)
Dept: CARDIOLOGY | Facility: CLINIC | Age: 65
End: 2020-05-07
Payer: COMMERCIAL

## 2020-05-07 DIAGNOSIS — I20.9 AP (ANGINA PECTORIS): ICD-10-CM

## 2020-05-07 DIAGNOSIS — E78.00 PURE HYPERCHOLESTEROLEMIA: Chronic | ICD-10-CM

## 2020-05-07 DIAGNOSIS — E66.01 MORBID OBESITY WITH BMI OF 40.0-44.9, ADULT: ICD-10-CM

## 2020-05-07 DIAGNOSIS — I48.0 PAROXYSMAL ATRIAL FIBRILLATION: ICD-10-CM

## 2020-05-07 DIAGNOSIS — R00.2 PALPITATIONS: Primary | ICD-10-CM

## 2020-05-07 DIAGNOSIS — I48.0 PAF (PAROXYSMAL ATRIAL FIBRILLATION): ICD-10-CM

## 2020-05-07 DIAGNOSIS — I10 ESSENTIAL HYPERTENSION: Chronic | ICD-10-CM

## 2020-05-07 PROCEDURE — 99214 OFFICE O/P EST MOD 30 MIN: CPT | Mod: 95,,, | Performed by: INTERNAL MEDICINE

## 2020-05-07 PROCEDURE — 99214 PR OFFICE/OUTPT VISIT, EST, LEVL IV, 30-39 MIN: ICD-10-PCS | Mod: 95,,, | Performed by: INTERNAL MEDICINE

## 2020-05-07 RX ORDER — METOPROLOL TARTRATE 25 MG/1
25 TABLET, FILM COATED ORAL 2 TIMES DAILY
Qty: 60 TABLET | Refills: 3 | Status: SHIPPED | OUTPATIENT
Start: 2020-05-07 | End: 2020-06-18 | Stop reason: SDUPTHER

## 2020-05-07 RX ORDER — NITROGLYCERIN 0.4 MG/1
0.4 TABLET SUBLINGUAL EVERY 5 MIN PRN
Qty: 20 TABLET | Refills: 1 | Status: SHIPPED | OUTPATIENT
Start: 2020-05-07 | End: 2022-03-04

## 2020-05-07 RX ORDER — RANOLAZINE 500 MG/1
500 TABLET, EXTENDED RELEASE ORAL 2 TIMES DAILY
Qty: 60 TABLET | Refills: 3 | Status: SHIPPED | OUTPATIENT
Start: 2020-05-07 | End: 2020-06-18 | Stop reason: SDUPTHER

## 2020-05-07 RX ORDER — RANOLAZINE 500 MG/1
500 TABLET, EXTENDED RELEASE ORAL 2 TIMES DAILY
Qty: 60 TABLET | Refills: 0 | Status: SHIPPED | OUTPATIENT
Start: 2020-05-07 | End: 2020-05-07 | Stop reason: SDUPTHER

## 2020-05-07 NOTE — PROGRESS NOTES
Established Patient - Audio Only Telehealth Visit     The patient location is: home  The chief complaint leading to consultation is: CV follow up  Visit type: Virtual visit with audio only (telephone)     The reason for the audio only service rather than synchronous audio and video virtual visit was related to technical difficulties or patient preference/necessity.     Each patient to whom I provide medical services by telemedicine is:  (1) informed of the relationship between the physician and patient and the respective role of any other health care provider with respect to management of the patient; and (2) notified that they may decline to receive medical services by telemedicine and may withdraw from such care at any time. Patient verbally consented to receive this service via voice-only telephone call.       Subjective:   Patient ID:  Latosha Enriquez is a 64 y.o. female who presents for cardiac consult of Follow-up      Follow-up   Associated symptoms include chest pain.     The patient came in today for cardiac consult of Follow-up    Latosha Enriquez is a 64 y.o. female PAF on Eliquis, HTN, HLD, DM2, obesity presents for CV Follow up.     3/6/19 Visit - Karly Garrett  Ms. Enriquez is a 63 year old female patient whose current medical conditions include DM, HTN, and hyperlipidemia who presents today for hospital follow-up. Patient recently underwent LHC by Dr. Suggs on 2/20/19 for further evaluation of chest pain symptoms and CANALES. LHC showed widely patent coronaries, medical mgmt/risk factor modification recommended. Patient returns today and states she is doing ok. Recently diagnosed with URI, on steroids and also having issues with kidney stones. Cardiac wise, seems stable. No complaints. No chest pain or SOB. No palpitations, near syncope, or syncope. BP stable. No radial access site complaints. Patient is compliant with her medications, would like to see how she feels off of Ranexa.     4/29/20 Sunday night  she was drinking a wild raspberry crystal light with caffeine and took her home meds - metformin, gapapentin, singular and then she started having chest pain. She had angina in the past and has been on losartan. She has been getting tired lately and more CANALES. She took her mother to a cardiologist recently and had ECG there with possible Afib. She took Ranexa. Her mother has Afib as well. Prior cath and ECHO in 2019 neg.     5/7/20  She was started on Eliquis after ER visit, ECG there negative but prior ECG revealed Afib per pt. She has been going through stress with her mother who had a fall and daugher in hospital as well. She continues to take Ranexa BID. Will need increase BB dose    Patient feels  no leg swelling, no PND, no dizziness, no syncope, no CNS symptoms.    Patient has fairly good exercise tolerance.    Patient is compliant with medications.    2/20/19 [facility]  - Left Main Coronary Artery:             The LM is normal. There is RAÚL 3 flow.     - Left Anterior Descending Artery:             The LAD is normal. There is RAÚL 3 flow.     - D1:             The D1 is normal. There is RAÚL 3 flow.     - D2:             The D2 is normal. There is RAÚL 3 flow.     - Left Circumflex Artery:             The LCX is normal. There is RAÚL 3 flow.     - OM1:             The OM1 is normal. There is RAÚL 3 flow.     - OM2:             The OM2 is normal. There is RAÚL 3 flow.     - Ramus:             The ramus is normal. There is RAÚL 3 flow.     - Right Coronary Artery:             The RCA has luminal irregularities. There is RAÚL 3 flow. Rucker's crook anatomy.       CONCLUSIONS     1 - Concentric remodeling.     2 - No wall motion abnormalities.     3 - Normal left ventricular systolic function (EF 60-65%).     4 - Normal left ventricular diastolic function.     5 - Normal right ventricular systolic function .     6 - The estimated PA systolic pressure is 34 mmHg.     7 - Intermediate central venous pressure.          This document has been electronically    SIGNED BY: Bhaskar Cook MD On: 2019 11:10      Past Medical History:   Diagnosis Date    Asthma     Colon polyps     Diabetes mellitus type II, uncontrolled     Diverticulosis of large intestine without hemorrhage 2015    Elevated liver enzymes     GERD (gastroesophageal reflux disease)     Gout, unspecified     Hemorrhoids, internal 2015    History of blood transfusion     Hyperlipidemia     Hypertension     IBS (irritable bowel syndrome)     Morbid obesity with BMI of 40.0-44.9, adult     Nasal vestibulitis     Osteoarthritis of multiple joints     Type 2 diabetes mellitus with both eyes affected by mild nonproliferative retinopathy and macular edema, with long-term current use of insulin 2016    Urolithiasis     Vitamin D deficiency disease        Past Surgical History:   Procedure Laterality Date    BREAST BIOPSY Right      SECTION, CLASSIC      COLONOSCOPY N/A 2015    Procedure: COLONOSCOPY;  Surgeon: Stanislav Pickard MD;  Location: [facility] ENDO;  Service: Endoscopy;  Laterality: N/A;    CYSTOSCOPY W/ URETERAL STENT PLACEMENT  2017    EXTRACORPOREAL SHOCK WAVE LITHOTRIPSY      LEFT HEART CATHETERIZATION Left 2019    Procedure: CATHETERIZATION, HEART, LEFT;  Surgeon: Haile Suggs MD;  Location: [facility] CATH LAB;  Service: Cardiology;  Laterality: Left;  10:30-11am start/poss rt radial approach    TOTAL ABDOMINAL HYSTERECTOMY      URETEROSCOPY  2017       Social History     Tobacco Use    Smoking status: Never Smoker    Smokeless tobacco: Never Used   Substance Use Topics    Alcohol use: No    Drug use: No       Family History   Problem Relation Age of Onset    Hypertension Mother     Heart disease Mother     Diabetes Mother     Colon polyps Mother     Other Mother         Lower limb amputation    Hypertension Brother     Stroke Brother     Colon cancer Maternal Grandmother      "Diabetes Brother     Breast cancer Maternal Cousin        Patient's Medications   New Prescriptions    No medications on file   Previous Medications    ALBUTEROL (PROVENTIL/VENTOLIN HFA) 90 MCG/ACTUATION INHALER    Inhale 2 puffs into the lungs every 6 (six) hours as needed.    ALLOPURINOL (ZYLOPRIM) 100 MG TABLET    Take 1 tablet (100 mg total) by mouth once daily.    ASPIRIN (ECOTRIN) 81 MG EC TABLET    Take 1 tablet (81 mg total) by mouth once daily.    BD ULTRA-FINE SHORT PEN NEEDLE 31 GAUGE X 5/16" NDLE    USE 1 PEN NEEDLE UNDER THE SKIN TWICE A DAY    BUDESONIDE 180MCG (PULMICORT FLEXHALER) 180 MCG/ACTUATION AEPB    Inhale 2 puffs into the lungs 2 (two) times daily.    CYANOCOBALAMIN (VITAMIN B-12) 100 MCG TABLET    Take by mouth.    DIPHENHYDRAMINE (BENADRYL) 50 MG CAPSULE    Begin [id number] the evening and morning before procedure with Prednisone and Pepcid    ERGOCALCIFEROL, VITAMIN D2, 400 UNIT TAB    Take by mouth.    ESOMEPRAZOLE (NEXIUM) 20 MG CAPSULE    TAKE 1 CAPSULE(20 MG) BY MOUTH BEFORE BREAKFAST    EYLEA 2 MG/0.05 ML SOLN    0.05 mLs (2 mg total) by Intravitreal route every 28 days. for 8 doses    FAMOTIDINE (PEPCID) 40 MG TABLET    Take [id number] the evening and morning before your procedure along with Benadryl and Prednisone    FERROUS SULFATE, DRIED (SLOW FE) 160 MG (50 MG IRON) TBSR    Take by mouth.    GABAPENTIN (NEURONTIN) 100 MG CAPSULE    TK ONE C PO  TID    IBUPROFEN (ADVIL,MOTRIN) 800 MG TABLET        INSULIN LISPRO PROTAMIN-LISPRO (HUMALOG MIX 75-25 KWIKPEN) 100 UNIT/ML (75-25) INPN    INJECT 30 UNITS UNDER THE SKIN IN THE MORNING AND 20 UNITS IN THE EVENING    LOSARTAN-HYDROCHLOROTHIAZIDE 100-25 MG (HYZAAR) 100-25 MG PER TABLET    Take 1 tablet by mouth once daily.    MECLIZINE (ANTIVERT) 12.5 MG TABLET    Take 1 tablet (12.5 mg total) by mouth 3 (three) times daily as needed for Dizziness or Nausea.    METFORMIN (GLUCOPHAGE-XR) 500 MG XR 24HR TABLET    TAKE 2 TABLETS BY MOUTH " WITH BREAKFAST AND 1 TABLET WITH DINNER    NITROGLYCERIN (NITROSTAT) 0.4 MG SL TABLET    Place 1 tablet (0.4 mg total) under the tongue every 5 (five) minutes as needed for Chest pain.    POTASSIUM CHLORIDE (MICRO-K) 10 MEQ CPSR    Take 1 capsule (10 mEq total) by mouth once daily.    PROMETHAZINE-CODEINE 6.25-10 MG/5 ML (PHENERGAN WITH CODEINE) 6.25-10 MG/5 ML SYRUP    Take 5 mLs by mouth every 4 to 6 hours as needed for Cough.    SIMVASTATIN (ZOCOR) 20 MG TABLET    Take 1 tablet (20 mg total) by mouth every evening.    TAMSULOSIN (FLOMAX) 0.4 MG CAP    TAKE 1 CAPSULE(0.4 MG) BY MOUTH EVERY DAY    TERBINAFINE HCL (LAMISIL) 250 MG TABLET    Take 1 tablet (250 mg total) by mouth once daily. For toenail fungus    TOBRAMYCIN SULFATE 0.3% (TOBREX) 0.3 % OPHTHALMIC SOLUTION    PLACE 1 GTT IN OU QID FOR 5 DAYS   Modified Medications    Modified Medication Previous Medication    APIXABAN (ELIQUIS) 5 MG TAB apixaban (ELIQUIS) 5 mg Tab       Take 1 tablet (5 mg total) by mouth 2 (two) times daily.    Take 1 tablet (5 mg total) by mouth 2 (two) times daily.    METOPROLOL TARTRATE (LOPRESSOR) 25 MG TABLET metoprolol tartrate (LOPRESSOR) 25 MG tablet       Take 1 tablet (25 mg total) by mouth 2 (two) times daily.    Take 12.5 mg by mouth every evening.    RANOLAZINE (RANEXA) 500 MG TB12 ranolazine (RANEXA) 500 MG Tb12       Take 1 tablet (500 mg total) by mouth 2 (two) times daily.    Take 1 tablet (500 mg total) by mouth 2 (two) times daily.   Discontinued Medications    No medications on file       Review of Systems   Constitutional: Positive for malaise/fatigue.   HENT: Negative.    Eyes: Negative.    Respiratory: Positive for shortness of breath.    Cardiovascular: Positive for chest pain and palpitations.   Gastrointestinal: Negative.    Genitourinary: Negative.    Musculoskeletal: Negative.    Skin: Negative.    Neurological: Negative.    Endo/Heme/Allergies: Negative.    Psychiatric/Behavioral: Negative.    All 12  systems otherwise negative.      Wt Readings from Last 3 Encounters:   04/29/20 111.9 kg (246 lb 11.1 oz)   02/06/20 111.3 kg (245 lb 6 oz)   11/29/19 114.4 kg (252 lb 3.3 oz)     Temp Readings from Last 3 Encounters:   04/29/20 98.7 °F (37.1 °C) (Oral)   02/06/20 97.9 °F (36.6 °C)   11/29/19 98 °F (36.7 °C) (Oral)     BP Readings from Last 3 Encounters:   04/29/20 107/68   02/06/20 122/78   11/29/19 122/70     Pulse Readings from Last 3 Encounters:   04/29/20 88   02/06/20 96   11/29/19 100       There were no vitals taken for this visit.    Objective:   Physical Exam   Constitutional: She is oriented to person, place, and time.   Neurological: She is alert and oriented to person, place, and time.   Psychiatric: She has a normal mood and affect. Her behavior is normal. Judgment and thought content normal.       Lab Results   Component Value Date     04/29/2020    K 3.7 04/29/2020     04/29/2020    CO2 26 04/29/2020    BUN 15 04/29/2020    CREATININE 0.8 04/29/2020     (H) 04/29/2020    HGBA1C 6.6 (H) 11/29/2019    AST 18 04/29/2020    ALT 51 (H) 04/29/2020    ALBUMIN 3.5 04/29/2020    PROT 6.8 04/29/2020    BILITOT 0.4 04/29/2020    WBC 7.87 04/29/2020    HGB 12.4 04/29/2020    HCT 39.6 04/29/2020    MCV 97 04/29/2020     04/29/2020    INR 0.9 05/30/2017    TSH 0.996 11/29/2019    CHOL 182 11/29/2019    HDL 51 11/29/2019    LDLCALC 116.4 11/29/2019    TRIG 73 11/29/2019     (H) 04/29/2020     Assessment:      1. Palpitations    2. Paroxysmal atrial fibrillation    3. AP (angina pectoris)    4. Essential hypertension    5. Pure hypercholesterolemia    6. PAF (paroxysmal atrial fibrillation)    7. Morbid obesity with BMI of 40.0-44.9, adult        Plan:   1. Palpitations   - concern for Afib based on ECG per pt  - discussed may need cardioversion if in Afib and anticoag  - cont current meds  - prior Cath neg, repeat ECHO based on symptoms  - needs pulm eval as well for KATHERINE    2.  HTN  - cont meds    3. Obesity  - needs weight loss    4. DM2 - 6.6  - cont meds per PCP    5. HLD   - cont statin    6. PAF  - cont Eliquis  - increase BB to 25 BID  - ECHO ordered   - Holter ordered     Thank you for allowing me to participate in this patient's care. Please do not hesitate to contact me with any questions or concerns. Consult note has been forwarded to the referral physician.      This service was not originating from a related E/M service provided within the previous 7 days nor will  to an E/M service or procedure within the next 24 hours or my soonest available appointment.  Prevailing standard of care was able to be met in this audio-only visit.

## 2020-05-19 ENCOUNTER — HOSPITAL ENCOUNTER (OUTPATIENT)
Dept: CARDIOLOGY | Facility: HOSPITAL | Age: 65
Discharge: HOME | End: 2020-05-19
Attending: INTERNAL MEDICINE
Payer: COMMERCIAL

## 2020-05-19 VITALS
DIASTOLIC BLOOD PRESSURE: 68 MMHG | BODY MASS INDEX: 39.53 KG/M2 | HEIGHT: 66 IN | SYSTOLIC BLOOD PRESSURE: 107 MMHG | HEART RATE: 70 BPM | WEIGHT: 246 LBS

## 2020-05-19 DIAGNOSIS — I20.9 AP (ANGINA PECTORIS): ICD-10-CM

## 2020-05-19 DIAGNOSIS — I48.0 PAF (PAROXYSMAL ATRIAL FIBRILLATION): ICD-10-CM

## 2020-05-19 DIAGNOSIS — I48.0 PAROXYSMAL ATRIAL FIBRILLATION: ICD-10-CM

## 2020-05-19 DIAGNOSIS — R00.2 PALPITATIONS: ICD-10-CM

## 2020-05-19 LAB
AORTIC ROOT ANNULUS: 3.54 CM
ASCENDING AORTA: 3.15 CM
AV INDEX (PROSTH): 0.96
AV MEAN GRADIENT: 6 MMHG
AV PEAK GRADIENT: 11 MMHG
AV VALVE AREA: 3.12 CM2
AV VELOCITY RATIO: 0.85
BSA FOR ECHO PROCEDURE: 2.28 M2
CV ECHO LV RWT: 0.65 CM
DOP CALC AO PEAK VEL: 1.64 M/S
DOP CALC AO VTI: 32.11 CM
DOP CALC LVOT AREA: 3.3 CM2
DOP CALC LVOT DIAMETER: 2.04 CM
DOP CALC LVOT PEAK VEL: 1.39 M/S
DOP CALC LVOT STROKE VOLUME: 100.23 CM3
DOP CALCLVOT PEAK VEL VTI: 30.68 CM
E WAVE DECELERATION TIME: 331.68 MSEC
E/A RATIO: 0.68
ECHO LV POSTERIOR WALL: 1.1 CM (ref 0.6–1.1)
FRACTIONAL SHORTENING: 35 % (ref 28–44)
INTERVENTRICULAR SEPTUM: 1.11 CM (ref 0.6–1.1)
IVRT: 85.63 MSEC
LA MAJOR: 4.45 CM
LA MINOR: 4.46 CM
LA WIDTH: 3.15 CM
LEFT ATRIUM SIZE: 2.78 CM
LEFT ATRIUM VOLUME INDEX: 15.2 ML/M2
LEFT ATRIUM VOLUME: 33.16 CM3
LEFT INTERNAL DIMENSION IN SYSTOLE: 2.21 CM (ref 2.1–4)
LEFT VENTRICLE DIASTOLIC VOLUME INDEX: 21.85 ML/M2
LEFT VENTRICLE DIASTOLIC VOLUME: 47.73 ML
LEFT VENTRICLE MASS INDEX: 53 G/M2
LEFT VENTRICLE SYSTOLIC VOLUME INDEX: 7.5 ML/M2
LEFT VENTRICLE SYSTOLIC VOLUME: 16.45 ML
LEFT VENTRICULAR INTERNAL DIMENSION IN DIASTOLE: 3.41 CM (ref 3.5–6)
LEFT VENTRICULAR MASS: 115.29 G
MV PEAK A VEL: 0.96 M/S
MV PEAK E VEL: 0.65 M/S
PISA TR MAX VEL: 2.6 M/S
PV PEAK VELOCITY: 1.06 CM/S
RA MAJOR: 3.98 CM
RA PRESSURE: 3 MMHG
RA WIDTH: 2.8 CM
RIGHT VENTRICULAR END-DIASTOLIC DIMENSION: 3.11 CM
SINUS: 3.44 CM
STJ: 2.98 CM
TR MAX PG: 27 MMHG
TV REST PULMONARY ARTERY PRESSURE: 30 MMHG

## 2020-05-19 PROCEDURE — 93225 XTRNL ECG REC<48 HRS REC: CPT

## 2020-05-19 PROCEDURE — 93306 TTE W/DOPPLER COMPLETE: CPT

## 2020-05-19 PROCEDURE — 93227 XTRNL ECG REC<48 HR R&I: CPT | Mod: ,,, | Performed by: INTERNAL MEDICINE

## 2020-05-19 PROCEDURE — 93227 HOLTER MONITOR - 48 HOUR (CUPID ONLY): ICD-10-PCS | Mod: ,,, | Performed by: INTERNAL MEDICINE

## 2020-05-19 PROCEDURE — 93306 ECHO (CUPID ONLY): ICD-10-PCS | Mod: 26,,, | Performed by: INTERNAL MEDICINE

## 2020-05-19 PROCEDURE — 93306 TTE W/DOPPLER COMPLETE: CPT | Mod: 26,,, | Performed by: INTERNAL MEDICINE

## 2020-05-21 ENCOUNTER — TELEPHONE (OUTPATIENT)
Dept: FAMILY MEDICINE | Facility: CLINIC | Age: 65
End: 2020-05-21

## 2020-05-21 ENCOUNTER — PROCEDURE VISIT (OUTPATIENT)
Dept: OPHTHALMOLOGY | Facility: CLINIC | Age: 65
End: 2020-05-21
Payer: COMMERCIAL

## 2020-05-21 DIAGNOSIS — D31.31 NEVUS OF CHOROID OF RIGHT EYE: ICD-10-CM

## 2020-05-21 DIAGNOSIS — G45.3 AMAUROSIS FUGAX: ICD-10-CM

## 2020-05-21 DIAGNOSIS — E11.3213 TYPE 2 DIABETES MELLITUS WITH BOTH EYES AFFECTED BY MILD NONPROLIFERATIVE RETINOPATHY AND MACULAR EDEMA, WITH LONG-TERM CURRENT USE OF INSULIN: Primary | Chronic | ICD-10-CM

## 2020-05-21 DIAGNOSIS — Z79.4 TYPE 2 DIABETES MELLITUS WITH BOTH EYES AFFECTED BY MILD NONPROLIFERATIVE RETINOPATHY AND MACULAR EDEMA, WITH LONG-TERM CURRENT USE OF INSULIN: Primary | Chronic | ICD-10-CM

## 2020-05-21 PROCEDURE — 92014 COMPRE OPH EXAM EST PT 1/>: CPT | Mod: S$GLB,,, | Performed by: OPHTHALMOLOGY

## 2020-05-21 PROCEDURE — 92014 PR EYE EXAM, EST PATIENT,COMPREHESV: ICD-10-PCS | Mod: S$GLB,,, | Performed by: OPHTHALMOLOGY

## 2020-05-21 PROCEDURE — 92250 COLOR FUNDUS PHOTOGRAPHY - OU - BOTH EYES: ICD-10-PCS | Mod: S$GLB,,, | Performed by: OPHTHALMOLOGY

## 2020-05-21 PROCEDURE — 92250 FUNDUS PHOTOGRAPHY W/I&R: CPT | Mod: S$GLB,,, | Performed by: OPHTHALMOLOGY

## 2020-05-21 NOTE — TELEPHONE ENCOUNTER
----- Message from Nelli Bennett sent at 5/21/2020  4:36 PM CDT -----  Pt is requesting to speak with nurse  . Did not state what was needed. Please call back at [phone]

## 2020-05-21 NOTE — TELEPHONE ENCOUNTER
Patient said she is on 3 different medication for her heart. Patient would like to know what she can take for joint pain in her knees elbows and fingers.  Because she can't take the aleeve or tylenol.

## 2020-05-21 NOTE — PROGRESS NOTES
===============================  Latosha Enriquez,  5/21/2020 today   64 y.o. female   Last visit [facility]: :2/26/2020   Last visit [facility] dept. 2/26/[facility]  [facility]:  Corrected distance visual acuity was 20/40 -2 in the right eye and not recorded in the left eye.   Not recorded         Not recorded         Not recorded         Not recorded        Chief Complaint   Patient presents with    BDR     pt here for eylea OD. pt states she want to talk to dr. ramsey before the injection. about 3 weeks ago her vision in the right eye went out completely, slowly returned when she sat up in the bed. prior to that she was having popping in and near her right ear. about a week after her vision went out she was dx with a-fib, was advised to see her doctor to get a cat scan. haven't set that up yet.       ________________  5/21/2020 today  HPI     BDR      Additional comments: pt here for eylea OD. pt states she want to talk to   dr. ramsey before the injection. about 3 weeks ago her vision in the right   eye went out completely, slowly returned when she sat up in the bed. prior   to that she was having popping in and near her right ear. about a week   after her vision went out she was dx with a-fib, was advised to see her   doctor to get a cat scan. haven't set that up yet.              Comments     Rx for eylea given to Jeffrey to reorder for patient on 2/26/1/20 @ 4:03pm       (No Betadine - Vigamox Prep))  Prefers pledget, no bleb    DM  DME OD  EYLEA OD 2/26/20          Last edited by Esteban Carballo on 5/21/2020  9:40 AM. (History)      Problem List Items Addressed This Visit        Eye/Vision problems    Type 2 diabetes mellitus with both eyes affected by mild nonproliferative retinopathy and macular edema, with long-term current use of insulin - Primary (Chronic)    Amaurosis fugax    Relevant Orders    Color Fundus Photography - OU - Both Eyes (Completed)    Nevus of choroid of right eye          .complete amaurosis fugax during episode  of a fib (right eye)  Undergoing work up with cardio  Oct persistent DME but no injection at this time due to other ongoing problems  No Hollenhorst plaque seen today  optos today    rtc 2 months, eylea od      ===========================

## 2020-05-22 LAB
OHS CV EVENT MONITOR DAY: 2
OHS CV HOLTER LENGTH DECIMAL HOURS: 96
OHS CV HOLTER LENGTH HOURS: 48
OHS CV HOLTER LENGTH MINUTES: 0

## 2020-05-22 NOTE — TELEPHONE ENCOUNTER
Why can't she take Aleve or ibuprofen or tylenol?  There is nothing else to take for joint pain unless she takes natural products such as glucosamine or tumeric.

## 2020-05-26 ENCOUNTER — TELEPHONE (OUTPATIENT)
Dept: CARDIOLOGY | Facility: CLINIC | Age: 65
End: 2020-05-26

## 2020-06-09 ENCOUNTER — TELEPHONE (OUTPATIENT)
Dept: FAMILY MEDICINE | Facility: CLINIC | Age: 65
End: 2020-06-09

## 2020-06-09 NOTE — TELEPHONE ENCOUNTER
There is no other option.  She can take Tylenol arthritis 2 tablets 3 times daily.   I do not prescribe narcotics for chronic pain.  What pain is she referring to?

## 2020-06-09 NOTE — TELEPHONE ENCOUNTER
Pt called in and she needs pain medication. They started her on ELIQUIS. And they told her she cant take Asprin or Alivie or anything like that while she's on that medication. She started taking  tyanol arthritis but that's not working for her . What can she take??

## 2020-06-10 NOTE — TELEPHONE ENCOUNTER
Kacie advised:   There is no other option.  She can take Tylenol arthritis 2 tablets 3 times daily.   I do not prescribe narcotics for chronic pain.  What pain is she referring to?  She verbally verified understanding, and said she'll advise her mother.

## 2020-06-17 ENCOUNTER — PATIENT OUTREACH (OUTPATIENT)
Dept: ADMINISTRATIVE | Facility: OTHER | Age: 65
End: 2020-06-17

## 2020-06-17 DIAGNOSIS — E11.9 TYPE 2 DIABETES MELLITUS WITHOUT COMPLICATION, UNSPECIFIED WHETHER LONG TERM INSULIN USE: Primary | ICD-10-CM

## 2020-06-18 ENCOUNTER — PATIENT MESSAGE (OUTPATIENT)
Dept: PULMONOLOGY | Facility: CLINIC | Age: 65
End: 2020-06-18

## 2020-06-18 ENCOUNTER — OFFICE VISIT (OUTPATIENT)
Dept: CARDIOLOGY | Facility: CLINIC | Age: 65
End: 2020-06-18
Payer: COMMERCIAL

## 2020-06-18 VITALS
SYSTOLIC BLOOD PRESSURE: 100 MMHG | BODY MASS INDEX: 40.07 KG/M2 | HEART RATE: 85 BPM | OXYGEN SATURATION: 97 % | HEIGHT: 66 IN | WEIGHT: 249.31 LBS | DIASTOLIC BLOOD PRESSURE: 66 MMHG

## 2020-06-18 DIAGNOSIS — E66.01 MORBID OBESITY WITH BMI OF 40.0-44.9, ADULT: ICD-10-CM

## 2020-06-18 DIAGNOSIS — I48.0 PAF (PAROXYSMAL ATRIAL FIBRILLATION): Primary | ICD-10-CM

## 2020-06-18 DIAGNOSIS — I10 ESSENTIAL HYPERTENSION: Chronic | ICD-10-CM

## 2020-06-18 DIAGNOSIS — I65.29 CAROTID ATHEROSCLEROSIS, UNSPECIFIED LATERALITY: ICD-10-CM

## 2020-06-18 DIAGNOSIS — G47.39 OTHER SLEEP APNEA: ICD-10-CM

## 2020-06-18 DIAGNOSIS — E78.00 PURE HYPERCHOLESTEROLEMIA: Chronic | ICD-10-CM

## 2020-06-18 DIAGNOSIS — I48.0 PAROXYSMAL ATRIAL FIBRILLATION: ICD-10-CM

## 2020-06-18 DIAGNOSIS — I48.0 PAF (PAROXYSMAL ATRIAL FIBRILLATION): ICD-10-CM

## 2020-06-18 DIAGNOSIS — I20.9 AP (ANGINA PECTORIS): ICD-10-CM

## 2020-06-18 DIAGNOSIS — R06.09 DOE (DYSPNEA ON EXERTION): ICD-10-CM

## 2020-06-18 DIAGNOSIS — R00.2 PALPITATIONS: ICD-10-CM

## 2020-06-18 PROCEDURE — 3074F PR MOST RECENT SYSTOLIC BLOOD PRESSURE < 130 MM HG: ICD-10-PCS | Mod: CPTII,S$GLB,, | Performed by: INTERNAL MEDICINE

## 2020-06-18 PROCEDURE — 99999 PR PBB SHADOW E&M-EST. PATIENT-LVL III: ICD-10-PCS | Mod: PBBFAC,,, | Performed by: INTERNAL MEDICINE

## 2020-06-18 PROCEDURE — 3078F DIAST BP <80 MM HG: CPT | Mod: CPTII,S$GLB,, | Performed by: INTERNAL MEDICINE

## 2020-06-18 PROCEDURE — 99214 OFFICE O/P EST MOD 30 MIN: CPT | Mod: S$GLB,,, | Performed by: INTERNAL MEDICINE

## 2020-06-18 PROCEDURE — 3008F PR BODY MASS INDEX (BMI) DOCUMENTED: ICD-10-PCS | Mod: CPTII,S$GLB,, | Performed by: INTERNAL MEDICINE

## 2020-06-18 PROCEDURE — 99214 PR OFFICE/OUTPT VISIT, EST, LEVL IV, 30-39 MIN: ICD-10-PCS | Mod: S$GLB,,, | Performed by: INTERNAL MEDICINE

## 2020-06-18 PROCEDURE — 3078F PR MOST RECENT DIASTOLIC BLOOD PRESSURE < 80 MM HG: ICD-10-PCS | Mod: CPTII,S$GLB,, | Performed by: INTERNAL MEDICINE

## 2020-06-18 PROCEDURE — 3074F SYST BP LT 130 MM HG: CPT | Mod: CPTII,S$GLB,, | Performed by: INTERNAL MEDICINE

## 2020-06-18 PROCEDURE — 3008F BODY MASS INDEX DOCD: CPT | Mod: CPTII,S$GLB,, | Performed by: INTERNAL MEDICINE

## 2020-06-18 PROCEDURE — 99999 PR PBB SHADOW E&M-EST. PATIENT-LVL III: CPT | Mod: PBBFAC,,, | Performed by: INTERNAL MEDICINE

## 2020-06-18 RX ORDER — RANOLAZINE 500 MG/1
500 TABLET, EXTENDED RELEASE ORAL 2 TIMES DAILY
Qty: 60 TABLET | Refills: 3 | Status: SHIPPED | OUTPATIENT
Start: 2020-06-18 | End: 2020-07-13 | Stop reason: SDUPTHER

## 2020-06-18 RX ORDER — METOPROLOL TARTRATE 25 MG/1
25 TABLET, FILM COATED ORAL 2 TIMES DAILY
Qty: 60 TABLET | Refills: 3 | Status: SHIPPED | OUTPATIENT
Start: 2020-06-18 | End: 2020-07-13 | Stop reason: SDUPTHER

## 2020-06-18 NOTE — PROGRESS NOTES
Subjective:   Patient ID:  Latosha Enriquez is a 64 y.o. female who presents for cardiac consult of Medication Management      Follow-up  Associated symptoms include chest pain.     The patient came in today for cardiac consult of Medication Management    Latosha Enriquez is a 64 y.o. female PAF on Eliquis, HTN, HLD, DM2, obesity presents for CV Follow up.     3/6/19 Visit - Karly Garrett  Ms. Enriquez is a 63 year old female patient whose current medical conditions include DM, HTN, and hyperlipidemia who presents today for hospital follow-up. Patient recently underwent LHC by Dr. Suggs on 2/20/19 for further evaluation of chest pain symptoms and CANALES. LHC showed widely patent coronaries, medical mgmt/risk factor modification recommended. Patient returns today and states she is doing ok. Recently diagnosed with URI, on steroids and also having issues with kidney stones. Cardiac wise, seems stable. No complaints. No chest pain or SOB. No palpitations, near syncope, or syncope. BP stable. No radial access site complaints. Patient is compliant with her medications, would like to see how she feels off of Ranexa.     4/29/20 Sunday night she was drinking a wild raspberry crystal light with caffeine and took her home meds - metformin, gapapentin, singular and then she started having chest pain. She had angina in the past and has been on losartan. She has been getting tired lately and more CANALES. She took her mother to a cardiologist recently and had ECG there with possible Afib. She took Ranexa. Her mother has Afib as well. Prior cath and ECHO in 2019 neg.     5/7/20  She was started on Eliquis after ER visit, ECG there negative but prior ECG revealed Afib per pt. She has been going through stress with her mother who had a fall and daugher in hospital as well. She continues to take Ranexa BID. Will need increase BB dose    6/18/20  ECHO after last visit with normal Bi V function. Holter neg. Bp well controlled, no dizziness.  CHset pain resolved with Ranexa, occ fluttering.Breathing is stable. She had issues when right eye would go blind at times more in surface she went to optho could not see any clots.     Patient feels  no leg swelling, no PND, no dizziness, no syncope, no CNS symptoms.    Patient has fairly good exercise tolerance.    Patient is compliant with medications.    5/19/20 HOLTER  Predominant Rhythm  Sinus rhythm with heart rates varying between 53 and 133 bpm with an average of 86 bpm.   PVC    Ventricular Arrhythmias  There were very rare PVCs totalling 62 and averaging 0.65 per hour.   VT    Ventricular ectopic activity consisted of 62 beats, of which, 62 were in single PVCs.         2/20/19 LHC  - Left Main Coronary Artery:             The LM is normal. There is RAÚL 3 flow.     - Left Anterior Descending Artery:             The LAD is normal. There is RAÚL 3 flow.     - D1:             The D1 is normal. There is RAÚL 3 flow.     - D2:             The D2 is normal. There is RAÚL 3 flow.     - Left Circumflex Artery:             The LCX is normal. There is RAÚL 3 flow.     - OM1:             The OM1 is normal. There is RAÚL 3 flow.     - OM2:             The OM2 is normal. There is RAÚL 3 flow.     - Ramus:             The ramus is normal. There is RAÚL 3 flow.     - Right Coronary Artery:             The RCA has luminal irregularities. There is RAÚL 3 flow. Rucker's crook anatomy.       Results for orders placed during the hospital encounter of 05/19/20   Echo Color Flow Doppler? Yes    Narrative · Concentric left ventricular remodeling.  · Normal left ventricular systolic function. The estimated ejection   fraction is 60%.  · Normal LV diastolic function.  · No wall motion abnormalities.  · Normal right ventricular systolic function.  · Normal central venous pressure (3 mmHg).  · The estimated PA systolic pressure is 30 mmHg.              Past Medical History:   Diagnosis Date    Asthma     Colon polyps      Diabetes mellitus type II, uncontrolled     Diverticulosis of large intestine without hemorrhage 2015    Elevated liver enzymes     GERD (gastroesophageal reflux disease)     Gout, unspecified     Hemorrhoids, internal 2015    History of blood transfusion     Hyperlipidemia     Hypertension     IBS (irritable bowel syndrome)     Morbid obesity with BMI of 40.0-44.9, adult     Nasal vestibulitis     Osteoarthritis of multiple joints     Urolithiasis     Vitamin D deficiency disease        Past Surgical History:   Procedure Laterality Date    BREAST BIOPSY Right      SECTION, CLASSIC      COLONOSCOPY N/A 2015    Procedure: COLONOSCOPY;  Surgeon: Stanislav Pickard MD;  Location: [facility] ENDO;  Service: Endoscopy;  Laterality: N/A;    CYSTOSCOPY W/ URETERAL STENT PLACEMENT  2017    EXTRACORPOREAL SHOCK WAVE LITHOTRIPSY      LEFT HEART CATHETERIZATION Left 2019    Procedure: CATHETERIZATION, HEART, LEFT;  Surgeon: Haile Suggs MD;  Location: [facility] CATH LAB;  Service: Cardiology;  Laterality: Left;  10:30-11am start/poss rt radial approach    TOTAL ABDOMINAL HYSTERECTOMY      URETEROSCOPY  2017       Social History     Tobacco Use    Smoking status: Never Smoker    Smokeless tobacco: Never Used   Substance Use Topics    Alcohol use: No    Drug use: No       Family History   Problem Relation Age of Onset    Hypertension Mother     Heart disease Mother     Diabetes Mother     Colon polyps Mother     Other Mother         Lower limb amputation    Hypertension Brother     Stroke Brother     Colon cancer Maternal Grandmother     Diabetes Brother     Breast cancer Maternal Cousin        Patient's Medications   New Prescriptions    No medications on file   Previous Medications    ALBUTEROL (PROVENTIL/VENTOLIN HFA) 90 MCG/ACTUATION INHALER    Inhale 2 puffs into the lungs every 6 (six) hours as needed.    ALLOPURINOL (ZYLOPRIM) 100 MG TABLET    Take 1 tablet (100  "mg total) by mouth once daily.    APIXABAN (ELIQUIS) 5 MG TAB    Take 1 tablet (5 mg total) by mouth 2 (two) times daily.    ASPIRIN (ECOTRIN) 81 MG EC TABLET    Take 1 tablet (81 mg total) by mouth once daily.    BD ULTRA-FINE SHORT PEN NEEDLE 31 GAUGE X 5/16" NDLE    USE 1 PEN NEEDLE UNDER THE SKIN TWICE A DAY    BUDESONIDE 180MCG (PULMICORT FLEXHALER) 180 MCG/ACTUATION AEPB    Inhale 2 puffs into the lungs 2 (two) times daily.    CYANOCOBALAMIN (VITAMIN B-12) 100 MCG TABLET    Take by mouth.    DIPHENHYDRAMINE (BENADRYL) 50 MG CAPSULE    Begin [id number] the evening and morning before procedure with Prednisone and Pepcid    ERGOCALCIFEROL, VITAMIN D2, 400 UNIT TAB    Take by mouth.    ESOMEPRAZOLE (NEXIUM) 20 MG CAPSULE    TAKE 1 CAPSULE(20 MG) BY MOUTH BEFORE BREAKFAST    EYLEA 2 MG/0.05 ML SOLN    0.05 mLs (2 mg total) by Intravitreal route every 28 days. for 8 doses    FAMOTIDINE (PEPCID) 40 MG TABLET    Take [id number] the evening and morning before your procedure along with Benadryl and Prednisone    FERROUS SULFATE, DRIED (SLOW FE) 160 MG (50 MG IRON) TBSR    Take by mouth.    GABAPENTIN (NEURONTIN) 100 MG CAPSULE    TK ONE C PO  TID    IBUPROFEN (ADVIL,MOTRIN) 800 MG TABLET        INSULIN LISPRO PROTAMIN-LISPRO (HUMALOG MIX 75-25 KWIKPEN) 100 UNIT/ML (75-25) INPN    INJECT 30 UNITS UNDER THE SKIN IN THE MORNING AND 20 UNITS IN THE EVENING    LOSARTAN-HYDROCHLOROTHIAZIDE 100-25 MG (HYZAAR) 100-25 MG PER TABLET    Take 1 tablet by mouth once daily.    MECLIZINE (ANTIVERT) 12.5 MG TABLET    Take 1 tablet (12.5 mg total) by mouth 3 (three) times daily as needed for Dizziness or Nausea.    METFORMIN (GLUCOPHAGE-XR) 500 MG XR 24HR TABLET    TAKE 2 TABLETS BY MOUTH WITH BREAKFAST AND 1 TABLET WITH DINNER    METOPROLOL TARTRATE (LOPRESSOR) 25 MG TABLET    Take 1 tablet (25 mg total) by mouth 2 (two) times daily.    NITROGLYCERIN (NITROSTAT) 0.4 MG SL TABLET    Place 1 tablet (0.4 mg total) under the tongue " "every 5 (five) minutes as needed for Chest pain.    POTASSIUM CHLORIDE (MICRO-K) 10 MEQ CPSR    Take 1 capsule (10 mEq total) by mouth once daily.    PROMETHAZINE-CODEINE 6.25-10 MG/5 ML (PHENERGAN WITH CODEINE) 6.25-10 MG/5 ML SYRUP    Take 5 mLs by mouth every 4 to 6 hours as needed for Cough.    RANOLAZINE (RANEXA) 500 MG TB12    Take 1 tablet (500 mg total) by mouth 2 (two) times daily.    SIMVASTATIN (ZOCOR) 20 MG TABLET    Take 1 tablet (20 mg total) by mouth every evening.    TAMSULOSIN (FLOMAX) 0.4 MG CAP    TAKE 1 CAPSULE(0.4 MG) BY MOUTH EVERY DAY    TERBINAFINE HCL (LAMISIL) 250 MG TABLET    Take 1 tablet (250 mg total) by mouth once daily. For toenail fungus    TOBRAMYCIN SULFATE 0.3% (TOBREX) 0.3 % OPHTHALMIC SOLUTION    PLACE 1 GTT IN OU QID FOR 5 DAYS   Modified Medications    No medications on file   Discontinued Medications    No medications on file       Review of Systems   Constitutional: Positive for malaise/fatigue.   HENT: Negative.    Eyes: Negative.    Respiratory: Positive for shortness of breath.    Cardiovascular: Positive for chest pain. Negative for palpitations.   Gastrointestinal: Negative.    Genitourinary: Negative.    Musculoskeletal: Negative.    Skin: Negative.    Neurological: Negative.    Endo/Heme/Allergies: Negative.    Psychiatric/Behavioral: Negative.    All 12 systems otherwise negative.      Wt Readings from Last 3 Encounters:   06/18/20 113.1 kg (249 lb 5.4 oz)   05/19/20 111.6 kg (246 lb)   04/29/20 111.9 kg (246 lb 11.1 oz)     Temp Readings from Last 3 Encounters:   04/29/20 98.7 °F (37.1 °C) (Oral)   02/06/20 97.9 °F (36.6 °C)   11/29/19 98 °F (36.7 °C) (Oral)     BP Readings from Last 3 Encounters:   06/18/20 100/66   05/19/20 107/68   04/29/20 107/68     Pulse Readings from Last 3 Encounters:   06/18/20 85   05/19/20 70   04/29/20 88       /66 (BP Location: Left arm, Patient Position: Sitting)   Pulse 85   Ht 5' 6" (1.676 m)   Wt 113.1 kg (249 lb 5.4 oz)   " SpO2 97%   BMI 40.24 kg/m²     Objective:   Physical Exam   Constitutional: She is oriented to person, place, and time. She appears well-developed and well-nourished. No distress.   Obese   HENT:   Head: Normocephalic and atraumatic.   Nose: Nose normal.   Mouth/Throat: Oropharynx is clear and moist.   Eyes: Conjunctivae and EOM are normal. No scleral icterus.   Neck: Normal range of motion. Neck supple. No JVD present. No thyromegaly present.   Cardiovascular: Normal rate, regular rhythm, S1 normal and S2 normal. Exam reveals no gallop, no S3, no S4 and no friction rub.   No murmur heard.  Pulmonary/Chest: Effort normal and breath sounds normal. No stridor. No respiratory distress. She has no wheezes. She has no rales. She exhibits no tenderness.   Abdominal: Soft. Bowel sounds are normal. She exhibits no distension and no mass. There is no abdominal tenderness. There is no rebound.   Genitourinary:    Genitourinary Comments: Deferred     Musculoskeletal: Normal range of motion.         General: No tenderness, deformity or edema.   Lymphadenopathy:     She has no cervical adenopathy.   Neurological: She is alert and oriented to person, place, and time. She exhibits normal muscle tone. Coordination normal.   Skin: Skin is warm and dry. No rash noted. She is not diaphoretic. No erythema. No pallor.   Psychiatric: She has a normal mood and affect. Her behavior is normal. Judgment and thought content normal.   Nursing note and vitals reviewed.      Lab Results   Component Value Date     04/29/2020    K 3.7 04/29/2020     04/29/2020    CO2 26 04/29/2020    BUN 15 04/29/2020    CREATININE 0.8 04/29/2020     (H) 04/29/2020    HGBA1C 6.6 (H) 11/29/2019    AST 18 04/29/2020    ALT 51 (H) 04/29/2020    ALBUMIN 3.5 04/29/2020    PROT 6.8 04/29/2020    BILITOT 0.4 04/29/2020    WBC 7.87 04/29/2020    HGB 12.4 04/29/2020    HCT 39.6 04/29/2020    MCV 97 04/29/2020     04/29/2020    INR 0.9 05/30/2017     TSH 0.996 11/29/2019    CHOL 182 11/29/2019    HDL 51 11/29/2019    LDLCALC 116.4 11/29/2019    TRIG 73 11/29/2019     (H) 04/29/2020     Assessment:      1. PAF (paroxysmal atrial fibrillation)    2. Palpitations    3. Pure hypercholesterolemia    4. Essential hypertension    5. Morbid obesity with BMI of 40.0-44.9, adult    6. CANALES (dyspnea on exertion)    7. AP (angina pectoris)    8. Other sleep apnea    9. Carotid atherosclerosis, unspecified laterality        Plan:   1. Palpitations - sec to PAF  - cont current meds  - prior Cath neg  - needs pulm eval as well for KATHERINE    2. HTN  - cont meds    3. Obesity  - needs weight loss    4. DM2 - 6.6  - cont meds per PCP    5. HLD   - cont statin  - check carotid u/s - had R eye blindness?    6. PAF  - cont Eliquis  - increase BB to 25 BID  - ECHO - neg  - Holter - neg    7. Angina  - cont Ranexa      Thank you for allowing me to participate in this patient's care. Please do not hesitate to contact me with any questions or concerns. Consult note has been forwarded to the referral physician.

## 2020-06-19 ENCOUNTER — TELEPHONE (OUTPATIENT)
Dept: PHARMACY | Facility: CLINIC | Age: 65
End: 2020-06-19

## 2020-06-19 NOTE — TELEPHONE ENCOUNTER
Good Morning,     The prior authorization for Latosha Enriquez's Eliquis 5mg prescription has been APPROVED FROM 5/19/2020 TO 6/18/2021 with copayment of $25.00.       Patient has been notified of the decision on 6/19/2020 and patient states she will  medication when she is out of her current fill.    If there are any additional questions or concerns, please contact me.    Thank You!   Roselia King CPhT, B.A  Patient Care Advocate   [facility]  Phone: [phone] Ext [phone]  Fax: [phone]

## 2020-06-22 NOTE — TELEPHONE ENCOUNTER
2nd Attempt    Attempted to reach patient to schedule sleep appointment based on referral received by pulmonary/sleep department work queue.     Number rang multiple times prior to recording that explained voicemail box was full and could not accept new messages; call ended. Unable to leave voicemail.    Please direct any possible call back from patient to Pulmonary/Sleep Clinic - [place].

## 2020-06-22 NOTE — TELEPHONE ENCOUNTER
3rd Attempt    Letter encouraging patient to contact department to schedule appointment mailed to patient at address on file in patient demographics. Referral removed from department work queue as such. Referral is still, however, available for scheduling.    Any subsequent call backs from patient or emergency contact may be directed to Pulmonary/Sleep Clinic - [facility].

## 2020-07-01 ENCOUNTER — TELEPHONE (OUTPATIENT)
Dept: PULMONOLOGY | Facility: CLINIC | Age: 65
End: 2020-07-01

## 2020-07-01 NOTE — TELEPHONE ENCOUNTER
----- Message from Sosa Townsend MA sent at 7/1/2020 11:22 AM CDT -----  Regarding: FW: SCHE SLEEP APNEA  Please call an schedule thanks.  ----- Message -----  From: Mariela Pagan  Sent: 7/1/2020  10:43 AM CDT  To: [facility] Staff  Subject: SCHE SLEEP APNEA                                 PLEASE CALL REGARDING A SLEEP APNEA TEST @ [phone]. THANKS

## 2020-07-01 NOTE — TELEPHONE ENCOUNTER
Patient opted for the following appointment:    Future Appointments   Date Time Provider Department Center   7/20/2020  9:30 AM [facility]ULAR[facility] ON SPECCPR  Medical C   7/23/2020  9:00 AM LINDSAY Bright MD [facility]   9/18/2020  4:00 PM Jef Rush MD ON PULMSVC  Medical C   9/22/2020  2:20 PM Pratik Ryan MD ON CARDIO  Medical C       Patient verbalized understanding of appointment date, time, and clinic location.

## 2020-07-07 ENCOUNTER — TELEPHONE (OUTPATIENT)
Dept: FAMILY MEDICINE | Facility: CLINIC | Age: 65
End: 2020-07-07

## 2020-07-07 NOTE — TELEPHONE ENCOUNTER
----- Message from Elsypopeye Townsend sent at 7/7/2020  4:13 PM CDT -----  Contact: pt  Pt requesting a call back regarding her virtual appt. She states that she tried to connect to appt and could not. She would like to know if she still have appt today. Please call pt back at [phone]

## 2020-07-07 NOTE — TELEPHONE ENCOUNTER
Called pt. Pt apologized for not connecting correctly yo  for 7/7 appt. Appt was rescheduled per pt request.

## 2020-07-08 RX ORDER — METFORMIN HYDROCHLORIDE 500 MG/1
TABLET, EXTENDED RELEASE ORAL
Qty: 270 TABLET | Refills: 1 | Status: SHIPPED | OUTPATIENT
Start: 2020-07-08 | End: 2021-02-17 | Stop reason: SDUPTHER

## 2020-07-13 ENCOUNTER — TELEPHONE (OUTPATIENT)
Dept: FAMILY MEDICINE | Facility: CLINIC | Age: 65
End: 2020-07-13

## 2020-07-13 ENCOUNTER — OFFICE VISIT (OUTPATIENT)
Dept: FAMILY MEDICINE | Facility: CLINIC | Age: 65
End: 2020-07-13
Payer: COMMERCIAL

## 2020-07-13 VITALS
SYSTOLIC BLOOD PRESSURE: 110 MMHG | WEIGHT: 251.75 LBS | HEIGHT: 66 IN | TEMPERATURE: 99 F | DIASTOLIC BLOOD PRESSURE: 70 MMHG | OXYGEN SATURATION: 97 % | RESPIRATION RATE: 18 BRPM | BODY MASS INDEX: 40.46 KG/M2 | HEART RATE: 102 BPM

## 2020-07-13 DIAGNOSIS — Z79.4 TYPE 2 DIABETES MELLITUS WITHOUT COMPLICATION, WITH LONG-TERM CURRENT USE OF INSULIN: ICD-10-CM

## 2020-07-13 DIAGNOSIS — E11.9 TYPE 2 DIABETES MELLITUS WITHOUT COMPLICATION, WITH LONG-TERM CURRENT USE OF INSULIN: ICD-10-CM

## 2020-07-13 DIAGNOSIS — I20.9 AP (ANGINA PECTORIS): ICD-10-CM

## 2020-07-13 DIAGNOSIS — I48.0 PAF (PAROXYSMAL ATRIAL FIBRILLATION): ICD-10-CM

## 2020-07-13 DIAGNOSIS — M25.50 POLYARTHRALGIA: Primary | ICD-10-CM

## 2020-07-13 DIAGNOSIS — I48.0 PAROXYSMAL ATRIAL FIBRILLATION: ICD-10-CM

## 2020-07-13 DIAGNOSIS — H53.9 VISUAL DISTURBANCE: ICD-10-CM

## 2020-07-13 DIAGNOSIS — M17.0 PRIMARY OSTEOARTHRITIS OF BOTH KNEES: ICD-10-CM

## 2020-07-13 DIAGNOSIS — R06.09 DOE (DYSPNEA ON EXERTION): ICD-10-CM

## 2020-07-13 PROCEDURE — 3078F DIAST BP <80 MM HG: CPT | Mod: CPTII,S$GLB,, | Performed by: FAMILY MEDICINE

## 2020-07-13 PROCEDURE — 3074F PR MOST RECENT SYSTOLIC BLOOD PRESSURE < 130 MM HG: ICD-10-PCS | Mod: CPTII,S$GLB,, | Performed by: FAMILY MEDICINE

## 2020-07-13 PROCEDURE — 3008F BODY MASS INDEX DOCD: CPT | Mod: CPTII,S$GLB,, | Performed by: FAMILY MEDICINE

## 2020-07-13 PROCEDURE — 99214 PR OFFICE/OUTPT VISIT, EST, LEVL IV, 30-39 MIN: ICD-10-PCS | Mod: S$GLB,,, | Performed by: FAMILY MEDICINE

## 2020-07-13 PROCEDURE — 3078F PR MOST RECENT DIASTOLIC BLOOD PRESSURE < 80 MM HG: ICD-10-PCS | Mod: CPTII,S$GLB,, | Performed by: FAMILY MEDICINE

## 2020-07-13 PROCEDURE — 99214 OFFICE O/P EST MOD 30 MIN: CPT | Mod: S$GLB,,, | Performed by: FAMILY MEDICINE

## 2020-07-13 PROCEDURE — 99999 PR PBB SHADOW E&M-EST. PATIENT-LVL V: CPT | Mod: PBBFAC,,, | Performed by: FAMILY MEDICINE

## 2020-07-13 PROCEDURE — 99999 PR PBB SHADOW E&M-EST. PATIENT-LVL V: ICD-10-PCS | Mod: PBBFAC,,, | Performed by: FAMILY MEDICINE

## 2020-07-13 PROCEDURE — 3044F PR MOST RECENT HEMOGLOBIN A1C LEVEL <7.0%: ICD-10-PCS | Mod: CPTII,S$GLB,, | Performed by: FAMILY MEDICINE

## 2020-07-13 PROCEDURE — 3008F PR BODY MASS INDEX (BMI) DOCUMENTED: ICD-10-PCS | Mod: CPTII,S$GLB,, | Performed by: FAMILY MEDICINE

## 2020-07-13 PROCEDURE — 3044F HG A1C LEVEL LT 7.0%: CPT | Mod: CPTII,S$GLB,, | Performed by: FAMILY MEDICINE

## 2020-07-13 PROCEDURE — 3074F SYST BP LT 130 MM HG: CPT | Mod: CPTII,S$GLB,, | Performed by: FAMILY MEDICINE

## 2020-07-13 RX ORDER — RANOLAZINE 500 MG/1
500 TABLET, EXTENDED RELEASE ORAL 2 TIMES DAILY
Qty: 90 TABLET | Refills: 1 | Status: SHIPPED | OUTPATIENT
Start: 2020-07-13 | End: 2020-10-23 | Stop reason: SDUPTHER

## 2020-07-13 RX ORDER — MONTELUKAST SODIUM 10 MG/1
TABLET ORAL
COMMUNITY
Start: 2020-06-18 | End: 2020-11-10 | Stop reason: SDUPTHER

## 2020-07-13 RX ORDER — GABAPENTIN 100 MG/1
CAPSULE ORAL
Qty: 90 CAPSULE | Refills: 0 | Status: SHIPPED | OUTPATIENT
Start: 2020-07-13 | End: 2020-08-09

## 2020-07-13 RX ORDER — TRAMADOL HYDROCHLORIDE 50 MG/1
50-100 TABLET ORAL EVERY 6 HOURS PRN
Qty: 30 TABLET | Refills: 0 | Status: SHIPPED | OUTPATIENT
Start: 2020-07-13 | End: 2020-07-23

## 2020-07-13 RX ORDER — METOPROLOL TARTRATE 25 MG/1
25 TABLET, FILM COATED ORAL 2 TIMES DAILY
Qty: 90 TABLET | Refills: 1 | Status: SHIPPED | OUTPATIENT
Start: 2020-07-13 | End: 2020-11-24 | Stop reason: SDUPTHER

## 2020-07-13 NOTE — PROGRESS NOTES
CHIEF COMPLAINT:  This is a 64-year-old female with multiple medical complaints.    SUBJECTIVE:  Earlier this year, patient had an episode of chest pain associated with fatigue and dyspnea on exertion.  She had EKG done at her mother's cardiologist office which showed possible atrial fibrillation.  She was started on Eliquis and was seen by her cardiologist with normal EKG at that visit.  Patient had Holter monitor and echocardiogram which were negative.  Chest pain resolved with Ranexa.  She has occasional heart flutters.  Patient reports that since she was placed on oral Eliquis she can no longer take ibuprofen which controlled her joint pain, particularly osteoarthritis of both knees.  She has been taking Tylenol arthritis which is not as effective.  She has done some research and was wondering if she could take diclofenac.  Patient is having difficulty getting around due to her joint pain.      Patient has gained 6 lb because she was told she could not eat vegetables and she has been substituting with starches such as potatoes.  She has type 2 diabetes and has noted increased blood sugar levels.  Patient's last A1c was 6.6% 7 months ago.  She denies polyuria, polydipsia or polyphagia he delete that  She takes metformin 1000 mg  twice daily and injects Humalog mix 75-25 twice daily.  Patient requests phentermine to help with weight management.  She has taken medication in the past. Her blood pressure is controlled on losartan HCT.  She takes simvastatin for hyperlipidemia.     Patient complains of 2-3 episodes of awakening at night with visual disturbance which she describes as crumpled aluminum foil obscuring her vision.  Inability to see lasts for a few minutes and then resolves.  Patient has had 2-3 episodes.  She has discussed the problem with her retinal specialist and apparently retinal exam is unchanged.  Patient is getting injections for diabetic retinopathy.  Patient denies headache following visual  disturbance, nausea or vomiting.    ROS:  GENERAL: Patient denies fever, chills, night sweats. Patient denies weight gain or loss. Patient denies anorexia, fatigue, weakness or swollen glands.  SKIN: Patient denies rash.  HEENT: Patient denies sore throat, ear pain, hearing loss, nasal congestion, or runny nose. Patient denies eye irritation or discharge.  LUNGS: Patient denies cough, wheeze or hemoptysis.  CARDIOVASCULAR: Patient denies chest pain, palpitations, syncope or lower extremity edema.  Positive for dyspnea on exertion.  GI: Patient denies abdominal pain, nausea, vomiting, diarrhea, blood in stool or melena.  GENITOURINARY:  Patient denies dysuria, frequency, hematuria, nocturia, urgency or incontinence.  MUSCULOSKELETAL: Patient denies joint swelling, redness or warmth.  Positive for joint pain.  NEUROLOGIC: Patient denies headache, vertigo, paresthesias, weakness in limb, or abnormality of gait.  PSYCHIATRIC: Patient denies anxiety, depression, or memory loss.     OBJECTIVE:   GENERAL: Well-developed well-nourished, morbidly obese, black female alert and oriented x3, in no acute distress. Memory, judgment and cognition without deficit.   SKIN: Clear without rash. Normal color and tone.   HEENT: Eyes: No scleral icterus. Clear conjunctivae.  Extraocular movements intact.  No nystagmus.  Fundi not visualized  NECK: Supple, normal range of motion. No masses, nodes or enlarged thyroid. No JVD. Carotids 2+ and equal. No bruits.  LUNGS: Clear to auscultation. Normal respiratory effort.  CARDIOVASCULAR: Regular rhythm, normal S1, S2 without murmur, gallop or rub.  BACK: No CVA or spinal tenderness.  EXTREMITIES: Without cyanosis, clubbing or edema. Distal pulses 2+ and equal. Normal range of motion in all extremities. No joint effusion, erythema or warmth.  NEUROLOGIC: Motor strength equal bilaterally. Sensation normal to touch.  Gait without abnormality. No tremor.    ASSESSMENT:  1. Polyarthralgia    2.  Primary osteoarthritis of both knees    3. PAF (paroxysmal atrial fibrillation)    4. CANALES (dyspnea on exertion)    5. Type 2 diabetes mellitus without complication, with long-term current use of insulin    6. Visual disturbance        PLAN:   1.  Refill gabapentin per patient request.  2.  Tramadol 50 mg 1 tablet every 4-6 hours as needed for pain.  3.  Apply moist heat and/or ice q.i.d. for 15-20 minutes.  4.  Patient may resume eating vegetables and decreasing starches in diet.  5.  Patient should avoid phentermine due to atrial fibrillation.  6.  Possible ocular migraines discussed.  7.  Weight reduction.  8.  Exercise 150 min per week.  9.  Follow-up if no improvement or worsening symptoms.    This note is generated with speech recognition software and is subject to transcription error and sound alike phrases that may be missed by proofreading.

## 2020-07-14 DIAGNOSIS — I48.0 PAF (PAROXYSMAL ATRIAL FIBRILLATION): ICD-10-CM

## 2020-07-14 DIAGNOSIS — I20.9 AP (ANGINA PECTORIS): ICD-10-CM

## 2020-07-14 DIAGNOSIS — I48.0 PAROXYSMAL ATRIAL FIBRILLATION: ICD-10-CM

## 2020-07-14 RX ORDER — METOPROLOL TARTRATE 25 MG/1
25 TABLET, FILM COATED ORAL 2 TIMES DAILY
Qty: 60 TABLET | Refills: 3 | Status: SHIPPED | OUTPATIENT
Start: 2020-07-14 | End: 2020-08-05 | Stop reason: SDUPTHER

## 2020-07-20 ENCOUNTER — HOSPITAL ENCOUNTER (OUTPATIENT)
Dept: CARDIOLOGY | Facility: HOSPITAL | Age: 65
Discharge: HOME | End: 2020-07-20
Attending: INTERNAL MEDICINE
Payer: COMMERCIAL

## 2020-07-20 VITALS
SYSTOLIC BLOOD PRESSURE: 109 MMHG | BODY MASS INDEX: 40.34 KG/M2 | DIASTOLIC BLOOD PRESSURE: 73 MMHG | WEIGHT: 251 LBS | HEIGHT: 66 IN

## 2020-07-20 DIAGNOSIS — I65.29 CAROTID ATHEROSCLEROSIS, UNSPECIFIED LATERALITY: ICD-10-CM

## 2020-07-20 LAB
LEFT ARM DIASTOLIC BLOOD PRESSURE: 82 MMHG
LEFT ARM SYSTOLIC BLOOD PRESSURE: 112 MMHG
LEFT CBA DIAS: 16 CM/S
LEFT CBA SYS: 64 CM/S
LEFT CCA DIST DIAS: 23 CM/S
LEFT CCA DIST SYS: 90 CM/S
LEFT CCA MID DIAS: 21 CM/S
LEFT CCA MID SYS: 97 CM/S
LEFT CCA PROX DIAS: 16 CM/S
LEFT CCA PROX SYS: 78 CM/S
LEFT ECA DIAS: 18 CM/S
LEFT ECA SYS: 91 CM/S
LEFT ICA DIST DIAS: 41 CM/S
LEFT ICA DIST SYS: 94 CM/S
LEFT ICA MID DIAS: 23 CM/S
LEFT ICA MID SYS: 69 CM/S
LEFT ICA PROX DIAS: 19 CM/S
LEFT ICA PROX SYS: 81 CM/S
LEFT VERTEBRAL DIAS: 16 CM/S
LEFT VERTEBRAL SYS: 55 CM/S
OHS CV CAROTID RIGHT ICA EDV HIGHEST: 25
OHS CV CAROTID ULTRASOUND LEFT ICA/CCA RATIO: 0.97
OHS CV CAROTID ULTRASOUND RIGHT ICA/CCA RATIO: 0.91
OHS CV PV CAROTID LEFT HIGHEST CCA: 97
OHS CV PV CAROTID LEFT HIGHEST ICA: 94
OHS CV PV CAROTID RIGHT HIGHEST CCA: 91
OHS CV PV CAROTID RIGHT HIGHEST ICA: 83
OHS CV US CAROTID LEFT HIGHEST EDV: 41
RIGHT ARM DIASTOLIC BLOOD PRESSURE: 73 MMHG
RIGHT ARM SYSTOLIC BLOOD PRESSURE: 109 MMHG
RIGHT CBA DIAS: 10 CM/S
RIGHT CBA SYS: 51 CM/S
RIGHT CCA DIST DIAS: 19 CM/S
RIGHT CCA DIST SYS: 72 CM/S
RIGHT CCA MID DIAS: 18 CM/S
RIGHT CCA MID SYS: 83 CM/S
RIGHT CCA PROX DIAS: 15 CM/S
RIGHT CCA PROX SYS: 91 CM/S
RIGHT ECA DIAS: 14 CM/S
RIGHT ECA SYS: 128 CM/S
RIGHT ICA DIST DIAS: 24 CM/S
RIGHT ICA DIST SYS: 83 CM/S
RIGHT ICA MID DIAS: 25 CM/S
RIGHT ICA MID SYS: 72 CM/S
RIGHT ICA PROX DIAS: 19 CM/S
RIGHT ICA PROX SYS: 74 CM/S
RIGHT VERTEBRAL DIAS: 16 CM/S
RIGHT VERTEBRAL SYS: 38 CM/S

## 2020-07-20 PROCEDURE — 93880 EXTRACRANIAL BILAT STUDY: CPT

## 2020-07-20 PROCEDURE — 93880 CV US DOPPLER CAROTID (CUPID ONLY): ICD-10-PCS | Mod: 26,,, | Performed by: INTERNAL MEDICINE

## 2020-07-20 PROCEDURE — 93880 EXTRACRANIAL BILAT STUDY: CPT | Mod: 26,,, | Performed by: INTERNAL MEDICINE

## 2020-07-20 NOTE — TELEPHONE ENCOUNTER
----- Message from Teresa Adrian sent at 7/20/2020 10:40 AM CDT -----  Contact: ronaldo /[phone]  Would like to consult with nurse about meds . insulin lispro protamin-lispro (HUMALOG MIX 75-25 KWIKPEN) 100 unit/mL (75-25) InPn /potassium chloride (MICRO-K) 10 MEQ CpSR /montelukast (SINGULAIR) 10 mg tablet   / simvastatin (ZOCOR) 20 MG tablet  /

## 2020-07-20 NOTE — TELEPHONE ENCOUNTER
----- Message from Sindi Cardenas sent at 7/20/2020 12:24 PM CDT -----  Regarding: returned call  Contact: patient  Patient is returning a call, please call them back at  , also needs refill for Humalog strips- [facility], [phone]

## 2020-07-21 RX ORDER — POTASSIUM CHLORIDE 750 MG/1
10 CAPSULE, EXTENDED RELEASE ORAL DAILY
Qty: 90 CAPSULE | Refills: 1 | Status: SHIPPED | OUTPATIENT
Start: 2020-07-21 | End: 2020-12-14 | Stop reason: SDUPTHER

## 2020-07-21 RX ORDER — INSULIN LISPRO 100 [IU]/ML
INJECTION, SUSPENSION SUBCUTANEOUS
Qty: 45 ML | Refills: 1 | Status: SHIPPED | OUTPATIENT
Start: 2020-07-21 | End: 2020-12-14 | Stop reason: SDUPTHER

## 2020-07-21 RX ORDER — SIMVASTATIN 20 MG/1
20 TABLET, FILM COATED ORAL NIGHTLY
Qty: 90 TABLET | Refills: 1 | Status: SHIPPED | OUTPATIENT
Start: 2020-07-21 | End: 2020-12-15 | Stop reason: SDUPTHER

## 2020-07-21 NOTE — TELEPHONE ENCOUNTER
Received refill request for 90 day rx. Please review and advise.       Simvastatin last filled- 11/29/2019  Potassium last filled- 04/29/2020  humalog last filled- 04/06/2020    Last visit 04/06/2020

## 2020-07-23 ENCOUNTER — PROCEDURE VISIT (OUTPATIENT)
Dept: OPHTHALMOLOGY | Facility: CLINIC | Age: 65
End: 2020-07-23
Payer: COMMERCIAL

## 2020-07-23 DIAGNOSIS — Z79.4 TYPE 2 DIABETES MELLITUS WITH BOTH EYES AFFECTED BY MILD NONPROLIFERATIVE RETINOPATHY AND MACULAR EDEMA, WITH LONG-TERM CURRENT USE OF INSULIN: Primary | ICD-10-CM

## 2020-07-23 DIAGNOSIS — E11.3213 TYPE 2 DIABETES MELLITUS WITH BOTH EYES AFFECTED BY MILD NONPROLIFERATIVE RETINOPATHY AND MACULAR EDEMA, WITH LONG-TERM CURRENT USE OF INSULIN: Primary | ICD-10-CM

## 2020-07-23 DIAGNOSIS — H04.129 DRYNESS, EYE: ICD-10-CM

## 2020-07-23 PROCEDURE — 92134 CPTRZ OPH DX IMG PST SGM RTA: CPT | Mod: S$GLB,,, | Performed by: OPHTHALMOLOGY

## 2020-07-23 PROCEDURE — 92012 PR EYE EXAM, EST PATIENT,INTERMED: ICD-10-PCS | Mod: 25,S$GLB,, | Performed by: OPHTHALMOLOGY

## 2020-07-23 PROCEDURE — 67028 INJECTION EYE DRUG: CPT | Mod: RT,S$GLB,, | Performed by: OPHTHALMOLOGY

## 2020-07-23 PROCEDURE — 92134 POSTERIOR SEGMENT OCT RETINA (OCULAR COHERENCE TOMOGRAPHY)-BOTH EYES: ICD-10-PCS | Mod: S$GLB,,, | Performed by: OPHTHALMOLOGY

## 2020-07-23 PROCEDURE — 67028 PR INJECT INTRAVITREAL PHARMCOLOGIC: ICD-10-PCS | Mod: RT,S$GLB,, | Performed by: OPHTHALMOLOGY

## 2020-07-23 PROCEDURE — 92012 INTRM OPH EXAM EST PATIENT: CPT | Mod: 25,S$GLB,, | Performed by: OPHTHALMOLOGY

## 2020-07-23 NOTE — PROGRESS NOTES
===============================  Latosha Enriquez,  7/23/2020 today   64 y.o. female   Last visit [facility]: :5/21/2020   Last visit [facility] dept. 5/21/2020  [facility]:  Corrected distance visual acuity was 20/30 in the right eye and 20/20 in the left eye.  Tonometry     Tonometry (Applanation, 11:00 AM)       Right Left    Pressure 18 18               Not recorded         Not recorded         Not recorded        Chief Complaint   Patient presents with    Blurred Vision     States when she wakes at night her vision is crinkly like aluminum foil.  Clears after a few minutes.    dme     eylea od       ________________  7/23/2020 today  HPI     Blurred Vision      Additional comments: States when she wakes at night her vision is   crinkly like aluminum foil.  Clears after a few minutes.              dme      Additional comments: eylea od              Comments     (No Betadine - Vigamox Prep))  Prefers pledget, no bleb    DM  DME OD  EYLEA OD 2/26/20          Last edited by LINDSAY Bright MD on 7/23/2020 11:00 AM. (History)      Problem List Items Addressed This Visit        Eye/Vision problems    Type 2 diabetes mellitus with both eyes affected by mild nonproliferative retinopathy and macular edema, with long-term current use of insulin - Primary    Relevant Medications    aflibercept Soln 2 mg (Start on 8/20/2020 12:00 AM)    aflibercept Soln 2 mg (Completed)    Other Relevant Orders    Posterior Segment OCT Retina-Both eyes       Other    Dryness, eye        Dry eyes at night    rec lacrilube  No vision  Like wrinlked paer blinks away  Recent ct , carotids extensive figueroa  Pledget hurt - do bleb next time   lacrube hs  Dme, continue as planned with OD DME      ...    Injection Procedure Note:    7/23/2020  Diagnosis :  od dme   Today:   Eylea (afibercept) 2 mg/0.05 ml Intravitreal Injection , OD   Follow up: rtc  1 mo - possibly      Instructed to call 24/7 for any worsening of vision. Check Both eyes daily. Gave patient my home  phone number.  Risks, benefits, and alternatives to treatment discussed in detail with the patient.  The patient voiced understanding and wished to proceed with the procedure.     Patient Identified and Time Out complete  Pledget ( per request)  and Betadine.  Inject at Eylea (afibercept) 2 mg/0.05 ml Intravitreal Injection , OD 6:00 @ 3.5-4mm posterior to limbus  Post Operative Dx: Same  Complications: None  Follow up as above.        ===========================

## 2020-07-27 ENCOUNTER — PATIENT OUTREACH (OUTPATIENT)
Dept: ADMINISTRATIVE | Facility: OTHER | Age: 65
End: 2020-07-27

## 2020-07-27 NOTE — PROGRESS NOTES
Requested updates within Care Everywhere.  Patient's chart was reviewed for overdue LACHO topics.

## 2020-07-28 ENCOUNTER — OFFICE VISIT (OUTPATIENT)
Dept: PULMONOLOGY | Facility: CLINIC | Age: 65
End: 2020-07-28
Payer: COMMERCIAL

## 2020-07-28 VITALS
WEIGHT: 253.5 LBS | HEIGHT: 66 IN | RESPIRATION RATE: 18 BRPM | TEMPERATURE: 98 F | BODY MASS INDEX: 40.74 KG/M2 | OXYGEN SATURATION: 98 % | DIASTOLIC BLOOD PRESSURE: 76 MMHG | SYSTOLIC BLOOD PRESSURE: 124 MMHG | HEART RATE: 88 BPM

## 2020-07-28 DIAGNOSIS — R06.83 SNORING: ICD-10-CM

## 2020-07-28 DIAGNOSIS — R53.82 CHRONIC FATIGUE: ICD-10-CM

## 2020-07-28 DIAGNOSIS — I48.0 PAROXYSMAL ATRIAL FIBRILLATION: ICD-10-CM

## 2020-07-28 DIAGNOSIS — E66.01 CLASS 3 SEVERE OBESITY DUE TO EXCESS CALORIES WITH BODY MASS INDEX (BMI) OF 40.0 TO 44.9 IN ADULT, UNSPECIFIED WHETHER SERIOUS COMORBIDITY PRESENT: Primary | ICD-10-CM

## 2020-07-28 DIAGNOSIS — R29.818 SUSPECTED SLEEP APNEA: ICD-10-CM

## 2020-07-28 DIAGNOSIS — G47.8 NON-RESTORATIVE SLEEP: ICD-10-CM

## 2020-07-28 PROCEDURE — 3078F PR MOST RECENT DIASTOLIC BLOOD PRESSURE < 80 MM HG: ICD-10-PCS | Mod: CPTII,S$GLB,, | Performed by: INTERNAL MEDICINE

## 2020-07-28 PROCEDURE — 99204 PR OFFICE/OUTPT VISIT, NEW, LEVL IV, 45-59 MIN: ICD-10-PCS | Mod: S$GLB,,, | Performed by: INTERNAL MEDICINE

## 2020-07-28 PROCEDURE — 99999 PR PBB SHADOW E&M-EST. PATIENT-LVL V: ICD-10-PCS | Mod: PBBFAC,,, | Performed by: INTERNAL MEDICINE

## 2020-07-28 PROCEDURE — 99204 OFFICE O/P NEW MOD 45 MIN: CPT | Mod: S$GLB,,, | Performed by: INTERNAL MEDICINE

## 2020-07-28 PROCEDURE — 3008F BODY MASS INDEX DOCD: CPT | Mod: CPTII,S$GLB,, | Performed by: INTERNAL MEDICINE

## 2020-07-28 PROCEDURE — 99999 PR PBB SHADOW E&M-EST. PATIENT-LVL V: CPT | Mod: PBBFAC,,, | Performed by: INTERNAL MEDICINE

## 2020-07-28 PROCEDURE — 3078F DIAST BP <80 MM HG: CPT | Mod: CPTII,S$GLB,, | Performed by: INTERNAL MEDICINE

## 2020-07-28 PROCEDURE — 3008F PR BODY MASS INDEX (BMI) DOCUMENTED: ICD-10-PCS | Mod: CPTII,S$GLB,, | Performed by: INTERNAL MEDICINE

## 2020-07-28 PROCEDURE — 3074F SYST BP LT 130 MM HG: CPT | Mod: CPTII,S$GLB,, | Performed by: INTERNAL MEDICINE

## 2020-07-28 PROCEDURE — 3074F PR MOST RECENT SYSTOLIC BLOOD PRESSURE < 130 MM HG: ICD-10-PCS | Mod: CPTII,S$GLB,, | Performed by: INTERNAL MEDICINE

## 2020-07-28 NOTE — LETTER
July 28, 2020      Pratik Ryan MD  [address] [address] [facility] [place] [address]           [facility]  [address] [facility] [address]  Phone: [phone]  Fax: [phone]          Patient: Latosha Enriquez   MR Number: [id number]   YOB: 1955   Date of Visit: 7/28/2020       Dear Dr. Pratik Ryan:    Thank you for referring Latosha Enriquez to me for evaluation. Attached you will find relevant portions of my assessment and plan of care.    If you have questions, please do not hesitate to call me. I look forward to following Latosha Enriquez along with you.    Sincerely,    Jef Rush MD    Enclosure  CC:  No Recipients    If you would like to receive this communication electronically, please contact [email or web address] or [phone] to request more information on [employer] Link access.    For providers and/or their staff who would like to refer a patient to [facility], please contact us through our one-stop-shop provider referral line, [facility], at [phone].    If you feel you have received this communication in error or would no longer like to receive these types of communications, please e-mail [email or web address]

## 2020-07-28 NOTE — PROGRESS NOTES
Initial Outpatient Pulmonary Evaluation       SUBJECTIVE:     Chief Complaint   Patient presents with    Shortness of Breath       History of Present Illness:    Patient is a 64 y.o. female referred for evaluation of suspected sleep apnea.    Complains of snoring, daytime fatigue, nonrestorative sleep and witnessed apneic spells.    He has initially started workup with Cardiology due to angina status post left heart catheterization with patent coronaries, currently on Ranexa and nitro and metoprolol.    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    Has AFib on anticoagulation for the last 2 months.      STOP - BANG Questionnaire:     1. Snoring : Do you snore loudly ?    Yes    2. Tired : Do you often feel tired, fatigued, or sleepy during daytime? Yes    3. Observed: Has anyone observed you stop breathing during your sleep?   Yes     4. Blood pressure : Do you have or are you being treated for high blood pressure?   Yes    5. BMI :BMI more than 35 kg/m2?   Yes    6. Age : Age over 50 yr old?   Yes    7. Neck circumference:   For male, is your shirt collar 17 inches / 43cm or larger?  For female, is your shirt collar 16 inches / 41cm or larger?    Yes    8. Gender: Gender male?   No    STOP BANG SCORE 7    High risk of KATHERINE: Yes 5 - 8  Intermediate risk of KATHERINE: Yes 3 - 4  Low risk of KATHERINE: Yes 0 - 2      References:   STOP Questionnaire   A Tool to Screen Patients for Obstructive Sleep Apnea: LYLE Bartholomew.C.P.C., SUNG Abdalla.B.B.S., Mimi Champagne M.D.,Génesis Tran, Ph.D., Jean Griffin M.B.B.S.,_ Natalya Lawrence.,_ Shayne Gutierrez M.D., Mitchel Atkins, F.R.C.P.C.; Anesthesiology 2008; 108:812-21 Copyright © 2008, the American Society of Anesthesiologists, Inc. [employer]            Review of Systems   Constitutional: Positive for weight gain and fatigue. Negative for fever and chills.   HENT:  "Negative for nosebleeds.    Eyes: Negative for redness.   Respiratory: Positive for apnea, snoring and somnolence. Negative for choking.    Genitourinary: Negative for hematuria.   Endocrine: Diabetes mellitus Negative for cold intolerance.    Musculoskeletal: Positive for arthralgias, back pain and gait problem.        Gout    Gastrointestinal: Positive for acid reflux. Negative for vomiting.   Neurological: Negative for syncope.   Hematological: Negative for adenopathy. Bleeds easily and excessive bruising.   Psychiatric/Behavioral: Positive for sleep disturbance. Negative for confusion.       Review of patient's allergies indicates:   Allergen Reactions    Antihistamines - alkylamine Anaphylaxis and Itching     Cough, throat itches    Chocolate flavor Other (See Comments)     disoriented    Doxycycline Other (See Comments)     Stomach pain    Betadine [povidone-iodine] Itching    Iodine and iodide containing products Other (See Comments)     Tongue swelling    Tramadol Nausea Only    Yeast Itching     Facial swelling, constipation       Current Outpatient Medications   Medication Sig Dispense Refill    albuterol (PROVENTIL/VENTOLIN HFA) 90 mcg/actuation inhaler Inhale 2 puffs into the lungs every 6 (six) hours as needed. 18 g 3    allopurinol (ZYLOPRIM) 100 MG tablet Take 1 tablet (100 mg total) by mouth once daily. (Patient taking differently: Take 100 mg by mouth as needed. ) 90 tablet 0    apixaban (ELIQUIS) 5 mg Tab Take 1 tablet (5 mg total) by mouth 2 (two) times daily. 90 tablet 1    apixaban (ELIQUIS) 5 mg Tab Take 1 tablet (5 mg total) by mouth 2 (two) times daily. 60 tablet 3    BD ULTRA-FINE SHORT PEN NEEDLE 31 gauge x 5/16" Ndle USE 1 PEN NEEDLE UNDER THE SKIN TWICE A DAY 90 each 3    cyanocobalamin (VITAMIN B-12) 100 MCG tablet Take by mouth.      diphenhydrAMINE (BENADRYL) 50 MG capsule Begin [id number] the evening and morning before procedure with Prednisone and Pepcid 3 capsule 0 "    ergocalciferol, vitamin D2, 400 unit Tab Take by mouth.      esomeprazole (NEXIUM) 20 MG capsule TAKE 1 CAPSULE(20 MG) BY MOUTH BEFORE BREAKFAST 30 capsule 0    EYLEA 2 mg/0.05 mL Soln 0.05 mLs (2 mg total) by Intravitreal route every 28 days. for 8 doses 1 vial 3    famotidine (PEPCID) 40 MG tablet Take [id number] the evening and morning before your procedure along with Benadryl and Prednisone 3 tablet 0    ferrous sulfate, dried (SLOW FE) 160 mg (50 mg iron) TbSR Take by mouth.      gabapentin (NEURONTIN) 100 MG capsule TK ONE C PO  TID 90 capsule 0    ibuprofen (ADVIL,MOTRIN) 800 MG tablet       insulin lispro protamin-lispro (HUMALOG MIX 75-25 KWIKPEN) 100 unit/mL (75-25) InPn INJECT 30 UNITS UNDER THE SKIN IN THE MORNING AND 20 UNITS IN THE EVENING 45 mL 1    losartan-hydrochlorothiazide 100-25 mg (HYZAAR) 100-25 mg per tablet Take 1 tablet by mouth once daily. 90 tablet 1    metFORMIN (GLUCOPHAGE-XR) 500 MG XR 24hr tablet TAKE 2 TABLETS DAILY WITH BREAKFAST AND 1 TABLET WITH DINNER 270 tablet 1    metoprolol tartrate (LOPRESSOR) 25 MG tablet Take 1 tablet (25 mg total) by mouth 2 (two) times daily. 90 tablet 1    metoprolol tartrate (LOPRESSOR) 25 MG tablet Take 1 tablet (25 mg total) by mouth 2 (two) times daily. 60 tablet 3    montelukast (SINGULAIR) 10 mg tablet       nitroGLYCERIN (NITROSTAT) 0.4 MG SL tablet Place 1 tablet (0.4 mg total) under the tongue every 5 (five) minutes as needed for Chest pain. 20 tablet 1    potassium chloride (MICRO-K) 10 MEQ CpSR Take 1 capsule (10 mEq total) by mouth once daily. 90 capsule 1    ranolazine (RANEXA) 500 MG Tb12 Take 1 tablet (500 mg total) by mouth 2 (two) times daily. 90 tablet 1    simvastatin (ZOCOR) 20 MG tablet Take 1 tablet (20 mg total) by mouth every evening. 90 tablet 1    tamsulosin (FLOMAX) 0.4 mg Cap TAKE 1 CAPSULE(0.4 MG) BY MOUTH EVERY DAY 30 capsule 2    tobramycin sulfate 0.3% (TOBREX) 0.3 % ophthalmic solution PLACE 1 GTT  IN OU QID FOR 5 DAYS  0     Current Facility-Administered Medications   Medication Dose Route Frequency Provider Last Rate Last Dose    [START ON 2020] aflibercept Soln 2 mg  2 mg Intravitreal 1 time in Clinic/HOD LINDSAY Bright MD           Past Medical History:   Diagnosis Date    Asthma     Colon polyps     Diabetes mellitus type II, uncontrolled     Diabetic retinopathy     Diverticulosis of large intestine without hemorrhage 2015    Elevated liver enzymes     GERD (gastroesophageal reflux disease)     Gout, unspecified     Hemorrhoids, internal 2015    History of blood transfusion     Hyperlipidemia     Hypertension     IBS (irritable bowel syndrome)     Morbid obesity with BMI of 40.0-44.9, adult     Nasal vestibulitis     Osteoarthritis of multiple joints     Urolithiasis     Vitamin D deficiency disease      Past Surgical History:   Procedure Laterality Date    BREAST BIOPSY Right      SECTION, CLASSIC      COLONOSCOPY N/A 2015    Procedure: COLONOSCOPY;  Surgeon: Stanislav Pickard MD;  Location: [facility] ENDO;  Service: Endoscopy;  Laterality: N/A;    CYSTOSCOPY W/ URETERAL STENT PLACEMENT  2017    EXTRACORPOREAL SHOCK WAVE LITHOTRIPSY      LEFT HEART CATHETERIZATION Left 2019    Procedure: CATHETERIZATION, HEART, LEFT;  Surgeon: Haile Suggs MD;  Location: [facility] CATH LAB;  Service: Cardiology;  Laterality: Left;  10:30-11am start/poss rt radial approach    TOTAL ABDOMINAL HYSTERECTOMY      URETEROSCOPY  2017     Family History   Problem Relation Age of Onset    Hypertension Mother     Heart disease Mother     Diabetes Mother     Colon polyps Mother     Other Mother         Lower limb amputation    Hypertension Brother     Stroke Brother     Colon cancer Maternal Grandmother     Diabetes Brother     Breast cancer Maternal Cousin      Social History     Tobacco Use    Smoking status: Never Smoker    Smokeless tobacco: Never Used  "  Substance Use Topics    Alcohol use: No    Drug use: No          OBJECTIVE:     Vital Signs (Most Recent)  Vital Signs  Temp: 97.8 °F (36.6 °C)  Temp src: Temporal  Pulse: 88  Resp: 18  SpO2: 98 %  BP: 124/76  Height and Weight  Height: 5' 6" (167.6 cm)  Weight: 115 kg (253 lb 8.5 oz)  BSA (Calculated - sq m): 2.31 sq meters  BMI (Calculated): 40.9  Weight in (lb) to have BMI = 25: 154.6]  Wt Readings from Last 2 Encounters:   07/28/20 115 kg (253 lb 8.5 oz)   07/20/20 113.9 kg (251 lb)         Physical Exam:  Physical Exam   Constitutional: She is oriented to person, place, and time. She appears well-developed and well-nourished. She is obese.   HENT:   Head: Normocephalic.   Neck: Neck supple.   Cardiovascular: Normal rate, regular rhythm and normal heart sounds.   Pulmonary/Chest: Normal expansion and effort normal. No stridor. No respiratory distress. She exhibits no tenderness.   Abdominal: Soft.   Musculoskeletal:         General: No tenderness.   Lymphadenopathy:     She has no cervical adenopathy.   Neurological: She is alert and oriented to person, place, and time. Gait normal.   Skin: Skin is warm. No cyanosis. Nails show no clubbing.   Psychiatric: She has a normal mood and affect. Her behavior is normal. Judgment and thought content normal.   Nursing note and vitals reviewed.      Laboratory  Lab Results   Component Value Date    WBC 7.87 04/29/2020    RBC 4.10 04/29/2020    HGB 12.4 04/29/2020    HCT 39.6 04/29/2020    MCV 97 04/29/2020    MCH 30.2 04/29/2020    MCHC 31.3 (L) 04/29/2020    RDW 12.9 04/29/2020     04/29/2020    MPV 10.5 04/29/2020    GRAN 4.3 04/29/2020    GRAN 53.9 04/29/2020    LYMPH 3.0 04/29/2020    LYMPH 37.9 04/29/2020    MONO 0.4 04/29/2020    MONO 5.6 04/29/2020    EOS 0.1 04/29/2020    BASO 0.04 04/29/2020    EOSINOPHIL 1.7 04/29/2020    BASOPHIL 0.5 04/29/2020       BMP  Lab Results   Component Value Date     04/29/2020    K 3.7 04/29/2020     04/29/2020 "    CO2 26 04/29/2020    BUN 15 04/29/2020    CREATININE 0.8 04/29/2020    CALCIUM 9.4 04/29/2020    ANIONGAP 8 04/29/2020    ESTGFRAFRICA >60 04/29/2020    EGFRNONAA >60 04/29/2020    AST 18 04/29/2020    ALT 51 (H) 04/29/2020    PROT 6.8 04/29/2020       Lab Results   Component Value Date     (H) 04/29/2020    BNP 26 02/07/2019       Lab Results   Component Value Date    TSH 0.996 11/29/2019       No results found for: SEDRATE    No results found for: CRP    No results found for: IGE    No results found for: ASPERGILLUS  No results found for: AFUMIGATUSCL     No results found for: ACE    Diagnostic Results:  I have personally reviewed today the following studies :      Chest x-ray 2019 normal    2D echo May 2020    · Concentric left ventricular remodeling.  · Normal left ventricular systolic function. The estimated ejection fraction is 60%.  · Normal LV diastolic function.  · No wall motion abnormalities.  · Normal right ventricular systolic function.  · Normal central venous pressure (3 mmHg).  · The estimated PA systolic pressure is 30 mmHg.        ASSESSMENT/PLAN:     Class 3 severe obesity due to excess calories with body mass index (BMI) of 40.0 to 44.9 in adult, unspecified whether serious comorbidity present  -     Ambulatory referral/consult to Weight Management Program; Future; Expected date: 08/04/2020    Suspected sleep apnea  -     Home Sleep Studies; Future    Snoring  -     Home Sleep Studies; Future    Non-restorative sleep  -     Home Sleep Studies; Future    Chronic fatigue  -     Home Sleep Studies; Future    Paroxysmal atrial fibrillation      Procedures home sleep study.      Recommendations regarding CPAP therapy to follow home sleep study results.    General weight loss/lifestyle modification strategies discussed (elicit support from others; identify saboteurs; non-food rewards).  Diet interventions: low calorie (1000 kCal/d) deficit diet    Rate control and anticoagulation with  Eliquis.    Follow up in about 2 months (around 9/28/2020).    This note was prepared using voice recognition system and is likely to have sound alike errors that may have been overlooked even after proof reading.  Please call me with any questions    Discussed diagnosis, its evaluation, treatment and usual course. All questions answered.    Thank you for the courtesy of participating in the care of this patient    Jef Rush MD

## 2020-07-28 NOTE — PATIENT INSTRUCTIONS
No eating / drinking for 3 hours before going to bed.  Elevate head of bed 30 - 45 %     CPAP HABITUATION PROCEDURE     Chon Wright, Ph.D., [facility] and Giovani Chance M.D.  Sleep [facility] Center, [facility]     Some people have difficulty adjusting to CPAP/BiPAP/AutoCPAP.  This is not unusual or hard to understand: Breathing with CPAP is different from ordinary breathing, and this difference is aversive to some. The problem can be overcome, however, and the benefits CPAP confers are certainly worth the effort.  Below, you will find a simple and gradual way to get used to CPAP before you try to use it all night, every night.  The essence of this procedure is to relax and let breathing with CPAP become a habit.  It may take about 2 weeks, and involves the followin. CPAP while awake and comfortably seated, during the late evening.     2. CPAP in bed while attempting sleep at night.     3. If your discomfort is too great at any time, discontinue and attempt again later the same night, for the same amount of time.   4. You and your physician may alter the times and pressures if necessary.     5. If you find that it is very easy to get used to CPAP, you may start using it every night when you are comfortable enough to do so.  6. IMPORTANT REMINDER: If you have a cold or sinus congestion it is okay to miss a night or two of CPAP. Consider using antihistamines or decongestants to clear up your sinus congestion prior to sleeping.     DAYS  1-3   Start CPAP while awake and comfortably seated during the late evening, after having prepared for bed.  You may do this while watching television, listening to music or reading. Use for 1 hour, then take off CPAP and go directly to bed to sleep     DAYS  4-6     Start CPAP when you go to bed and use for 1 hour, or until you fall asleep.  If your discomfort is too great at any time, discontinue and attempt again later the same night, for the  same designated amount of time (1 hour).      DAYS  7-9     Increase time with CPAP to 2 hours a night.  If your discomfort is too great at any time, discontinue and attempt again later the same night, for the same designated amount of time (2 hours).      DAYS 10-12    Increase time with CPAP to 3 hours a night. If your discomfort is too great at any time, discontinue and attempt again later the same night, for the same designated amount of time (3 hours).      DAYS 13-15     Sleep the entire night with CPAP.      OPTIONAL: You may use Progressive Muscle Relaxation (PMR) to help put you at ease when using CPAP; do PMR twice each day, once in the morning or afternoon, and once in the evening just before using CPAP. You may do PMR prior to any attempt until you are comfortable with CPAP.        Continuous Positive Air Pressure (CPAP)  Continuous positive air pressure (CPAP) uses gentle air pressure to hold the airway open. CPAP is often the most effective treatment for sleep apnea and severe snoring. It works very well for many people. But keep in mind that it can take several adjustments before the setup is right for you.       How CPAP Works  CPAP is a small portable pump beside the bed. The pump sends air through a hose, which is held over your nose and/or mouth by a mask. Mild air pressure is gently pushed through your airway. The air pressure nudges sagging tissues aside. This widens the airway so you can breathe better. CPAP may be combined with other kinds of therapy for sleep apnea.       Types of Air Pressure Treatments  There are different types of CPAP. Your doctor or CPAP technician will help you decide which type is best for you:  · Basic CPAP keeps the pressure constant all night long.  · A bilevel device (BiPAP) provides more pressure when you breathe in and less when you breathe out. A BiPAP machine also may be set to provide automatic breaths to maintain breathing if you stop breathing while  sleeping.  · An autoCPAP device automatically adjusts pressure throughout the night and in response to changes such as body position, sleep stage, and snoring.  © 5452-7995 [employer]. [address] [place] [address]. All rights reserved. This information is not intended as a substitute for professional medical care. Always follow your healthcare professional's instructions.        Snoring and Sleep Apnea: Notes for a Partner  Snoring and sleep apnea affect your life, as well as your partners. You can help in the treatment of the problem. Be supportive. Encourage your partner both to get treatment and to make the adjustments needed to follow through.       Adjusting to Changes  Your partners treatment may involve making changes to certain life habits. You can help your partner make and stick with these changes. For example:  · Support and even join your partners exercise program.  · Be supportive if your partner gets CPAP (continuous positive airway pressure). He or she may feel self-conscious at first. Remind your partner to expect adjustments to CPAP before it feels just right.  · Consider joining a snoring and sleep apnea support group.  Go Along to See the Health Care Provider  You can give the health care provider the best account of your partners nighttime breathing and snoring patterns. Try to go along to health care providers appointments. If you cant go, write notes for your partner to give to the health care provider. Describe your partners snoring and sleep breathing patterns in detail.  Tips for Sleeping with a Snorer  Until treatment takes care of your partners snoring:  · Try to go to bed first. It may help if youre already asleep when your partner starts to snore.  · Wear earplugs to bed. A fan or other source of background noise may also help drown out snoring.   © 7560-4861 [employer]. [address] [place] [address]. All rights reserved.  This information is not intended as a substitute for professional medical care. Always follow your healthcare professional's instructions.        Continuous Positive Airway Pressure (CPAP)  Your health care provider has prescribed continuous positive airway pressure (CPAP) therapy for you. A CPAP device helps you breathe better at night. The device delivers air through your nose or mouth when you breathe in to keep your air passages open. CPAP is:  · Used most often to treat sleep apnea and some other problems (Sleep apnea is a chronic condition with periods of sleep in which you briefly stop breathing.)  · Safe and very effective, but it takes time to get used to the mask.   Your health care provider, nurse, or medical supplier will give you tips for wearing and caring for your CPAP device.  General guidelines  · It's very important not to give up! It takes time to get used to wearing the mask at night.  · Practice using your CPAP device during the day, especially whenever you take a nap.  · Remember, there are several different types of masks. If you cant get used to your mask, ask your provider or medical supply company about trying another style.  · If you have nasal stuffiness or dryness when using your CPAP device, talk with your provider or medical supply company. There are ways to lessen these problems. For example, your provider may recommend moistening nasal spray or the medical supply company may recommend a device with a humidifier.  · The goal is to use your CPAP all night, every night, during all naps, and even when you travel.  · Keep your mask clean. Wash it with soap and water. Be sure to rinse the mask and tubing well with water to remove any soap. Let them air-dry thoroughly before using.  · Make yourself comfortable when sleeping with CPAP. Try using extra pillows.  Work with your medical supply company so that you know how to correctly use your CPAP. Their representative will be able to help  you:  · Use the CPAP correctly  · Troubleshoot any problems that come up  · Learn to clean and maintain the device  · Adjust to regular use of the CPAP  © 2043-2701 [employer]. [address] [place] [address], [place] [address]. All rights reserved. This information is not intended as a substitute for professional medical care. Always follow your healthcare professional's instructions.          General weight loss/lifestyle modification strategies discussed (elicit support from others; identify saboteurs; non-food rewards).  Diet interventions: low calorie (1000 kCal/d) deficit diet      Improving sleep hygiene was discussed with the patient  Advise to Sleep as long as necessary to feel rested (usually seven to eight hours for adults) and then get out of bed  Maintain a regular sleep schedule, particularly a regular wake-up time in the morning  Try not to force sleep  Avoid caffeinated beverages after lunch, alcohol near bedtime , smoking or other nicotine intake, particularly during the evening  Adjust the bedroom environment as needed to decrease stimuli (reduce ambient light, turn off the television or radio)  Avoid prolonged use of light-emitting screens (laptops, tablets, smartphones, [employer]) before bedtime   Resolve concerns or worries before bedtime  Exercise regularly for at least 20 minutes, preferably more than four to five hours prior to bedtime   Avoid daytime naps, especially if they are longer than 20 to 30 minutes or occur late in the day

## 2020-07-29 ENCOUNTER — TELEPHONE (OUTPATIENT)
Dept: PULMONOLOGY | Facility: CLINIC | Age: 65
End: 2020-07-29

## 2020-07-29 NOTE — TELEPHONE ENCOUNTER
----- Message from Ashley Calvert sent at 7/29/2020  9:06 AM CDT -----  [facility]

## 2020-08-05 ENCOUNTER — OFFICE VISIT (OUTPATIENT)
Dept: PODIATRY | Facility: CLINIC | Age: 65
End: 2020-08-05
Payer: COMMERCIAL

## 2020-08-05 ENCOUNTER — HOSPITAL ENCOUNTER (OUTPATIENT)
Dept: RADIOLOGY | Facility: HOSPITAL | Age: 65
Discharge: HOME | End: 2020-08-05
Attending: PODIATRIST
Payer: COMMERCIAL

## 2020-08-05 VITALS
HEIGHT: 66 IN | BODY MASS INDEX: 40.74 KG/M2 | DIASTOLIC BLOOD PRESSURE: 78 MMHG | HEART RATE: 92 BPM | SYSTOLIC BLOOD PRESSURE: 132 MMHG | WEIGHT: 253.5 LBS

## 2020-08-05 DIAGNOSIS — E66.01 MORBID OBESITY WITH BMI OF 40.0-44.9, ADULT: ICD-10-CM

## 2020-08-05 DIAGNOSIS — M79.672 BILATERAL FOOT PAIN: ICD-10-CM

## 2020-08-05 DIAGNOSIS — M77.50 BONE SPUR OF FOOT: Primary | ICD-10-CM

## 2020-08-05 DIAGNOSIS — B35.1 DERMATOPHYTOSIS OF NAIL: ICD-10-CM

## 2020-08-05 DIAGNOSIS — M79.671 BILATERAL FOOT PAIN: ICD-10-CM

## 2020-08-05 DIAGNOSIS — I87.2 VENOUS INSUFFICIENCY OF BOTH LOWER EXTREMITIES: ICD-10-CM

## 2020-08-05 DIAGNOSIS — E11.42 DM TYPE 2 WITH DIABETIC PERIPHERAL NEUROPATHY: ICD-10-CM

## 2020-08-05 PROCEDURE — 99999 PR PBB SHADOW E&M-EST. PATIENT-LVL V: ICD-10-PCS | Mod: PBBFAC,,, | Performed by: PODIATRIST

## 2020-08-05 PROCEDURE — 73630 XR FOOT COMPLETE 3 VIEW BILATERAL: ICD-10-PCS | Mod: 26,,, | Performed by: RADIOLOGY

## 2020-08-05 PROCEDURE — 3075F SYST BP GE 130 - 139MM HG: CPT | Mod: CPTII,S$GLB,, | Performed by: PODIATRIST

## 2020-08-05 PROCEDURE — 73630 X-RAY EXAM OF FOOT: CPT | Mod: TC,50

## 2020-08-05 PROCEDURE — 3008F PR BODY MASS INDEX (BMI) DOCUMENTED: ICD-10-PCS | Mod: CPTII,S$GLB,, | Performed by: PODIATRIST

## 2020-08-05 PROCEDURE — 3044F HG A1C LEVEL LT 7.0%: CPT | Mod: CPTII,S$GLB,, | Performed by: PODIATRIST

## 2020-08-05 PROCEDURE — 99204 PR OFFICE/OUTPT VISIT, NEW, LEVL IV, 45-59 MIN: ICD-10-PCS | Mod: S$GLB,,, | Performed by: PODIATRIST

## 2020-08-05 PROCEDURE — 3078F DIAST BP <80 MM HG: CPT | Mod: CPTII,S$GLB,, | Performed by: PODIATRIST

## 2020-08-05 PROCEDURE — 99204 OFFICE O/P NEW MOD 45 MIN: CPT | Mod: S$GLB,,, | Performed by: PODIATRIST

## 2020-08-05 PROCEDURE — 3075F PR MOST RECENT SYSTOLIC BLOOD PRESS GE 130-139MM HG: ICD-10-PCS | Mod: CPTII,S$GLB,, | Performed by: PODIATRIST

## 2020-08-05 PROCEDURE — 99999 PR PBB SHADOW E&M-EST. PATIENT-LVL V: CPT | Mod: PBBFAC,,, | Performed by: PODIATRIST

## 2020-08-05 PROCEDURE — 3044F PR MOST RECENT HEMOGLOBIN A1C LEVEL <7.0%: ICD-10-PCS | Mod: CPTII,S$GLB,, | Performed by: PODIATRIST

## 2020-08-05 PROCEDURE — 3078F PR MOST RECENT DIASTOLIC BLOOD PRESSURE < 80 MM HG: ICD-10-PCS | Mod: CPTII,S$GLB,, | Performed by: PODIATRIST

## 2020-08-05 PROCEDURE — 3008F BODY MASS INDEX DOCD: CPT | Mod: CPTII,S$GLB,, | Performed by: PODIATRIST

## 2020-08-05 PROCEDURE — 73630 X-RAY EXAM OF FOOT: CPT | Mod: 26,,, | Performed by: RADIOLOGY

## 2020-08-05 RX ORDER — CICLOPIROX 80 MG/ML
SOLUTION TOPICAL
Qty: 1 BOTTLE | Refills: 10 | Status: SHIPPED | OUTPATIENT
Start: 2020-08-05 | End: 2021-12-15

## 2020-08-05 NOTE — PROGRESS NOTES
PODIATRIC MEDICINE AND SURGERY  8/5/2020    PCP: Dr. Gabbie Ronquillo MD    CHIEF COMPLAINT   Chief Complaint   Patient presents with    Foot Pain     Dorsal bilateral foot pain. Pain present for years and worsens during the day. Swelling noted at times.       HPI:    Latosha Enriquez is a 64 y.o. female who has a past medical history of Asthma, Colon polyps, Diabetes mellitus type II, uncontrolled, Diabetic retinopathy, Diverticulosis of large intestine without hemorrhage (11/23/2015), Elevated liver enzymes, GERD (gastroesophageal reflux disease), Gout, unspecified, Hemorrhoids, internal (11/23/2015), History of blood transfusion, Hyperlipidemia, Hypertension, IBS (irritable bowel syndrome), Morbid obesity with BMI of 40.0-44.9, adult, Nasal vestibulitis, Osteoarthritis of multiple joints, Urolithiasis, and Vitamin D deficiency disease.     Latosha presents to clinic today complaining of multiple pedal complaints.  Patient states that she has bilateral foot pain on the top of both feet.  Symptoms have been present for greater than 15 years and is worsening in severity.  She does have history of gout.  She does have diagnose the multiple degenerative joint disease.  Symptoms are throbbing aching and worse with ambulation. She rates pain 10/10 on pain scale.  She states she works 2 jobs stands on her feet for long duration.  She also complains about fungal toenail.  She has not per any treatment..      Patient denies other pedal complaints at this time.     PMH  Past Medical History:   Diagnosis Date    Asthma     Colon polyps     Diabetes mellitus type II, uncontrolled     Diabetic retinopathy     Diverticulosis of large intestine without hemorrhage 11/23/2015    Elevated liver enzymes     GERD (gastroesophageal reflux disease)     Gout, unspecified     Hemorrhoids, internal 11/23/2015    History of blood transfusion     Hyperlipidemia     Hypertension     IBS (irritable bowel syndrome)     Morbid  "obesity with BMI of 40.0-44.9, adult     Nasal vestibulitis     Osteoarthritis of multiple joints     Urolithiasis     Vitamin D deficiency disease        PROBLEM LIST  Patient Active Problem List    Diagnosis Date Noted    Dryness, eye 07/23/2020    Type 2 diabetes mellitus without complication, with long-term current use of insulin 07/13/2020    Amaurosis fugax 05/21/2020    Nevus of choroid of right eye 05/21/2020    PAF (paroxysmal atrial fibrillation) 05/07/2020    CANLAES (dyspnea on exertion) 02/18/2019    Palpitations 02/11/2019    AP (angina pectoris) 02/10/2019    Kidney stones, calcium oxalate 05/11/2017    Type 2 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 (Patient taking differently: Take 100 mg by mouth as needed. ) 90 tablet 0    apixaban (ELIQUIS) 5 mg Tab Take 1 tablet (5 mg total) by mouth 2 (two) times daily. 90 tablet 1    BD ULTRA-FINE SHORT PEN NEEDLE 31 gauge x 5/16" Ndle USE 1 PEN NEEDLE UNDER THE SKIN TWICE A DAY 90 each 3    cyanocobalamin (VITAMIN B-12) 100 MCG tablet Take by mouth.      ergocalciferol, vitamin D2, 400 unit Tab Take by mouth.      EYLEA 2 mg/0.05 mL Soln 0.05 mLs (2 mg total) by Intravitreal route every 28 days. for 8 doses 1 vial 3    famotidine (PEPCID) 40 MG tablet Take [id number] the evening and morning before your procedure along with Benadryl and Prednisone 3 tablet 0    ferrous sulfate, dried (SLOW FE) 160 mg " (50 mg iron) TbSR Take by mouth.      gabapentin (NEURONTIN) 100 MG capsule TK ONE C PO  TID 90 capsule 0    insulin lispro protamin-lispro (HUMALOG MIX 75-25 KWIKPEN) 100 unit/mL (75-25) InPn INJECT 30 UNITS UNDER THE SKIN IN THE MORNING AND 20 UNITS IN THE EVENING 45 mL 1    losartan-hydrochlorothiazide 100-25 mg (HYZAAR) 100-25 mg per tablet Take 1 tablet by mouth once daily. 90 tablet 1    metFORMIN (GLUCOPHAGE-XR) 500 MG XR 24hr tablet TAKE 2 TABLETS DAILY WITH BREAKFAST AND 1 TABLET WITH DINNER 270 tablet 1    metoprolol tartrate (LOPRESSOR) 25 MG tablet Take 1 tablet (25 mg total) by mouth 2 (two) times daily. 90 tablet 1    montelukast (SINGULAIR) 10 mg tablet       potassium chloride (MICRO-K) 10 MEQ CpSR Take 1 capsule (10 mEq total) by mouth once daily. 90 capsule 1    ranolazine (RANEXA) 500 MG Tb12 Take 1 tablet (500 mg total) by mouth 2 (two) times daily. 90 tablet 1    simvastatin (ZOCOR) 20 MG tablet Take 1 tablet (20 mg total) by mouth every evening. 90 tablet 1    albuterol (PROVENTIL/VENTOLIN HFA) 90 mcg/actuation inhaler Inhale 2 puffs into the lungs every 6 (six) hours as needed. (Patient not taking: Reported on 8/5/2020) 18 g 3    diphenhydrAMINE (BENADRYL) 50 MG capsule Begin [id number] the evening and morning before procedure with Prednisone and Pepcid (Patient not taking: Reported on 8/5/2020) 3 capsule 0    esomeprazole (NEXIUM) 20 MG capsule TAKE 1 CAPSULE(20 MG) BY MOUTH BEFORE BREAKFAST (Patient not taking: Reported on 8/5/2020) 30 capsule 0    ibuprofen (ADVIL,MOTRIN) 800 MG tablet       nitroGLYCERIN (NITROSTAT) 0.4 MG SL tablet Place 1 tablet (0.4 mg total) under the tongue every 5 (five) minutes as needed for Chest pain. (Patient not taking: Reported on 8/5/2020) 20 tablet 1    tamsulosin (FLOMAX) 0.4 mg Cap TAKE 1 CAPSULE(0.4 MG) BY MOUTH EVERY DAY (Patient not taking: Reported on 8/5/2020) 30 capsule 2    tobramycin sulfate 0.3% (TOBREX) 0.3 % ophthalmic solution  "PLACE 1 GTT IN OU QID FOR 5 DAYS  0    [DISCONTINUED] apixaban (ELIQUIS) 5 mg Tab Take 1 tablet (5 mg total) by mouth 2 (two) times daily. 60 tablet 3    [DISCONTINUED] metoprolol tartrate (LOPRESSOR) 25 MG tablet Take 1 tablet (25 mg total) by mouth 2 (two) times daily. 60 tablet 3     Current Facility-Administered Medications on File Prior to Visit   Medication Dose Route Frequency Provider Last Rate Last Dose    [START ON 8/20/2020] aflibercept Soln 2 mg  2 mg Intravitreal 1 time in Clinic/HOD LINDSAY Bright MD           Medication List with Changes/Refills   New Medications    CICLOPIROX (PENLAC) 8 % SOLN    Apply to affected toenails at night time DAILY. On 7th day, file nails down, clean all nails with alcohol and restart application process.   Current Medications    ALBUTEROL (PROVENTIL/VENTOLIN HFA) 90 MCG/ACTUATION INHALER    Inhale 2 puffs into the lungs every 6 (six) hours as needed.    ALLOPURINOL (ZYLOPRIM) 100 MG TABLET    Take 1 tablet (100 mg total) by mouth once daily.    APIXABAN (ELIQUIS) 5 MG TAB    Take 1 tablet (5 mg total) by mouth 2 (two) times daily.    BD ULTRA-FINE SHORT PEN NEEDLE 31 GAUGE X 5/16" NDLE    USE 1 PEN NEEDLE UNDER THE SKIN TWICE A DAY    CYANOCOBALAMIN (VITAMIN B-12) 100 MCG TABLET    Take by mouth.    DIPHENHYDRAMINE (BENADRYL) 50 MG CAPSULE    Begin [id number] the evening and morning before procedure with Prednisone and Pepcid    ERGOCALCIFEROL, VITAMIN D2, 400 UNIT TAB    Take by mouth.    ESOMEPRAZOLE (NEXIUM) 20 MG CAPSULE    TAKE 1 CAPSULE(20 MG) BY MOUTH BEFORE BREAKFAST    EYLEA 2 MG/0.05 ML SOLN    0.05 mLs (2 mg total) by Intravitreal route every 28 days. for 8 doses    FAMOTIDINE (PEPCID) 40 MG TABLET    Take [id number] the evening and morning before your procedure along with Benadryl and Prednisone    FERROUS SULFATE, DRIED (SLOW FE) 160 MG (50 MG IRON) TBSR    Take by mouth.    GABAPENTIN (NEURONTIN) 100 MG CAPSULE    TK ONE C PO  TID    IBUPROFEN " (ADVIL,MOTRIN) 800 MG TABLET        INSULIN LISPRO PROTAMIN-LISPRO (HUMALOG MIX 75-25 KWIKPEN) 100 UNIT/ML (75-25) INPN    INJECT 30 UNITS UNDER THE SKIN IN THE MORNING AND 20 UNITS IN THE EVENING    LOSARTAN-HYDROCHLOROTHIAZIDE 100-25 MG (HYZAAR) 100-25 MG PER TABLET    Take 1 tablet by mouth once daily.    METFORMIN (GLUCOPHAGE-XR) 500 MG XR 24HR TABLET    TAKE 2 TABLETS DAILY WITH BREAKFAST AND 1 TABLET WITH DINNER    METOPROLOL TARTRATE (LOPRESSOR) 25 MG TABLET    Take 1 tablet (25 mg total) by mouth 2 (two) times daily.    MONTELUKAST (SINGULAIR) 10 MG TABLET        NITROGLYCERIN (NITROSTAT) 0.4 MG SL TABLET    Place 1 tablet (0.4 mg total) under the tongue every 5 (five) minutes as needed for Chest pain.    POTASSIUM CHLORIDE (MICRO-K) 10 MEQ CPSR    Take 1 capsule (10 mEq total) by mouth once daily.    RANOLAZINE (RANEXA) 500 MG TB12    Take 1 tablet (500 mg total) by mouth 2 (two) times daily.    SIMVASTATIN (ZOCOR) 20 MG TABLET    Take 1 tablet (20 mg total) by mouth every evening.    TAMSULOSIN (FLOMAX) 0.4 MG CAP    TAKE 1 CAPSULE(0.4 MG) BY MOUTH EVERY DAY    TOBRAMYCIN SULFATE 0.3% (TOBREX) 0.3 % OPHTHALMIC SOLUTION    PLACE 1 GTT IN OU QID FOR 5 DAYS   Discontinued Medications    APIXABAN (ELIQUIS) 5 MG TAB    Take 1 tablet (5 mg total) by mouth 2 (two) times daily.    METOPROLOL TARTRATE (LOPRESSOR) 25 MG TABLET    Take 1 tablet (25 mg total) by mouth 2 (two) times daily.       PSH     Past Surgical History:   Procedure Laterality Date    BREAST BIOPSY Right      SECTION, CLASSIC      COLONOSCOPY N/A 2015    Procedure: COLONOSCOPY;  Surgeon: Stanislav Pickard MD;  Location: [facility] ENDO;  Service: Endoscopy;  Laterality: N/A;    CYSTOSCOPY W/ URETERAL STENT PLACEMENT  2017    EXTRACORPOREAL SHOCK WAVE LITHOTRIPSY      LEFT HEART CATHETERIZATION Left 2019    Procedure: CATHETERIZATION, HEART, LEFT;  Surgeon: Haile Suggs MD;  Location: [facility] CATH LAB;  Service: Cardiology;   "Laterality: Left;  10:30-11am start/poss rt radial approach    TOTAL ABDOMINAL HYSTERECTOMY      URETEROSCOPY  05/2017        ALL  Review of patient's allergies indicates:   Allergen Reactions    Antihistamines - alkylamine Anaphylaxis and Itching     Cough, throat itches    Chocolate flavor Other (See Comments)     disoriented    Doxycycline Other (See Comments)     Stomach pain    Betadine [povidone-iodine] Itching    Iodine and iodide containing products Other (See Comments)     Tongue swelling    Tramadol Nausea Only    Yeast Itching     Facial swelling, constipation       SOC     Social History     Tobacco Use    Smoking status: Never Smoker    Smokeless tobacco: Never Used   Substance Use Topics    Alcohol use: No    Drug use: No         FAMILY HX    Family History   Problem Relation Age of Onset    Hypertension Mother     Heart disease Mother     Diabetes Mother     Colon polyps Mother     Other Mother         Lower limb amputation    Hypertension Brother     Stroke Brother     Colon cancer Maternal Grandmother     Diabetes Brother     Breast cancer Maternal Cousin             REVIEW OF SYSTEMS  General: This patient is well-developed, well-nourished and appears stated age, well-oriented to person, place and time, and cooperative and pleasant on today's visit   Constitutional: Negative for chills and fever.   Respiratory: Negative for shortness of breath.    Cardiovascular: Negative for chest pain, palpitations, orthopnea  Gastrointestinal: Negative for diarrhea, nausea and vomiting.   Musculoskeletal: Positive for above noted in HPI  Skin: positive for skin and nail changes   Neurological: positive for tingling and sensory changes  Peripheral Vascular: no claudication or cyanosis  Psychiatric/Behavioral: Negative for altered mental status     PHYSICAL EXAM:      Vitals:    08/05/20 1619   BP: 132/78   Pulse: 92   Weight: 115 kg (253 lb 8.5 oz)   Height: 5' 6" (1.676 m)   PainSc:   3 "   PainLoc: Foot         LOWER EXTREMITY PHYSICAL EXAM  VASCULAR  Dorsalis pedis and posterior tibial pulses palpable 2/4 bilaterally. Capillary refill time immediate to the toes. Feet are warm to the touch. Skin temperature warm to warm from proximally to distally There are varicosities, telangiectasias noted to bilateral foot and ankle regions. There are no ecchymoses noted to bilateral foot and ankle regions. There is gross lower extremity edema.    DERMATOLOGIC  Skin moist with healthy texture and turgor.There are no open ulcerations, lacerations, or fissures to bilateral foot and ankle regions. There are no signs of infection as there is no erythema, no proximal-extending lymphangiitis, no fluctuance, or crepitus noted on palpation to bilateral foot and ankle regions. There is no interdigital maceration.   There are hyperkeratotic lesions noted to feet. Nails are mycotic b/l hallux but well-trimmed.    NEUROLOGIC  Epicritic sensation is intact as the patient is able to sense light touch to bilateral foot and ankle regions. Achilles and patellar deep tendon reflexes intact. Babinski reflex absent    ORTHOPEDIC/BIOMECHANICAL  TTP along dorsal exostosis b/l midfoot.  Muscle strength AT/EHL/EDL/PT: 5/5; Achilles/Gastroc/Soleus: 5/5; PB/PL: 5/5 Muscle tone is normal. Ankle joint ROM non painful with DF/PF, non-crepitus; STJ ROM  Inv/ev non painful, non crepitus     IMAGING  ordered    ASSESSMENT     Encounter Diagnoses   Name Primary?    Bilateral foot pain     Venous insufficiency of both lower extremities     DM type 2 with diabetic peripheral neuropathy     Dermatophytosis of nail     Bone spur of foot Yes    Morbid obesity with BMI of 40.0-44.9, adult          PLAN  Patient was educated about clinical and imaging findings, and verbalizes understanding of above.     Diagnoses and all orders for this visit:  Bone spur of foot    Bilateral foot pain  -     X-Ray Foot Complete Bilateral; Future; Expected date:  08/05/2020    Venous insufficiency of both lower extremities  -     COMPRESSION STOCKINGS  -     DIABETIC SHOES FOR HOME USE    DM type 2 with diabetic peripheral neuropathy  -     DIABETIC SHOES FOR HOME USE    Dermatophytosis of nail    Morbid obesity with BMI of 40.0-44.9, adult    Other orders  -     ciclopirox (PENLAC) 8 % Soln; Apply to affected toenails at night time DAILY. On 7th day, file nails down, clean all nails with alcohol and restart application process.  Dispense: 1 Bottle; Refill: 10      Discussed midfoot exostosis-- discussed surgical intervention last resort. Shoe modification, topical pain cream, pt can not take NSAIDs- RX compound pain cream prescribed. -Prescription for pain:Arthritis: diclofenac2%, amitriptyline 2%, lidocaine 2%, and prilocaine 2%.       -Patient was given written and verbal instructions on maintenance care for mycotic nails. The need for proper skin care in order to prevent infection and colonization in the nails was explained to the patient.    -For the nails, patient was prescribed  Ciclopirox nail lacquer to be applied every day in the evening. On the 7th day clean nails with alcohol swab, file nails down, and restart cycle.    -In addition, recommendations were made to spray and disinfect shoes with Lysol and to alternate shoes    Shoe inspection. Diabetic Foot Education. Patient reminded of the importance of good nutrition and blood sugar control to help prevent podiatric complications of diabetes. Patient instructed on proper foot hygeine. We discussed wearing proper shoe gear, daily foot inspections, never walking without protective shoe gear, never putting sharp instruments to feet, routine podiatric nail visits     DM shoe Rx provided with custom orthotics and extra depth to accommodate feet and offload area of pain.     RX compression stockings, discussed diet control. Patient is counseled and reminded concerning the importance of good nutrition and healthy eating  habits, which may include blood sugar control, to prevent and/or help podiatric foot and ankle complications.    RTC in 4 weeks.     The total visit time face to face with the patient was over 30 minutes. Time spent in counseling, discussion and coordination of care with the patient was over 15 minutes. Topics discussed as noted above.      Disclaimer:  This note may have been prepared using voice recognition software, it may have not been extensively proofed, as such there could be errors within the text such as sound alike errors.         Future Appointments   Date Time Provider Department Center   8/5/2020  5:00 PM V XR2 [facility] XRAY [place]   8/26/2020  3:00 PM LINDSAY Bright MD [facility] OPHTHAL [place]   9/2/2020  2:30 PM Samantha Menjivar DPM [facility] POD [place]   9/22/2020  2:20 PM Pratik Ryan MD [facility]   9/22/2020  2:40 PM Jef Rush MD [facility] PULMSVC [place]       Report Electronically Signed By:     Samantha Menjivar DPM   Podiatry  [facility]  8/5/2020

## 2020-08-09 RX ORDER — GABAPENTIN 100 MG/1
CAPSULE ORAL
Qty: 90 CAPSULE | Refills: 0 | Status: SHIPPED | OUTPATIENT
Start: 2020-08-09 | End: 2020-09-15

## 2020-08-26 ENCOUNTER — TELEPHONE (OUTPATIENT)
Dept: OPHTHALMOLOGY | Facility: CLINIC | Age: 65
End: 2020-08-26

## 2020-08-31 PROCEDURE — 95806 PR SLEEP STUDY, UNATTENDED, SIMUL RECORD HR/O2 SAT/RESP FLOW/RESP EFFT: ICD-10-PCS | Mod: 26,S$GLB,, | Performed by: INTERNAL MEDICINE

## 2020-08-31 PROCEDURE — 95806 SLEEP STUDY UNATT&RESP EFFT: CPT | Mod: 26,S$GLB,, | Performed by: INTERNAL MEDICINE

## 2020-09-01 ENCOUNTER — PROCEDURE VISIT (OUTPATIENT)
Dept: SLEEP MEDICINE | Facility: CLINIC | Age: 65
End: 2020-09-01
Payer: COMMERCIAL

## 2020-09-01 DIAGNOSIS — G47.8 NON-RESTORATIVE SLEEP: ICD-10-CM

## 2020-09-01 DIAGNOSIS — R06.83 SNORING: ICD-10-CM

## 2020-09-01 DIAGNOSIS — R29.818 SUSPECTED SLEEP APNEA: ICD-10-CM

## 2020-09-01 DIAGNOSIS — R53.82 CHRONIC FATIGUE: ICD-10-CM

## 2020-09-01 DIAGNOSIS — G47.33 OSA (OBSTRUCTIVE SLEEP APNEA): Primary | ICD-10-CM

## 2020-09-01 NOTE — PROCEDURES
Home Sleep Studies    Date/Time: 9/1/2020 8:00 AM  Performed by: Fredy Dietrich MD  Authorized by: Jef Rush MD       2 night study  SEVERE OBSTRUCTIVE SLEEP APNEA with overall AHI 37.9/hr ( 149 events): night #1  Oxygen desaturation: 79%. SpO2 between 70% to 79% for < 1 min.  Patient snored 92% time above 50 .  Heart rate range: 60 bpm - 126 bpm  REC's:  Consider inlab CPAP titration.  Therapy with APAP at 4-20 cm WP using mask of choice with heated humidification is an option.

## 2020-09-01 NOTE — Clinical Note
2 night study  SEVERE OBSTRUCTIVE SLEEP APNEA with overall AHI 37.9/hr ( 149 events): night #1  Oxygen desaturation: 79%. SpO2 between 70% to 79% for < 1 min.  Patient snored 92% time above 50 .  Heart rate range: 60 bpm - 126 bpm  REC's:  Consider inlab CPAP titration.  Therapy with APAP at 4-20 cm WP using mask of choice with heated humidification is an option.

## 2020-09-04 ENCOUNTER — PROCEDURE VISIT (OUTPATIENT)
Dept: OPHTHALMOLOGY | Facility: CLINIC | Age: 65
End: 2020-09-04
Payer: COMMERCIAL

## 2020-09-04 DIAGNOSIS — Z79.4 TYPE 2 DIABETES MELLITUS WITH BOTH EYES AFFECTED BY MILD NONPROLIFERATIVE RETINOPATHY AND MACULAR EDEMA, WITH LONG-TERM CURRENT USE OF INSULIN: Primary | ICD-10-CM

## 2020-09-04 DIAGNOSIS — E11.3213 TYPE 2 DIABETES MELLITUS WITH BOTH EYES AFFECTED BY MILD NONPROLIFERATIVE RETINOPATHY AND MACULAR EDEMA, WITH LONG-TERM CURRENT USE OF INSULIN: Primary | ICD-10-CM

## 2020-09-04 PROCEDURE — 67028 INJECTION EYE DRUG: CPT | Mod: RT,S$GLB,, | Performed by: OPHTHALMOLOGY

## 2020-09-04 PROCEDURE — 92134 CPTRZ OPH DX IMG PST SGM RTA: CPT | Mod: S$GLB,,, | Performed by: OPHTHALMOLOGY

## 2020-09-04 PROCEDURE — 99499 NO LOS: ICD-10-PCS | Mod: S$GLB,,, | Performed by: OPHTHALMOLOGY

## 2020-09-04 PROCEDURE — 67028 PR INJECT INTRAVITREAL PHARMCOLOGIC: ICD-10-PCS | Mod: RT,S$GLB,, | Performed by: OPHTHALMOLOGY

## 2020-09-04 PROCEDURE — 99499 UNLISTED E&M SERVICE: CPT | Mod: S$GLB,,, | Performed by: OPHTHALMOLOGY

## 2020-09-04 PROCEDURE — 92134 POSTERIOR SEGMENT OCT RETINA (OCULAR COHERENCE TOMOGRAPHY)-BOTH EYES: ICD-10-PCS | Mod: S$GLB,,, | Performed by: OPHTHALMOLOGY

## 2020-09-04 NOTE — PROGRESS NOTES
===============================  Latosha Enriquez,  9/4/2020 today   64 y.o. female   Last visit [facility]: :7/23/2020   Last visit eye dept. 7/23/2020  [facility]:  Corrected distance visual acuity was 20/30 -2 in the right eye and 20/20 -2 in the left eye.   Not recorded        Wearing Rx     Wearing Rx       Sphere Cylinder Axis Add    Right -2.75 +0.50 180 +2.50    Left -3.00 Sphere  +2.50          Wearing Rx #2       Sphere Cylinder Axis Add    Right +1.75 Sphere      Left +1.75 Sphere      Type: otc readers               Not recorded         Not recorded        Chief Complaint   Patient presents with    DME     pt her for eval eylea OD. pt states no changes since her last visit. sometimes it seems like her vision is better compared to other days.        ________________  9/4/2020 today  HPI     DME      Additional comments: pt her for eval eylea OD. pt states no changes   since her last visit. sometimes it seems like her vision is better   compared to other days.               Comments     (No Betadine - Vigamox Prep))  Prefers pledget, no bleb    DM  DME OD  EYLEA OD 7/23/20          Last edited by Esteban Carballo on 9/4/2020  2:43 PM. (History)      Problem List Items Addressed This Visit        Eye/Vision problems    Type 2 diabetes mellitus with both eyes affected by mild nonproliferative retinopathy and macular edema, with long-term current use of insulin - Primary    Relevant Medications    aflibercept Soln 2 mg    Other Relevant Orders    Posterior Segment OCT Retina-Both eyes (Completed)    Prior authorization Order          ...OD better today  Continue as planned with eylea od today    Pledget/vigamox    Injection Procedure Note:    9/4/2020  Diagnosis :  dme od  Today:   Eylea (afibercept) 2 mg/0.05 ml Intravitreal Injection , OD   Follow up: 1 mo    Instructed to call 24/7 for any worsening of vision. Check Both eyes daily. Gave patient my home phone number.  Risks, benefits, and alternatives to treatment  discussed in detail with the patient.  The patient voiced understanding and wished to proceed with the procedure.     Patient Identified and Time Out complete  Pledget and vigamox.  Inject at Eylea (afibercept) 2 mg/0.05 ml Intravitreal Injection , OD 6:00 @ 3.5-4mm posterior to limbus  1 stop: na   Post Operative Dx: Same  Complications: None  Follow up as above.        ===========================

## 2020-09-08 DIAGNOSIS — G47.33 SEVERE OBSTRUCTIVE SLEEP APNEA: Primary | ICD-10-CM

## 2020-09-09 ENCOUNTER — TELEPHONE (OUTPATIENT)
Dept: CARDIOLOGY | Facility: CLINIC | Age: 65
End: 2020-09-09

## 2020-09-09 NOTE — TELEPHONE ENCOUNTER
Left message for patient to call back    ----- Message from Elsy Townsend sent at 9/9/2020  1:12 PM CDT -----  Contact: pt  Pt requesting a call back to discuss what she is needing when she comes to her appt. Please call pt back at [phone]

## 2020-09-15 ENCOUNTER — PATIENT OUTREACH (OUTPATIENT)
Dept: ADMINISTRATIVE | Facility: OTHER | Age: 65
End: 2020-09-15

## 2020-09-15 RX ORDER — GABAPENTIN 100 MG/1
CAPSULE ORAL
Qty: 90 CAPSULE | Refills: 1 | Status: SHIPPED | OUTPATIENT
Start: 2020-09-15 | End: 2020-12-14 | Stop reason: SDUPTHER

## 2020-09-16 DIAGNOSIS — G47.33 OSA (OBSTRUCTIVE SLEEP APNEA): Primary | ICD-10-CM

## 2020-09-22 ENCOUNTER — TELEPHONE (OUTPATIENT)
Dept: PULMONOLOGY | Facility: CLINIC | Age: 65
End: 2020-09-22

## 2020-09-22 ENCOUNTER — OFFICE VISIT (OUTPATIENT)
Dept: CARDIOLOGY | Facility: CLINIC | Age: 65
End: 2020-09-22
Payer: COMMERCIAL

## 2020-09-22 VITALS
WEIGHT: 248.25 LBS | DIASTOLIC BLOOD PRESSURE: 64 MMHG | HEART RATE: 79 BPM | SYSTOLIC BLOOD PRESSURE: 120 MMHG | BODY MASS INDEX: 40.07 KG/M2 | OXYGEN SATURATION: 100 %

## 2020-09-22 DIAGNOSIS — I20.9 AP (ANGINA PECTORIS): ICD-10-CM

## 2020-09-22 DIAGNOSIS — E66.01 MORBID OBESITY WITH BMI OF 40.0-44.9, ADULT: ICD-10-CM

## 2020-09-22 DIAGNOSIS — E78.49 OTHER HYPERLIPIDEMIA: Chronic | ICD-10-CM

## 2020-09-22 DIAGNOSIS — N20.0 RENAL STONES: ICD-10-CM

## 2020-09-22 DIAGNOSIS — Z79.4 TYPE 2 DIABETES MELLITUS WITHOUT COMPLICATION, WITH LONG-TERM CURRENT USE OF INSULIN: Chronic | ICD-10-CM

## 2020-09-22 DIAGNOSIS — E11.9 TYPE 2 DIABETES MELLITUS WITHOUT COMPLICATION, WITH LONG-TERM CURRENT USE OF INSULIN: Chronic | ICD-10-CM

## 2020-09-22 DIAGNOSIS — G47.33 OSA (OBSTRUCTIVE SLEEP APNEA): ICD-10-CM

## 2020-09-22 DIAGNOSIS — I48.0 PAF (PAROXYSMAL ATRIAL FIBRILLATION): Primary | ICD-10-CM

## 2020-09-22 DIAGNOSIS — R00.2 PALPITATIONS: ICD-10-CM

## 2020-09-22 DIAGNOSIS — I10 ESSENTIAL HYPERTENSION: Chronic | ICD-10-CM

## 2020-09-22 PROCEDURE — 99214 PR OFFICE/OUTPT VISIT, EST, LEVL IV, 30-39 MIN: ICD-10-PCS | Mod: S$GLB,,, | Performed by: INTERNAL MEDICINE

## 2020-09-22 PROCEDURE — 3044F PR MOST RECENT HEMOGLOBIN A1C LEVEL <7.0%: ICD-10-PCS | Mod: CPTII,S$GLB,, | Performed by: INTERNAL MEDICINE

## 2020-09-22 PROCEDURE — 3074F SYST BP LT 130 MM HG: CPT | Mod: CPTII,S$GLB,, | Performed by: INTERNAL MEDICINE

## 2020-09-22 PROCEDURE — 99999 PR PBB SHADOW E&M-EST. PATIENT-LVL V: ICD-10-PCS | Mod: PBBFAC,,, | Performed by: INTERNAL MEDICINE

## 2020-09-22 PROCEDURE — 3078F DIAST BP <80 MM HG: CPT | Mod: CPTII,S$GLB,, | Performed by: INTERNAL MEDICINE

## 2020-09-22 PROCEDURE — 3074F PR MOST RECENT SYSTOLIC BLOOD PRESSURE < 130 MM HG: ICD-10-PCS | Mod: CPTII,S$GLB,, | Performed by: INTERNAL MEDICINE

## 2020-09-22 PROCEDURE — 3078F PR MOST RECENT DIASTOLIC BLOOD PRESSURE < 80 MM HG: ICD-10-PCS | Mod: CPTII,S$GLB,, | Performed by: INTERNAL MEDICINE

## 2020-09-22 PROCEDURE — 3008F PR BODY MASS INDEX (BMI) DOCUMENTED: ICD-10-PCS | Mod: CPTII,S$GLB,, | Performed by: INTERNAL MEDICINE

## 2020-09-22 PROCEDURE — 3008F BODY MASS INDEX DOCD: CPT | Mod: CPTII,S$GLB,, | Performed by: INTERNAL MEDICINE

## 2020-09-22 PROCEDURE — 99214 OFFICE O/P EST MOD 30 MIN: CPT | Mod: S$GLB,,, | Performed by: INTERNAL MEDICINE

## 2020-09-22 PROCEDURE — 99999 PR PBB SHADOW E&M-EST. PATIENT-LVL V: CPT | Mod: PBBFAC,,, | Performed by: INTERNAL MEDICINE

## 2020-09-22 PROCEDURE — 3044F HG A1C LEVEL LT 7.0%: CPT | Mod: CPTII,S$GLB,, | Performed by: INTERNAL MEDICINE

## 2020-09-22 NOTE — PROGRESS NOTES
Subjective:   Patient ID:  Latosha Enriquez is a 64 y.o. female who presents for cardiac consult of Chest Pain      Follow-up  Associated symptoms include chest pain.     The patient came in today for cardiac consult of Chest Pain    Latosha Enriquez is a 64 y.o. female PAF on Eliquis, KATHERINE, HTN, HLD, DM2, obesity presents for CV Follow up.     3/6/19 Visit - Karly Garrett  Ms. Enriquez is a 63 year old female patient whose current medical conditions include DM, HTN, and hyperlipidemia who presents today for hospital follow-up. Patient recently underwent LHC by Dr. Suggs on 2/20/19 for further evaluation of chest pain symptoms and CANALES. LHC showed widely patent coronaries, medical mgmt/risk factor modification recommended. Patient returns today and states she is doing ok. Recently diagnosed with URI, on steroids and also having issues with kidney stones. Cardiac wise, seems stable. No complaints. No chest pain or SOB. No palpitations, near syncope, or syncope. BP stable. No radial access site complaints. Patient is compliant with her medications, would like to see how she feels off of Ranexa.     4/29/20 Sunday night she was drinking a wild raspberry crystal light with caffeine and took her home meds - metformin, gapapentin, singular and then she started having chest pain. She had angina in the past and has been on losartan. She has been getting tired lately and more CANALES. She took her mother to a cardiologist recently and had ECG there with possible Afib. She took Ranexa. Her mother has Afib as well. Prior cath and ECHO in 2019 neg.     5/7/20  She was started on Eliquis after ER visit, ECG there negative but prior ECG revealed Afib per pt. She has been going through stress with her mother who had a fall and daugher in hospital as well. She continues to take Ranexa BID. Will need increase BB dose    6/18/20  ECHO after last visit with normal Bi V function. Holter neg. Bp well controlled, no dizziness. Chset pain  resolved with Ranexa, occ fluttering.Breathing is stable. She had issues when right eye would go blind at times more in surface she went to optho could not see any clots.     9/22/20  Carotid u/s in July with min plaque.  Increased BB to BID last visit. Pt had sleep study diagnosed with severe KATHERINE with AHI 37.9/hr. She will have inhouse sleep study. She had recent opthal visit and had addition eye drops/injections, now improved. She has occ chest tightness, worse with stress, her sister in law had breast CA and passed away. BP has been stable. Pulse has been stable. She has occ choking sensation with eating feels like asthma attack. SHe does not have KATHERINE sx. She has stopped Eliquis due to renal stones with bleeding, had prior ureter stent and had severe bleeding had could not walk until it was removed.     Patient feels  no leg swelling, no PND, no dizziness, no syncope, no CNS symptoms.    Patient has fairly good exercise tolerance.    Patient is compliant with medications.    7/2020 Carotid u/s  · There is 0-19% right Internal Carotid Stenosis.  · There is 0-19% left Internal Carotid Stenosis.    Sleep study  2 night study  SEVERE OBSTRUCTIVE SLEEP APNEA with overall AHI 37.9/hr ( 149 events): night #1  Oxygen desaturation: 79%. SpO2 between 70% to 79% for < 1 min.  Patient snored 92% time above 50 .  Heart rate range: 60 bpm - 126 bpm  REC's:  Consider inlab CPAP titration.  Therapy with APAP at 4-20 cm WP using mask of choice with heated humidification is an option.    5/19/20 HOLTER  Predominant Rhythm  Sinus rhythm with heart rates varying between 53 and 133 bpm with an average of 86 bpm.   PVC    Ventricular Arrhythmias  There were very rare PVCs totalling 62 and averaging 0.65 per hour.   VT    Ventricular ectopic activity consisted of 62 beats, of which, 62 were in single PVCs.         2/20/19 LHC  - Left Main Coronary Artery:             The LM is normal. There is RAÚL 3 flow.     - Left Anterior  Descending Artery:             The LAD is normal. There is RAÚL 3 flow.     - D1:             The D1 is normal. There is RAÚL 3 flow.     - D2:             The D2 is normal. There is RAÚL 3 flow.     - Left Circumflex Artery:             The LCX is normal. There is RAÚL 3 flow.     - OM1:             The OM1 is normal. There is RAÚL 3 flow.     - OM2:             The OM2 is normal. There is RAÚL 3 flow.     - Ramus:             The ramus is normal. There is RAÚL 3 flow.     - Right Coronary Artery:             The RCA has luminal irregularities. There is RAÚL 3 flow. Rucker's crook anatomy.       Results for orders placed during the hospital encounter of 20   Echo Color Flow Doppler? Yes    Narrative · Concentric left ventricular remodeling.  · Normal left ventricular systolic function. The estimated ejection   fraction is 60%.  · Normal LV diastolic function.  · No wall motion abnormalities.  · Normal right ventricular systolic function.  · Normal central venous pressure (3 mmHg).  · The estimated PA systolic pressure is 30 mmHg.              Past Medical History:   Diagnosis Date    Asthma     Colon polyps     Diabetes mellitus type II, uncontrolled     Diabetic retinopathy     Diverticulosis of large intestine without hemorrhage 2015    Elevated liver enzymes     GERD (gastroesophageal reflux disease)     Gout, unspecified     Hemorrhoids, internal 2015    History of blood transfusion     Hyperlipidemia     Hypertension     IBS (irritable bowel syndrome)     Morbid obesity with BMI of 40.0-44.9, adult     Nasal vestibulitis     Osteoarthritis of multiple joints     Urolithiasis     Vitamin D deficiency disease        Past Surgical History:   Procedure Laterality Date    BREAST BIOPSY Right      SECTION, CLASSIC      COLONOSCOPY N/A 2015    Procedure: COLONOSCOPY;  Surgeon: Stanisalv Pickard MD;  [facility];  Service: Endoscopy;  Laterality: N/A;     "CYSTOSCOPY W/ URETERAL STENT PLACEMENT  05/2017    EXTRACORPOREAL SHOCK WAVE LITHOTRIPSY      LEFT HEART CATHETERIZATION Left 2/20/2019    Procedure: CATHETERIZATION, HEART, LEFT;  Surgeon: Haile Suggs MD;  Location[facility] C[facility] LAB;  Service: Cardiology;  Laterality: Left;  10:30-11am start/poss rt radial approach    TOTAL ABDOMINAL HYSTERECTOMY      URETEROSCOPY  05/2017       Social History     Tobacco Use    Smoking status: Never Smoker    Smokeless tobacco: Never Used   Substance Use Topics    Alcohol use: No    Drug use: No       Family History   Problem Relation Age of Onset    Hypertension Mother     Heart disease Mother     Diabetes Mother     Colon polyps Mother     Other Mother         Lower limb amputation    Hypertension Brother     Stroke Brother     Colon cancer Maternal Grandmother     Diabetes Brother     Breast cancer Maternal Cousin        Patient's Medications   New Prescriptions    No medications on file   Previous Medications    ALBUTEROL (PROVENTIL/VENTOLIN HFA) 90 MCG/ACTUATION INHALER    Inhale 2 puffs into the lungs every 6 (six) hours as needed.    ALLOPURINOL (ZYLOPRIM) 100 MG TABLET    Take 1 tablet (100 mg total) by mouth once daily.    BD ULTRA-FINE SHORT PEN NEEDLE 31 GAUGE X 5/16" NDLE    USE 1 PEN NEEDLE UNDER THE SKIN TWICE A DAY    CICLOPIROX (PENLAC) 8 % SOLN    Apply to affected toenails at night time DAILY. On 7th day, file nails down, clean all nails with alcohol and restart application process.    CYANOCOBALAMIN (VITAMIN B-12) 100 MCG TABLET    Take by mouth.    DIPHENHYDRAMINE (BENADRYL) 50 MG CAPSULE    Begin 2cw-64rm-7as the evening and morning before procedure with Prednisone and Pepcid    ERGOCALCIFEROL, VITAMIN D2, 400 UNIT TAB    Take by mouth.    ESOMEPRAZOLE (NEXIUM) 20 MG CAPSULE    TAKE 1 CAPSULE(20 MG) BY MOUTH BEFORE BREAKFAST    EYLEA 2 MG/0.05 ML SOLN    0.05 mLs (2 mg total) by Intravitreal route every 28 days. for 8 doses    FAMOTIDINE " (PEPCID) 40 MG TABLET    Take 6rm-34qe-5yh the evening and morning before your procedure along with Benadryl and Prednisone    FERROUS SULFATE, DRIED (SLOW FE) 160 MG (50 MG IRON) TBSR    Take by mouth.    GABAPENTIN (NEURONTIN) 100 MG CAPSULE    TAKE 1 CAPSULE BY MOUTH THREE TIMES DAILY    IBUPROFEN (ADVIL,MOTRIN) 800 MG TABLET    As needed    INSULIN LISPRO PROTAMIN-LISPRO (HUMALOG MIX 75-25 KWIKPEN) 100 UNIT/ML (75-25) INPN    INJECT 30 UNITS UNDER THE SKIN IN THE MORNING AND 20 UNITS IN THE EVENING    LOSARTAN-HYDROCHLOROTHIAZIDE 100-25 MG (HYZAAR) 100-25 MG PER TABLET    Take 1 tablet by mouth once daily.    METFORMIN (GLUCOPHAGE-XR) 500 MG XR 24HR TABLET    TAKE 2 TABLETS DAILY WITH BREAKFAST AND 1 TABLET WITH DINNER    METOPROLOL TARTRATE (LOPRESSOR) 25 MG TABLET    Take 1 tablet (25 mg total) by mouth 2 (two) times daily.    MONTELUKAST (SINGULAIR) 10 MG TABLET        NITROGLYCERIN (NITROSTAT) 0.4 MG SL TABLET    Place 1 tablet (0.4 mg total) under the tongue every 5 (five) minutes as needed for Chest pain.    POTASSIUM CHLORIDE (MICRO-K) 10 MEQ CPSR    Take 1 capsule (10 mEq total) by mouth once daily.    RANOLAZINE (RANEXA) 500 MG TB12    Take 1 tablet (500 mg total) by mouth 2 (two) times daily.    SIMVASTATIN (ZOCOR) 20 MG TABLET    Take 1 tablet (20 mg total) by mouth every evening.    TAMSULOSIN (FLOMAX) 0.4 MG CAP    TAKE 1 CAPSULE(0.4 MG) BY MOUTH EVERY DAY    TOBRAMYCIN SULFATE 0.3% (TOBREX) 0.3 % OPHTHALMIC SOLUTION    PLACE 1 GTT IN OU QID FOR 5 DAYS   Modified Medications    Modified Medication Previous Medication    APIXABAN (ELIQUIS) 5 MG TAB apixaban (ELIQUIS) 5 mg Tab       Take 1 tablet (5 mg total) by mouth 2 (two) times daily.    Take 1 tablet (5 mg total) by mouth 2 (two) times daily.   Discontinued Medications    No medications on file       Review of Systems   Constitutional: Positive for malaise/fatigue.   HENT: Negative.    Eyes: Negative.    Respiratory: Positive for shortness of  breath.    Cardiovascular: Positive for chest pain. Negative for palpitations.   Gastrointestinal: Negative.    Genitourinary: Negative.    Musculoskeletal: Negative.    Skin: Negative.    Neurological: Negative.    Endo/Heme/Allergies: Negative.    Psychiatric/Behavioral: Negative.    All 12 systems otherwise negative.      Wt Readings from Last 3 Encounters:   09/22/20 112.6 kg (248 lb 3.8 oz)   08/05/20 115 kg (253 lb 8.5 oz)   07/28/20 115 kg (253 lb 8.5 oz)     Temp Readings from Last 3 Encounters:   07/28/20 97.8 °F (36.6 °C) (Temporal)   07/13/20 98.8 °F (37.1 °C) (Oral)   04/29/20 98.7 °F (37.1 °C) (Oral)     BP Readings from Last 3 Encounters:   09/22/20 120/64   08/05/20 132/78   07/28/20 124/76     Pulse Readings from Last 3 Encounters:   09/22/20 79   08/05/20 92   07/28/20 88       /64 (BP Location: Right arm, Patient Position: Sitting, BP Method: Medium (Manual))   Pulse 79   Wt 112.6 kg (248 lb 3.8 oz)   SpO2 100%   BMI 40.07 kg/m²     Objective:   Physical Exam   Constitutional: She is oriented to person, place, and time. She appears well-developed and well-nourished. No distress.   Obese   HENT:   Head: Normocephalic and atraumatic.   Nose: Nose normal.   Mouth/Throat: Oropharynx is clear and moist.   Eyes: Conjunctivae and EOM are normal. No scleral icterus.   Neck: Normal range of motion. Neck supple. No JVD present. No thyromegaly present.   Cardiovascular: Normal rate, regular rhythm, S1 normal and S2 normal. Exam reveals no gallop, no S3, no S4 and no friction rub.   No murmur heard.  Pulmonary/Chest: Effort normal and breath sounds normal. No stridor. No respiratory distress. She has no wheezes. She has no rales. She exhibits no tenderness.   Abdominal: Soft. Bowel sounds are normal. She exhibits no distension and no mass. There is no abdominal tenderness. There is no rebound.   Genitourinary:    Genitourinary Comments: Deferred     Musculoskeletal: Normal range of motion.          General: No tenderness, deformity or edema.   Lymphadenopathy:     She has no cervical adenopathy.   Neurological: She is alert and oriented to person, place, and time. She exhibits normal muscle tone. Coordination normal.   Skin: Skin is warm and dry. No rash noted. She is not diaphoretic. No erythema. No pallor.   Psychiatric: She has a normal mood and affect. Her behavior is normal. Judgment and thought content normal.   Nursing note and vitals reviewed.      Lab Results   Component Value Date     04/29/2020    K 3.7 04/29/2020     04/29/2020    CO2 26 04/29/2020    BUN 15 04/29/2020    CREATININE 0.8 04/29/2020     (H) 04/29/2020    HGBA1C 6.6 (H) 11/29/2019    AST 18 04/29/2020    ALT 51 (H) 04/29/2020    ALBUMIN 3.5 04/29/2020    PROT 6.8 04/29/2020    BILITOT 0.4 04/29/2020    WBC 7.87 04/29/2020    HGB 12.4 04/29/2020    HCT 39.6 04/29/2020    MCV 97 04/29/2020     04/29/2020    INR 0.9 05/30/2017    TSH 0.996 11/29/2019    CHOL 182 11/29/2019    HDL 51 11/29/2019    LDLCALC 116.4 11/29/2019    TRIG 73 11/29/2019     (H) 04/29/2020     Assessment:      1. PAF (paroxysmal atrial fibrillation)    2. Palpitations    3. AP (angina pectoris)    4. Other hyperlipidemia    5. Essential hypertension    6. Type 2 diabetes mellitus without complication, with long-term current use of insulin    7. Morbid obesity with BMI of 40.0-44.9, adult    8. KATHERINE (obstructive sleep apnea)    9. Renal stones        Plan:   1. Palpitations - sec to PAF  - cont current meds  - prior Cath neg    2. HTN  - cont meds    3. Obesity  - needs weight loss    4. DM2 - 6.6  - cont meds per PCP  - refer to nutritionist     5. HLD   - cont statin  -  carotid u/s - ? had R eye blindness - negative    6. PAF  - cont Eliquis- restart  - increased BB to 25 BID  - ECHO - neg  - Holter - neg    7. Angina  - cont Ranexa     8. KATHERINE, severe  - cont CPAP    9. Renal stones  - needs uro eval  - hold Eliquis if needed for a  day but rec further treatment    Thank you for allowing me to participate in this patient's care. Please do not hesitate to contact me with any questions or concerns. Consult note has been forwarded to the referral physician.

## 2020-10-06 ENCOUNTER — PATIENT MESSAGE (OUTPATIENT)
Dept: ADMINISTRATIVE | Facility: HOSPITAL | Age: 65
End: 2020-10-06

## 2020-10-08 DIAGNOSIS — Z79.4 TYPE 2 DIABETES MELLITUS WITH BOTH EYES AFFECTED BY MILD NONPROLIFERATIVE RETINOPATHY AND MACULAR EDEMA, WITH LONG-TERM CURRENT USE OF INSULIN: Primary | ICD-10-CM

## 2020-10-08 DIAGNOSIS — E11.3213 TYPE 2 DIABETES MELLITUS WITH BOTH EYES AFFECTED BY MILD NONPROLIFERATIVE RETINOPATHY AND MACULAR EDEMA, WITH LONG-TERM CURRENT USE OF INSULIN: Primary | ICD-10-CM

## 2020-10-12 ENCOUNTER — TELEPHONE (OUTPATIENT)
Dept: OPHTHALMOLOGY | Facility: CLINIC | Age: 65
End: 2020-10-12

## 2020-10-12 NOTE — TELEPHONE ENCOUNTER
I spoke with MsDiana Mina.  She missed her appt. Due to death in family and requested to reschedule.  I rescheduled to Wed. Am at 7:45.

## 2020-10-14 ENCOUNTER — PROCEDURE VISIT (OUTPATIENT)
Dept: OPHTHALMOLOGY | Facility: CLINIC | Age: 65
End: 2020-10-14
Payer: COMMERCIAL

## 2020-10-14 DIAGNOSIS — E11.3213 TYPE 2 DIABETES MELLITUS WITH BOTH EYES AFFECTED BY MILD NONPROLIFERATIVE RETINOPATHY AND MACULAR EDEMA, WITH LONG-TERM CURRENT USE OF INSULIN: Primary | ICD-10-CM

## 2020-10-14 DIAGNOSIS — Z79.4 TYPE 2 DIABETES MELLITUS WITH BOTH EYES AFFECTED BY MILD NONPROLIFERATIVE RETINOPATHY AND MACULAR EDEMA, WITH LONG-TERM CURRENT USE OF INSULIN: Primary | ICD-10-CM

## 2020-10-14 PROCEDURE — 67028 INJECTION EYE DRUG: CPT | Mod: RT,S$GLB,, | Performed by: OPHTHALMOLOGY

## 2020-10-14 PROCEDURE — 92134 CPTRZ OPH DX IMG PST SGM RTA: CPT | Mod: S$GLB,,, | Performed by: OPHTHALMOLOGY

## 2020-10-14 PROCEDURE — 92134 POSTERIOR SEGMENT OCT RETINA (OCULAR COHERENCE TOMOGRAPHY)-BOTH EYES: ICD-10-PCS | Mod: S$GLB,,, | Performed by: OPHTHALMOLOGY

## 2020-10-14 PROCEDURE — 99499 UNLISTED E&M SERVICE: CPT | Mod: S$GLB,,, | Performed by: OPHTHALMOLOGY

## 2020-10-14 PROCEDURE — 67028 PR INJECT INTRAVITREAL PHARMCOLOGIC: ICD-10-PCS | Mod: RT,S$GLB,, | Performed by: OPHTHALMOLOGY

## 2020-10-14 PROCEDURE — 99499 NO LOS: ICD-10-PCS | Mod: S$GLB,,, | Performed by: OPHTHALMOLOGY

## 2020-10-14 NOTE — PROGRESS NOTES
===============================  Latosha Enriquez,  10/14/2020 today   64 y.o. female   Last visit [facility]: :9/4/2020   Last visit eye dept. 9/4/2020  [facility]:  Corrected distance visual acuity was 20/40 in the right eye and 20/25 in the left eye.   Not recorded         Not recorded         Not recorded         Not recorded        Chief Complaint   Patient presents with    DME     Eylea OD.       ________________  10/14/2020 today  HPI     DME      Additional comments: Eylea OD.              Comments     (No Betadine - Vigamox Prep))  Prefers pledget, no bleb    DM  DME OD  EYLEA OD 9/4/20          Last edited by Esteban Carballo on 10/14/2020  8:10 AM. (History)      Problem List Items Addressed This Visit        Eye/Vision problems    Type 2 diabetes mellitus with both eyes affected by mild nonproliferative retinopathy and macular edema, with long-term current use of insulin - Primary    Relevant Medications    aflibercept Soln 2 mg (Start on 10/14/2020 10:00 AM)    Other Relevant Orders    Posterior Segment OCT Retina-Both eyes    Prior authorization Order        od dme bteer fivea returning   ..    Injection Procedure Note:    10/14/2020  Diagnosis :  dme od  Today:   Eylea (afibercept) 2 mg/0.05 ml Intravitreal Injection , OD   Follow up: 1 mo    Instructed to call 24/7 for any worsening of vision. Check Both eyes daily. Gave patient my home phone number.  Risks, benefits, and alternatives to treatment discussed in detail with the patient.  The patient voiced understanding and wished to proceed with the procedure.     Patient Identified and Time Out complete  Subconjunctival bleb - xylocaine with epi 2%   and vigamox.  Inject at Eylea (afibercept) 2 mg/0.05 ml Intravitreal Injection , OD 6:00 @ 3.5-4mm posterior to limbus  1 stop: vigamox   Post Operative Dx: Same  Complications: None  Follow up as above.  ]  .      ===========================

## 2020-10-23 DIAGNOSIS — I20.9 AP (ANGINA PECTORIS): ICD-10-CM

## 2020-10-23 DIAGNOSIS — I48.0 PAROXYSMAL ATRIAL FIBRILLATION: ICD-10-CM

## 2020-10-23 RX ORDER — RANOLAZINE 500 MG/1
500 TABLET, EXTENDED RELEASE ORAL 2 TIMES DAILY
Qty: 180 TABLET | Refills: 1 | Status: SHIPPED | OUTPATIENT
Start: 2020-10-23 | End: 2021-04-28 | Stop reason: SDUPTHER

## 2020-10-23 NOTE — TELEPHONE ENCOUNTER
----- Message from Nelli Bennett sent at 10/23/2020 11:07 AM CDT -----  Type:  RX Refill Request    Who Called: Latosha  Refill or New Rx:refill  RX Name and Strength:ranolazine (RANEXA) 500 MG Tb12 ()  How is the patient currently taking it? (ex. 1XDay):  Is this a 30 day or 90 day RX:  Preferred Pharmacy with phone number:    [facility] [place]  Local or Mail Order:local  Ordering Provider:Connor  Would the patient rather a call back or a response via [facility]? call  Best Call Back Number:[phone]    Additional Information: Pt only have 2 left

## 2020-10-27 ENCOUNTER — TELEPHONE (OUTPATIENT)
Dept: FAMILY MEDICINE | Facility: CLINIC | Age: 65
End: 2020-10-27

## 2020-10-27 DIAGNOSIS — Z12.31 ENCOUNTER FOR SCREENING MAMMOGRAM FOR MALIGNANT NEOPLASM OF BREAST: Primary | ICD-10-CM

## 2020-11-02 ENCOUNTER — PATIENT OUTREACH (OUTPATIENT)
Dept: ADMINISTRATIVE | Facility: OTHER | Age: 65
End: 2020-11-02

## 2020-11-10 RX ORDER — MONTELUKAST SODIUM 10 MG/1
10 TABLET ORAL NIGHTLY PRN
Qty: 90 TABLET | Refills: 0 | Status: SHIPPED | OUTPATIENT
Start: 2020-11-10 | End: 2021-02-17

## 2020-11-10 RX ORDER — MONTELUKAST SODIUM 10 MG/1
10 TABLET ORAL NIGHTLY PRN
Qty: 90 TABLET | Refills: 0 | OUTPATIENT
Start: 2020-11-10

## 2020-11-11 ENCOUNTER — LAB VISIT (OUTPATIENT)
Dept: LAB | Facility: HOSPITAL | Age: 65
End: 2020-11-11
Payer: COMMERCIAL

## 2020-11-11 ENCOUNTER — OFFICE VISIT (OUTPATIENT)
Dept: FAMILY MEDICINE | Facility: CLINIC | Age: 65
End: 2020-11-11
Payer: COMMERCIAL

## 2020-11-11 VITALS
WEIGHT: 251.13 LBS | DIASTOLIC BLOOD PRESSURE: 70 MMHG | OXYGEN SATURATION: 95 % | HEIGHT: 66 IN | SYSTOLIC BLOOD PRESSURE: 116 MMHG | HEART RATE: 87 BPM | BODY MASS INDEX: 40.36 KG/M2 | TEMPERATURE: 98 F

## 2020-11-11 DIAGNOSIS — R53.81 MALAISE AND FATIGUE: ICD-10-CM

## 2020-11-11 DIAGNOSIS — R53.83 MALAISE AND FATIGUE: ICD-10-CM

## 2020-11-11 DIAGNOSIS — I10 ESSENTIAL HYPERTENSION: Chronic | ICD-10-CM

## 2020-11-11 DIAGNOSIS — R53.83 MALAISE AND FATIGUE: Primary | ICD-10-CM

## 2020-11-11 DIAGNOSIS — E11.9 TYPE 2 DIABETES MELLITUS WITHOUT COMPLICATION, WITH LONG-TERM CURRENT USE OF INSULIN: Chronic | ICD-10-CM

## 2020-11-11 DIAGNOSIS — R53.81 MALAISE AND FATIGUE: Primary | ICD-10-CM

## 2020-11-11 DIAGNOSIS — Z79.4 TYPE 2 DIABETES MELLITUS WITHOUT COMPLICATION, WITH LONG-TERM CURRENT USE OF INSULIN: Chronic | ICD-10-CM

## 2020-11-11 LAB
ANION GAP SERPL CALC-SCNC: 9 MMOL/L (ref 8–16)
BUN SERPL-MCNC: 19 MG/DL (ref 8–23)
CALCIUM SERPL-MCNC: 9.5 MG/DL (ref 8.7–10.5)
CHLORIDE SERPL-SCNC: 102 MMOL/L (ref 95–110)
CO2 SERPL-SCNC: 29 MMOL/L (ref 23–29)
CREAT SERPL-MCNC: 0.8 MG/DL (ref 0.5–1.4)
CTP QC/QA: YES
EST. GFR  (AFRICAN AMERICAN): >60 ML/MIN/1.73 M^2
EST. GFR  (NON AFRICAN AMERICAN): >60 ML/MIN/1.73 M^2
FLUAV AG NPH QL: NEGATIVE
FLUBV AG NPH QL: NEGATIVE
GLUCOSE SERPL-MCNC: 127 MG/DL (ref 70–110)
POTASSIUM SERPL-SCNC: 3.9 MMOL/L (ref 3.5–5.1)
SODIUM SERPL-SCNC: 140 MMOL/L (ref 136–145)

## 2020-11-11 PROCEDURE — 87804 POCT INFLUENZA A/B: ICD-10-PCS | Mod: 59,QW,S$GLB, | Performed by: INTERNAL MEDICINE

## 2020-11-11 PROCEDURE — 3074F SYST BP LT 130 MM HG: CPT | Mod: CPTII,S$GLB,, | Performed by: INTERNAL MEDICINE

## 2020-11-11 PROCEDURE — 3078F DIAST BP <80 MM HG: CPT | Mod: CPTII,S$GLB,, | Performed by: INTERNAL MEDICINE

## 2020-11-11 PROCEDURE — 3078F PR MOST RECENT DIASTOLIC BLOOD PRESSURE < 80 MM HG: ICD-10-PCS | Mod: CPTII,S$GLB,, | Performed by: INTERNAL MEDICINE

## 2020-11-11 PROCEDURE — 3008F BODY MASS INDEX DOCD: CPT | Mod: CPTII,S$GLB,, | Performed by: INTERNAL MEDICINE

## 2020-11-11 PROCEDURE — 3008F PR BODY MASS INDEX (BMI) DOCUMENTED: ICD-10-PCS | Mod: CPTII,S$GLB,, | Performed by: INTERNAL MEDICINE

## 2020-11-11 PROCEDURE — 3074F PR MOST RECENT SYSTOLIC BLOOD PRESSURE < 130 MM HG: ICD-10-PCS | Mod: CPTII,S$GLB,, | Performed by: INTERNAL MEDICINE

## 2020-11-11 PROCEDURE — 3044F PR MOST RECENT HEMOGLOBIN A1C LEVEL <7.0%: ICD-10-PCS | Mod: CPTII,S$GLB,, | Performed by: INTERNAL MEDICINE

## 2020-11-11 PROCEDURE — 99213 OFFICE O/P EST LOW 20 MIN: CPT | Mod: 25,S$GLB,, | Performed by: INTERNAL MEDICINE

## 2020-11-11 PROCEDURE — 87804 INFLUENZA ASSAY W/OPTIC: CPT | Mod: QW,S$GLB,, | Performed by: INTERNAL MEDICINE

## 2020-11-11 PROCEDURE — 3044F HG A1C LEVEL LT 7.0%: CPT | Mod: CPTII,S$GLB,, | Performed by: INTERNAL MEDICINE

## 2020-11-11 PROCEDURE — 36415 COLL VENOUS BLD VENIPUNCTURE: CPT | Mod: PO

## 2020-11-11 PROCEDURE — 99999 PR PBB SHADOW E&M-EST. PATIENT-LVL V: CPT | Mod: PBBFAC,,, | Performed by: INTERNAL MEDICINE

## 2020-11-11 PROCEDURE — 99213 PR OFFICE/OUTPT VISIT, EST, LEVL III, 20-29 MIN: ICD-10-PCS | Mod: 25,S$GLB,, | Performed by: INTERNAL MEDICINE

## 2020-11-11 PROCEDURE — 99999 PR PBB SHADOW E&M-EST. PATIENT-LVL V: ICD-10-PCS | Mod: PBBFAC,,, | Performed by: INTERNAL MEDICINE

## 2020-11-11 PROCEDURE — 80048 BASIC METABOLIC PNL TOTAL CA: CPT

## 2020-11-11 NOTE — PROGRESS NOTES
"Subjective:      Patient ID: Latosha Enriquez is a 64 y.o. female.    Chief Complaint: Dysuria, Cough, Blood Sugar Problem, and Dizziness      HPI  S/p 14 day quarantine for coworker with covid.  Covid test negative on day #9.  Day 11 started with achiness, sore throat, cough.  No fever.  Better now.  Sugars have been running high which sometimes dehydrates her and makes her feel achy.          Review of Systems   Constitutional: Negative for chills, diaphoresis and fever.   Respiratory: Negative for cough and shortness of breath.    Cardiovascular: Negative for chest pain, palpitations and leg swelling.   Gastrointestinal: Negative for blood in stool, constipation, diarrhea, nausea and vomiting.   Genitourinary: Negative for dysuria, frequency and hematuria.   Psychiatric/Behavioral: The patient is not nervous/anxious.          Objective:   /70   Pulse 87   Temp 97.7 °F (36.5 °C) (Temporal)   Ht 5' 6" (1.676 m)   Wt 113.9 kg (251 lb 1.7 oz)   SpO2 95%   BMI 40.53 kg/m²     Physical Exam  Constitutional:       Appearance: She is well-developed.   Neck:      Musculoskeletal: Neck supple.      Thyroid: No thyroid mass.      Vascular: No carotid bruit.   Cardiovascular:      Rate and Rhythm: Normal rate and regular rhythm.      Heart sounds: No murmur. No friction rub. No gallop.    Pulmonary:      Effort: Pulmonary effort is normal.      Breath sounds: Normal breath sounds. No wheezing or rales.   Abdominal:      General: Bowel sounds are normal.      Palpations: Abdomen is soft. There is no mass.      Tenderness: There is no abdominal tenderness.   Lymphadenopathy:      Cervical: No cervical adenopathy.   Neurological:      Mental Status: She is alert and oriented to person, place, and time.             Assessment:       1. Malaise and fatigue    2. Type 2 diabetes mellitus without complication, with long-term current use of insulin    3. Essential hypertension          Plan:     Malaise and fatigue- flu " test negative.  Check for dehydration with [place].  -     Basic Metabolic Panel; Future; Expected date: 11/11/2020  -     POCT Influenza A/B    Type 2 diabetes mellitus without complication, with long-term current use of insulin    Essential hypertension- stable, cont meds.

## 2020-11-12 ENCOUNTER — PATIENT MESSAGE (OUTPATIENT)
Dept: FAMILY MEDICINE | Facility: CLINIC | Age: 65
End: 2020-11-12

## 2020-11-24 ENCOUNTER — OFFICE VISIT (OUTPATIENT)
Dept: PODIATRY | Facility: CLINIC | Age: 65
End: 2020-11-24
Payer: COMMERCIAL

## 2020-11-24 VITALS
BODY MASS INDEX: 40.36 KG/M2 | WEIGHT: 251.13 LBS | HEART RATE: 74 BPM | SYSTOLIC BLOOD PRESSURE: 107 MMHG | DIASTOLIC BLOOD PRESSURE: 65 MMHG | HEIGHT: 66 IN

## 2020-11-24 DIAGNOSIS — M76.71 PERONEAL TENDINITIS OF RIGHT LOWER EXTREMITY: ICD-10-CM

## 2020-11-24 DIAGNOSIS — I20.9 AP (ANGINA PECTORIS): ICD-10-CM

## 2020-11-24 DIAGNOSIS — M19.079 ARTHRITIS OF MIDFOOT: Primary | ICD-10-CM

## 2020-11-24 DIAGNOSIS — I48.0 PAROXYSMAL ATRIAL FIBRILLATION: ICD-10-CM

## 2020-11-24 PROCEDURE — 3074F PR MOST RECENT SYSTOLIC BLOOD PRESSURE < 130 MM HG: ICD-10-PCS | Mod: CPTII,S$GLB,, | Performed by: PODIATRIST

## 2020-11-24 PROCEDURE — 99213 PR OFFICE/OUTPT VISIT, EST, LEVL III, 20-29 MIN: ICD-10-PCS | Mod: S$GLB,,, | Performed by: PODIATRIST

## 2020-11-24 PROCEDURE — 3074F SYST BP LT 130 MM HG: CPT | Mod: CPTII,S$GLB,, | Performed by: PODIATRIST

## 2020-11-24 PROCEDURE — 3078F DIAST BP <80 MM HG: CPT | Mod: CPTII,S$GLB,, | Performed by: PODIATRIST

## 2020-11-24 PROCEDURE — 99999 PR PBB SHADOW E&M-EST. PATIENT-LVL IV: CPT | Mod: PBBFAC,,, | Performed by: PODIATRIST

## 2020-11-24 PROCEDURE — 3078F PR MOST RECENT DIASTOLIC BLOOD PRESSURE < 80 MM HG: ICD-10-PCS | Mod: CPTII,S$GLB,, | Performed by: PODIATRIST

## 2020-11-24 PROCEDURE — 3008F PR BODY MASS INDEX (BMI) DOCUMENTED: ICD-10-PCS | Mod: CPTII,S$GLB,, | Performed by: PODIATRIST

## 2020-11-24 PROCEDURE — 99213 OFFICE O/P EST LOW 20 MIN: CPT | Mod: S$GLB,,, | Performed by: PODIATRIST

## 2020-11-24 PROCEDURE — 99999 PR PBB SHADOW E&M-EST. PATIENT-LVL IV: ICD-10-PCS | Mod: PBBFAC,,, | Performed by: PODIATRIST

## 2020-11-24 PROCEDURE — 3008F BODY MASS INDEX DOCD: CPT | Mod: CPTII,S$GLB,, | Performed by: PODIATRIST

## 2020-11-24 RX ORDER — METOPROLOL TARTRATE 25 MG/1
25 TABLET, FILM COATED ORAL 2 TIMES DAILY
Qty: 90 TABLET | Refills: 1 | Status: SHIPPED | OUTPATIENT
Start: 2020-11-24 | End: 2021-01-07 | Stop reason: SDUPTHER

## 2020-11-24 NOTE — PROGRESS NOTES
PODIATRIC MEDICINE AND SURGERY  11/24/2020    PCP: Dr. Gabbie Ronquillo MD    CHIEF COMPLAINT   Chief Complaint   Patient presents with    Follow-up     F/U foot pain left foot 3/10       HPI:    Latosha Enriquez is a 64 y.o. female who has a past medical history of Asthma, Colon polyps, Diabetes mellitus type II, uncontrolled, Diabetic retinopathy, Diverticulosis of large intestine without hemorrhage (11/23/2015), Elevated liver enzymes, GERD (gastroesophageal reflux disease), Gout, unspecified, Hemorrhoids, internal (11/23/2015), History of blood transfusion, Hyperlipidemia, Hypertension, IBS (irritable bowel syndrome), Morbid obesity with BMI of 40.0-44.9, adult, Nasal vestibulitis, Osteoarthritis of multiple joints, Urolithiasis, and Vitamin D deficiency disease.     Latosha presents to clinic today complaining of multiple pedal complaints.  Patient states that she has bilateral foot pain on the top of both feet.  Symptoms have been present for greater than 15 years and is worsening in severity.  She does have history of gout.  She does have diagnose the multiple degenerative joint disease.  Symptoms are throbbing aching and worse with ambulation. She rates pain 10/10 on pain scale.  She states she works 2 jobs stands on her feet for long duration.  She also complains about fungal toenail.  She has not per any treatment..    11/24/2020  Pt is here today for f/u foot pain. Pt states topical pain cream did not help. She states pain is along midfoot of LEFT and along fifth metatarsal base on RIGHT foot. Symptoms are intermittent and worse with ambulation. She does take tylenol arthritis for pain. She rates average pain 3/10.      Patient denies other pedal complaints at this time.     Hemoglobin A1C   Date Value Ref Range Status   11/29/2019 6.6 (H) 4.0 - 5.6 % Final     Comment:     ADA Screening Guidelines:  5.7-6.4%  Consistent with prediabetes  >or=6.5%  Consistent with diabetes  High levels of fetal  hemoglobin interfere with the HbA1C  assay. Heterozygous hemoglobin variants (HbS, HgC, etc)do  not significantly interfere with this assay.   However, presence of multiple variants may affect accuracy.     08/24/2018 6.4 (H) 4.0 - 5.6 % Final     Comment:     ADA Screening Guidelines:  5.7-6.4%  Consistent with prediabetes  >or=6.5%  Consistent with diabetes  High levels of fetal hemoglobin interfere with the HbA1C  assay. Heterozygous hemoglobin variants (HbS, HgC, etc)do  not significantly interfere with this assay.   However, presence of multiple variants may affect accuracy.     04/03/2018 6.6 (H) 4.0 - 5.6 % Final     Comment:     According to ADA guidelines, hemoglobin A1c <7.0% represents  optimal control in non-pregnant diabetic patients. Different  metrics may apply to specific patient populations.   Standards of Medical Care in Diabetes-2016.  For the purpose of screening for the presence of diabetes:  <5.7%     Consistent with the absence of diabetes  5.7-6.4%  Consistent with increasing risk for diabetes   (prediabetes)  >or=6.5%  Consistent with diabetes  Currently, no consensus exists for use of hemoglobin A1c  for diagnosis of diabetes for children.  This Hemoglobin A1c assay has significant interference with fetal   hemoglobin   (HbF). The results are invalid for patients with abnormal amounts of   HbF,   including those with known Hereditary Persistence   of Fetal Hemoglobin. Heterozygous hemoglobin variants (HbAS, HbAC,   HbAD, HbAE, HbA2) do not significantly interfere with this assay;   however, presence of multiple variants in a sample may impact the %   interference.         PMH  Past Medical History:   Diagnosis Date    Asthma     Colon polyps     Diabetes mellitus type II, uncontrolled     Diabetic retinopathy     Diverticulosis of large intestine without hemorrhage 11/23/2015    Elevated liver enzymes     GERD (gastroesophageal reflux disease)     Gout, unspecified     Hemorrhoids,  "internal 11/23/2015    History of blood transfusion     Hyperlipidemia     Hypertension     IBS (irritable bowel syndrome)     Morbid obesity with BMI of 40.0-44.9, adult     Nasal vestibulitis     Osteoarthritis of multiple joints     Urolithiasis     Vitamin D deficiency disease        PROBLEM LIST  Patient Active Problem List    Diagnosis Date Noted    KATHERINE (obstructive sleep apnea) 09/22/2020    Dryness, eye 07/23/2020    Type 2 diabetes mellitus without complication, with long-term current use of insulin 07/13/2020    Amaurosis fugax 05/21/2020    Nevus of choroid of right eye 05/21/2020    PAF (paroxysmal atrial fibrillation) 05/07/2020    CANALES (dyspnea on exertion) 02/18/2019    Palpitations 02/11/2019    AP (angina pectoris) 02/10/2019    Kidney stones, calcium oxalate 05/11/2017    Type 2 diabetes mellitus with both eyes affected by mild nonproliferative retinopathy and macular edema, with long-term current use of insulin 11/09/2016    Essential hypertension     Morbid obesity with BMI of 40.0-44.9, adult     Vitamin D deficiency disease     Primary osteoarthritis of both knees     GERD (gastroesophageal reflux disease)     Hyperlipidemia     Hemorrhoids, internal 11/23/2015       MEDS  Current Outpatient Medications on File Prior to Visit   Medication Sig Dispense Refill    albuterol (PROVENTIL/VENTOLIN HFA) 90 mcg/actuation inhaler Inhale 2 puffs into the lungs every 6 (six) hours as needed. 18 g 3    allopurinol (ZYLOPRIM) 100 MG tablet Take 1 tablet (100 mg total) by mouth once daily. (Patient taking differently: Take 100 mg by mouth as needed. ) 90 tablet 0    apixaban (ELIQUIS) 5 mg Tab Take 1 tablet (5 mg total) by mouth 2 (two) times daily. 180 tablet 1    BD ULTRA-FINE SHORT PEN NEEDLE 31 gauge x 5/16" Ndle USE 1 PEN NEEDLE UNDER THE SKIN TWICE A DAY 90 each 3    ciclopirox (PENLAC) 8 % Soln Apply to affected toenails at night time DAILY. On 7th day, file nails down, " clean all nails with alcohol and restart application process. 1 Bottle 10    cyanocobalamin (VITAMIN B-12) 100 MCG tablet Take by mouth.      diphenhydrAMINE (BENADRYL) 50 MG capsule Begin [id number] the evening and morning before procedure with Prednisone and Pepcid 3 capsule 0    ergocalciferol, vitamin D2, 400 unit Tab Take by mouth.      esomeprazole (NEXIUM) 20 MG capsule TAKE 1 CAPSULE(20 MG) BY MOUTH BEFORE BREAKFAST 30 capsule 0    EYLEA 2 mg/0.05 mL Soln 0.05 mLs (2 mg total) by Intravitreal route every 28 days. for 8 doses 1 vial 3    famotidine (PEPCID) 40 MG tablet Take [id number] the evening and morning before your procedure along with Benadryl and Prednisone 3 tablet 0    ferrous sulfate, dried (SLOW FE) 160 mg (50 mg iron) TbSR Take by mouth.      gabapentin (NEURONTIN) 100 MG capsule TAKE 1 CAPSULE BY MOUTH THREE TIMES DAILY 90 capsule 1    ibuprofen (ADVIL,MOTRIN) 800 MG tablet As needed      insulin lispro protamin-lispro (HUMALOG MIX 75-25 KWIKPEN) 100 unit/mL (75-25) InPn INJECT 30 UNITS UNDER THE SKIN IN THE MORNING AND 20 UNITS IN THE EVENING 45 mL 1    losartan-hydrochlorothiazide 100-25 mg (HYZAAR) 100-25 mg per tablet Take 1 tablet by mouth once daily. 90 tablet 1    metFORMIN (GLUCOPHAGE-XR) 500 MG XR 24hr tablet TAKE 2 TABLETS DAILY WITH BREAKFAST AND 1 TABLET WITH DINNER 270 tablet 1    montelukast (SINGULAIR) 10 mg tablet Take 1 tablet (10 mg total) by mouth nightly as needed. 90 tablet 0    nitroGLYCERIN (NITROSTAT) 0.4 MG SL tablet Place 1 tablet (0.4 mg total) under the tongue every 5 (five) minutes as needed for Chest pain. 20 tablet 1    potassium chloride (MICRO-K) 10 MEQ CpSR Take 1 capsule (10 mEq total) by mouth once daily. 90 capsule 1    ranolazine (RANEXA) 500 MG Tb12 Take 1 tablet (500 mg total) by mouth 2 (two) times daily. 180 tablet 1    simvastatin (ZOCOR) 20 MG tablet Take 1 tablet (20 mg total) by mouth every evening. 90 tablet 1    tamsulosin  "(FLOMAX) 0.4 mg Cap TAKE 1 CAPSULE(0.4 MG) BY MOUTH EVERY DAY 30 capsule 2    tobramycin sulfate 0.3% (TOBREX) 0.3 % ophthalmic solution PLACE 1 GTT IN OU QID FOR 5 DAYS  0    [DISCONTINUED] metoprolol tartrate (LOPRESSOR) 25 MG tablet Take 1 tablet (25 mg total) by mouth 2 (two) times daily. 90 tablet 1     No current facility-administered medications on file prior to visit.        Medication List with Changes/Refills   Current Medications    ALBUTEROL (PROVENTIL/VENTOLIN HFA) 90 MCG/ACTUATION INHALER    Inhale 2 puffs into the lungs every 6 (six) hours as needed.    ALLOPURINOL (ZYLOPRIM) 100 MG TABLET    Take 1 tablet (100 mg total) by mouth once daily.    APIXABAN (ELIQUIS) 5 MG TAB    Take 1 tablet (5 mg total) by mouth 2 (two) times daily.    BD ULTRA-FINE SHORT PEN NEEDLE 31 GAUGE X 5/16" NDLE    USE 1 PEN NEEDLE UNDER THE SKIN TWICE A DAY    CICLOPIROX (PENLAC) 8 % SOLN    Apply to affected toenails at night time DAILY. On 7th day, file nails down, clean all nails with alcohol and restart application process.    CYANOCOBALAMIN (VITAMIN B-12) 100 MCG TABLET    Take by mouth.    DIPHENHYDRAMINE (BENADRYL) 50 MG CAPSULE    Begin [id number] the evening and morning before procedure with Prednisone and Pepcid    ERGOCALCIFEROL, VITAMIN D2, 400 UNIT TAB    Take by mouth.    ESOMEPRAZOLE (NEXIUM) 20 MG CAPSULE    TAKE 1 CAPSULE(20 MG) BY MOUTH BEFORE BREAKFAST    EYLEA 2 MG/0.05 ML SOLN    0.05 mLs (2 mg total) by Intravitreal route every 28 days. for 8 doses    FAMOTIDINE (PEPCID) 40 MG TABLET    Take [id number] the evening and morning before your procedure along with Benadryl and Prednisone    FERROUS SULFATE, DRIED (SLOW FE) 160 MG (50 MG IRON) TBSR    Take by mouth.    GABAPENTIN (NEURONTIN) 100 MG CAPSULE    TAKE 1 CAPSULE BY MOUTH THREE TIMES DAILY    IBUPROFEN (ADVIL,MOTRIN) 800 MG TABLET    As needed    INSULIN LISPRO PROTAMIN-LISPRO (HUMALOG MIX 75-25 KWIKPEN) 100 UNIT/ML (75-25) INPN    INJECT 30 " UNITS UNDER THE SKIN IN THE MORNING AND 20 UNITS IN THE EVENING    LOSARTAN-HYDROCHLOROTHIAZIDE 100-25 MG (HYZAAR) 100-25 MG PER TABLET    Take 1 tablet by mouth once daily.    METFORMIN (GLUCOPHAGE-XR) 500 MG XR 24HR TABLET    TAKE 2 TABLETS DAILY WITH BREAKFAST AND 1 TABLET WITH DINNER    MONTELUKAST (SINGULAIR) 10 MG TABLET    Take 1 tablet (10 mg total) by mouth nightly as needed.    NITROGLYCERIN (NITROSTAT) 0.4 MG SL TABLET    Place 1 tablet (0.4 mg total) under the tongue every 5 (five) minutes as needed for Chest pain.    POTASSIUM CHLORIDE (MICRO-K) 10 MEQ CPSR    Take 1 capsule (10 mEq total) by mouth once daily.    RANOLAZINE (RANEXA) 500 MG TB12    Take 1 tablet (500 mg total) by mouth 2 (two) times daily.    SIMVASTATIN (ZOCOR) 20 MG TABLET    Take 1 tablet (20 mg total) by mouth every evening.    TAMSULOSIN (FLOMAX) 0.4 MG CAP    TAKE 1 CAPSULE(0.4 MG) BY MOUTH EVERY DAY    TOBRAMYCIN SULFATE 0.3% (TOBREX) 0.3 % OPHTHALMIC SOLUTION    PLACE 1 GTT IN OU QID FOR 5 DAYS   Changed and/or Refilled Medications    Modified Medication Previous Medication    METOPROLOL TARTRATE (LOPRESSOR) 25 MG TABLET metoprolol tartrate (LOPRESSOR) 25 MG tablet       Take 1 tablet (25 mg total) by mouth 2 (two) times daily.    Take 1 tablet (25 mg total) by mouth 2 (two) times daily.       PSH     Past Surgical History:   Procedure Laterality Date    BREAST BIOPSY Right      SECTION, CLASSIC      COLONOSCOPY N/A 2015    Procedure: COLONOSCOPY;  Surgeon: Stanislav Pickard MD;  Location: [facility] ENDO;  Service: Endoscopy;  Laterality: N/A;    CYSTOSCOPY W/ URETERAL STENT PLACEMENT  2017    EXTRACORPOREAL SHOCK WAVE LITHOTRIPSY      LEFT HEART CATHETERIZATION Left 2019    Procedure: CATHETERIZATION, HEART, LEFT;  Surgeon: Haile Suggs MD;  Location: [facility] CATH LAB;  Service: Cardiology;  Laterality: Left;  10:30-11am start/poss rt radial approach    TOTAL ABDOMINAL HYSTERECTOMY      URETEROSCOPY   "05/2017        ALL  Review of patient's allergies indicates:   Allergen Reactions    Antihistamines - alkylamine Anaphylaxis and Itching     Cough, throat itches    Chocolate flavor Other (See Comments)     disoriented    Doxycycline Other (See Comments)     Stomach pain    Betadine [povidone-iodine] Itching    Iodine and iodide containing products Other (See Comments)     Tongue swelling    Tramadol Nausea Only    Yeast Itching     Facial swelling, constipation       SOC     Social History     Tobacco Use    Smoking status: Never Smoker    Smokeless tobacco: Never Used   Substance Use Topics    Alcohol use: No    Drug use: No         FAMILY HX    Family History   Problem Relation Age of Onset    Hypertension Mother     Heart disease Mother     Diabetes Mother     Colon polyps Mother     Other Mother         Lower limb amputation    Hypertension Brother     Stroke Brother     Colon cancer Maternal Grandmother     Diabetes Brother     Breast cancer Maternal Cousin             REVIEW OF SYSTEMS  General: This patient is well-developed, well-nourished and appears stated age, well-oriented to person, place and time, and cooperative and pleasant on today's visit   Constitutional: Negative for chills and fever.   Respiratory: Negative for shortness of breath.    Cardiovascular: Negative for chest pain, palpitations, orthopnea  Gastrointestinal: Negative for diarrhea, nausea and vomiting.   Musculoskeletal: Positive for above noted in HPI  Skin: positive for skin and nail changes   Neurological: positive for tingling and sensory changes  Peripheral Vascular: no claudication or cyanosis  Psychiatric/Behavioral: Negative for altered mental status     PHYSICAL EXAM:      Vitals:    11/24/20 0908   BP: 107/65   Pulse: 74   Weight: 113.9 kg (251 lb 1.7 oz)   Height: 5' 6" (1.676 m)         LOWER EXTREMITY PHYSICAL EXAM  VASCULAR  Dorsalis pedis and posterior tibial pulses palpable 2/4 bilaterally. Capillary " refill time immediate to the toes. Feet are warm to the touch. Skin temperature warm to warm from proximally to distally There are varicosities, telangiectasias noted to bilateral foot and ankle regions. There are no ecchymoses noted to bilateral foot and ankle regions. There is gross lower extremity edema.    DERMATOLOGIC  Skin moist with healthy texture and turgor.There are no open ulcerations, lacerations, or fissures to bilateral foot and ankle regions. There are no signs of infection as there is no erythema, no proximal-extending lymphangiitis, no fluctuance, or crepitus noted on palpation to bilateral foot and ankle regions. There is no interdigital maceration.   There are hyperkeratotic lesions noted to feet. Nails are mycotic b/l hallux but well-trimmed.    NEUROLOGIC  Epicritic sensation is intact as the patient is able to sense light touch to bilateral foot and ankle regions. Achilles and patellar deep tendon reflexes intact. Babinski reflex absent    ORTHOPEDIC/BIOMECHANICAL  TTP along dorsal exostosis b/l midfoot LEFT worse than RIGHT. TTP fifth metatarsal base RIGHT at insertion of peroneal brevis tendon.  Muscle strength AT/EHL/EDL/PT: 5/5; Achilles/Gastroc/Soleus: 5/5; PB/PL: 5/5 Muscle tone is normal. Ankle joint ROM non painful with DF/PF, non-crepitus; STJ ROM  Inv/ev non painful, non crepitus     IMAGING  ordered    ASSESSMENT     Encounter Diagnoses   Name Primary?    Arthritis of midfoot, Left foot Yes    Peroneal tendinitis of right lower extremity          PLAN  Patient was educated about clinical and imaging findings, and verbalizes understanding of above.     Diagnoses and all orders for this visit:  Arthritis of midfoot, Left foot    Peroneal tendinitis of right lower extremity      Discussed midfoot exostosis-- discussed surgical intervention last resort.  Discussed steroid injection- blood sugars uncontrolled- will reassess in January. In the interim--Shoe modification, topical pain  cream, pt can not take NSAIDs- RX compound pain cream prescribed. -Prescription for pain:Arthritis: diclofenac2%, amitriptyline 2%, lidocaine 2%, and prilocaine 2%.       Disclaimer:  This note may have been prepared using voice recognition software, it may have not been extensively proofed, as such there could be errors within the text such as sound alike errors.         Future Appointments   Date Time Provider Department Center   12/3/2020  2:30 PM LINDSAY Bright MD [facility] OPHTHAL [facility]   12/9/2020  4:00 PM Pratik Ryan MD [facility] CARDIO [facility]   12/14/2020  8:20 AM Gabbie Ronquillo MD [facility]   12/16/2020  3:50 PM COVID TESTING, [facility] ENT [facility] ENT [facility]   12/19/2020  8:30 PM SLEEP STUDY, [facility] SLEEPO [place]   12/31/2020 11:40 AM [facility] [id number] [facility] MAMMO [facility]   1/5/2021 10:15 AM Samantha Menjivar DPM [facility] POD [place]       Report Electronically Signed By:     Samantha Menjivar DPM   Podiatry  [facility]-   11/24/2020

## 2020-12-03 ENCOUNTER — OFFICE VISIT (OUTPATIENT)
Dept: PODIATRY | Facility: CLINIC | Age: 65
End: 2020-12-03
Payer: COMMERCIAL

## 2020-12-03 ENCOUNTER — PROCEDURE VISIT (OUTPATIENT)
Dept: OPHTHALMOLOGY | Facility: CLINIC | Age: 65
End: 2020-12-03
Payer: COMMERCIAL

## 2020-12-03 VITALS — BODY MASS INDEX: 40.36 KG/M2 | WEIGHT: 251.13 LBS | HEIGHT: 66 IN

## 2020-12-03 DIAGNOSIS — E11.3213 TYPE 2 DIABETES MELLITUS WITH BOTH EYES AFFECTED BY MILD NONPROLIFERATIVE RETINOPATHY AND MACULAR EDEMA, WITH LONG-TERM CURRENT USE OF INSULIN: Primary | ICD-10-CM

## 2020-12-03 DIAGNOSIS — Z79.4 TYPE 2 DIABETES MELLITUS WITH BOTH EYES AFFECTED BY MILD NONPROLIFERATIVE RETINOPATHY AND MACULAR EDEMA, WITH LONG-TERM CURRENT USE OF INSULIN: Primary | ICD-10-CM

## 2020-12-03 DIAGNOSIS — M76.71 PERONEAL TENDINITIS OF RIGHT LOWER EXTREMITY: ICD-10-CM

## 2020-12-03 DIAGNOSIS — M79.672 BILATERAL FOOT PAIN: ICD-10-CM

## 2020-12-03 DIAGNOSIS — M79.671 BILATERAL FOOT PAIN: ICD-10-CM

## 2020-12-03 DIAGNOSIS — M19.079 ARTHRITIS OF MIDFOOT: Primary | ICD-10-CM

## 2020-12-03 PROCEDURE — 20600 DRAIN/INJ JOINT/BURSA W/O US: CPT | Mod: S$GLB,,, | Performed by: PODIATRIST

## 2020-12-03 PROCEDURE — 99999 PR PBB SHADOW E&M-EST. PATIENT-LVL IV: CPT | Mod: PBBFAC,,, | Performed by: PODIATRIST

## 2020-12-03 PROCEDURE — 67028 INJECTION EYE DRUG: CPT | Mod: RT,S$GLB,, | Performed by: OPHTHALMOLOGY

## 2020-12-03 PROCEDURE — 3008F BODY MASS INDEX DOCD: CPT | Mod: CPTII,S$GLB,, | Performed by: PODIATRIST

## 2020-12-03 PROCEDURE — 67028 PR INJECT INTRAVITREAL PHARMCOLOGIC: ICD-10-PCS | Mod: RT,S$GLB,, | Performed by: OPHTHALMOLOGY

## 2020-12-03 PROCEDURE — 3008F PR BODY MASS INDEX (BMI) DOCUMENTED: ICD-10-PCS | Mod: CPTII,S$GLB,, | Performed by: PODIATRIST

## 2020-12-03 PROCEDURE — 99999 PR PBB SHADOW E&M-EST. PATIENT-LVL IV: ICD-10-PCS | Mod: PBBFAC,,, | Performed by: PODIATRIST

## 2020-12-03 PROCEDURE — 99214 PR OFFICE/OUTPT VISIT, EST, LEVL IV, 30-39 MIN: ICD-10-PCS | Mod: 25,S$GLB,, | Performed by: PODIATRIST

## 2020-12-03 PROCEDURE — 99499 NO LOS: ICD-10-PCS | Mod: S$GLB,,, | Performed by: OPHTHALMOLOGY

## 2020-12-03 PROCEDURE — 92134 CPTRZ OPH DX IMG PST SGM RTA: CPT | Mod: S$GLB,,, | Performed by: OPHTHALMOLOGY

## 2020-12-03 PROCEDURE — 99499 UNLISTED E&M SERVICE: CPT | Mod: S$GLB,,, | Performed by: OPHTHALMOLOGY

## 2020-12-03 PROCEDURE — 92134 POSTERIOR SEGMENT OCT RETINA (OCULAR COHERENCE TOMOGRAPHY)-BOTH EYES: ICD-10-PCS | Mod: S$GLB,,, | Performed by: OPHTHALMOLOGY

## 2020-12-03 PROCEDURE — 20600 PR DRAIN/INJECT SMALL JOINT/BURSA: ICD-10-PCS | Mod: S$GLB,,, | Performed by: PODIATRIST

## 2020-12-03 PROCEDURE — 99214 OFFICE O/P EST MOD 30 MIN: CPT | Mod: 25,S$GLB,, | Performed by: PODIATRIST

## 2020-12-03 NOTE — PROGRESS NOTES
===============================  Latosha Enriquez,  12/3/2020 today   65 y.o. female   Last visit [facility]: :10/14/2020   Last visit eye dept. 10/14/2020  [facility]:  Corrected distance visual acuity was 20/50 in the right eye and 20/20 in the left eye.   Not recorded         Not recorded         Not recorded         Not recorded        Chief Complaint   Patient presents with    dme     eylea od       ________________  12/3/2020 today  HPI     dme      Additional comments: eylea od              Comments     Her ins. Now allows us to use in stock eylea, no need to special order    (No Betadine - Vigamox Prep))  Prefers pledget, no bleb    DM  DME OD  EYLEA OD 10/14/20          Last edited by KILEY Aguirre on 12/3/2020  2:58 PM. (History)      Problem List Items Addressed This Visit        Eye/Vision problems    Type 2 diabetes mellitus with both eyes affected by mild nonproliferative retinopathy and macular edema, with long-term current use of insulin - Primary    Relevant Medications    aflibercept Soln 2 mg (Completed) (Start on 12/3/2020  4:00 PM)    Other Relevant Orders    Posterior Segment OCT Retina-Both eyes (Completed)    Prior authorization Order    Prior authorization Order        Os oct f good  od dme  Oct worse dme   off bp meds  ..    Injection Procedure Note:    12/3/2020  Diagnosis :  od dme   Today:   Eylea (afibercept) 2 mg/0.05 ml Intravitreal Injection , OD   Follow up: rtc  1mo  - do    .... add ozurdex      Instructed to call 24/7 for any worsening of vision. Check Both eyes daily. Gave patient my home phone number.  Risks, benefits, and alternatives to treatment discussed in detail with the patient.  The patient voiced understanding and wished to proceed with the procedure.     Patient Identified and Time Out complete  Subconjunctival bleb - xylocaine with epi 2%   and Betadine.  Inject at Eylea (afibercept) 2 mg/0.05 ml Intravitreal Injection , OD 6:00 @ 3.5-4mm posterior to limbus  1 stop: pt  prefer pledget and vigamox   Post Operative Dx: Same  Complications: None  Follow up as above.    .      ===========================

## 2020-12-03 NOTE — PROGRESS NOTES
PODIATRIC MEDICINE AND SURGERY  12/9/2020    PCP: Dr. Gabbie Ronquillo MD    CHIEF COMPLAINT   Chief Complaint   Patient presents with    Follow-up     F/U BL Foot Pain 10/10       HPI:    Latosha Enriquez is a 65 y.o. female who has a past medical history of Asthma, Colon polyps, Diabetes mellitus type II, uncontrolled, Diabetic retinopathy, Diverticulosis of large intestine without hemorrhage (11/23/2015), Elevated liver enzymes, GERD (gastroesophageal reflux disease), Gout, unspecified, Hemorrhoids, internal (11/23/2015), History of blood transfusion, Hyperlipidemia, Hypertension, IBS (irritable bowel syndrome), Morbid obesity with BMI of 40.0-44.9, adult, Nasal vestibulitis, Osteoarthritis of multiple joints, Urolithiasis, and Vitamin D deficiency disease.     Latosha presents to clinic today complaining of multiple pedal complaints.  Patient states that she has bilateral foot pain on the top of both feet.  Symptoms have been present for greater than 15 years and is worsening in severity.  She does have history of gout.  She does have diagnose the multiple degenerative joint disease.  Symptoms are throbbing aching and worse with ambulation. She rates pain 10/10 on pain scale.  She states she works 2 jobs stands on her feet for long duration.  She also complains about fungal toenail.  She has not per any treatment..    12/3/20  Pt is here today for f/u foot pain. Pt states topical pain cream did not help. She states pain is along midfoot of LEFT and along fifth metatarsal base on RIGHT foot. Symptoms are intermittent and worse with ambulation. She does take tylenol arthritis for pain. She rates average pain 3/10.      Patient denies other pedal complaints at this time.     Hemoglobin A1C   Date Value Ref Range Status   11/29/2019 6.6 (H) 4.0 - 5.6 % Final     Comment:     ADA Screening Guidelines:  5.7-6.4%  Consistent with prediabetes  >or=6.5%  Consistent with diabetes  High levels of fetal hemoglobin  interfere with the HbA1C  assay. Heterozygous hemoglobin variants (HbS, HgC, etc)do  not significantly interfere with this assay.   However, presence of multiple variants may affect accuracy.     08/24/2018 6.4 (H) 4.0 - 5.6 % Final     Comment:     ADA Screening Guidelines:  5.7-6.4%  Consistent with prediabetes  >or=6.5%  Consistent with diabetes  High levels of fetal hemoglobin interfere with the HbA1C  assay. Heterozygous hemoglobin variants (HbS, HgC, etc)do  not significantly interfere with this assay.   However, presence of multiple variants may affect accuracy.     04/03/2018 6.6 (H) 4.0 - 5.6 % Final     Comment:     According to ADA guidelines, hemoglobin A1c <7.0% represents  optimal control in non-pregnant diabetic patients. Different  metrics may apply to specific patient populations.   Standards of Medical Care in Diabetes-2016.  For the purpose of screening for the presence of diabetes:  <5.7%     Consistent with the absence of diabetes  5.7-6.4%  Consistent with increasing risk for diabetes   (prediabetes)  >or=6.5%  Consistent with diabetes  Currently, no consensus exists for use of hemoglobin A1c  for diagnosis of diabetes for children.  This Hemoglobin A1c assay has significant interference with fetal   hemoglobin   (HbF). The results are invalid for patients with abnormal amounts of   HbF,   including those with known Hereditary Persistence   of Fetal Hemoglobin. Heterozygous hemoglobin variants (HbAS, HbAC,   HbAD, HbAE, HbA2) do not significantly interfere with this assay;   however, presence of multiple variants in a sample may impact the %   interference.         PMH  Past Medical History:   Diagnosis Date    Asthma     Colon polyps     Diabetes mellitus type II, uncontrolled     Diabetic retinopathy     Diverticulosis of large intestine without hemorrhage 11/23/2015    Elevated liver enzymes     GERD (gastroesophageal reflux disease)     Gout, unspecified     Hemorrhoids, internal  "11/23/2015    History of blood transfusion     Hyperlipidemia     Hypertension     IBS (irritable bowel syndrome)     Morbid obesity with BMI of 40.0-44.9, adult     Nasal vestibulitis     Osteoarthritis of multiple joints     Urolithiasis     Vitamin D deficiency disease        PROBLEM LIST  Patient Active Problem List    Diagnosis Date Noted    KATHERINE (obstructive sleep apnea) 09/22/2020    Dryness, eye 07/23/2020    Type 2 diabetes mellitus without complication, with long-term current use of insulin 07/13/2020    Amaurosis fugax 05/21/2020    Nevus of choroid of right eye 05/21/2020    PAF (paroxysmal atrial fibrillation) 05/07/2020    CANALES (dyspnea on exertion) 02/18/2019    Palpitations 02/11/2019    AP (angina pectoris) 02/10/2019    Kidney stones, calcium oxalate 05/11/2017    Type 2 diabetes mellitus with both eyes affected by mild nonproliferative retinopathy and macular edema, with long-term current use of insulin 11/09/2016    Essential hypertension     Morbid obesity with BMI of 40.0-44.9, adult     Vitamin D deficiency disease     Primary osteoarthritis of both knees     GERD (gastroesophageal reflux disease)     Hyperlipidemia     Hemorrhoids, internal 11/23/2015       MEDS  Current Outpatient Medications on File Prior to Visit   Medication Sig Dispense Refill    albuterol (PROVENTIL/VENTOLIN HFA) 90 mcg/actuation inhaler Inhale 2 puffs into the lungs every 6 (six) hours as needed. 18 g 3    allopurinol (ZYLOPRIM) 100 MG tablet Take 1 tablet (100 mg total) by mouth once daily. (Patient taking differently: Take 100 mg by mouth as needed. ) 90 tablet 0    apixaban (ELIQUIS) 5 mg Tab Take 1 tablet (5 mg total) by mouth 2 (two) times daily. 180 tablet 1    BD ULTRA-FINE SHORT PEN NEEDLE 31 gauge x 5/16" Ndle USE 1 PEN NEEDLE UNDER THE SKIN TWICE A DAY 90 each 3    ciclopirox (PENLAC) 8 % Soln Apply to affected toenails at night time DAILY. On 7th day, file nails down, clean all " nails with alcohol and restart application process. 1 Bottle 10    cyanocobalamin (VITAMIN B-12) 100 MCG tablet Take by mouth.      diphenhydrAMINE (BENADRYL) 50 MG capsule Begin [id number] the evening and morning before procedure with Prednisone and Pepcid 3 capsule 0    ergocalciferol, vitamin D2, 400 unit Tab Take by mouth.      esomeprazole (NEXIUM) 20 MG capsule TAKE 1 CAPSULE(20 MG) BY MOUTH BEFORE BREAKFAST 30 capsule 0    EYLEA 2 mg/0.05 mL Soln 0.05 mLs (2 mg total) by Intravitreal route every 28 days. for 8 doses 1 vial 3    famotidine (PEPCID) 40 MG tablet Take [id number] the evening and morning before your procedure along with Benadryl and Prednisone 3 tablet 0    ferrous sulfate, dried (SLOW FE) 160 mg (50 mg iron) TbSR Take by mouth.      gabapentin (NEURONTIN) 100 MG capsule TAKE 1 CAPSULE BY MOUTH THREE TIMES DAILY 90 capsule 1    ibuprofen (ADVIL,MOTRIN) 800 MG tablet As needed      insulin lispro protamin-lispro (HUMALOG MIX 75-25 KWIKPEN) 100 unit/mL (75-25) InPn INJECT 30 UNITS UNDER THE SKIN IN THE MORNING AND 20 UNITS IN THE EVENING 45 mL 1    losartan-hydrochlorothiazide 100-25 mg (HYZAAR) 100-25 mg per tablet Take 1 tablet by mouth once daily. 90 tablet 1    metFORMIN (GLUCOPHAGE-XR) 500 MG XR 24hr tablet TAKE 2 TABLETS DAILY WITH BREAKFAST AND 1 TABLET WITH DINNER 270 tablet 1    metoprolol tartrate (LOPRESSOR) 25 MG tablet Take 1 tablet (25 mg total) by mouth 2 (two) times daily. 90 tablet 1    montelukast (SINGULAIR) 10 mg tablet Take 1 tablet (10 mg total) by mouth nightly as needed. 90 tablet 0    nitroGLYCERIN (NITROSTAT) 0.4 MG SL tablet Place 1 tablet (0.4 mg total) under the tongue every 5 (five) minutes as needed for Chest pain. 20 tablet 1    potassium chloride (MICRO-K) 10 MEQ CpSR Take 1 capsule (10 mEq total) by mouth once daily. 90 capsule 1    ranolazine (RANEXA) 500 MG Tb12 Take 1 tablet (500 mg total) by mouth 2 (two) times daily. 180 tablet 1     "simvastatin (ZOCOR) 20 MG tablet Take 1 tablet (20 mg total) by mouth every evening. 90 tablet 1    tamsulosin (FLOMAX) 0.4 mg Cap TAKE 1 CAPSULE(0.4 MG) BY MOUTH EVERY DAY 30 capsule 2    tobramycin sulfate 0.3% (TOBREX) 0.3 % ophthalmic solution PLACE 1 GTT IN OU QID FOR 5 DAYS  0     No current facility-administered medications on file prior to visit.        Medication List with Changes/Refills   Current Medications    ALBUTEROL (PROVENTIL/VENTOLIN HFA) 90 MCG/ACTUATION INHALER    Inhale 2 puffs into the lungs every 6 (six) hours as needed.    ALLOPURINOL (ZYLOPRIM) 100 MG TABLET    Take 1 tablet (100 mg total) by mouth once daily.    APIXABAN (ELIQUIS) 5 MG TAB    Take 1 tablet (5 mg total) by mouth 2 (two) times daily.    BD ULTRA-FINE SHORT PEN NEEDLE 31 GAUGE X 5/16" NDLE    USE 1 PEN NEEDLE UNDER THE SKIN TWICE A DAY    CICLOPIROX (PENLAC) 8 % SOLN    Apply to affected toenails at night time DAILY. On 7th day, file nails down, clean all nails with alcohol and restart application process.    CYANOCOBALAMIN (VITAMIN B-12) 100 MCG TABLET    Take by mouth.    DIPHENHYDRAMINE (BENADRYL) 50 MG CAPSULE    Begin [id number] the evening and morning before procedure with Prednisone and Pepcid    ERGOCALCIFEROL, VITAMIN D2, 400 UNIT TAB    Take by mouth.    ESOMEPRAZOLE (NEXIUM) 20 MG CAPSULE    TAKE 1 CAPSULE(20 MG) BY MOUTH BEFORE BREAKFAST    EYLEA 2 MG/0.05 ML SOLN    0.05 mLs (2 mg total) by Intravitreal route every 28 days. for 8 doses    FAMOTIDINE (PEPCID) 40 MG TABLET    Take [id number] the evening and morning before your procedure along with Benadryl and Prednisone    FERROUS SULFATE, DRIED (SLOW FE) 160 MG (50 MG IRON) TBSR    Take by mouth.    GABAPENTIN (NEURONTIN) 100 MG CAPSULE    TAKE 1 CAPSULE BY MOUTH THREE TIMES DAILY    IBUPROFEN (ADVIL,MOTRIN) 800 MG TABLET    As needed    INSULIN LISPRO PROTAMIN-LISPRO (HUMALOG MIX 75-25 KWIKPEN) 100 UNIT/ML (75-25) INPN    INJECT 30 UNITS UNDER THE SKIN IN " THE MORNING AND 20 UNITS IN THE EVENING    LOSARTAN-HYDROCHLOROTHIAZIDE 100-25 MG (HYZAAR) 100-25 MG PER TABLET    Take 1 tablet by mouth once daily.    METFORMIN (GLUCOPHAGE-XR) 500 MG XR 24HR TABLET    TAKE 2 TABLETS DAILY WITH BREAKFAST AND 1 TABLET WITH DINNER    METOPROLOL TARTRATE (LOPRESSOR) 25 MG TABLET    Take 1 tablet (25 mg total) by mouth 2 (two) times daily.    MONTELUKAST (SINGULAIR) 10 MG TABLET    Take 1 tablet (10 mg total) by mouth nightly as needed.    NITROGLYCERIN (NITROSTAT) 0.4 MG SL TABLET    Place 1 tablet (0.4 mg total) under the tongue every 5 (five) minutes as needed for Chest pain.    POTASSIUM CHLORIDE (MICRO-K) 10 MEQ CPSR    Take 1 capsule (10 mEq total) by mouth once daily.    RANOLAZINE (RANEXA) 500 MG TB12    Take 1 tablet (500 mg total) by mouth 2 (two) times daily.    SIMVASTATIN (ZOCOR) 20 MG TABLET    Take 1 tablet (20 mg total) by mouth every evening.    TAMSULOSIN (FLOMAX) 0.4 MG CAP    TAKE 1 CAPSULE(0.4 MG) BY MOUTH EVERY DAY    TOBRAMYCIN SULFATE 0.3% (TOBREX) 0.3 % OPHTHALMIC SOLUTION    PLACE 1 GTT IN OU QID FOR 5 DAYS       PSH     Past Surgical History:   Procedure Laterality Date    BREAST BIOPSY Right      SECTION, CLASSIC      COLONOSCOPY N/A 2015    Procedure: COLONOSCOPY;  Surgeon: Stanislav Pickard MD;  Location: [facility] ENDO;  Service: Endoscopy;  Laterality: N/A;    CYSTOSCOPY W/ URETERAL STENT PLACEMENT  2017    EXTRACORPOREAL SHOCK WAVE LITHOTRIPSY      LEFT HEART CATHETERIZATION Left 2019    Procedure: CATHETERIZATION, HEART, LEFT;  Surgeon: Haile Suggs MD;  Location: [facility] CATH LAB;  Service: Cardiology;  Laterality: Left;  10:30-11am start/poss rt radial approach    TOTAL ABDOMINAL HYSTERECTOMY      URETEROSCOPY  2017        ALL  Review of patient's allergies indicates:   Allergen Reactions    Antihistamines - alkylamine Anaphylaxis and Itching     Cough, throat itches    Chocolate flavor Other (See Comments)     disoriented  "   Doxycycline Other (See Comments)     Stomach pain    Betadine [povidone-iodine] Itching    Iodine and iodide containing products Other (See Comments)     Tongue swelling    Tramadol Nausea Only    Yeast Itching     Facial swelling, constipation       SOC     Social History     Tobacco Use    Smoking status: Never Smoker    Smokeless tobacco: Never Used   Substance Use Topics    Alcohol use: No    Drug use: No         FAMILY HX    Family History   Problem Relation Age of Onset    Hypertension Mother     Heart disease Mother     Diabetes Mother     Colon polyps Mother     Other Mother         Lower limb amputation    Hypertension Brother     Stroke Brother     Colon cancer Maternal Grandmother     Diabetes Brother     Breast cancer Maternal Cousin             REVIEW OF SYSTEMS  General: This patient is well-developed, well-nourished and appears stated age, well-oriented to person, place and time, and cooperative and pleasant on today's visit   Constitutional: Negative for chills and fever.   Respiratory: Negative for shortness of breath.    Cardiovascular: Negative for chest pain, palpitations, orthopnea  Gastrointestinal: Negative for diarrhea, nausea and vomiting.   Musculoskeletal: Positive for above noted in HPI  Skin: positive for skin and nail changes   Neurological: positive for tingling and sensory changes  Peripheral Vascular: no claudication or cyanosis  Psychiatric/Behavioral: Negative for altered mental status     PHYSICAL EXAM:      Vitals:    12/03/20 1610   Weight: 113.9 kg (251 lb 1.7 oz)   Height: 5' 6" (1.676 m)         LOWER EXTREMITY PHYSICAL EXAM  VASCULAR  Dorsalis pedis and posterior tibial pulses palpable 2/4 bilaterally. Capillary refill time immediate to the toes. Feet are warm to the touch. Skin temperature warm to warm from proximally to distally There are varicosities, telangiectasias noted to bilateral foot and ankle regions. There are no ecchymoses noted to " bilateral foot and ankle regions. There is gross lower extremity edema.    DERMATOLOGIC  Skin moist with healthy texture and turgor.There are no open ulcerations, lacerations, or fissures to bilateral foot and ankle regions. There are no signs of infection as there is no erythema, no proximal-extending lymphangiitis, no fluctuance, or crepitus noted on palpation to bilateral foot and ankle regions. There is no interdigital maceration.   There are hyperkeratotic lesions noted to feet. Nails are mycotic b/l hallux but well-trimmed.    NEUROLOGIC  Epicritic sensation is intact as the patient is able to sense light touch to bilateral foot and ankle regions. Achilles and patellar deep tendon reflexes intact. Babinski reflex absent    ORTHOPEDIC/BIOMECHANICAL  TTP along dorsal exostosis b/l midfoot LEFT worse than RIGHT. TTP fifth metatarsal base RIGHT at insertion of peroneal brevis tendon.  Muscle strength AT/EHL/EDL/PT: 5/5; Achilles/Gastroc/Soleus: 5/5; PB/PL: 5/5 Muscle tone is normal. Ankle joint ROM non painful with DF/PF, non-crepitus; STJ ROM  Inv/ev non painful, non crepitus     IMAGING  ordered    ASSESSMENT     Encounter Diagnoses   Name Primary?    Arthritis of midfoot, Left foot Yes    Peroneal tendinitis of right lower extremity     Bilateral foot pain          PLAN  Patient was educated about clinical and imaging findings, and verbalizes understanding of above.     Diagnoses and all orders for this visit:  Arthritis of midfoot, Left foot    Peroneal tendinitis of right lower extremity    Bilateral foot pain    Other orders  -     triamcinolone acetonide injection 20 mg      Discussed midfoot exostosis-- discussed surgical intervention last resort.  Discussed steroid injection- blood sugars uncontrolled- will reassess in January. In the interim--Shoe modification, topical pain cream, pt can not take NSAIDs- RX compound pain cream prescribed.  Discussed COMPLIANCE with orthotics and shoes. Discussed blood  sugar control.      A discussion of the risks, benefits, and alternatives of the corticosteroid injection LEFT  occurred.  All questions were answered.  After gaining oral consent and verifying the injection site, the skin was prepped with betadine, then alcohol swabs.  Under aseptic conditions, the left foot at dorsal spur around tendon sheath/bursa was injected with a 1 mL 1:1 mixture of 0.5% Marcaine and kenalog 40. The area was cleansed and then covered with a band-aid . The patient tolerated the injection well and no apparent adverse reactions observed.    A discussion regarding the time course of the medications was undertaken and questions were answered.  Patient counseled to look for signs of infection and if any report to Emergency Department.       Disclaimer:  This note may have been prepared using voice recognition software, it may have not been extensively proofed, as such there could be errors within the text such as sound alike errors.         Future Appointments   Date Time Provider Department Center   12/14/2020  8:20 AM Gabbie Ronquillo MD [facility]   12/16/2020  3:50 PM COVID TESTING, [facility] ENT [facility] ENT [facility] [place]   12/19/2020  8:30 [facility]   12/31/2020 11:40 AM [facility] [id number] [facility] MAMMO [place]   1/8/2021  2:15 PM LINDSAY Bright MD [facility] OPHTHAL [place]   1/21/2021  2:45 PM Samantha Menjivar DPM [facility] POD [place]       Report Electronically Signed By:     Samantha Menjivar DPM   Podiatry  [facility]-   12/9/2020

## 2020-12-09 ENCOUNTER — PATIENT OUTREACH (OUTPATIENT)
Dept: ADMINISTRATIVE | Facility: OTHER | Age: 65
End: 2020-12-09

## 2020-12-09 ENCOUNTER — OFFICE VISIT (OUTPATIENT)
Dept: CARDIOLOGY | Facility: CLINIC | Age: 65
End: 2020-12-09
Payer: COMMERCIAL

## 2020-12-09 DIAGNOSIS — I48.0 PAF (PAROXYSMAL ATRIAL FIBRILLATION): ICD-10-CM

## 2020-12-09 DIAGNOSIS — R00.2 PALPITATIONS: ICD-10-CM

## 2020-12-09 DIAGNOSIS — E11.9 TYPE 2 DIABETES MELLITUS WITHOUT COMPLICATION, WITH LONG-TERM CURRENT USE OF INSULIN: Chronic | ICD-10-CM

## 2020-12-09 DIAGNOSIS — Z79.4 TYPE 2 DIABETES MELLITUS WITHOUT COMPLICATION, WITH LONG-TERM CURRENT USE OF INSULIN: Chronic | ICD-10-CM

## 2020-12-09 DIAGNOSIS — G47.33 OSA (OBSTRUCTIVE SLEEP APNEA): ICD-10-CM

## 2020-12-09 DIAGNOSIS — E78.49 OTHER HYPERLIPIDEMIA: Chronic | ICD-10-CM

## 2020-12-09 DIAGNOSIS — I10 ESSENTIAL HYPERTENSION: Primary | Chronic | ICD-10-CM

## 2020-12-09 DIAGNOSIS — E66.01 MORBID OBESITY WITH BMI OF 40.0-44.9, ADULT: ICD-10-CM

## 2020-12-09 PROCEDURE — 99214 OFFICE O/P EST MOD 30 MIN: CPT | Mod: 95,,, | Performed by: INTERNAL MEDICINE

## 2020-12-09 PROCEDURE — 99214 PR OFFICE/OUTPT VISIT, EST, LEVL IV, 30-39 MIN: ICD-10-PCS | Mod: 95,,, | Performed by: INTERNAL MEDICINE

## 2020-12-09 RX ORDER — TRIAMCINOLONE ACETONIDE 40 MG/ML
20 INJECTION, SUSPENSION INTRA-ARTICULAR; INTRAMUSCULAR ONCE
Status: COMPLETED | OUTPATIENT
Start: 2020-12-09 | End: 2020-12-09

## 2020-12-09 RX ADMIN — TRIAMCINOLONE ACETONIDE 20 MG: 40 INJECTION, SUSPENSION INTRA-ARTICULAR; INTRAMUSCULAR at 04:12

## 2020-12-09 NOTE — PROGRESS NOTES
The patient location is: home  The chief complaint leading to consultation is: CV follow up    Visit type: audiovisual    Face to Face time with patient: 15 min  25 minutes of total time spent on the encounter, which includes face to face time and non-face to face time preparing to see the patient (eg, review of tests), Obtaining and/or reviewing separately obtained history, Documenting clinical information in the electronic or other health record, Independently interpreting results (not separately reported) and communicating results to the patient/family/caregiver, or Care coordination (not separately reported).         Each patient to whom he or she provides medical services by telemedicine is:  (1) informed of the relationship between the physician and patient and the respective role of any other health care provider with respect to management of the patient; and (2) notified that he or she may decline to receive medical services by telemedicine and may withdraw from such care at any time.    Notes:        Subjective:   Patient ID:  Latosha Enriquez is a 65 y.o. female who presents for cardiac consult of Follow-up      Follow-up  Associated symptoms include chest pain.     The patient came in today for cardiac consult of Follow-up    Latosha Enriquez is a 65 y.o. female PAF on Eliquis, KATHERINE, HTN, HLD, DM2, obesity presents for CV Follow up.     3/6/19 Visit - Karly Garrett  Ms. Enriquez is a 63 year old female patient whose current medical conditions include DM, HTN, and hyperlipidemia who presents today for hospital follow-up. Patient recently underwent LHC by Dr. Suggs on 2/20/19 for further evaluation of chest pain symptoms and CANALES. LHC showed widely patent coronaries, medical mgmt/risk factor modification recommended. Patient returns today and states she is doing ok. Recently diagnosed with URI, on steroids and also having issues with kidney stones. Cardiac wise, seems stable. No complaints. No chest pain or SOB. No  palpitations, near syncope, or syncope. BP stable. No radial access site complaints. Patient is compliant with her medications, would like to see how she feels off of Ranexa.     4/29/20 Sunday night she was drinking a wild raspberry crystal light with caffeine and took her home meds - metformin, gapapentin, singular and then she started having chest pain. She had angina in the past and has been on losartan. She has been getting tired lately and more CANALES. She took her mother to a cardiologist recently and had ECG there with possible Afib. She took Ranexa. Her mother has Afib as well. Prior cath and ECHO in 2019 neg.     5/7/20  She was started on Eliquis after ER visit, ECG there negative but prior ECG revealed Afib per pt. She has been going through stress with her mother who had a fall and daugher in hospital as well. She continues to take Ranexa BID. Will need increase BB dose    6/18/20  ECHO after last visit with normal Bi V function. Holter neg. Bp well controlled, no dizziness. Chset pain resolved with Ranexa, occ fluttering.Breathing is stable. She had issues when right eye would go blind at times more in surface she went to optho could not see any clots.     9/22/20  Carotid u/s in July with min plaque.  Increased BB to BID last visit. Pt had sleep study diagnosed with severe KATHERINE with AHI 37.9/hr. She will have inhouse sleep study. She had recent opthal visit and had addition eye drops/injections, now improved. She has occ chest tightness, worse with stress, her sister in law had breast CA and passed away. BP has been stable. Pulse has been stable. She has occ choking sensation with eating feels like asthma attack. SHe does not have KATHERINE sx. She has stopped Eliquis due to renal stones with bleeding, had prior ureter stent and had severe bleeding had could not walk until it was removed.     12/9/20  She had left foot injection on Friday with podiatry. She has bone spurs and had some cortisone injections  which helped. BP has been stable, she is taking BP meds. No CP/SOB. Sometimes feels chest heaviness. She has been lifting heavy bag/shoulder. She has been doing ok with Eliquis. Sugars have been elevated due to steroid shots. She weighs 255 lbs now will try to get below 235 lbs.     Patient feels  no leg swelling, no PND, no dizziness, no syncope, no CNS symptoms.    Patient has fairly good exercise tolerance.    Patient is compliant with medications.    7/2020 Carotid u/s  · There is 0-19% right Internal Carotid Stenosis.  · There is 0-19% left Internal Carotid Stenosis.    Sleep study  2 night study  SEVERE OBSTRUCTIVE SLEEP APNEA with overall AHI 37.9/hr ( 149 events): night #1  Oxygen desaturation: 79%. SpO2 between 70% to 79% for < 1 min.  Patient snored 92% time above 50 .  Heart rate range: 60 bpm - 126 bpm  REC's:  Consider inlab CPAP titration.  Therapy with APAP at 4-20 cm WP using mask of choice with heated humidification is an option.    5/19/20 HOLTER  Predominant Rhythm  Sinus rhythm with heart rates varying between 53 and 133 bpm with an average of 86 bpm.   PVC    Ventricular Arrhythmias  There were very rare PVCs totalling 62 and averaging 0.65 per hour.   VT    Ventricular ectopic activity consisted of 62 beats, of which, 62 were in single PVCs.         2/20/19 LHC  - Left Main Coronary Artery:             The LM is normal. There is RAÚL 3 flow.     - Left Anterior Descending Artery:             The LAD is normal. There is RAÚL 3 flow.     - D1:             The D1 is normal. There is RAÚL 3 flow.     - D2:             The D2 is normal. There is RAÚL 3 flow.     - Left Circumflex Artery:             The LCX is normal. There is RAÚL 3 flow.     - OM1:             The OM1 is normal. There is RAÚL 3 flow.     - OM2:             The OM2 is normal. There is RAÚL 3 flow.     - Ramus:             The ramus is normal. There is RAÚL 3 flow.     - Right Coronary Artery:             The RCA has luminal  irregularities. There is RAÚL 3 flow. Rucker's crook anatomy.       Results for orders placed during the hospital encounter of 20   Echo Color Flow Doppler? Yes    Narrative · Concentric left ventricular remodeling.  · Normal left ventricular systolic function. The estimated ejection   fraction is 60%.  · Normal LV diastolic function.  · No wall motion abnormalities.  · Normal right ventricular systolic function.  · Normal central venous pressure (3 mmHg).  · The estimated PA systolic pressure is 30 mmHg.              Past Medical History:   Diagnosis Date    Asthma     Colon polyps     Diabetes mellitus type II, uncontrolled     Diabetic retinopathy     Diverticulosis of large intestine without hemorrhage 2015    Elevated liver enzymes     GERD (gastroesophageal reflux disease)     Gout, unspecified     Hemorrhoids, internal 2015    History of blood transfusion     Hyperlipidemia     Hypertension     IBS (irritable bowel syndrome)     Morbid obesity with BMI of 40.0-44.9, adult     Nasal vestibulitis     Osteoarthritis of multiple joints     Urolithiasis     Vitamin D deficiency disease        Past Surgical History:   Procedure Laterality Date    BREAST BIOPSY Right      SECTION, CLASSIC      COLONOSCOPY N/A 2015    Procedure: COLONOSCOPY;  Surgeon: Stanislav Pickard MD;  Location: [facility] ENDO;  Service: Endoscopy;  Laterality: N/A;    CYSTOSCOPY W/ URETERAL STENT PLACEMENT  2017    EXTRACORPOREAL SHOCK WAVE LITHOTRIPSY      LEFT HEART CATHETERIZATION Left 2019    Procedure: CATHETERIZATION, HEART, LEFT;  Surgeon: Haile Suggs MD;  Location: [facility] CATH LAB;  Service: Cardiology;  Laterality: Left;  10:30-11am start/poss rt radial approach    TOTAL ABDOMINAL HYSTERECTOMY      URETEROSCOPY  2017       Social History     Tobacco Use    Smoking status: Never Smoker    Smokeless tobacco: Never Used   Substance Use Topics    Alcohol use: No    Drug  "use: No       Family History   Problem Relation Age of Onset    Hypertension Mother     Heart disease Mother     Diabetes Mother     Colon polyps Mother     Other Mother         Lower limb amputation    Hypertension Brother     Stroke Brother     Colon cancer Maternal Grandmother     Diabetes Brother     Breast cancer Maternal Cousin        Patient's Medications   New Prescriptions    No medications on file   Previous Medications    ALBUTEROL (PROVENTIL/VENTOLIN HFA) 90 MCG/ACTUATION INHALER    Inhale 2 puffs into the lungs every 6 (six) hours as needed.    ALLOPURINOL (ZYLOPRIM) 100 MG TABLET    Take 1 tablet (100 mg total) by mouth once daily.    BD ULTRA-FINE SHORT PEN NEEDLE 31 GAUGE X 5/16" NDLE    USE 1 PEN NEEDLE UNDER THE SKIN TWICE A DAY    CICLOPIROX (PENLAC) 8 % SOLN    Apply to affected toenails at night time DAILY. On 7th day, file nails down, clean all nails with alcohol and restart application process.    CYANOCOBALAMIN (VITAMIN B-12) 100 MCG TABLET    Take by mouth.    DIPHENHYDRAMINE (BENADRYL) 50 MG CAPSULE    Begin [id number] the evening and morning before procedure with Prednisone and Pepcid    ERGOCALCIFEROL, VITAMIN D2, 400 UNIT TAB    Take by mouth.    ESOMEPRAZOLE (NEXIUM) 20 MG CAPSULE    TAKE 1 CAPSULE(20 MG) BY MOUTH BEFORE BREAKFAST    EYLEA 2 MG/0.05 ML SOLN    0.05 mLs (2 mg total) by Intravitreal route every 28 days. for 8 doses    FAMOTIDINE (PEPCID) 40 MG TABLET    Take [id number] the evening and morning before your procedure along with Benadryl and Prednisone    FERROUS SULFATE, DRIED (SLOW FE) 160 MG (50 MG IRON) TBSR    Take by mouth.    GABAPENTIN (NEURONTIN) 100 MG CAPSULE    TAKE 1 CAPSULE BY MOUTH THREE TIMES DAILY    IBUPROFEN (ADVIL,MOTRIN) 800 MG TABLET    As needed    INSULIN LISPRO PROTAMIN-LISPRO (HUMALOG MIX 75-25 KWIKPEN) 100 UNIT/ML (75-25) INPN    INJECT 30 UNITS UNDER THE SKIN IN THE MORNING AND 20 UNITS IN THE EVENING    LOSARTAN-HYDROCHLOROTHIAZIDE " 100-25 MG (HYZAAR) 100-25 MG PER TABLET    Take 1 tablet by mouth once daily.    METFORMIN (GLUCOPHAGE-XR) 500 MG XR 24HR TABLET    TAKE 2 TABLETS DAILY WITH BREAKFAST AND 1 TABLET WITH DINNER    METOPROLOL TARTRATE (LOPRESSOR) 25 MG TABLET    Take 1 tablet (25 mg total) by mouth 2 (two) times daily.    MONTELUKAST (SINGULAIR) 10 MG TABLET    Take 1 tablet (10 mg total) by mouth nightly as needed.    NITROGLYCERIN (NITROSTAT) 0.4 MG SL TABLET    Place 1 tablet (0.4 mg total) under the tongue every 5 (five) minutes as needed for Chest pain.    POTASSIUM CHLORIDE (MICRO-K) 10 MEQ CPSR    Take 1 capsule (10 mEq total) by mouth once daily.    RANOLAZINE (RANEXA) 500 MG TB12    Take 1 tablet (500 mg total) by mouth 2 (two) times daily.    SIMVASTATIN (ZOCOR) 20 MG TABLET    Take 1 tablet (20 mg total) by mouth every evening.    TAMSULOSIN (FLOMAX) 0.4 MG CAP    TAKE 1 CAPSULE(0.4 MG) BY MOUTH EVERY DAY    TOBRAMYCIN SULFATE 0.3% (TOBREX) 0.3 % OPHTHALMIC SOLUTION    PLACE 1 GTT IN OU QID FOR 5 DAYS   Modified Medications    Modified Medication Previous Medication    APIXABAN (ELIQUIS) 5 MG TAB apixaban (ELIQUIS) 5 mg Tab       Take 1 tablet (5 mg total) by mouth 2 (two) times daily.    Take 1 tablet (5 mg total) by mouth 2 (two) times daily.   Discontinued Medications    No medications on file       Review of Systems   Constitutional: Positive for malaise/fatigue.   HENT: Negative.    Eyes: Negative.    Respiratory: Positive for shortness of breath.    Cardiovascular: Positive for chest pain. Negative for palpitations.   Gastrointestinal: Negative.    Genitourinary: Negative.    Musculoskeletal: Negative.    Skin: Negative.    Neurological: Negative.    Endo/Heme/Allergies: Negative.    Psychiatric/Behavioral: Negative.    All 12 systems otherwise negative.      Wt Readings from Last 3 Encounters:   12/03/20 113.9 kg (251 lb 1.7 oz)   11/24/20 113.9 kg (251 lb 1.7 oz)   11/11/20 113.9 kg (251 lb 1.7 oz)     Temp Readings  from Last 3 Encounters:   11/11/20 97.7 °F (36.5 °C) (Temporal)   07/28/20 97.8 °F (36.6 °C) (Temporal)   07/13/20 98.8 °F (37.1 °C) (Oral)     BP Readings from Last 3 Encounters:   11/24/20 107/65   11/11/20 116/70   09/22/20 120/64     Pulse Readings from Last 3 Encounters:   11/24/20 74   11/11/20 87   09/22/20 79       There were no vitals taken for this visit.    Objective:   Physical Exam   Constitutional: She is oriented to person, place, and time. She appears well-developed and well-nourished. No distress.   HENT:   Head: Normocephalic and atraumatic.   Mouth/Throat: No oropharyngeal exudate.   Eyes: Right eye exhibits no discharge. Left eye exhibits no discharge. No scleral icterus.   Pulmonary/Chest: Effort normal. No respiratory distress.   Neurological: She is alert and oriented to person, place, and time.   Skin: No rash noted. She is not diaphoretic. No erythema. No pallor.   Psychiatric: She has a normal mood and affect. Her behavior is normal. Judgment and thought content normal.       Lab Results   Component Value Date     11/11/2020    K 3.9 11/11/2020     11/11/2020    CO2 29 11/11/2020    BUN 19 11/11/2020    CREATININE 0.8 11/11/2020     (H) 11/11/2020    HGBA1C 6.6 (H) 11/29/2019    AST 18 04/29/2020    ALT 51 (H) 04/29/2020    ALBUMIN 3.5 04/29/2020    PROT 6.8 04/29/2020    BILITOT 0.4 04/29/2020    WBC 7.87 04/29/2020    HGB 12.4 04/29/2020    HCT 39.6 04/29/2020    MCV 97 04/29/2020     04/29/2020    INR 0.9 05/30/2017    TSH 0.996 11/29/2019    CHOL 182 11/29/2019    HDL 51 11/29/2019    LDLCALC 116.4 11/29/2019    TRIG 73 11/29/2019     (H) 04/29/2020     Assessment:      1. Essential hypertension    2. PAF (paroxysmal atrial fibrillation)    3. Palpitations    4. Other hyperlipidemia    5. Type 2 diabetes mellitus without complication, with long-term current use of insulin    6. Morbid obesity with BMI of 40.0-44.9, adult    7. KATHERINE (obstructive sleep  apnea)        Plan:   1. Palpitations - sec to PAF  - cont current meds  - prior Cath neg    2. HTN  - cont meds    3. Obesity  - needs weight loss    4. DM2 - 6.6  - cont meds per PCP  - refer to nutritionist     5. HLD   - cont statin  -  carotid u/s - ? had R eye blindness - negative    6. PAF  - cont Eliquis  - cont BB  - ECHO - neg  - Holter - neg    7. Angina  - cont Ranexa     8. KATHERINE, severe  - cont CPAP      Thank you for allowing me to participate in this patient's care. Please do not hesitate to contact me with any questions or concerns. Consult note has been forwarded to the referral physician.

## 2020-12-14 ENCOUNTER — LAB VISIT (OUTPATIENT)
Dept: LAB | Facility: HOSPITAL | Age: 65
End: 2020-12-14
Attending: ORTHOPAEDIC SURGERY
Payer: COMMERCIAL

## 2020-12-14 ENCOUNTER — OFFICE VISIT (OUTPATIENT)
Dept: FAMILY MEDICINE | Facility: CLINIC | Age: 65
End: 2020-12-14
Payer: COMMERCIAL

## 2020-12-14 VITALS
WEIGHT: 253.75 LBS | SYSTOLIC BLOOD PRESSURE: 112 MMHG | HEART RATE: 85 BPM | BODY MASS INDEX: 40.78 KG/M2 | HEIGHT: 66 IN | DIASTOLIC BLOOD PRESSURE: 68 MMHG | TEMPERATURE: 97 F | OXYGEN SATURATION: 98 %

## 2020-12-14 DIAGNOSIS — Z00.00 PREVENTATIVE HEALTH CARE: Primary | ICD-10-CM

## 2020-12-14 DIAGNOSIS — I48.0 PAF (PAROXYSMAL ATRIAL FIBRILLATION): ICD-10-CM

## 2020-12-14 DIAGNOSIS — E11.9 TYPE 2 DIABETES MELLITUS WITHOUT COMPLICATION, UNSPECIFIED WHETHER LONG TERM INSULIN USE: ICD-10-CM

## 2020-12-14 DIAGNOSIS — E11.9 TYPE 2 DIABETES MELLITUS WITHOUT COMPLICATION, WITH LONG-TERM CURRENT USE OF INSULIN: Chronic | ICD-10-CM

## 2020-12-14 DIAGNOSIS — I10 ESSENTIAL HYPERTENSION: Chronic | ICD-10-CM

## 2020-12-14 DIAGNOSIS — E78.49 OTHER HYPERLIPIDEMIA: Chronic | ICD-10-CM

## 2020-12-14 DIAGNOSIS — Z79.4 TYPE 2 DIABETES MELLITUS WITHOUT COMPLICATION, WITH LONG-TERM CURRENT USE OF INSULIN: Chronic | ICD-10-CM

## 2020-12-14 PROCEDURE — 99999 PR PBB SHADOW E&M-EST. PATIENT-LVL IV: CPT | Mod: PBBFAC,,, | Performed by: FAMILY MEDICINE

## 2020-12-14 PROCEDURE — 3008F BODY MASS INDEX DOCD: CPT | Mod: CPTII,S$GLB,, | Performed by: FAMILY MEDICINE

## 2020-12-14 PROCEDURE — 99396 PR PREVENTIVE VISIT,EST,40-64: ICD-10-PCS | Mod: S$GLB,,, | Performed by: FAMILY MEDICINE

## 2020-12-14 PROCEDURE — 3074F PR MOST RECENT SYSTOLIC BLOOD PRESSURE < 130 MM HG: ICD-10-PCS | Mod: CPTII,S$GLB,, | Performed by: FAMILY MEDICINE

## 2020-12-14 PROCEDURE — 99396 PREV VISIT EST AGE 40-64: CPT | Mod: S$GLB,,, | Performed by: FAMILY MEDICINE

## 2020-12-14 PROCEDURE — 3074F SYST BP LT 130 MM HG: CPT | Mod: CPTII,S$GLB,, | Performed by: FAMILY MEDICINE

## 2020-12-14 PROCEDURE — 99999 PR PBB SHADOW E&M-EST. PATIENT-LVL IV: ICD-10-PCS | Mod: PBBFAC,,, | Performed by: FAMILY MEDICINE

## 2020-12-14 PROCEDURE — 1125F PR PAIN SEVERITY QUANTIFIED, PAIN PRESENT: ICD-10-PCS | Mod: S$GLB,,, | Performed by: FAMILY MEDICINE

## 2020-12-14 PROCEDURE — 3078F DIAST BP <80 MM HG: CPT | Mod: CPTII,S$GLB,, | Performed by: FAMILY MEDICINE

## 2020-12-14 PROCEDURE — 1125F AMNT PAIN NOTED PAIN PRSNT: CPT | Mod: S$GLB,,, | Performed by: FAMILY MEDICINE

## 2020-12-14 PROCEDURE — 3008F PR BODY MASS INDEX (BMI) DOCUMENTED: ICD-10-PCS | Mod: CPTII,S$GLB,, | Performed by: FAMILY MEDICINE

## 2020-12-14 PROCEDURE — 3078F PR MOST RECENT DIASTOLIC BLOOD PRESSURE < 80 MM HG: ICD-10-PCS | Mod: CPTII,S$GLB,, | Performed by: FAMILY MEDICINE

## 2020-12-14 RX ORDER — ALLOPURINOL 100 MG/1
100 TABLET ORAL DAILY
Qty: 90 TABLET | Refills: 3 | Status: SHIPPED | OUTPATIENT
Start: 2020-12-14 | End: 2022-01-03 | Stop reason: SDUPTHER

## 2020-12-14 RX ORDER — LOSARTAN POTASSIUM AND HYDROCHLOROTHIAZIDE 25; 100 MG/1; MG/1
1 TABLET ORAL DAILY
Qty: 90 TABLET | Refills: 3 | Status: SHIPPED | OUTPATIENT
Start: 2020-12-14 | End: 2021-03-30 | Stop reason: SDUPTHER

## 2020-12-14 RX ORDER — GABAPENTIN 100 MG/1
100 CAPSULE ORAL 3 TIMES DAILY
Qty: 270 CAPSULE | Refills: 3 | Status: SHIPPED | OUTPATIENT
Start: 2020-12-14 | End: 2021-12-21

## 2020-12-14 RX ORDER — POTASSIUM CHLORIDE 750 MG/1
10 CAPSULE, EXTENDED RELEASE ORAL DAILY
Qty: 90 CAPSULE | Refills: 3 | Status: SHIPPED | OUTPATIENT
Start: 2020-12-14 | End: 2021-09-06 | Stop reason: SDUPTHER

## 2020-12-14 RX ORDER — PEN NEEDLE, DIABETIC 31 GX5/16"
NEEDLE, DISPOSABLE MISCELLANEOUS
Qty: 180 EACH | Refills: 3 | Status: SHIPPED | OUTPATIENT
Start: 2020-12-14 | End: 2021-11-30 | Stop reason: SDUPTHER

## 2020-12-14 RX ORDER — INSULIN LISPRO 100 [IU]/ML
INJECTION, SUSPENSION SUBCUTANEOUS
Qty: 45 ML | Refills: 3 | Status: SHIPPED | OUTPATIENT
Start: 2020-12-14 | End: 2021-08-16 | Stop reason: SDUPTHER

## 2020-12-14 NOTE — PROGRESS NOTES
CHIEF COMPLAINT:  This is a 65-year-old female here for preventive health exam.       SUBJECTIVE:  The patient is concerned about taking Eliquis for paroxysmal atrial fibrillation. She has type 2 diabetes.  Blood sugars range from 130-160.   She denies polyuria, polydipsia or polyphagia.  Last A1c was 6.6% over 1 year ago.  She takes metformin 1000 mg  twice daily and injects Humalog mix 75-25 twice daily.  Her blood pressure is controlled on losartan HCT.  She takes simvastatin for hyperlipidemia.  Patient has vitamin D deficiency and takes vitamin-D supplement daily. She has a history of uric acid kidney stones for which she takes allopurinol daily. Patient recently passed a kidney stone.  She takes Nexium as needed for GERD.  The patient is morbidly obese with a BMI of 40.96.       Eye exam November 2020.  Mammogram December 2019. Colonoscopy November 2015, due again in November 2025.  Tdap July 2017.     ROS:  GENERAL: Patient denies fever, chills, night sweats. Patient denies weight gain or loss. Patient denies anorexia, fatigue, weakness or swollen glands.  SKIN: Patient denies rash or hair loss.  HEENT: Patient denies sore throat, ear pain, hearing loss, nasal congestion, or runny nose. Patient denies visual disturbance, eye irritation or discharge.  LUNGS: Patient denies cough, wheeze or hemoptysis.  CARDIOVASCULAR: Patient denies chest pain, shortness of breath, palpitations, syncope or lower extremity edema.  GI: Patient denies abdominal pain, nausea, vomiting, diarrhea, blood in stool or melena.  GENITOURINARY: Patient denies pelvic pain, vaginal discharge, itch or odor. Patient denies irregular vaginal bleeding. Patient denies dysuria, frequency, hematuria, nocturia, urgency or incontinence.  BREASTS: Patient denies breast pain, mass or nipple discharge.  MUSCULOSKELETAL: Patient denies joint pain, swelling, redness or warmth.  NEUROLOGIC: Patient denies headache, vertigo, paresthesias, weakness in limb,  dysarthria, dysphagia or abnormality of gait.  PSYCHIATRIC: Patient denies anxiety, depression, or memory loss.     OBJECTIVE:   GENERAL: Well-developed well-nourished, morbidly obese, black female alert and oriented x3, in no acute distress. Memory, judgment and cognition without deficit.   SKIN: Clear without rash. Normal color and tone.  Onychomycosis left great toenail.  HEENT: Eyes: No scleral icterus. Clear conjunctivae. Pupils equal reactive to light and accommodation. Ears: Clear canals.  Clear TMs.  Nose: Without congestion. Crusty scabs in nares. Pharynx: Without injection or exudates.  NECK: Supple, normal range of motion. No masses, nodes or enlarged thyroid. No JVD. Carotids 2+ and equal. No bruits.  LUNGS: Clear to auscultation. Normal respiratory effort.  CARDIOVASCULAR: Regular rhythm, normal S1, S2 without murmur, gallop or rub.  BACK: No CVA or spinal tenderness.  BREASTS: No masses, tenderness or nipple discharge.  ABDOMEN: Normal appearance. Active bowel sounds. Soft, nontender without mass or organomegaly. No rebound or guarding.  EXTREMITIES: Without cyanosis, clubbing or edema. Distal pulses 2+ and equal. Normal range of motion in all extremities. No joint effusion, erythema or warmth.  NEUROLOGIC: Cranial nerves II through XII without deficit. Motor strength equal bilaterally. Sensation normal to touch. Deep tendon reflexes 2+ and equal. Gait without abnormality. No tremor. Negative cerebellar signs.  FOOT EVALUATION: 10 gram monofilament exam with protective sensation intact bilaterally. Nails appropriately trimmed. No ulcers. Distal pulses palpable.  PELVIC: External: Without lesions or inflammation. Vaginal: Atrophic changes, malodor, cuff intact. Bimanual: Non-tender, without masses. Rectovaginal: Confirms, heme-negative stool x2.     ASSESSMENT:  1. Preventative health care    2. Type 2 diabetes mellitus without complication, with long-term current use of insulin    3. Essential  hypertension    4. Other hyperlipidemia    5. PAF (paroxysmal atrial fibrillation)        PLAN:  1.  Weight reduction. Exercise regularly.  2.  Age-appropriate counseling.  3.  Fasting lab.  4.  Mammogram.  5.  Refill medications.  6.  Restart allopurinol.  7.  Follow-up annually.    This note is generated with speech recognition software and is subject to transcription error and sound alike phrases that may be missed by proofreading.

## 2020-12-15 RX ORDER — SIMVASTATIN 20 MG/1
20 TABLET, FILM COATED ORAL NIGHTLY
Qty: 90 TABLET | Refills: 3 | Status: SHIPPED | OUTPATIENT
Start: 2020-12-15 | End: 2022-01-03 | Stop reason: SDUPTHER

## 2020-12-17 ENCOUNTER — TELEPHONE (OUTPATIENT)
Dept: FAMILY MEDICINE | Facility: CLINIC | Age: 65
End: 2020-12-17

## 2020-12-17 NOTE — TELEPHONE ENCOUNTER
----- Message from Valerio Llanos sent at 12/17/2020  4:02 PM CST -----  Contact: Latosha Amin is calling in regards to lab results. Please call her back at [phone].      Thanks  DD

## 2020-12-31 ENCOUNTER — HOSPITAL ENCOUNTER (OUTPATIENT)
Dept: RADIOLOGY | Facility: HOSPITAL | Age: 65
Discharge: HOME | End: 2020-12-31
Attending: FAMILY MEDICINE
Payer: COMMERCIAL

## 2020-12-31 DIAGNOSIS — Z12.31 ENCOUNTER FOR SCREENING MAMMOGRAM FOR MALIGNANT NEOPLASM OF BREAST: ICD-10-CM

## 2020-12-31 PROCEDURE — 77067 SCR MAMMO BI INCL CAD: CPT | Mod: TC

## 2020-12-31 PROCEDURE — 77067 MAMMO DIGITAL SCREENING BILAT WITH TOMO: ICD-10-PCS | Mod: 26,,, | Performed by: RADIOLOGY

## 2020-12-31 PROCEDURE — 77063 BREAST TOMOSYNTHESIS BI: CPT | Mod: 26,,, | Performed by: RADIOLOGY

## 2020-12-31 PROCEDURE — 77067 SCR MAMMO BI INCL CAD: CPT | Mod: 26,,, | Performed by: RADIOLOGY

## 2020-12-31 PROCEDURE — 77063 MAMMO DIGITAL SCREENING BILAT WITH TOMO: ICD-10-PCS | Mod: 26,,, | Performed by: RADIOLOGY

## 2021-01-05 ENCOUNTER — OFFICE VISIT (OUTPATIENT)
Dept: FAMILY MEDICINE | Facility: CLINIC | Age: 66
End: 2021-01-05
Payer: COMMERCIAL

## 2021-01-05 ENCOUNTER — LAB VISIT (OUTPATIENT)
Dept: LAB | Facility: HOSPITAL | Age: 66
End: 2021-01-05
Payer: COMMERCIAL

## 2021-01-05 VITALS
HEART RATE: 91 BPM | DIASTOLIC BLOOD PRESSURE: 76 MMHG | SYSTOLIC BLOOD PRESSURE: 121 MMHG | TEMPERATURE: 97 F | WEIGHT: 255.31 LBS | BODY MASS INDEX: 40.07 KG/M2 | HEIGHT: 67 IN

## 2021-01-05 DIAGNOSIS — R53.83 FATIGUE, UNSPECIFIED TYPE: ICD-10-CM

## 2021-01-05 DIAGNOSIS — Z86.2 HISTORY OF ANEMIA: ICD-10-CM

## 2021-01-05 DIAGNOSIS — Z79.899 ON STATIN THERAPY: ICD-10-CM

## 2021-01-05 DIAGNOSIS — R25.2 LEG CRAMPS: ICD-10-CM

## 2021-01-05 DIAGNOSIS — S39.012A BACK STRAIN, INITIAL ENCOUNTER: Primary | ICD-10-CM

## 2021-01-05 LAB
25(OH)D3+25(OH)D2 SERPL-MCNC: 29 NG/ML (ref 30–96)
ALBUMIN SERPL BCP-MCNC: 3.7 G/DL (ref 3.5–5.2)
ALP SERPL-CCNC: 78 U/L (ref 55–135)
ALT SERPL W/O P-5'-P-CCNC: 24 U/L (ref 10–44)
ANION GAP SERPL CALC-SCNC: 6 MMOL/L (ref 8–16)
AST SERPL-CCNC: 13 U/L (ref 10–40)
BILIRUB SERPL-MCNC: 0.4 MG/DL (ref 0.1–1)
BUN SERPL-MCNC: 16 MG/DL (ref 8–23)
CALCIUM SERPL-MCNC: 9.6 MG/DL (ref 8.7–10.5)
CHLORIDE SERPL-SCNC: 103 MMOL/L (ref 95–110)
CK SERPL-CCNC: 84 U/L (ref 20–180)
CO2 SERPL-SCNC: 32 MMOL/L (ref 23–29)
CREAT SERPL-MCNC: 0.8 MG/DL (ref 0.5–1.4)
EST. GFR  (AFRICAN AMERICAN): >60 ML/MIN/1.73 M^2
EST. GFR  (NON AFRICAN AMERICAN): >60 ML/MIN/1.73 M^2
FERRITIN SERPL-MCNC: 100 NG/ML (ref 20–300)
GLUCOSE SERPL-MCNC: 95 MG/DL (ref 70–110)
IRON SERPL-MCNC: 49 UG/DL (ref 30–160)
POTASSIUM SERPL-SCNC: 3.6 MMOL/L (ref 3.5–5.1)
PROT SERPL-MCNC: 7.1 G/DL (ref 6–8.4)
SATURATED IRON: 13 % (ref 20–50)
SODIUM SERPL-SCNC: 141 MMOL/L (ref 136–145)
TOTAL IRON BINDING CAPACITY: 383 UG/DL (ref 250–450)
TRANSFERRIN SERPL-MCNC: 259 MG/DL (ref 200–375)
VIT B12 SERPL-MCNC: 1130 PG/ML (ref 210–950)

## 2021-01-05 PROCEDURE — 82550 ASSAY OF CK (CPK): CPT

## 2021-01-05 PROCEDURE — 82728 ASSAY OF FERRITIN: CPT

## 2021-01-05 PROCEDURE — 3078F DIAST BP <80 MM HG: CPT | Mod: CPTII,S$GLB,, | Performed by: REGISTERED NURSE

## 2021-01-05 PROCEDURE — 1125F PR PAIN SEVERITY QUANTIFIED, PAIN PRESENT: ICD-10-PCS | Mod: S$GLB,,, | Performed by: REGISTERED NURSE

## 2021-01-05 PROCEDURE — 3078F PR MOST RECENT DIASTOLIC BLOOD PRESSURE < 80 MM HG: ICD-10-PCS | Mod: CPTII,S$GLB,, | Performed by: REGISTERED NURSE

## 2021-01-05 PROCEDURE — 1125F AMNT PAIN NOTED PAIN PRSNT: CPT | Mod: S$GLB,,, | Performed by: REGISTERED NURSE

## 2021-01-05 PROCEDURE — 99214 PR OFFICE/OUTPT VISIT, EST, LEVL IV, 30-39 MIN: ICD-10-PCS | Mod: S$GLB,,, | Performed by: REGISTERED NURSE

## 2021-01-05 PROCEDURE — 83540 ASSAY OF IRON: CPT

## 2021-01-05 PROCEDURE — 82306 VITAMIN D 25 HYDROXY: CPT

## 2021-01-05 PROCEDURE — 99999 PR PBB SHADOW E&M-EST. PATIENT-LVL IV: ICD-10-PCS | Mod: PBBFAC,,, | Performed by: REGISTERED NURSE

## 2021-01-05 PROCEDURE — 3008F PR BODY MASS INDEX (BMI) DOCUMENTED: ICD-10-PCS | Mod: CPTII,S$GLB,, | Performed by: REGISTERED NURSE

## 2021-01-05 PROCEDURE — 3008F BODY MASS INDEX DOCD: CPT | Mod: CPTII,S$GLB,, | Performed by: REGISTERED NURSE

## 2021-01-05 PROCEDURE — 3074F SYST BP LT 130 MM HG: CPT | Mod: CPTII,S$GLB,, | Performed by: REGISTERED NURSE

## 2021-01-05 PROCEDURE — 99214 OFFICE O/P EST MOD 30 MIN: CPT | Mod: S$GLB,,, | Performed by: REGISTERED NURSE

## 2021-01-05 PROCEDURE — 80053 COMPREHEN METABOLIC PANEL: CPT

## 2021-01-05 PROCEDURE — 99999 PR PBB SHADOW E&M-EST. PATIENT-LVL IV: CPT | Mod: PBBFAC,,, | Performed by: REGISTERED NURSE

## 2021-01-05 PROCEDURE — 82607 VITAMIN B-12: CPT

## 2021-01-05 PROCEDURE — 3074F PR MOST RECENT SYSTOLIC BLOOD PRESSURE < 130 MM HG: ICD-10-PCS | Mod: CPTII,S$GLB,, | Performed by: REGISTERED NURSE

## 2021-01-05 PROCEDURE — 36415 COLL VENOUS BLD VENIPUNCTURE: CPT | Mod: PO

## 2021-01-05 RX ORDER — BACLOFEN 10 MG/1
10 TABLET ORAL 3 TIMES DAILY
Qty: 30 TABLET | Refills: 0 | Status: SHIPPED | OUTPATIENT
Start: 2021-01-05 | End: 2024-01-11

## 2021-01-06 ENCOUNTER — HOSPITAL ENCOUNTER (EMERGENCY)
Facility: HOSPITAL | Age: 66
Discharge: HOME | End: 2021-01-06
Attending: EMERGENCY MEDICINE
Payer: COMMERCIAL

## 2021-01-06 VITALS
DIASTOLIC BLOOD PRESSURE: 82 MMHG | SYSTOLIC BLOOD PRESSURE: 138 MMHG | BODY MASS INDEX: 40.33 KG/M2 | WEIGHT: 256.94 LBS | RESPIRATION RATE: 18 BRPM | HEIGHT: 67 IN | HEART RATE: 82 BPM | OXYGEN SATURATION: 96 % | TEMPERATURE: 99 F

## 2021-01-06 DIAGNOSIS — M54.6 THORACIC BACK PAIN: ICD-10-CM

## 2021-01-06 DIAGNOSIS — M54.6 ACUTE RIGHT-SIDED THORACIC BACK PAIN: Primary | ICD-10-CM

## 2021-01-06 LAB
BILIRUB UR QL STRIP: NEGATIVE
CLARITY UR: CLEAR
COLOR UR: YELLOW
GLUCOSE UR QL STRIP: NEGATIVE
HGB UR QL STRIP: NEGATIVE
KETONES UR QL STRIP: NEGATIVE
LEUKOCYTE ESTERASE UR QL STRIP: NEGATIVE
NITRITE UR QL STRIP: NEGATIVE
PH UR STRIP: 7 [PH] (ref 5–8)
PROT UR QL STRIP: NEGATIVE
SP GR UR STRIP: 1.02 (ref 1–1.03)
URN SPEC COLLECT METH UR: NORMAL
UROBILINOGEN UR STRIP-ACNC: NEGATIVE EU/DL

## 2021-01-06 PROCEDURE — 25000003 PHARM REV CODE 250: Performed by: EMERGENCY MEDICINE

## 2021-01-06 PROCEDURE — 63600175 PHARM REV CODE 636 W HCPCS: Performed by: EMERGENCY MEDICINE

## 2021-01-06 PROCEDURE — 96372 THER/PROPH/DIAG INJ SC/IM: CPT

## 2021-01-06 PROCEDURE — 99284 EMERGENCY DEPT VISIT MOD MDM: CPT | Mod: 25

## 2021-01-06 PROCEDURE — 81003 URINALYSIS AUTO W/O SCOPE: CPT

## 2021-01-06 RX ORDER — DEXAMETHASONE SODIUM PHOSPHATE 4 MG/ML
8 INJECTION, SOLUTION INTRA-ARTICULAR; INTRALESIONAL; INTRAMUSCULAR; INTRAVENOUS; SOFT TISSUE
Status: COMPLETED | OUTPATIENT
Start: 2021-01-06 | End: 2021-01-06

## 2021-01-06 RX ORDER — KETOROLAC TROMETHAMINE 10 MG/1
10 TABLET, FILM COATED ORAL
Status: DISCONTINUED | OUTPATIENT
Start: 2021-01-06 | End: 2021-01-06 | Stop reason: HOSPADM

## 2021-01-06 RX ORDER — HYDROCODONE BITARTRATE AND ACETAMINOPHEN 10; 325 MG/1; MG/1
1 TABLET ORAL EVERY 6 HOURS PRN
Qty: 9 TABLET | Refills: 0 | Status: SHIPPED | OUTPATIENT
Start: 2021-01-06 | End: 2023-01-03 | Stop reason: SDUPTHER

## 2021-01-06 RX ORDER — ACETAMINOPHEN 325 MG/1
325 TABLET ORAL
Status: COMPLETED | OUTPATIENT
Start: 2021-01-06 | End: 2021-01-06

## 2021-01-06 RX ADMIN — DEXAMETHASONE SODIUM PHOSPHATE 8 MG: 4 INJECTION INTRA-ARTICULAR; INTRALESIONAL; INTRAMUSCULAR; INTRAVENOUS; SOFT TISSUE at 11:01

## 2021-01-06 RX ADMIN — ACETAMINOPHEN 325 MG: 325 TABLET ORAL at 12:01

## 2021-01-07 ENCOUNTER — OFFICE VISIT (OUTPATIENT)
Dept: CARDIOLOGY | Facility: CLINIC | Age: 66
End: 2021-01-07
Payer: COMMERCIAL

## 2021-01-07 VITALS
DIASTOLIC BLOOD PRESSURE: 70 MMHG | BODY MASS INDEX: 40.35 KG/M2 | HEART RATE: 77 BPM | WEIGHT: 257.06 LBS | OXYGEN SATURATION: 99 % | HEIGHT: 67 IN | SYSTOLIC BLOOD PRESSURE: 130 MMHG

## 2021-01-07 DIAGNOSIS — I48.0 PAROXYSMAL ATRIAL FIBRILLATION: ICD-10-CM

## 2021-01-07 DIAGNOSIS — E66.01 MORBID OBESITY WITH BMI OF 40.0-44.9, ADULT: ICD-10-CM

## 2021-01-07 DIAGNOSIS — I10 ESSENTIAL HYPERTENSION: Chronic | ICD-10-CM

## 2021-01-07 DIAGNOSIS — I20.9 AP (ANGINA PECTORIS): ICD-10-CM

## 2021-01-07 DIAGNOSIS — R06.09 DOE (DYSPNEA ON EXERTION): ICD-10-CM

## 2021-01-07 DIAGNOSIS — Z79.4 TYPE 2 DIABETES MELLITUS WITHOUT COMPLICATION, WITH LONG-TERM CURRENT USE OF INSULIN: Chronic | ICD-10-CM

## 2021-01-07 DIAGNOSIS — R00.2 PALPITATIONS: ICD-10-CM

## 2021-01-07 DIAGNOSIS — E11.9 TYPE 2 DIABETES MELLITUS WITHOUT COMPLICATION, WITH LONG-TERM CURRENT USE OF INSULIN: Chronic | ICD-10-CM

## 2021-01-07 DIAGNOSIS — G47.33 OSA (OBSTRUCTIVE SLEEP APNEA): ICD-10-CM

## 2021-01-07 DIAGNOSIS — E78.49 OTHER HYPERLIPIDEMIA: Chronic | ICD-10-CM

## 2021-01-07 DIAGNOSIS — I48.0 PAF (PAROXYSMAL ATRIAL FIBRILLATION): Primary | ICD-10-CM

## 2021-01-07 PROCEDURE — 3051F HG A1C>EQUAL 7.0%<8.0%: CPT | Mod: CPTII,S$GLB,, | Performed by: INTERNAL MEDICINE

## 2021-01-07 PROCEDURE — 3051F PR MOST RECENT HEMOGLOBIN A1C LEVEL 7.0 - < 8.0%: ICD-10-PCS | Mod: CPTII,S$GLB,, | Performed by: INTERNAL MEDICINE

## 2021-01-07 PROCEDURE — 99999 PR PBB SHADOW E&M-EST. PATIENT-LVL V: CPT | Mod: PBBFAC,,, | Performed by: INTERNAL MEDICINE

## 2021-01-07 PROCEDURE — 99214 PR OFFICE/OUTPT VISIT, EST, LEVL IV, 30-39 MIN: ICD-10-PCS | Mod: S$GLB,,, | Performed by: INTERNAL MEDICINE

## 2021-01-07 PROCEDURE — 3078F PR MOST RECENT DIASTOLIC BLOOD PRESSURE < 80 MM HG: ICD-10-PCS | Mod: CPTII,S$GLB,, | Performed by: INTERNAL MEDICINE

## 2021-01-07 PROCEDURE — 3078F DIAST BP <80 MM HG: CPT | Mod: CPTII,S$GLB,, | Performed by: INTERNAL MEDICINE

## 2021-01-07 PROCEDURE — 3075F SYST BP GE 130 - 139MM HG: CPT | Mod: CPTII,S$GLB,, | Performed by: INTERNAL MEDICINE

## 2021-01-07 PROCEDURE — 99214 OFFICE O/P EST MOD 30 MIN: CPT | Mod: S$GLB,,, | Performed by: INTERNAL MEDICINE

## 2021-01-07 PROCEDURE — 3075F PR MOST RECENT SYSTOLIC BLOOD PRESS GE 130-139MM HG: ICD-10-PCS | Mod: CPTII,S$GLB,, | Performed by: INTERNAL MEDICINE

## 2021-01-07 PROCEDURE — 99999 PR PBB SHADOW E&M-EST. PATIENT-LVL V: ICD-10-PCS | Mod: PBBFAC,,, | Performed by: INTERNAL MEDICINE

## 2021-01-07 PROCEDURE — 1125F PR PAIN SEVERITY QUANTIFIED, PAIN PRESENT: ICD-10-PCS | Mod: S$GLB,,, | Performed by: INTERNAL MEDICINE

## 2021-01-07 PROCEDURE — 1125F AMNT PAIN NOTED PAIN PRSNT: CPT | Mod: S$GLB,,, | Performed by: INTERNAL MEDICINE

## 2021-01-07 PROCEDURE — 3008F PR BODY MASS INDEX (BMI) DOCUMENTED: ICD-10-PCS | Mod: CPTII,S$GLB,, | Performed by: INTERNAL MEDICINE

## 2021-01-07 PROCEDURE — 3008F BODY MASS INDEX DOCD: CPT | Mod: CPTII,S$GLB,, | Performed by: INTERNAL MEDICINE

## 2021-01-07 RX ORDER — METOPROLOL TARTRATE 50 MG/1
50 TABLET ORAL 2 TIMES DAILY
Qty: 120 TABLET | Refills: 1 | Status: SHIPPED | OUTPATIENT
Start: 2021-01-07 | End: 2021-01-15 | Stop reason: SDUPTHER

## 2021-01-07 RX ORDER — METOPROLOL TARTRATE 50 MG/1
50 TABLET ORAL 2 TIMES DAILY
Qty: 120 TABLET | Refills: 1 | Status: SHIPPED | OUTPATIENT
Start: 2021-01-07 | End: 2021-01-07 | Stop reason: SDUPTHER

## 2021-01-08 ENCOUNTER — PROCEDURE VISIT (OUTPATIENT)
Dept: OPHTHALMOLOGY | Facility: CLINIC | Age: 66
End: 2021-01-08
Payer: COMMERCIAL

## 2021-01-08 DIAGNOSIS — Z79.4 TYPE 2 DIABETES MELLITUS WITH BOTH EYES AFFECTED BY MILD NONPROLIFERATIVE RETINOPATHY AND MACULAR EDEMA, WITH LONG-TERM CURRENT USE OF INSULIN: Primary | ICD-10-CM

## 2021-01-08 DIAGNOSIS — E11.3213 TYPE 2 DIABETES MELLITUS WITH BOTH EYES AFFECTED BY MILD NONPROLIFERATIVE RETINOPATHY AND MACULAR EDEMA, WITH LONG-TERM CURRENT USE OF INSULIN: Primary | ICD-10-CM

## 2021-01-08 PROCEDURE — 92134 POSTERIOR SEGMENT OCT RETINA (OCULAR COHERENCE TOMOGRAPHY)-BOTH EYES: ICD-10-PCS | Mod: S$GLB,,, | Performed by: OPHTHALMOLOGY

## 2021-01-08 PROCEDURE — 67028 INJECTION EYE DRUG: CPT | Mod: RT,S$GLB,, | Performed by: OPHTHALMOLOGY

## 2021-01-08 PROCEDURE — 92134 CPTRZ OPH DX IMG PST SGM RTA: CPT | Mod: S$GLB,,, | Performed by: OPHTHALMOLOGY

## 2021-01-08 PROCEDURE — 99499 NO LOS: ICD-10-PCS | Mod: S$GLB,,, | Performed by: OPHTHALMOLOGY

## 2021-01-08 PROCEDURE — 67028 PR INJECT INTRAVITREAL PHARMCOLOGIC: ICD-10-PCS | Mod: RT,S$GLB,, | Performed by: OPHTHALMOLOGY

## 2021-01-08 PROCEDURE — 99499 UNLISTED E&M SERVICE: CPT | Mod: S$GLB,,, | Performed by: OPHTHALMOLOGY

## 2021-01-11 ENCOUNTER — HOSPITAL ENCOUNTER (OUTPATIENT)
Dept: CARDIOLOGY | Facility: HOSPITAL | Age: 66
Discharge: HOME | End: 2021-01-11
Attending: INTERNAL MEDICINE
Payer: COMMERCIAL

## 2021-01-11 DIAGNOSIS — I48.0 PAF (PAROXYSMAL ATRIAL FIBRILLATION): ICD-10-CM

## 2021-01-11 DIAGNOSIS — R00.2 PALPITATIONS: ICD-10-CM

## 2021-01-11 PROCEDURE — 93227 HOLTER MONITOR - 48 HOUR (CUPID ONLY): ICD-10-PCS | Mod: ,,, | Performed by: INTERNAL MEDICINE

## 2021-01-11 PROCEDURE — 93227 XTRNL ECG REC<48 HR R&I: CPT | Mod: ,,, | Performed by: INTERNAL MEDICINE

## 2021-01-11 PROCEDURE — 93225 XTRNL ECG REC<48 HRS REC: CPT

## 2021-01-14 ENCOUNTER — OFFICE VISIT (OUTPATIENT)
Dept: FAMILY MEDICINE | Facility: CLINIC | Age: 66
End: 2021-01-14
Payer: COMMERCIAL

## 2021-01-14 VITALS
OXYGEN SATURATION: 98 % | BODY MASS INDEX: 39.47 KG/M2 | RESPIRATION RATE: 18 BRPM | SYSTOLIC BLOOD PRESSURE: 110 MMHG | TEMPERATURE: 97 F | WEIGHT: 252 LBS | DIASTOLIC BLOOD PRESSURE: 66 MMHG | HEART RATE: 82 BPM

## 2021-01-14 DIAGNOSIS — R42 DIZZINESS: Primary | ICD-10-CM

## 2021-01-14 DIAGNOSIS — H69.93 DYSFUNCTION OF BOTH EUSTACHIAN TUBES: ICD-10-CM

## 2021-01-14 DIAGNOSIS — H61.23 BILATERAL IMPACTED CERUMEN: ICD-10-CM

## 2021-01-14 PROCEDURE — 1125F AMNT PAIN NOTED PAIN PRSNT: CPT | Mod: S$GLB,,, | Performed by: REGISTERED NURSE

## 2021-01-14 PROCEDURE — 99214 OFFICE O/P EST MOD 30 MIN: CPT | Mod: S$GLB,,, | Performed by: REGISTERED NURSE

## 2021-01-14 PROCEDURE — 3008F BODY MASS INDEX DOCD: CPT | Mod: CPTII,S$GLB,, | Performed by: REGISTERED NURSE

## 2021-01-14 PROCEDURE — 3074F SYST BP LT 130 MM HG: CPT | Mod: CPTII,S$GLB,, | Performed by: REGISTERED NURSE

## 2021-01-14 PROCEDURE — 99999 PR PBB SHADOW E&M-EST. PATIENT-LVL III: CPT | Mod: PBBFAC,,, | Performed by: REGISTERED NURSE

## 2021-01-14 PROCEDURE — 1125F PR PAIN SEVERITY QUANTIFIED, PAIN PRESENT: ICD-10-PCS | Mod: S$GLB,,, | Performed by: REGISTERED NURSE

## 2021-01-14 PROCEDURE — 3078F PR MOST RECENT DIASTOLIC BLOOD PRESSURE < 80 MM HG: ICD-10-PCS | Mod: CPTII,S$GLB,, | Performed by: REGISTERED NURSE

## 2021-01-14 PROCEDURE — 3008F PR BODY MASS INDEX (BMI) DOCUMENTED: ICD-10-PCS | Mod: CPTII,S$GLB,, | Performed by: REGISTERED NURSE

## 2021-01-14 PROCEDURE — 99999 PR PBB SHADOW E&M-EST. PATIENT-LVL III: ICD-10-PCS | Mod: PBBFAC,,, | Performed by: REGISTERED NURSE

## 2021-01-14 PROCEDURE — 99214 PR OFFICE/OUTPT VISIT, EST, LEVL IV, 30-39 MIN: ICD-10-PCS | Mod: S$GLB,,, | Performed by: REGISTERED NURSE

## 2021-01-14 PROCEDURE — 3078F DIAST BP <80 MM HG: CPT | Mod: CPTII,S$GLB,, | Performed by: REGISTERED NURSE

## 2021-01-14 PROCEDURE — 3074F PR MOST RECENT SYSTOLIC BLOOD PRESSURE < 130 MM HG: ICD-10-PCS | Mod: CPTII,S$GLB,, | Performed by: REGISTERED NURSE

## 2021-01-14 RX ORDER — TRIAMCINOLONE ACETONIDE 55 UG/1
2 SPRAY, METERED NASAL DAILY
Qty: 17 G | Refills: 3 | Status: SHIPPED | OUTPATIENT
Start: 2021-01-14 | End: 2022-08-12

## 2021-01-14 RX ORDER — MINERAL OIL
180 ENEMA (ML) RECTAL DAILY
Qty: 30 TABLET | Refills: 2 | Status: SHIPPED | OUTPATIENT
Start: 2021-01-14 | End: 2024-01-11

## 2021-01-15 ENCOUNTER — OFFICE VISIT (OUTPATIENT)
Dept: CARDIOLOGY | Facility: CLINIC | Age: 66
End: 2021-01-15
Payer: COMMERCIAL

## 2021-01-15 VITALS
BODY MASS INDEX: 39.87 KG/M2 | SYSTOLIC BLOOD PRESSURE: 126 MMHG | OXYGEN SATURATION: 99 % | HEART RATE: 82 BPM | DIASTOLIC BLOOD PRESSURE: 86 MMHG | WEIGHT: 254 LBS | HEIGHT: 67 IN | RESPIRATION RATE: 16 BRPM

## 2021-01-15 DIAGNOSIS — E78.49 OTHER HYPERLIPIDEMIA: Chronic | ICD-10-CM

## 2021-01-15 DIAGNOSIS — I48.0 PAROXYSMAL ATRIAL FIBRILLATION: ICD-10-CM

## 2021-01-15 DIAGNOSIS — I48.0 PAF (PAROXYSMAL ATRIAL FIBRILLATION): ICD-10-CM

## 2021-01-15 DIAGNOSIS — I20.9 AP (ANGINA PECTORIS): ICD-10-CM

## 2021-01-15 DIAGNOSIS — I49.3 PVC'S (PREMATURE VENTRICULAR CONTRACTIONS): ICD-10-CM

## 2021-01-15 DIAGNOSIS — R00.2 PALPITATIONS: Primary | ICD-10-CM

## 2021-01-15 DIAGNOSIS — D64.9 ANEMIA, UNSPECIFIED TYPE: ICD-10-CM

## 2021-01-15 LAB
OHS CV EVENT MONITOR DAY: 0
OHS CV HOLTER LENGTH DECIMAL HOURS: 48
OHS CV HOLTER LENGTH HOURS: 48
OHS CV HOLTER LENGTH MINUTES: 0

## 2021-01-15 PROCEDURE — 3074F PR MOST RECENT SYSTOLIC BLOOD PRESSURE < 130 MM HG: ICD-10-PCS | Mod: CPTII,S$GLB,, | Performed by: INTERNAL MEDICINE

## 2021-01-15 PROCEDURE — 3079F DIAST BP 80-89 MM HG: CPT | Mod: CPTII,S$GLB,, | Performed by: INTERNAL MEDICINE

## 2021-01-15 PROCEDURE — 3008F PR BODY MASS INDEX (BMI) DOCUMENTED: ICD-10-PCS | Mod: CPTII,S$GLB,, | Performed by: INTERNAL MEDICINE

## 2021-01-15 PROCEDURE — 3079F PR MOST RECENT DIASTOLIC BLOOD PRESSURE 80-89 MM HG: ICD-10-PCS | Mod: CPTII,S$GLB,, | Performed by: INTERNAL MEDICINE

## 2021-01-15 PROCEDURE — 99999 PR PBB SHADOW E&M-EST. PATIENT-LVL V: ICD-10-PCS | Mod: PBBFAC,,, | Performed by: INTERNAL MEDICINE

## 2021-01-15 PROCEDURE — 3074F SYST BP LT 130 MM HG: CPT | Mod: CPTII,S$GLB,, | Performed by: INTERNAL MEDICINE

## 2021-01-15 PROCEDURE — 99214 PR OFFICE/OUTPT VISIT, EST, LEVL IV, 30-39 MIN: ICD-10-PCS | Mod: S$GLB,,, | Performed by: INTERNAL MEDICINE

## 2021-01-15 PROCEDURE — 99999 PR PBB SHADOW E&M-EST. PATIENT-LVL V: CPT | Mod: PBBFAC,,, | Performed by: INTERNAL MEDICINE

## 2021-01-15 PROCEDURE — 3008F BODY MASS INDEX DOCD: CPT | Mod: CPTII,S$GLB,, | Performed by: INTERNAL MEDICINE

## 2021-01-15 PROCEDURE — 99214 OFFICE O/P EST MOD 30 MIN: CPT | Mod: S$GLB,,, | Performed by: INTERNAL MEDICINE

## 2021-01-15 RX ORDER — METOPROLOL TARTRATE 100 MG/1
100 TABLET ORAL 2 TIMES DAILY
Qty: 60 TABLET | Refills: 3 | Status: SHIPPED | OUTPATIENT
Start: 2021-01-15 | End: 2021-01-21 | Stop reason: SDUPTHER

## 2021-01-16 ENCOUNTER — HOSPITAL ENCOUNTER (EMERGENCY)
Facility: HOSPITAL | Age: 66
Discharge: HOME | End: 2021-01-16
Attending: EMERGENCY MEDICINE
Payer: COMMERCIAL

## 2021-01-16 VITALS
OXYGEN SATURATION: 97 % | HEART RATE: 58 BPM | SYSTOLIC BLOOD PRESSURE: 114 MMHG | TEMPERATURE: 99 F | WEIGHT: 254.88 LBS | HEIGHT: 67 IN | BODY MASS INDEX: 40 KG/M2 | RESPIRATION RATE: 13 BRPM | DIASTOLIC BLOOD PRESSURE: 68 MMHG

## 2021-01-16 DIAGNOSIS — R00.2 PALPITATIONS: Primary | ICD-10-CM

## 2021-01-16 DIAGNOSIS — R07.9 CHEST PAIN: ICD-10-CM

## 2021-01-16 LAB
ALBUMIN SERPL BCP-MCNC: 3.7 G/DL (ref 3.5–5.2)
ALP SERPL-CCNC: 81 U/L (ref 55–135)
ALT SERPL W/O P-5'-P-CCNC: 43 U/L (ref 10–44)
AMPHET+METHAMPHET UR QL: NEGATIVE
ANION GAP SERPL CALC-SCNC: 9 MMOL/L (ref 8–16)
AST SERPL-CCNC: 18 U/L (ref 10–40)
BARBITURATES UR QL SCN>200 NG/ML: NEGATIVE
BASOPHILS # BLD AUTO: 0.06 K/UL (ref 0–0.2)
BASOPHILS NFR BLD: 0.6 % (ref 0–1.9)
BENZODIAZ UR QL SCN>200 NG/ML: NEGATIVE
BILIRUB SERPL-MCNC: 0.3 MG/DL (ref 0.1–1)
BILIRUB UR QL STRIP: NEGATIVE
BNP SERPL-MCNC: 20 PG/ML (ref 0–99)
BUN SERPL-MCNC: 15 MG/DL (ref 8–23)
BZE UR QL SCN: NEGATIVE
CALCIUM SERPL-MCNC: 9.2 MG/DL (ref 8.7–10.5)
CANNABINOIDS UR QL SCN: NEGATIVE
CHLORIDE SERPL-SCNC: 102 MMOL/L (ref 95–110)
CLARITY UR: CLEAR
CO2 SERPL-SCNC: 26 MMOL/L (ref 23–29)
COLOR UR: YELLOW
CREAT SERPL-MCNC: 0.8 MG/DL (ref 0.5–1.4)
CREAT UR-MCNC: 103.2 MG/DL (ref 15–325)
DIFFERENTIAL METHOD: ABNORMAL
EOSINOPHIL # BLD AUTO: 0.1 K/UL (ref 0–0.5)
EOSINOPHIL NFR BLD: 1.2 % (ref 0–8)
ERYTHROCYTE [DISTWIDTH] IN BLOOD BY AUTOMATED COUNT: 12.7 % (ref 11.5–14.5)
EST. GFR  (AFRICAN AMERICAN): >60 ML/MIN/1.73 M^2
EST. GFR  (NON AFRICAN AMERICAN): >60 ML/MIN/1.73 M^2
GLUCOSE SERPL-MCNC: 99 MG/DL (ref 70–110)
GLUCOSE UR QL STRIP: NEGATIVE
HCT VFR BLD AUTO: 37.9 % (ref 37–48.5)
HCV AB SERPL QL IA: NEGATIVE
HGB BLD-MCNC: 12.2 G/DL (ref 12–16)
HGB UR QL STRIP: NEGATIVE
IMM GRANULOCYTES # BLD AUTO: 0.04 K/UL (ref 0–0.04)
IMM GRANULOCYTES NFR BLD AUTO: 0.4 % (ref 0–0.5)
KETONES UR QL STRIP: NEGATIVE
LEUKOCYTE ESTERASE UR QL STRIP: NEGATIVE
LYMPHOCYTES # BLD AUTO: 3.4 K/UL (ref 1–4.8)
LYMPHOCYTES NFR BLD: 32.4 % (ref 18–48)
MCH RBC QN AUTO: 31.4 PG (ref 27–31)
MCHC RBC AUTO-ENTMCNC: 32.2 G/DL (ref 32–36)
MCV RBC AUTO: 97 FL (ref 82–98)
METHADONE UR QL SCN>300 NG/ML: NEGATIVE
MONOCYTES # BLD AUTO: 0.7 K/UL (ref 0.3–1)
MONOCYTES NFR BLD: 7 % (ref 4–15)
NEUTROPHILS # BLD AUTO: 6.1 K/UL (ref 1.8–7.7)
NEUTROPHILS NFR BLD: 58.4 % (ref 38–73)
NITRITE UR QL STRIP: NEGATIVE
NRBC BLD-RTO: 0 /100 WBC
OPIATES UR QL SCN: NEGATIVE
PCP UR QL SCN>25 NG/ML: NEGATIVE
PH UR STRIP: 6 [PH] (ref 5–8)
PLATELET # BLD AUTO: 253 K/UL (ref 150–350)
PMV BLD AUTO: 10.3 FL (ref 9.2–12.9)
POTASSIUM SERPL-SCNC: 3.7 MMOL/L (ref 3.5–5.1)
PROT SERPL-MCNC: 7.1 G/DL (ref 6–8.4)
PROT UR QL STRIP: NEGATIVE
RBC # BLD AUTO: 3.89 M/UL (ref 4–5.4)
SODIUM SERPL-SCNC: 137 MMOL/L (ref 136–145)
SP GR UR STRIP: 1.02 (ref 1–1.03)
TOXICOLOGY INFORMATION: NORMAL
TROPONIN I SERPL DL<=0.01 NG/ML-MCNC: <0.006 NG/ML (ref 0–0.03)
TROPONIN I SERPL DL<=0.01 NG/ML-MCNC: <0.006 NG/ML (ref 0–0.03)
TSH SERPL DL<=0.005 MIU/L-ACNC: 1.13 UIU/ML (ref 0.4–4)
URN SPEC COLLECT METH UR: NORMAL
UROBILINOGEN UR STRIP-ACNC: NEGATIVE EU/DL
WBC # BLD AUTO: 10.42 K/UL (ref 3.9–12.7)

## 2021-01-16 PROCEDURE — 93005 ELECTROCARDIOGRAM TRACING: CPT

## 2021-01-16 PROCEDURE — 36415 COLL VENOUS BLD VENIPUNCTURE: CPT

## 2021-01-16 PROCEDURE — 93010 ELECTROCARDIOGRAM REPORT: CPT | Mod: 76,,, | Performed by: INTERNAL MEDICINE

## 2021-01-16 PROCEDURE — 80053 COMPREHEN METABOLIC PANEL: CPT

## 2021-01-16 PROCEDURE — 81003 URINALYSIS AUTO W/O SCOPE: CPT | Mod: 59

## 2021-01-16 PROCEDURE — 25000003 PHARM REV CODE 250: Performed by: FAMILY MEDICINE

## 2021-01-16 PROCEDURE — 80307 DRUG TEST PRSMV CHEM ANLYZR: CPT

## 2021-01-16 PROCEDURE — 84443 ASSAY THYROID STIM HORMONE: CPT

## 2021-01-16 PROCEDURE — 84484 ASSAY OF TROPONIN QUANT: CPT | Mod: 91

## 2021-01-16 PROCEDURE — 83880 ASSAY OF NATRIURETIC PEPTIDE: CPT

## 2021-01-16 PROCEDURE — 84484 ASSAY OF TROPONIN QUANT: CPT

## 2021-01-16 PROCEDURE — 85025 COMPLETE CBC W/AUTO DIFF WBC: CPT

## 2021-01-16 PROCEDURE — 99285 EMERGENCY DEPT VISIT HI MDM: CPT | Mod: 25

## 2021-01-16 PROCEDURE — 93010 ELECTROCARDIOGRAM REPORT: CPT | Mod: ,,, | Performed by: INTERNAL MEDICINE

## 2021-01-16 PROCEDURE — 93010 EKG 12-LEAD: ICD-10-PCS | Mod: ,,, | Performed by: INTERNAL MEDICINE

## 2021-01-16 PROCEDURE — 86803 HEPATITIS C AB TEST: CPT

## 2021-01-16 RX ORDER — ASPIRIN 325 MG
325 TABLET ORAL
Status: COMPLETED | OUTPATIENT
Start: 2021-01-16 | End: 2021-01-16

## 2021-01-16 RX ORDER — ASPIRIN 325 MG
325 TABLET ORAL
Status: DISCONTINUED | OUTPATIENT
Start: 2021-01-16 | End: 2021-01-16

## 2021-01-16 RX ADMIN — ASPIRIN 325 MG ORAL TABLET 325 MG: 325 PILL ORAL at 11:01

## 2021-01-19 ENCOUNTER — TELEPHONE (OUTPATIENT)
Dept: HEMATOLOGY/ONCOLOGY | Facility: CLINIC | Age: 66
End: 2021-01-19

## 2021-01-19 ENCOUNTER — PATIENT MESSAGE (OUTPATIENT)
Dept: HEMATOLOGY/ONCOLOGY | Facility: CLINIC | Age: 66
End: 2021-01-19

## 2021-01-20 ENCOUNTER — CLINICAL SUPPORT (OUTPATIENT)
Dept: AUDIOLOGY | Facility: CLINIC | Age: 66
End: 2021-01-20
Payer: COMMERCIAL

## 2021-01-20 ENCOUNTER — OFFICE VISIT (OUTPATIENT)
Dept: OTOLARYNGOLOGY | Facility: CLINIC | Age: 66
End: 2021-01-20
Payer: COMMERCIAL

## 2021-01-20 VITALS
TEMPERATURE: 98 F | DIASTOLIC BLOOD PRESSURE: 71 MMHG | HEART RATE: 59 BPM | WEIGHT: 252.19 LBS | SYSTOLIC BLOOD PRESSURE: 107 MMHG | BODY MASS INDEX: 39.5 KG/M2

## 2021-01-20 DIAGNOSIS — H90.5 HEARING LOSS, SENSORINEURAL, COMBINED TYPES: ICD-10-CM

## 2021-01-20 DIAGNOSIS — I10 ESSENTIAL HYPERTENSION: ICD-10-CM

## 2021-01-20 DIAGNOSIS — R42 DIZZINESS: ICD-10-CM

## 2021-01-20 DIAGNOSIS — R26.89 IMBALANCE: Primary | ICD-10-CM

## 2021-01-20 DIAGNOSIS — I49.3 PVC'S (PREMATURE VENTRICULAR CONTRACTIONS): ICD-10-CM

## 2021-01-20 PROCEDURE — 99204 OFFICE O/P NEW MOD 45 MIN: CPT | Mod: S$GLB,,, | Performed by: ORTHOPAEDIC SURGERY

## 2021-01-20 PROCEDURE — 3008F BODY MASS INDEX DOCD: CPT | Mod: CPTII,S$GLB,, | Performed by: ORTHOPAEDIC SURGERY

## 2021-01-20 PROCEDURE — 1126F AMNT PAIN NOTED NONE PRSNT: CPT | Mod: S$GLB,,, | Performed by: ORTHOPAEDIC SURGERY

## 2021-01-20 PROCEDURE — 92567 TYMPANOMETRY: CPT | Mod: S$GLB,,, | Performed by: AUDIOLOGIST

## 2021-01-20 PROCEDURE — 99999 PR PBB SHADOW E&M-EST. PATIENT-LVL IV: ICD-10-PCS | Mod: PBBFAC,,, | Performed by: ORTHOPAEDIC SURGERY

## 2021-01-20 PROCEDURE — 3008F PR BODY MASS INDEX (BMI) DOCUMENTED: ICD-10-PCS | Mod: CPTII,S$GLB,, | Performed by: ORTHOPAEDIC SURGERY

## 2021-01-20 PROCEDURE — 92567 PR TYMPA2METRY: ICD-10-PCS | Mod: S$GLB,,, | Performed by: AUDIOLOGIST

## 2021-01-20 PROCEDURE — 1126F PR PAIN SEVERITY QUANTIFIED, NO PAIN PRESENT: ICD-10-PCS | Mod: S$GLB,,, | Performed by: ORTHOPAEDIC SURGERY

## 2021-01-20 PROCEDURE — 99999 PR PBB SHADOW E&M-EST. PATIENT-LVL I: CPT | Mod: PBBFAC,,, | Performed by: AUDIOLOGIST

## 2021-01-20 PROCEDURE — 3074F SYST BP LT 130 MM HG: CPT | Mod: CPTII,S$GLB,, | Performed by: ORTHOPAEDIC SURGERY

## 2021-01-20 PROCEDURE — 99204 PR OFFICE/OUTPT VISIT, NEW, LEVL IV, 45-59 MIN: ICD-10-PCS | Mod: S$GLB,,, | Performed by: ORTHOPAEDIC SURGERY

## 2021-01-20 PROCEDURE — 3078F PR MOST RECENT DIASTOLIC BLOOD PRESSURE < 80 MM HG: ICD-10-PCS | Mod: CPTII,S$GLB,, | Performed by: ORTHOPAEDIC SURGERY

## 2021-01-20 PROCEDURE — 3078F DIAST BP <80 MM HG: CPT | Mod: CPTII,S$GLB,, | Performed by: ORTHOPAEDIC SURGERY

## 2021-01-20 PROCEDURE — 92557 COMPREHENSIVE HEARING TEST: CPT | Mod: S$GLB,,, | Performed by: AUDIOLOGIST

## 2021-01-20 PROCEDURE — 92557 PR COMPREHENSIVE HEARING TEST: ICD-10-PCS | Mod: S$GLB,,, | Performed by: AUDIOLOGIST

## 2021-01-20 PROCEDURE — 99999 PR PBB SHADOW E&M-EST. PATIENT-LVL I: ICD-10-PCS | Mod: PBBFAC,,, | Performed by: AUDIOLOGIST

## 2021-01-20 PROCEDURE — 3074F PR MOST RECENT SYSTOLIC BLOOD PRESSURE < 130 MM HG: ICD-10-PCS | Mod: CPTII,S$GLB,, | Performed by: ORTHOPAEDIC SURGERY

## 2021-01-20 PROCEDURE — 99999 PR PBB SHADOW E&M-EST. PATIENT-LVL IV: CPT | Mod: PBBFAC,,, | Performed by: ORTHOPAEDIC SURGERY

## 2021-01-21 ENCOUNTER — HOSPITAL ENCOUNTER (OUTPATIENT)
Dept: CARDIOLOGY | Facility: HOSPITAL | Age: 66
Discharge: HOME | End: 2021-01-21
Attending: INTERNAL MEDICINE
Payer: COMMERCIAL

## 2021-01-21 ENCOUNTER — OFFICE VISIT (OUTPATIENT)
Dept: CARDIOLOGY | Facility: CLINIC | Age: 66
End: 2021-01-21
Payer: COMMERCIAL

## 2021-01-21 VITALS
BODY MASS INDEX: 39.72 KG/M2 | DIASTOLIC BLOOD PRESSURE: 70 MMHG | WEIGHT: 253.06 LBS | HEIGHT: 67 IN | SYSTOLIC BLOOD PRESSURE: 124 MMHG | HEART RATE: 79 BPM | OXYGEN SATURATION: 97 %

## 2021-01-21 DIAGNOSIS — R00.2 PALPITATIONS: ICD-10-CM

## 2021-01-21 DIAGNOSIS — I48.0 PAF (PAROXYSMAL ATRIAL FIBRILLATION): ICD-10-CM

## 2021-01-21 DIAGNOSIS — E11.9 TYPE 2 DIABETES MELLITUS WITHOUT COMPLICATION, WITH LONG-TERM CURRENT USE OF INSULIN: Chronic | ICD-10-CM

## 2021-01-21 DIAGNOSIS — E66.01 MORBID OBESITY WITH BMI OF 40.0-44.9, ADULT: ICD-10-CM

## 2021-01-21 DIAGNOSIS — E78.49 OTHER HYPERLIPIDEMIA: Chronic | ICD-10-CM

## 2021-01-21 DIAGNOSIS — I49.3 PVC'S (PREMATURE VENTRICULAR CONTRACTIONS): ICD-10-CM

## 2021-01-21 DIAGNOSIS — G47.33 OSA (OBSTRUCTIVE SLEEP APNEA): ICD-10-CM

## 2021-01-21 DIAGNOSIS — Z79.4 TYPE 2 DIABETES MELLITUS WITHOUT COMPLICATION, WITH LONG-TERM CURRENT USE OF INSULIN: Chronic | ICD-10-CM

## 2021-01-21 DIAGNOSIS — I20.9 AP (ANGINA PECTORIS): ICD-10-CM

## 2021-01-21 DIAGNOSIS — I10 ESSENTIAL HYPERTENSION: Primary | Chronic | ICD-10-CM

## 2021-01-21 DIAGNOSIS — I48.0 PAROXYSMAL ATRIAL FIBRILLATION: ICD-10-CM

## 2021-01-21 PROCEDURE — 99214 PR OFFICE/OUTPT VISIT, EST, LEVL IV, 30-39 MIN: ICD-10-PCS | Mod: S$GLB,,, | Performed by: INTERNAL MEDICINE

## 2021-01-21 PROCEDURE — 3051F HG A1C>EQUAL 7.0%<8.0%: CPT | Mod: CPTII,S$GLB,, | Performed by: INTERNAL MEDICINE

## 2021-01-21 PROCEDURE — 3051F PR MOST RECENT HEMOGLOBIN A1C LEVEL 7.0 - < 8.0%: ICD-10-PCS | Mod: CPTII,S$GLB,, | Performed by: INTERNAL MEDICINE

## 2021-01-21 PROCEDURE — 1126F AMNT PAIN NOTED NONE PRSNT: CPT | Mod: S$GLB,,, | Performed by: INTERNAL MEDICINE

## 2021-01-21 PROCEDURE — 3008F BODY MASS INDEX DOCD: CPT | Mod: CPTII,S$GLB,, | Performed by: INTERNAL MEDICINE

## 2021-01-21 PROCEDURE — 3078F PR MOST RECENT DIASTOLIC BLOOD PRESSURE < 80 MM HG: ICD-10-PCS | Mod: CPTII,S$GLB,, | Performed by: INTERNAL MEDICINE

## 2021-01-21 PROCEDURE — 93227 XTRNL ECG REC<48 HR R&I: CPT | Mod: ,,, | Performed by: INTERNAL MEDICINE

## 2021-01-21 PROCEDURE — 99999 PR PBB SHADOW E&M-EST. PATIENT-LVL V: CPT | Mod: PBBFAC,,, | Performed by: INTERNAL MEDICINE

## 2021-01-21 PROCEDURE — 93226 XTRNL ECG REC<48 HR SCAN A/R: CPT

## 2021-01-21 PROCEDURE — 3074F PR MOST RECENT SYSTOLIC BLOOD PRESSURE < 130 MM HG: ICD-10-PCS | Mod: CPTII,S$GLB,, | Performed by: INTERNAL MEDICINE

## 2021-01-21 PROCEDURE — 99999 PR PBB SHADOW E&M-EST. PATIENT-LVL V: ICD-10-PCS | Mod: PBBFAC,,, | Performed by: INTERNAL MEDICINE

## 2021-01-21 PROCEDURE — 1126F PR PAIN SEVERITY QUANTIFIED, NO PAIN PRESENT: ICD-10-PCS | Mod: S$GLB,,, | Performed by: INTERNAL MEDICINE

## 2021-01-21 PROCEDURE — 3074F SYST BP LT 130 MM HG: CPT | Mod: CPTII,S$GLB,, | Performed by: INTERNAL MEDICINE

## 2021-01-21 PROCEDURE — 93227 HOLTER MONITOR - 48 HOUR (CUPID ONLY): ICD-10-PCS | Mod: ,,, | Performed by: INTERNAL MEDICINE

## 2021-01-21 PROCEDURE — 99214 OFFICE O/P EST MOD 30 MIN: CPT | Mod: S$GLB,,, | Performed by: INTERNAL MEDICINE

## 2021-01-21 PROCEDURE — 3008F PR BODY MASS INDEX (BMI) DOCUMENTED: ICD-10-PCS | Mod: CPTII,S$GLB,, | Performed by: INTERNAL MEDICINE

## 2021-01-21 PROCEDURE — 3078F DIAST BP <80 MM HG: CPT | Mod: CPTII,S$GLB,, | Performed by: INTERNAL MEDICINE

## 2021-01-21 RX ORDER — VERAPAMIL HYDROCHLORIDE 120 MG/1
120 TABLET, FILM COATED, EXTENDED RELEASE ORAL NIGHTLY
Qty: 30 TABLET | Refills: 3 | Status: SHIPPED | OUTPATIENT
Start: 2021-01-21 | End: 2022-01-03

## 2021-01-21 RX ORDER — METOPROLOL TARTRATE 50 MG/1
50 TABLET ORAL 2 TIMES DAILY
Qty: 120 TABLET | Refills: 1 | Status: SHIPPED | OUTPATIENT
Start: 2021-01-21 | End: 2021-07-27 | Stop reason: SDUPTHER

## 2021-01-25 ENCOUNTER — TELEPHONE (OUTPATIENT)
Dept: ELECTROPHYSIOLOGY | Facility: CLINIC | Age: 66
End: 2021-01-25

## 2021-01-25 DIAGNOSIS — I49.3 PVC'S (PREMATURE VENTRICULAR CONTRACTIONS): Primary | ICD-10-CM

## 2021-01-26 DIAGNOSIS — I49.3 PVC'S (PREMATURE VENTRICULAR CONTRACTIONS): Primary | ICD-10-CM

## 2021-01-26 DIAGNOSIS — R00.2 PALPITATIONS: ICD-10-CM

## 2021-02-17 RX ORDER — MONTELUKAST SODIUM 10 MG/1
TABLET ORAL
Qty: 90 TABLET | Refills: 0 | Status: SHIPPED | OUTPATIENT
Start: 2021-02-17 | End: 2022-05-16

## 2021-02-17 RX ORDER — METFORMIN HYDROCHLORIDE 500 MG/1
TABLET, EXTENDED RELEASE ORAL
Qty: 270 TABLET | Refills: 2 | Status: SHIPPED | OUTPATIENT
Start: 2021-02-17 | End: 2021-03-16

## 2021-02-18 ENCOUNTER — TELEPHONE (OUTPATIENT)
Dept: FAMILY MEDICINE | Facility: CLINIC | Age: 66
End: 2021-02-18

## 2021-02-26 ENCOUNTER — OFFICE VISIT (OUTPATIENT)
Dept: FAMILY MEDICINE | Facility: CLINIC | Age: 66
End: 2021-02-26
Payer: COMMERCIAL

## 2021-02-26 ENCOUNTER — LAB VISIT (OUTPATIENT)
Dept: LAB | Facility: HOSPITAL | Age: 66
End: 2021-02-26
Payer: COMMERCIAL

## 2021-02-26 VITALS
HEART RATE: 96 BPM | DIASTOLIC BLOOD PRESSURE: 68 MMHG | BODY MASS INDEX: 40.1 KG/M2 | HEIGHT: 67 IN | TEMPERATURE: 98 F | WEIGHT: 255.5 LBS | RESPIRATION RATE: 18 BRPM | OXYGEN SATURATION: 97 % | SYSTOLIC BLOOD PRESSURE: 108 MMHG

## 2021-02-26 DIAGNOSIS — E55.9 VITAMIN D DEFICIENCY DISEASE: ICD-10-CM

## 2021-02-26 DIAGNOSIS — Z79.899 ON STATIN THERAPY: ICD-10-CM

## 2021-02-26 DIAGNOSIS — R25.2 MUSCLE CRAMP: ICD-10-CM

## 2021-02-26 DIAGNOSIS — D50.9 IRON DEFICIENCY ANEMIA, UNSPECIFIED IRON DEFICIENCY ANEMIA TYPE: Primary | ICD-10-CM

## 2021-02-26 DIAGNOSIS — D50.9 IRON DEFICIENCY ANEMIA, UNSPECIFIED IRON DEFICIENCY ANEMIA TYPE: ICD-10-CM

## 2021-02-26 LAB
ERYTHROCYTE [DISTWIDTH] IN BLOOD BY AUTOMATED COUNT: 13.2 % (ref 11.5–14.5)
HCT VFR BLD AUTO: 38.4 % (ref 37–48.5)
HGB BLD-MCNC: 11.9 G/DL (ref 12–16)
MCH RBC QN AUTO: 31.2 PG (ref 27–31)
MCHC RBC AUTO-ENTMCNC: 31 G/DL (ref 32–36)
MCV RBC AUTO: 101 FL (ref 82–98)
PLATELET # BLD AUTO: 249 K/UL (ref 150–350)
PMV BLD AUTO: 10.7 FL (ref 9.2–12.9)
RBC # BLD AUTO: 3.82 M/UL (ref 4–5.4)
WBC # BLD AUTO: 7.93 K/UL (ref 3.9–12.7)

## 2021-02-26 PROCEDURE — 3074F PR MOST RECENT SYSTOLIC BLOOD PRESSURE < 130 MM HG: ICD-10-PCS | Mod: CPTII,S$GLB,, | Performed by: REGISTERED NURSE

## 2021-02-26 PROCEDURE — 3078F DIAST BP <80 MM HG: CPT | Mod: CPTII,S$GLB,, | Performed by: REGISTERED NURSE

## 2021-02-26 PROCEDURE — 3008F PR BODY MASS INDEX (BMI) DOCUMENTED: ICD-10-PCS | Mod: CPTII,S$GLB,, | Performed by: REGISTERED NURSE

## 2021-02-26 PROCEDURE — 83540 ASSAY OF IRON: CPT

## 2021-02-26 PROCEDURE — 36415 COLL VENOUS BLD VENIPUNCTURE: CPT | Mod: PO

## 2021-02-26 PROCEDURE — 83735 ASSAY OF MAGNESIUM: CPT

## 2021-02-26 PROCEDURE — 99214 OFFICE O/P EST MOD 30 MIN: CPT | Mod: S$GLB,,, | Performed by: REGISTERED NURSE

## 2021-02-26 PROCEDURE — 1126F AMNT PAIN NOTED NONE PRSNT: CPT | Mod: S$GLB,,, | Performed by: REGISTERED NURSE

## 2021-02-26 PROCEDURE — 82728 ASSAY OF FERRITIN: CPT

## 2021-02-26 PROCEDURE — 80048 BASIC METABOLIC PNL TOTAL CA: CPT

## 2021-02-26 PROCEDURE — 82550 ASSAY OF CK (CPK): CPT

## 2021-02-26 PROCEDURE — 99999 PR PBB SHADOW E&M-EST. PATIENT-LVL III: ICD-10-PCS | Mod: PBBFAC,,, | Performed by: REGISTERED NURSE

## 2021-02-26 PROCEDURE — 85027 COMPLETE CBC AUTOMATED: CPT

## 2021-02-26 PROCEDURE — 99214 PR OFFICE/OUTPT VISIT, EST, LEVL IV, 30-39 MIN: ICD-10-PCS | Mod: S$GLB,,, | Performed by: REGISTERED NURSE

## 2021-02-26 PROCEDURE — 3078F PR MOST RECENT DIASTOLIC BLOOD PRESSURE < 80 MM HG: ICD-10-PCS | Mod: CPTII,S$GLB,, | Performed by: REGISTERED NURSE

## 2021-02-26 PROCEDURE — 3074F SYST BP LT 130 MM HG: CPT | Mod: CPTII,S$GLB,, | Performed by: REGISTERED NURSE

## 2021-02-26 PROCEDURE — 99999 PR PBB SHADOW E&M-EST. PATIENT-LVL III: CPT | Mod: PBBFAC,,, | Performed by: REGISTERED NURSE

## 2021-02-26 PROCEDURE — 3008F BODY MASS INDEX DOCD: CPT | Mod: CPTII,S$GLB,, | Performed by: REGISTERED NURSE

## 2021-02-26 PROCEDURE — 1126F PR PAIN SEVERITY QUANTIFIED, NO PAIN PRESENT: ICD-10-PCS | Mod: S$GLB,,, | Performed by: REGISTERED NURSE

## 2021-02-26 RX ORDER — METOPROLOL SUCCINATE 50 MG/1
50 TABLET, EXTENDED RELEASE ORAL
COMMUNITY
Start: 2021-01-28 | End: 2021-07-27

## 2021-02-26 RX ORDER — PANTOPRAZOLE SODIUM 20 MG/1
20 TABLET, DELAYED RELEASE ORAL
COMMUNITY
Start: 2021-01-28 | End: 2021-04-30 | Stop reason: SDUPTHER

## 2021-02-27 LAB
ANION GAP SERPL CALC-SCNC: 7 MMOL/L (ref 8–16)
BUN SERPL-MCNC: 18 MG/DL (ref 8–23)
CALCIUM SERPL-MCNC: 9 MG/DL (ref 8.7–10.5)
CHLORIDE SERPL-SCNC: 104 MMOL/L (ref 95–110)
CK SERPL-CCNC: 62 U/L (ref 20–180)
CO2 SERPL-SCNC: 30 MMOL/L (ref 23–29)
CREAT SERPL-MCNC: 0.8 MG/DL (ref 0.5–1.4)
EST. GFR  (AFRICAN AMERICAN): >60 ML/MIN/1.73 M^2
EST. GFR  (NON AFRICAN AMERICAN): >60 ML/MIN/1.73 M^2
FERRITIN SERPL-MCNC: 108 NG/ML (ref 20–300)
GLUCOSE SERPL-MCNC: 145 MG/DL (ref 70–110)
IRON SERPL-MCNC: 57 UG/DL (ref 30–160)
MAGNESIUM SERPL-MCNC: 1.8 MG/DL (ref 1.6–2.6)
POTASSIUM SERPL-SCNC: 3.6 MMOL/L (ref 3.5–5.1)
SATURATED IRON: 15 % (ref 20–50)
SODIUM SERPL-SCNC: 141 MMOL/L (ref 136–145)
TOTAL IRON BINDING CAPACITY: 382 UG/DL (ref 250–450)
TRANSFERRIN SERPL-MCNC: 258 MG/DL (ref 200–375)

## 2021-03-18 ENCOUNTER — PROCEDURE VISIT (OUTPATIENT)
Dept: OPHTHALMOLOGY | Facility: CLINIC | Age: 66
End: 2021-03-18
Payer: COMMERCIAL

## 2021-03-18 DIAGNOSIS — Z79.4 TYPE 2 DIABETES MELLITUS WITH BOTH EYES AFFECTED BY MILD NONPROLIFERATIVE RETINOPATHY AND MACULAR EDEMA, WITH LONG-TERM CURRENT USE OF INSULIN: Primary | ICD-10-CM

## 2021-03-18 DIAGNOSIS — H04.129 DRYNESS, EYE: ICD-10-CM

## 2021-03-18 DIAGNOSIS — E11.3213 TYPE 2 DIABETES MELLITUS WITH BOTH EYES AFFECTED BY MILD NONPROLIFERATIVE RETINOPATHY AND MACULAR EDEMA, WITH LONG-TERM CURRENT USE OF INSULIN: Primary | ICD-10-CM

## 2021-03-18 DIAGNOSIS — D31.31 NEVUS OF CHOROID OF RIGHT EYE: ICD-10-CM

## 2021-03-18 DIAGNOSIS — G45.3 AMAUROSIS FUGAX: ICD-10-CM

## 2021-03-18 PROCEDURE — 92134 POSTERIOR SEGMENT OCT RETINA (OCULAR COHERENCE TOMOGRAPHY)-BOTH EYES: ICD-10-PCS | Mod: S$GLB,,, | Performed by: OPHTHALMOLOGY

## 2021-03-18 PROCEDURE — 99499 UNLISTED E&M SERVICE: CPT | Mod: S$GLB,,, | Performed by: OPHTHALMOLOGY

## 2021-03-18 PROCEDURE — 67028 INJECTION EYE DRUG: CPT | Mod: RT,S$GLB,, | Performed by: OPHTHALMOLOGY

## 2021-03-18 PROCEDURE — 99499 NO LOS: ICD-10-PCS | Mod: S$GLB,,, | Performed by: OPHTHALMOLOGY

## 2021-03-18 PROCEDURE — 92134 CPTRZ OPH DX IMG PST SGM RTA: CPT | Mod: S$GLB,,, | Performed by: OPHTHALMOLOGY

## 2021-03-18 PROCEDURE — 67028 PR INJECT INTRAVITREAL PHARMCOLOGIC: ICD-10-PCS | Mod: RT,S$GLB,, | Performed by: OPHTHALMOLOGY

## 2021-03-18 RX ORDER — PREDNISOLONE ACETATE 10 MG/ML
1 SUSPENSION/ DROPS OPHTHALMIC 4 TIMES DAILY
Qty: 10 ML | Refills: 1 | Status: SHIPPED | OUTPATIENT
Start: 2021-03-18 | End: 2022-05-16

## 2021-03-18 RX ORDER — KETOROLAC TROMETHAMINE 5 MG/ML
1 SOLUTION OPHTHALMIC 4 TIMES DAILY
Qty: 5 ML | Refills: 0 | Status: SHIPPED | OUTPATIENT
Start: 2021-03-18 | End: 2022-03-18

## 2021-03-30 RX ORDER — LOSARTAN POTASSIUM AND HYDROCHLOROTHIAZIDE 25; 100 MG/1; MG/1
1 TABLET ORAL DAILY
Qty: 90 TABLET | Refills: 2 | Status: SHIPPED | OUTPATIENT
Start: 2021-03-30 | End: 2021-05-04 | Stop reason: SDUPTHER

## 2021-04-19 ENCOUNTER — OFFICE VISIT (OUTPATIENT)
Dept: FAMILY MEDICINE | Facility: CLINIC | Age: 66
End: 2021-04-19
Payer: COMMERCIAL

## 2021-04-19 VITALS
BODY MASS INDEX: 40.02 KG/M2 | RESPIRATION RATE: 16 BRPM | OXYGEN SATURATION: 87 % | HEART RATE: 87 BPM | WEIGHT: 255.5 LBS | SYSTOLIC BLOOD PRESSURE: 124 MMHG | TEMPERATURE: 98 F | DIASTOLIC BLOOD PRESSURE: 84 MMHG

## 2021-04-19 DIAGNOSIS — K64.9 HEMORRHOIDS, UNSPECIFIED HEMORRHOID TYPE: Primary | ICD-10-CM

## 2021-04-19 PROCEDURE — 1125F PR PAIN SEVERITY QUANTIFIED, PAIN PRESENT: ICD-10-PCS | Mod: S$GLB,,, | Performed by: INTERNAL MEDICINE

## 2021-04-19 PROCEDURE — 99999 PR PBB SHADOW E&M-EST. PATIENT-LVL V: CPT | Mod: PBBFAC,,, | Performed by: INTERNAL MEDICINE

## 2021-04-19 PROCEDURE — 3008F BODY MASS INDEX DOCD: CPT | Mod: CPTII,S$GLB,, | Performed by: INTERNAL MEDICINE

## 2021-04-19 PROCEDURE — 1125F AMNT PAIN NOTED PAIN PRSNT: CPT | Mod: S$GLB,,, | Performed by: INTERNAL MEDICINE

## 2021-04-19 PROCEDURE — 99999 PR PBB SHADOW E&M-EST. PATIENT-LVL V: ICD-10-PCS | Mod: PBBFAC,,, | Performed by: INTERNAL MEDICINE

## 2021-04-19 PROCEDURE — 3008F PR BODY MASS INDEX (BMI) DOCUMENTED: ICD-10-PCS | Mod: CPTII,S$GLB,, | Performed by: INTERNAL MEDICINE

## 2021-04-19 PROCEDURE — 99213 OFFICE O/P EST LOW 20 MIN: CPT | Mod: S$GLB,,, | Performed by: INTERNAL MEDICINE

## 2021-04-19 PROCEDURE — 99213 PR OFFICE/OUTPT VISIT, EST, LEVL III, 20-29 MIN: ICD-10-PCS | Mod: S$GLB,,, | Performed by: INTERNAL MEDICINE

## 2021-04-19 RX ORDER — HYDROCORTISONE ACETATE 25 MG/1
25 SUPPOSITORY RECTAL 2 TIMES DAILY
Qty: 20 SUPPOSITORY | Refills: 0 | Status: SHIPPED | OUTPATIENT
Start: 2021-04-19 | End: 2021-04-29

## 2021-04-21 RX ORDER — METFORMIN HYDROCHLORIDE 500 MG/1
TABLET, EXTENDED RELEASE ORAL
Qty: 270 TABLET | Refills: 1 | Status: SHIPPED | OUTPATIENT
Start: 2021-04-21 | End: 2021-05-04 | Stop reason: SDUPTHER

## 2021-04-22 ENCOUNTER — TELEPHONE (OUTPATIENT)
Dept: OPHTHALMOLOGY | Facility: CLINIC | Age: 66
End: 2021-04-22

## 2021-04-26 ENCOUNTER — PROCEDURE VISIT (OUTPATIENT)
Dept: OPHTHALMOLOGY | Facility: CLINIC | Age: 66
End: 2021-04-26
Payer: COMMERCIAL

## 2021-04-26 DIAGNOSIS — D31.31 NEVUS OF CHOROID OF RIGHT EYE: ICD-10-CM

## 2021-04-26 DIAGNOSIS — G45.3 AMAUROSIS FUGAX: ICD-10-CM

## 2021-04-26 DIAGNOSIS — Z79.4 TYPE 2 DIABETES MELLITUS WITH BOTH EYES AFFECTED BY MILD NONPROLIFERATIVE RETINOPATHY AND MACULAR EDEMA, WITH LONG-TERM CURRENT USE OF INSULIN: Primary | ICD-10-CM

## 2021-04-26 DIAGNOSIS — H04.129 DRYNESS, EYE: ICD-10-CM

## 2021-04-26 DIAGNOSIS — E11.3213 TYPE 2 DIABETES MELLITUS WITH BOTH EYES AFFECTED BY MILD NONPROLIFERATIVE RETINOPATHY AND MACULAR EDEMA, WITH LONG-TERM CURRENT USE OF INSULIN: Primary | ICD-10-CM

## 2021-04-26 PROCEDURE — 3051F PR MOST RECENT HEMOGLOBIN A1C LEVEL 7.0 - < 8.0%: ICD-10-PCS | Mod: CPTII,S$GLB,, | Performed by: OPHTHALMOLOGY

## 2021-04-26 PROCEDURE — 92134 POSTERIOR SEGMENT OCT RETINA (OCULAR COHERENCE TOMOGRAPHY)-BOTH EYES: ICD-10-PCS | Mod: S$GLB,,, | Performed by: OPHTHALMOLOGY

## 2021-04-26 PROCEDURE — 92134 CPTRZ OPH DX IMG PST SGM RTA: CPT | Mod: S$GLB,,, | Performed by: OPHTHALMOLOGY

## 2021-04-26 PROCEDURE — 99213 OFFICE O/P EST LOW 20 MIN: CPT | Mod: S$GLB,,, | Performed by: OPHTHALMOLOGY

## 2021-04-26 PROCEDURE — 3051F HG A1C>EQUAL 7.0%<8.0%: CPT | Mod: CPTII,S$GLB,, | Performed by: OPHTHALMOLOGY

## 2021-04-26 PROCEDURE — 99213 PR OFFICE/OUTPT VISIT, EST, LEVL III, 20-29 MIN: ICD-10-PCS | Mod: S$GLB,,, | Performed by: OPHTHALMOLOGY

## 2021-04-26 RX ORDER — LOSARTAN POTASSIUM 100 MG/1
TABLET ORAL
COMMUNITY
End: 2021-05-04

## 2021-04-28 DIAGNOSIS — I48.0 PAROXYSMAL ATRIAL FIBRILLATION: ICD-10-CM

## 2021-04-28 DIAGNOSIS — I20.9 AP (ANGINA PECTORIS): ICD-10-CM

## 2021-04-28 RX ORDER — RANOLAZINE 500 MG/1
500 TABLET, EXTENDED RELEASE ORAL 2 TIMES DAILY
Qty: 180 TABLET | Refills: 1 | Status: SHIPPED | OUTPATIENT
Start: 2021-04-28 | End: 2021-11-15 | Stop reason: SDUPTHER

## 2021-04-30 RX ORDER — PANTOPRAZOLE SODIUM 20 MG/1
20 TABLET, DELAYED RELEASE ORAL
Qty: 90 TABLET | Refills: 1 | Status: SHIPPED | OUTPATIENT
Start: 2021-04-30 | End: 2021-10-24

## 2021-05-04 ENCOUNTER — LAB VISIT (OUTPATIENT)
Dept: LAB | Facility: HOSPITAL | Age: 66
End: 2021-05-04
Attending: FAMILY MEDICINE
Payer: COMMERCIAL

## 2021-05-04 ENCOUNTER — OFFICE VISIT (OUTPATIENT)
Dept: FAMILY MEDICINE | Facility: CLINIC | Age: 66
End: 2021-05-04
Payer: COMMERCIAL

## 2021-05-04 VITALS
DIASTOLIC BLOOD PRESSURE: 60 MMHG | OXYGEN SATURATION: 96 % | TEMPERATURE: 98 F | BODY MASS INDEX: 40.73 KG/M2 | SYSTOLIC BLOOD PRESSURE: 110 MMHG | HEART RATE: 88 BPM | HEIGHT: 67 IN | WEIGHT: 259.5 LBS

## 2021-05-04 DIAGNOSIS — D50.9 IRON DEFICIENCY ANEMIA, UNSPECIFIED IRON DEFICIENCY ANEMIA TYPE: ICD-10-CM

## 2021-05-04 DIAGNOSIS — M17.11 PRIMARY OSTEOARTHRITIS OF RIGHT KNEE: Primary | ICD-10-CM

## 2021-05-04 LAB
BASOPHILS # BLD AUTO: 0.06 K/UL (ref 0–0.2)
BASOPHILS NFR BLD: 0.8 % (ref 0–1.9)
DIFFERENTIAL METHOD: ABNORMAL
EOSINOPHIL # BLD AUTO: 0.1 K/UL (ref 0–0.5)
EOSINOPHIL NFR BLD: 1.7 % (ref 0–8)
ERYTHROCYTE [DISTWIDTH] IN BLOOD BY AUTOMATED COUNT: 12.9 % (ref 11.5–14.5)
HCT VFR BLD AUTO: 36.9 % (ref 37–48.5)
HGB BLD-MCNC: 11.4 G/DL (ref 12–16)
IMM GRANULOCYTES # BLD AUTO: 0.02 K/UL (ref 0–0.04)
IMM GRANULOCYTES NFR BLD AUTO: 0.3 % (ref 0–0.5)
LYMPHOCYTES # BLD AUTO: 3 K/UL (ref 1–4.8)
LYMPHOCYTES NFR BLD: 38.6 % (ref 18–48)
MCH RBC QN AUTO: 30.8 PG (ref 27–31)
MCHC RBC AUTO-ENTMCNC: 30.9 G/DL (ref 32–36)
MCV RBC AUTO: 100 FL (ref 82–98)
MONOCYTES # BLD AUTO: 0.6 K/UL (ref 0.3–1)
MONOCYTES NFR BLD: 7.4 % (ref 4–15)
NEUTROPHILS # BLD AUTO: 3.9 K/UL (ref 1.8–7.7)
NEUTROPHILS NFR BLD: 51.2 % (ref 38–73)
NRBC BLD-RTO: 0 /100 WBC
PLATELET # BLD AUTO: 262 K/UL (ref 150–450)
PMV BLD AUTO: 10.9 FL (ref 9.2–12.9)
RBC # BLD AUTO: 3.7 M/UL (ref 4–5.4)
WBC # BLD AUTO: 7.67 K/UL (ref 3.9–12.7)

## 2021-05-04 PROCEDURE — 85025 COMPLETE CBC W/AUTO DIFF WBC: CPT | Performed by: FAMILY MEDICINE

## 2021-05-04 PROCEDURE — 1125F AMNT PAIN NOTED PAIN PRSNT: CPT | Mod: S$GLB,,, | Performed by: FAMILY MEDICINE

## 2021-05-04 PROCEDURE — 99213 PR OFFICE/OUTPT VISIT, EST, LEVL III, 20-29 MIN: ICD-10-PCS | Mod: 25,S$GLB,, | Performed by: FAMILY MEDICINE

## 2021-05-04 PROCEDURE — 20610 DRAIN/INJ JOINT/BURSA W/O US: CPT | Mod: S$GLB,,, | Performed by: FAMILY MEDICINE

## 2021-05-04 PROCEDURE — 3008F PR BODY MASS INDEX (BMI) DOCUMENTED: ICD-10-PCS | Mod: CPTII,S$GLB,, | Performed by: FAMILY MEDICINE

## 2021-05-04 PROCEDURE — 20610 PR DRAIN/INJECT LARGE JOINT/BURSA: ICD-10-PCS | Mod: S$GLB,,, | Performed by: FAMILY MEDICINE

## 2021-05-04 PROCEDURE — 99213 OFFICE O/P EST LOW 20 MIN: CPT | Mod: 25,S$GLB,, | Performed by: FAMILY MEDICINE

## 2021-05-04 PROCEDURE — 3008F BODY MASS INDEX DOCD: CPT | Mod: CPTII,S$GLB,, | Performed by: FAMILY MEDICINE

## 2021-05-04 PROCEDURE — 99999 PR PBB SHADOW E&M-EST. PATIENT-LVL V: ICD-10-PCS | Mod: PBBFAC,,, | Performed by: FAMILY MEDICINE

## 2021-05-04 PROCEDURE — 83540 ASSAY OF IRON: CPT | Performed by: FAMILY MEDICINE

## 2021-05-04 PROCEDURE — 99999 PR PBB SHADOW E&M-EST. PATIENT-LVL V: CPT | Mod: PBBFAC,,, | Performed by: FAMILY MEDICINE

## 2021-05-04 PROCEDURE — 1125F PR PAIN SEVERITY QUANTIFIED, PAIN PRESENT: ICD-10-PCS | Mod: S$GLB,,, | Performed by: FAMILY MEDICINE

## 2021-05-04 PROCEDURE — 36415 COLL VENOUS BLD VENIPUNCTURE: CPT | Mod: PO | Performed by: FAMILY MEDICINE

## 2021-05-04 RX ORDER — TRIAMCINOLONE ACETONIDE 40 MG/ML
40 INJECTION, SUSPENSION INTRA-ARTICULAR; INTRAMUSCULAR
Status: COMPLETED | OUTPATIENT
Start: 2021-05-04 | End: 2021-05-04

## 2021-05-04 RX ORDER — METFORMIN HYDROCHLORIDE 500 MG/1
TABLET, EXTENDED RELEASE ORAL
Qty: 270 TABLET | Refills: 1 | Status: SHIPPED | OUTPATIENT
Start: 2021-05-04 | End: 2022-01-03 | Stop reason: SDUPTHER

## 2021-05-04 RX ORDER — LOSARTAN POTASSIUM AND HYDROCHLOROTHIAZIDE 25; 100 MG/1; MG/1
1 TABLET ORAL DAILY
Qty: 90 TABLET | Refills: 2 | Status: SHIPPED | OUTPATIENT
Start: 2021-05-04 | End: 2021-05-07 | Stop reason: SDUPTHER

## 2021-05-04 RX ORDER — LIDOCAINE HYDROCHLORIDE 10 MG/ML
1 INJECTION INFILTRATION; PERINEURAL
Status: COMPLETED | OUTPATIENT
Start: 2021-05-04 | End: 2021-05-04

## 2021-05-04 RX ADMIN — TRIAMCINOLONE ACETONIDE 40 MG: 40 INJECTION, SUSPENSION INTRA-ARTICULAR; INTRAMUSCULAR at 06:05

## 2021-05-04 RX ADMIN — LIDOCAINE HYDROCHLORIDE 1 ML: 10 INJECTION INFILTRATION; PERINEURAL at 06:05

## 2021-05-05 LAB
IRON SERPL-MCNC: 61 UG/DL (ref 30–160)
SATURATED IRON: 16 % (ref 20–50)
TOTAL IRON BINDING CAPACITY: 383 UG/DL (ref 250–450)
TRANSFERRIN SERPL-MCNC: 259 MG/DL (ref 200–375)

## 2021-05-07 RX ORDER — LOSARTAN POTASSIUM AND HYDROCHLOROTHIAZIDE 25; 100 MG/1; MG/1
1 TABLET ORAL DAILY
Qty: 90 TABLET | Refills: 1 | Status: SHIPPED | OUTPATIENT
Start: 2021-05-07 | End: 2021-11-30 | Stop reason: SDUPTHER

## 2021-06-09 ENCOUNTER — TELEPHONE (OUTPATIENT)
Dept: CARDIOLOGY | Facility: CLINIC | Age: 66
End: 2021-06-09

## 2021-06-22 ENCOUNTER — OFFICE VISIT (OUTPATIENT)
Dept: FAMILY MEDICINE | Facility: CLINIC | Age: 66
End: 2021-06-22
Payer: COMMERCIAL

## 2021-06-22 VITALS
SYSTOLIC BLOOD PRESSURE: 120 MMHG | BODY MASS INDEX: 29.98 KG/M2 | HEIGHT: 67 IN | DIASTOLIC BLOOD PRESSURE: 66 MMHG | HEART RATE: 84 BPM | OXYGEN SATURATION: 96 % | WEIGHT: 191 LBS | TEMPERATURE: 98 F

## 2021-06-22 DIAGNOSIS — M25.561 CHRONIC PAIN OF BOTH KNEES: ICD-10-CM

## 2021-06-22 DIAGNOSIS — G89.29 CHRONIC PAIN OF BOTH KNEES: ICD-10-CM

## 2021-06-22 DIAGNOSIS — M25.562 CHRONIC PAIN OF BOTH KNEES: ICD-10-CM

## 2021-06-22 DIAGNOSIS — R05.9 COUGH: ICD-10-CM

## 2021-06-22 DIAGNOSIS — J01.90 ACUTE RHINOSINUSITIS: Primary | ICD-10-CM

## 2021-06-22 DIAGNOSIS — Z79.01 ANTICOAGULATED: ICD-10-CM

## 2021-06-22 PROCEDURE — 99999 PR PBB SHADOW E&M-EST. PATIENT-LVL V: ICD-10-PCS | Mod: PBBFAC,,, | Performed by: FAMILY MEDICINE

## 2021-06-22 PROCEDURE — 99999 PR PBB SHADOW E&M-EST. PATIENT-LVL V: CPT | Mod: PBBFAC,,, | Performed by: FAMILY MEDICINE

## 2021-06-22 PROCEDURE — 3008F BODY MASS INDEX DOCD: CPT | Mod: CPTII,S$GLB,, | Performed by: FAMILY MEDICINE

## 2021-06-22 PROCEDURE — 1125F PR PAIN SEVERITY QUANTIFIED, PAIN PRESENT: ICD-10-PCS | Mod: S$GLB,,, | Performed by: FAMILY MEDICINE

## 2021-06-22 PROCEDURE — 99214 OFFICE O/P EST MOD 30 MIN: CPT | Mod: 25,S$GLB,, | Performed by: FAMILY MEDICINE

## 2021-06-22 PROCEDURE — 1125F AMNT PAIN NOTED PAIN PRSNT: CPT | Mod: S$GLB,,, | Performed by: FAMILY MEDICINE

## 2021-06-22 PROCEDURE — 96372 PR INJECTION,THERAP/PROPH/DIAG2ST, IM OR SUBCUT: ICD-10-PCS | Mod: S$GLB,,, | Performed by: FAMILY MEDICINE

## 2021-06-22 PROCEDURE — 99214 PR OFFICE/OUTPT VISIT, EST, LEVL IV, 30-39 MIN: ICD-10-PCS | Mod: 25,S$GLB,, | Performed by: FAMILY MEDICINE

## 2021-06-22 PROCEDURE — 3008F PR BODY MASS INDEX (BMI) DOCUMENTED: ICD-10-PCS | Mod: CPTII,S$GLB,, | Performed by: FAMILY MEDICINE

## 2021-06-22 PROCEDURE — 96372 THER/PROPH/DIAG INJ SC/IM: CPT | Mod: S$GLB,,, | Performed by: FAMILY MEDICINE

## 2021-06-22 RX ORDER — BETAMETHASONE SODIUM PHOSPHATE AND BETAMETHASONE ACETATE 3; 3 MG/ML; MG/ML
9 INJECTION, SUSPENSION INTRA-ARTICULAR; INTRALESIONAL; INTRAMUSCULAR; SOFT TISSUE
Status: COMPLETED | OUTPATIENT
Start: 2021-06-22 | End: 2021-06-22

## 2021-06-22 RX ORDER — PROMETHAZINE HYDROCHLORIDE AND CODEINE PHOSPHATE 6.25; 1 MG/5ML; MG/5ML
5 SOLUTION ORAL EVERY 4 HOURS PRN
Qty: 240 ML | Refills: 0 | Status: SHIPPED | OUTPATIENT
Start: 2021-06-22 | End: 2021-07-02

## 2021-06-22 RX ORDER — AZITHROMYCIN 250 MG/1
TABLET, FILM COATED ORAL
Qty: 6 TABLET | Refills: 0 | Status: SHIPPED | OUTPATIENT
Start: 2021-06-22 | End: 2021-12-15

## 2021-06-22 RX ADMIN — BETAMETHASONE SODIUM PHOSPHATE AND BETAMETHASONE ACETATE 9 MG: 3; 3 INJECTION, SUSPENSION INTRA-ARTICULAR; INTRALESIONAL; INTRAMUSCULAR; SOFT TISSUE at 11:06

## 2021-06-23 ENCOUNTER — PROCEDURE VISIT (OUTPATIENT)
Dept: OPHTHALMOLOGY | Facility: CLINIC | Age: 66
End: 2021-06-23
Payer: COMMERCIAL

## 2021-06-23 DIAGNOSIS — Z79.4 TYPE 2 DIABETES MELLITUS WITH BOTH EYES AFFECTED BY MILD NONPROLIFERATIVE RETINOPATHY AND MACULAR EDEMA, WITH LONG-TERM CURRENT USE OF INSULIN: Primary | ICD-10-CM

## 2021-06-23 DIAGNOSIS — E11.9 TYPE 2 DIABETES MELLITUS WITHOUT COMPLICATION: ICD-10-CM

## 2021-06-23 DIAGNOSIS — E11.3213 TYPE 2 DIABETES MELLITUS WITH BOTH EYES AFFECTED BY MILD NONPROLIFERATIVE RETINOPATHY AND MACULAR EDEMA, WITH LONG-TERM CURRENT USE OF INSULIN: Primary | ICD-10-CM

## 2021-06-23 LAB
LEFT EYE DM RETINOPATHY: POSITIVE
RIGHT EYE DM RETINOPATHY: POSITIVE

## 2021-06-23 PROCEDURE — 92134 CPTRZ OPH DX IMG PST SGM RTA: CPT | Mod: S$GLB,,, | Performed by: OPHTHALMOLOGY

## 2021-06-23 PROCEDURE — 3051F HG A1C>EQUAL 7.0%<8.0%: CPT | Mod: CPTII,S$GLB,, | Performed by: OPHTHALMOLOGY

## 2021-06-23 PROCEDURE — 99213 OFFICE O/P EST LOW 20 MIN: CPT | Mod: S$GLB,,, | Performed by: OPHTHALMOLOGY

## 2021-06-23 PROCEDURE — 92134 POSTERIOR SEGMENT OCT RETINA (OCULAR COHERENCE TOMOGRAPHY)-BOTH EYES: ICD-10-PCS | Mod: S$GLB,,, | Performed by: OPHTHALMOLOGY

## 2021-06-23 PROCEDURE — 99213 PR OFFICE/OUTPT VISIT, EST, LEVL III, 20-29 MIN: ICD-10-PCS | Mod: S$GLB,,, | Performed by: OPHTHALMOLOGY

## 2021-06-23 PROCEDURE — 3051F PR MOST RECENT HEMOGLOBIN A1C LEVEL 7.0 - < 8.0%: ICD-10-PCS | Mod: CPTII,S$GLB,, | Performed by: OPHTHALMOLOGY

## 2021-07-27 ENCOUNTER — TELEPHONE (OUTPATIENT)
Dept: FAMILY MEDICINE | Facility: CLINIC | Age: 66
End: 2021-07-27

## 2021-07-27 DIAGNOSIS — I20.9 AP (ANGINA PECTORIS): ICD-10-CM

## 2021-07-27 DIAGNOSIS — I48.0 PAROXYSMAL ATRIAL FIBRILLATION: ICD-10-CM

## 2021-07-27 RX ORDER — METOPROLOL TARTRATE 50 MG/1
50 TABLET ORAL 2 TIMES DAILY
Qty: 180 TABLET | Refills: 1 | Status: SHIPPED | OUTPATIENT
Start: 2021-07-27 | End: 2022-05-16

## 2021-08-02 ENCOUNTER — PATIENT OUTREACH (OUTPATIENT)
Dept: ADMINISTRATIVE | Facility: OTHER | Age: 66
End: 2021-08-02

## 2021-08-04 ENCOUNTER — PATIENT MESSAGE (OUTPATIENT)
Dept: ADMINISTRATIVE | Facility: HOSPITAL | Age: 66
End: 2021-08-04

## 2021-08-06 DIAGNOSIS — I48.0 PAF (PAROXYSMAL ATRIAL FIBRILLATION): ICD-10-CM

## 2021-08-06 DIAGNOSIS — I48.0 PAROXYSMAL ATRIAL FIBRILLATION: ICD-10-CM

## 2021-08-06 RX ORDER — APIXABAN 5 MG/1
5 TABLET, FILM COATED ORAL 2 TIMES DAILY
Qty: 60 TABLET | Refills: 3 | Status: CANCELLED | OUTPATIENT
Start: 2021-08-06 | End: 2021-09-05

## 2021-08-16 RX ORDER — INSULIN LISPRO 100 [IU]/ML
INJECTION, SUSPENSION SUBCUTANEOUS
Qty: 45 ML | Refills: 3 | Status: SHIPPED | OUTPATIENT
Start: 2021-08-16 | End: 2021-09-06 | Stop reason: SDUPTHER

## 2021-09-06 DIAGNOSIS — I48.0 PAF (PAROXYSMAL ATRIAL FIBRILLATION): ICD-10-CM

## 2021-09-07 RX ORDER — INSULIN LISPRO 100 [IU]/ML
INJECTION, SUSPENSION SUBCUTANEOUS
Qty: 45 ML | Refills: 0 | Status: SHIPPED | OUTPATIENT
Start: 2021-09-07 | End: 2021-11-30 | Stop reason: SDUPTHER

## 2021-09-07 RX ORDER — POTASSIUM CHLORIDE 750 MG/1
10 CAPSULE, EXTENDED RELEASE ORAL DAILY
Qty: 90 CAPSULE | Refills: 0 | Status: SHIPPED | OUTPATIENT
Start: 2021-09-07 | End: 2022-03-30 | Stop reason: SDUPTHER

## 2021-09-21 ENCOUNTER — PATIENT OUTREACH (OUTPATIENT)
Dept: ADMINISTRATIVE | Facility: OTHER | Age: 66
End: 2021-09-21

## 2021-09-22 ENCOUNTER — OFFICE VISIT (OUTPATIENT)
Dept: OTOLARYNGOLOGY | Facility: CLINIC | Age: 66
End: 2021-09-22
Payer: COMMERCIAL

## 2021-09-22 VITALS
DIASTOLIC BLOOD PRESSURE: 65 MMHG | HEART RATE: 78 BPM | BODY MASS INDEX: 39.95 KG/M2 | WEIGHT: 255.06 LBS | SYSTOLIC BLOOD PRESSURE: 107 MMHG | TEMPERATURE: 98 F

## 2021-09-22 DIAGNOSIS — H61.21 RIGHT EAR IMPACTED CERUMEN: Primary | ICD-10-CM

## 2021-09-22 DIAGNOSIS — G50.1 ATYPICAL FACIAL PAIN: ICD-10-CM

## 2021-09-22 DIAGNOSIS — R20.0 RIGHT FACIAL NUMBNESS: ICD-10-CM

## 2021-09-22 PROCEDURE — 69210 REMOVE IMPACTED EAR WAX UNI: CPT | Mod: S$GLB,,, | Performed by: OTOLARYNGOLOGY

## 2021-09-22 PROCEDURE — 3008F PR BODY MASS INDEX (BMI) DOCUMENTED: ICD-10-PCS | Mod: CPTII,S$GLB,, | Performed by: OTOLARYNGOLOGY

## 2021-09-22 PROCEDURE — 1160F PR REVIEW ALL MEDS BY PRESCRIBER/CLIN PHARMACIST DOCUMENTED: ICD-10-PCS | Mod: CPTII,S$GLB,, | Performed by: OTOLARYNGOLOGY

## 2021-09-22 PROCEDURE — 3074F SYST BP LT 130 MM HG: CPT | Mod: CPTII,S$GLB,, | Performed by: OTOLARYNGOLOGY

## 2021-09-22 PROCEDURE — 3078F DIAST BP <80 MM HG: CPT | Mod: CPTII,S$GLB,, | Performed by: OTOLARYNGOLOGY

## 2021-09-22 PROCEDURE — 3008F BODY MASS INDEX DOCD: CPT | Mod: CPTII,S$GLB,, | Performed by: OTOLARYNGOLOGY

## 2021-09-22 PROCEDURE — 1159F PR MEDICATION LIST DOCUMENTED IN MEDICAL RECORD: ICD-10-PCS | Mod: CPTII,S$GLB,, | Performed by: OTOLARYNGOLOGY

## 2021-09-22 PROCEDURE — 99999 PR PBB SHADOW E&M-EST. PATIENT-LVL V: ICD-10-PCS | Mod: PBBFAC,,, | Performed by: OTOLARYNGOLOGY

## 2021-09-22 PROCEDURE — 99213 OFFICE O/P EST LOW 20 MIN: CPT | Mod: 25,S$GLB,, | Performed by: OTOLARYNGOLOGY

## 2021-09-22 PROCEDURE — 3074F PR MOST RECENT SYSTOLIC BLOOD PRESSURE < 130 MM HG: ICD-10-PCS | Mod: CPTII,S$GLB,, | Performed by: OTOLARYNGOLOGY

## 2021-09-22 PROCEDURE — 99999 PR PBB SHADOW E&M-EST. PATIENT-LVL V: CPT | Mod: PBBFAC,,, | Performed by: OTOLARYNGOLOGY

## 2021-09-22 PROCEDURE — 99213 PR OFFICE/OUTPT VISIT, EST, LEVL III, 20-29 MIN: ICD-10-PCS | Mod: 25,S$GLB,, | Performed by: OTOLARYNGOLOGY

## 2021-09-22 PROCEDURE — 69210 PR REMOVAL IMPACTED CERUMEN REQUIRING INSTRUMENTATION, UNILATERAL: ICD-10-PCS | Mod: S$GLB,,, | Performed by: OTOLARYNGOLOGY

## 2021-09-22 PROCEDURE — 1160F RVW MEDS BY RX/DR IN RCRD: CPT | Mod: CPTII,S$GLB,, | Performed by: OTOLARYNGOLOGY

## 2021-09-22 PROCEDURE — 3078F PR MOST RECENT DIASTOLIC BLOOD PRESSURE < 80 MM HG: ICD-10-PCS | Mod: CPTII,S$GLB,, | Performed by: OTOLARYNGOLOGY

## 2021-09-22 PROCEDURE — 1159F MED LIST DOCD IN RCRD: CPT | Mod: CPTII,S$GLB,, | Performed by: OTOLARYNGOLOGY

## 2021-09-27 ENCOUNTER — LAB VISIT (OUTPATIENT)
Dept: LAB | Facility: HOSPITAL | Age: 66
End: 2021-09-27
Attending: OTOLARYNGOLOGY
Payer: COMMERCIAL

## 2021-09-27 DIAGNOSIS — R20.0 RIGHT FACIAL NUMBNESS: ICD-10-CM

## 2021-09-27 LAB
CREAT SERPL-MCNC: 0.8 MG/DL (ref 0.5–1.4)
EST. GFR  (AFRICAN AMERICAN): >60 ML/MIN/1.73 M^2
EST. GFR  (NON AFRICAN AMERICAN): >60 ML/MIN/1.73 M^2

## 2021-09-27 PROCEDURE — 82565 ASSAY OF CREATININE: CPT | Performed by: OTOLARYNGOLOGY

## 2021-09-27 PROCEDURE — 36415 COLL VENOUS BLD VENIPUNCTURE: CPT | Performed by: OTOLARYNGOLOGY

## 2021-09-28 ENCOUNTER — HOSPITAL ENCOUNTER (OUTPATIENT)
Dept: RADIOLOGY | Facility: HOSPITAL | Age: 66
Discharge: HOME | End: 2021-09-28
Attending: OTOLARYNGOLOGY
Payer: COMMERCIAL

## 2021-09-28 DIAGNOSIS — R20.0 RIGHT FACIAL NUMBNESS: ICD-10-CM

## 2021-09-28 PROCEDURE — A9585 GADOBUTROL INJECTION: HCPCS | Mod: PO | Performed by: OTOLARYNGOLOGY

## 2021-09-28 PROCEDURE — 25500020 PHARM REV CODE 255: Mod: PO | Performed by: OTOLARYNGOLOGY

## 2021-09-28 PROCEDURE — 70553 MRI BRAIN STEM W/O & W/DYE: CPT | Mod: TC,PO

## 2021-09-28 PROCEDURE — 70553 MRI BRAIN STEM W/O & W/DYE: CPT | Mod: 26,,, | Performed by: RADIOLOGY

## 2021-09-28 PROCEDURE — 70553 MRI BRAIN W WO CONTRAST: ICD-10-PCS | Mod: 26,,, | Performed by: RADIOLOGY

## 2021-09-28 RX ORDER — GADOBUTROL 604.72 MG/ML
10 INJECTION INTRAVENOUS
Status: COMPLETED | OUTPATIENT
Start: 2021-09-28 | End: 2021-09-28

## 2021-09-28 RX ADMIN — GADOBUTROL 10 ML: 604.72 INJECTION INTRAVENOUS at 09:09

## 2021-10-04 ENCOUNTER — PATIENT OUTREACH (OUTPATIENT)
Dept: ADMINISTRATIVE | Facility: HOSPITAL | Age: 66
End: 2021-10-04

## 2021-10-04 DIAGNOSIS — E11.9 TYPE 2 DIABETES MELLITUS WITHOUT COMPLICATION, WITH LONG-TERM CURRENT USE OF INSULIN: Primary | ICD-10-CM

## 2021-10-04 DIAGNOSIS — Z79.4 TYPE 2 DIABETES MELLITUS WITHOUT COMPLICATION, WITH LONG-TERM CURRENT USE OF INSULIN: Primary | ICD-10-CM

## 2021-10-08 ENCOUNTER — LAB VISIT (OUTPATIENT)
Dept: LAB | Facility: HOSPITAL | Age: 66
End: 2021-10-08
Attending: FAMILY MEDICINE
Payer: COMMERCIAL

## 2021-10-08 DIAGNOSIS — E11.9 TYPE 2 DIABETES MELLITUS WITHOUT COMPLICATION, WITH LONG-TERM CURRENT USE OF INSULIN: ICD-10-CM

## 2021-10-08 DIAGNOSIS — E55.9 VITAMIN D DEFICIENCY DISEASE: ICD-10-CM

## 2021-10-08 DIAGNOSIS — Z79.4 TYPE 2 DIABETES MELLITUS WITHOUT COMPLICATION, WITH LONG-TERM CURRENT USE OF INSULIN: ICD-10-CM

## 2021-10-08 DIAGNOSIS — E11.9 TYPE 2 DIABETES MELLITUS WITHOUT COMPLICATION: ICD-10-CM

## 2021-10-08 LAB
25(OH)D3+25(OH)D2 SERPL-MCNC: 32 NG/ML (ref 30–96)
ALBUMIN SERPL BCP-MCNC: 3.4 G/DL (ref 3.5–5.2)
ALP SERPL-CCNC: 74 U/L (ref 55–135)
ALT SERPL W/O P-5'-P-CCNC: 27 U/L (ref 10–44)
ANION GAP SERPL CALC-SCNC: 10 MMOL/L (ref 8–16)
AST SERPL-CCNC: 14 U/L (ref 10–40)
BILIRUB SERPL-MCNC: 0.5 MG/DL (ref 0.1–1)
BUN SERPL-MCNC: 16 MG/DL (ref 8–23)
CALCIUM SERPL-MCNC: 9.6 MG/DL (ref 8.7–10.5)
CHLORIDE SERPL-SCNC: 103 MMOL/L (ref 95–110)
CHOLEST SERPL-MCNC: 184 MG/DL (ref 120–199)
CHOLEST/HDLC SERPL: 4.3 {RATIO} (ref 2–5)
CO2 SERPL-SCNC: 27 MMOL/L (ref 23–29)
CREAT SERPL-MCNC: 0.7 MG/DL (ref 0.5–1.4)
EST. GFR  (AFRICAN AMERICAN): >60 ML/MIN/1.73 M^2
EST. GFR  (NON AFRICAN AMERICAN): >60 ML/MIN/1.73 M^2
ESTIMATED AVG GLUCOSE: 171 MG/DL (ref 68–131)
GLUCOSE SERPL-MCNC: 206 MG/DL (ref 70–110)
HBA1C MFR BLD: 7.6 % (ref 4–5.6)
HDLC SERPL-MCNC: 43 MG/DL (ref 40–75)
HDLC SERPL: 23.4 % (ref 20–50)
LDLC SERPL CALC-MCNC: 120.4 MG/DL (ref 63–159)
NONHDLC SERPL-MCNC: 141 MG/DL
POTASSIUM SERPL-SCNC: 3.6 MMOL/L (ref 3.5–5.1)
PROT SERPL-MCNC: 6.6 G/DL (ref 6–8.4)
SODIUM SERPL-SCNC: 140 MMOL/L (ref 136–145)
TRIGL SERPL-MCNC: 103 MG/DL (ref 30–150)

## 2021-10-08 PROCEDURE — 80061 LIPID PANEL: CPT | Performed by: FAMILY MEDICINE

## 2021-10-08 PROCEDURE — 80053 COMPREHEN METABOLIC PANEL: CPT | Performed by: FAMILY MEDICINE

## 2021-10-08 PROCEDURE — 82306 VITAMIN D 25 HYDROXY: CPT | Performed by: REGISTERED NURSE

## 2021-10-08 PROCEDURE — 36415 COLL VENOUS BLD VENIPUNCTURE: CPT | Mod: PO | Performed by: REGISTERED NURSE

## 2021-10-08 PROCEDURE — 83036 HEMOGLOBIN GLYCOSYLATED A1C: CPT | Performed by: FAMILY MEDICINE

## 2021-10-18 ENCOUNTER — PATIENT MESSAGE (OUTPATIENT)
Dept: ADMINISTRATIVE | Facility: HOSPITAL | Age: 66
End: 2021-10-18
Payer: COMMERCIAL

## 2021-10-20 ENCOUNTER — PATIENT OUTREACH (OUTPATIENT)
Dept: ADMINISTRATIVE | Facility: HOSPITAL | Age: 66
End: 2021-10-20

## 2021-10-24 RX ORDER — PANTOPRAZOLE SODIUM 20 MG/1
TABLET, DELAYED RELEASE ORAL
Qty: 90 TABLET | Refills: 0 | Status: SHIPPED | OUTPATIENT
Start: 2021-10-24 | End: 2022-02-08

## 2021-11-15 DIAGNOSIS — I48.0 PAROXYSMAL ATRIAL FIBRILLATION: ICD-10-CM

## 2021-11-15 DIAGNOSIS — I20.9 AP (ANGINA PECTORIS): ICD-10-CM

## 2021-11-15 RX ORDER — RANOLAZINE 500 MG/1
500 TABLET, EXTENDED RELEASE ORAL 2 TIMES DAILY
Qty: 180 TABLET | Refills: 1 | Status: SHIPPED | OUTPATIENT
Start: 2021-11-15 | End: 2023-01-03

## 2021-11-30 RX ORDER — INSULIN LISPRO 100 [IU]/ML
INJECTION, SUSPENSION SUBCUTANEOUS
Qty: 45 ML | Refills: 0 | Status: SHIPPED | OUTPATIENT
Start: 2021-11-30 | End: 2022-01-03 | Stop reason: SDUPTHER

## 2021-11-30 RX ORDER — LOSARTAN POTASSIUM AND HYDROCHLOROTHIAZIDE 25; 100 MG/1; MG/1
1 TABLET ORAL DAILY
Qty: 90 TABLET | Refills: 0 | Status: SHIPPED | OUTPATIENT
Start: 2021-11-30 | End: 2022-01-03 | Stop reason: SDUPTHER

## 2021-11-30 RX ORDER — PEN NEEDLE, DIABETIC 31 GX5/16"
NEEDLE, DISPOSABLE MISCELLANEOUS
Qty: 180 EACH | Refills: 0 | Status: SHIPPED | OUTPATIENT
Start: 2021-11-30

## 2021-12-13 ENCOUNTER — TELEPHONE (OUTPATIENT)
Dept: FAMILY MEDICINE | Facility: CLINIC | Age: 66
End: 2021-12-13
Payer: COMMERCIAL

## 2021-12-15 ENCOUNTER — OFFICE VISIT (OUTPATIENT)
Dept: INTERNAL MEDICINE | Facility: CLINIC | Age: 66
End: 2021-12-15
Payer: COMMERCIAL

## 2021-12-15 VITALS
TEMPERATURE: 98 F | OXYGEN SATURATION: 98 % | HEART RATE: 84 BPM | SYSTOLIC BLOOD PRESSURE: 110 MMHG | HEIGHT: 67 IN | WEIGHT: 254.44 LBS | BODY MASS INDEX: 39.93 KG/M2 | DIASTOLIC BLOOD PRESSURE: 64 MMHG

## 2021-12-15 DIAGNOSIS — M17.0 PRIMARY OSTEOARTHRITIS OF BOTH KNEES: Primary | ICD-10-CM

## 2021-12-15 PROCEDURE — 3061F PR NEG MICROALBUMINURIA RESULT DOCUMENTED/REVIEW: ICD-10-PCS | Mod: CPTII,S$GLB,, | Performed by: PEDIATRICS

## 2021-12-15 PROCEDURE — 3066F NEPHROPATHY DOC TX: CPT | Mod: CPTII,S$GLB,, | Performed by: PEDIATRICS

## 2021-12-15 PROCEDURE — 99999 PR PBB SHADOW E&M-EST. PATIENT-LVL V: CPT | Mod: PBBFAC,,, | Performed by: PEDIATRICS

## 2021-12-15 PROCEDURE — 99213 OFFICE O/P EST LOW 20 MIN: CPT | Mod: S$GLB,,, | Performed by: PEDIATRICS

## 2021-12-15 PROCEDURE — 3061F NEG MICROALBUMINURIA REV: CPT | Mod: CPTII,S$GLB,, | Performed by: PEDIATRICS

## 2021-12-15 PROCEDURE — 99999 PR PBB SHADOW E&M-EST. PATIENT-LVL V: ICD-10-PCS | Mod: PBBFAC,,, | Performed by: PEDIATRICS

## 2021-12-15 PROCEDURE — 99213 PR OFFICE/OUTPT VISIT, EST, LEVL III, 20-29 MIN: ICD-10-PCS | Mod: S$GLB,,, | Performed by: PEDIATRICS

## 2021-12-15 PROCEDURE — 3066F PR DOCUMENTATION OF TREATMENT FOR NEPHROPATHY: ICD-10-PCS | Mod: CPTII,S$GLB,, | Performed by: PEDIATRICS

## 2021-12-15 RX ORDER — DICLOFENAC SODIUM 10 MG/G
2 GEL TOPICAL 3 TIMES DAILY
Qty: 350 G | Refills: 2 | Status: SHIPPED | OUTPATIENT
Start: 2021-12-15 | End: 2022-07-27 | Stop reason: SDUPTHER

## 2021-12-28 ENCOUNTER — TELEPHONE (OUTPATIENT)
Dept: FAMILY MEDICINE | Facility: CLINIC | Age: 66
End: 2021-12-28
Payer: COMMERCIAL

## 2021-12-28 NOTE — TELEPHONE ENCOUNTER
Patient has annual scheduled with Dr. Ronquillo on 01.03.2022, patient went to the [facility] to have labs done but they had been cancelled due to being checked in and not having them completed. Patient would like to have labs prior to annual, can you please re-order CMP, Lipid Panel and Micro Albumin Urine? /jacob/        ----- Message from Fay Yoon sent at 12/28/2021  4:11 PM CST -----  Contact: self [phone]  Would like to consult with nurse regarding getting orders put out for lab work, please call her at [phone]. Thanks ah

## 2021-12-29 ENCOUNTER — PATIENT OUTREACH (OUTPATIENT)
Dept: ADMINISTRATIVE | Facility: HOSPITAL | Age: 66
End: 2021-12-29
Payer: COMMERCIAL

## 2021-12-30 NOTE — TELEPHONE ENCOUNTER
"Dr. Ronquillo to order ---- I'm trying to put the lipids in but due to "shortage", not letting me.  She just had her lipids checked in October 2021 so really too soon to recheck.  She can decide what else needs to be done, annual booked for Monday coming up which is only a few days away.    "

## 2022-01-03 ENCOUNTER — OFFICE VISIT (OUTPATIENT)
Dept: PRIMARY CARE CLINIC | Facility: CLINIC | Age: 67
End: 2022-01-03
Payer: COMMERCIAL

## 2022-01-03 VITALS
TEMPERATURE: 97 F | DIASTOLIC BLOOD PRESSURE: 70 MMHG | HEART RATE: 69 BPM | BODY MASS INDEX: 39.44 KG/M2 | SYSTOLIC BLOOD PRESSURE: 112 MMHG | OXYGEN SATURATION: 97 % | RESPIRATION RATE: 18 BRPM | WEIGHT: 251.31 LBS | HEIGHT: 67 IN

## 2022-01-03 DIAGNOSIS — N20.0 KIDNEY STONES, CALCIUM OXALATE: ICD-10-CM

## 2022-01-03 DIAGNOSIS — E11.9 TYPE 2 DIABETES MELLITUS WITHOUT COMPLICATION, WITH LONG-TERM CURRENT USE OF INSULIN: Chronic | ICD-10-CM

## 2022-01-03 DIAGNOSIS — D64.9 ANEMIA, UNSPECIFIED TYPE: ICD-10-CM

## 2022-01-03 DIAGNOSIS — Z23 NEED FOR VACCINATION: ICD-10-CM

## 2022-01-03 DIAGNOSIS — E66.01 SEVERE OBESITY (BMI 35.0-39.9) WITH COMORBIDITY: ICD-10-CM

## 2022-01-03 DIAGNOSIS — I10 ESSENTIAL HYPERTENSION: ICD-10-CM

## 2022-01-03 DIAGNOSIS — I48.0 PAROXYSMAL ATRIAL FIBRILLATION: ICD-10-CM

## 2022-01-03 DIAGNOSIS — Z79.4 TYPE 2 DIABETES MELLITUS WITHOUT COMPLICATION, WITH LONG-TERM CURRENT USE OF INSULIN: Chronic | ICD-10-CM

## 2022-01-03 DIAGNOSIS — Z78.0 POSTMENOPAUSAL: ICD-10-CM

## 2022-01-03 DIAGNOSIS — E11.3213 TYPE 2 DIABETES MELLITUS WITH BOTH EYES AFFECTED BY MILD NONPROLIFERATIVE RETINOPATHY AND MACULAR EDEMA, WITH LONG-TERM CURRENT USE OF INSULIN: ICD-10-CM

## 2022-01-03 DIAGNOSIS — Z00.00 PREVENTATIVE HEALTH CARE: Primary | ICD-10-CM

## 2022-01-03 DIAGNOSIS — Z12.31 ENCOUNTER FOR SCREENING MAMMOGRAM FOR MALIGNANT NEOPLASM OF BREAST: ICD-10-CM

## 2022-01-03 DIAGNOSIS — Z79.4 TYPE 2 DIABETES MELLITUS WITH BOTH EYES AFFECTED BY MILD NONPROLIFERATIVE RETINOPATHY AND MACULAR EDEMA, WITH LONG-TERM CURRENT USE OF INSULIN: ICD-10-CM

## 2022-01-03 DIAGNOSIS — E78.5 HYPERLIPIDEMIA, UNSPECIFIED HYPERLIPIDEMIA TYPE: Chronic | ICD-10-CM

## 2022-01-03 DIAGNOSIS — I20.9 AP (ANGINA PECTORIS): ICD-10-CM

## 2022-01-03 PROBLEM — D31.31 NEVUS OF CHOROID OF RIGHT EYE: Status: RESOLVED | Noted: 2020-05-21 | Resolved: 2022-01-03

## 2022-01-03 PROBLEM — R00.2 PALPITATIONS: Status: RESOLVED | Noted: 2019-02-11 | Resolved: 2022-01-03

## 2022-01-03 PROCEDURE — 1101F PR PT FALLS ASSESS DOC 0-1 FALLS W/OUT INJ PAST YR: ICD-10-PCS | Mod: CPTII,S$GLB,, | Performed by: FAMILY MEDICINE

## 2022-01-03 PROCEDURE — 3051F PR MOST RECENT HEMOGLOBIN A1C LEVEL 7.0 - < 8.0%: ICD-10-PCS | Mod: CPTII,S$GLB,, | Performed by: FAMILY MEDICINE

## 2022-01-03 PROCEDURE — 3074F PR MOST RECENT SYSTOLIC BLOOD PRESSURE < 130 MM HG: ICD-10-PCS | Mod: CPTII,S$GLB,, | Performed by: FAMILY MEDICINE

## 2022-01-03 PROCEDURE — 3008F BODY MASS INDEX DOCD: CPT | Mod: CPTII,S$GLB,, | Performed by: FAMILY MEDICINE

## 2022-01-03 PROCEDURE — 3051F HG A1C>EQUAL 7.0%<8.0%: CPT | Mod: CPTII,S$GLB,, | Performed by: FAMILY MEDICINE

## 2022-01-03 PROCEDURE — 1160F RVW MEDS BY RX/DR IN RCRD: CPT | Mod: CPTII,S$GLB,, | Performed by: FAMILY MEDICINE

## 2022-01-03 PROCEDURE — 1160F PR REVIEW ALL MEDS BY PRESCRIBER/CLIN PHARMACIST DOCUMENTED: ICD-10-PCS | Mod: CPTII,S$GLB,, | Performed by: FAMILY MEDICINE

## 2022-01-03 PROCEDURE — 1159F PR MEDICATION LIST DOCUMENTED IN MEDICAL RECORD: ICD-10-PCS | Mod: CPTII,S$GLB,, | Performed by: FAMILY MEDICINE

## 2022-01-03 PROCEDURE — 1126F PR PAIN SEVERITY QUANTIFIED, NO PAIN PRESENT: ICD-10-PCS | Mod: CPTII,S$GLB,, | Performed by: FAMILY MEDICINE

## 2022-01-03 PROCEDURE — 99397 PER PM REEVAL EST PAT 65+ YR: CPT | Mod: S$GLB,,, | Performed by: FAMILY MEDICINE

## 2022-01-03 PROCEDURE — 3288F PR FALLS RISK ASSESSMENT DOCUMENTED: ICD-10-PCS | Mod: CPTII,S$GLB,, | Performed by: FAMILY MEDICINE

## 2022-01-03 PROCEDURE — 3078F DIAST BP <80 MM HG: CPT | Mod: CPTII,S$GLB,, | Performed by: FAMILY MEDICINE

## 2022-01-03 PROCEDURE — 3008F PR BODY MASS INDEX (BMI) DOCUMENTED: ICD-10-PCS | Mod: CPTII,S$GLB,, | Performed by: FAMILY MEDICINE

## 2022-01-03 PROCEDURE — 1101F PT FALLS ASSESS-DOCD LE1/YR: CPT | Mod: CPTII,S$GLB,, | Performed by: FAMILY MEDICINE

## 2022-01-03 PROCEDURE — 3288F FALL RISK ASSESSMENT DOCD: CPT | Mod: CPTII,S$GLB,, | Performed by: FAMILY MEDICINE

## 2022-01-03 PROCEDURE — 1159F MED LIST DOCD IN RCRD: CPT | Mod: CPTII,S$GLB,, | Performed by: FAMILY MEDICINE

## 2022-01-03 PROCEDURE — 99999 PR PBB SHADOW E&M-EST. PATIENT-LVL V: ICD-10-PCS | Mod: PBBFAC,,, | Performed by: FAMILY MEDICINE

## 2022-01-03 PROCEDURE — 3078F PR MOST RECENT DIASTOLIC BLOOD PRESSURE < 80 MM HG: ICD-10-PCS | Mod: CPTII,S$GLB,, | Performed by: FAMILY MEDICINE

## 2022-01-03 PROCEDURE — 99397 PR PREVENTIVE VISIT,EST,65 & OVER: ICD-10-PCS | Mod: S$GLB,,, | Performed by: FAMILY MEDICINE

## 2022-01-03 PROCEDURE — 99999 PR PBB SHADOW E&M-EST. PATIENT-LVL V: CPT | Mod: PBBFAC,,, | Performed by: FAMILY MEDICINE

## 2022-01-03 PROCEDURE — 1126F AMNT PAIN NOTED NONE PRSNT: CPT | Mod: CPTII,S$GLB,, | Performed by: FAMILY MEDICINE

## 2022-01-03 PROCEDURE — 3074F SYST BP LT 130 MM HG: CPT | Mod: CPTII,S$GLB,, | Performed by: FAMILY MEDICINE

## 2022-01-03 RX ORDER — GABAPENTIN 100 MG/1
100 CAPSULE ORAL 3 TIMES DAILY
Qty: 270 CAPSULE | Refills: 3 | Status: SHIPPED | OUTPATIENT
Start: 2022-01-03 | End: 2022-12-09

## 2022-01-03 RX ORDER — INSULIN LISPRO 100 [IU]/ML
INJECTION, SUSPENSION SUBCUTANEOUS
Qty: 45 ML | Refills: 11 | Status: SHIPPED | OUTPATIENT
Start: 2022-01-03 | End: 2022-03-04

## 2022-01-03 RX ORDER — ALLOPURINOL 100 MG/1
100 TABLET ORAL DAILY
Qty: 90 TABLET | Refills: 3 | Status: SHIPPED | OUTPATIENT
Start: 2022-01-03 | End: 2023-08-08

## 2022-01-03 RX ORDER — SIMVASTATIN 20 MG/1
20 TABLET, FILM COATED ORAL NIGHTLY
Qty: 90 TABLET | Refills: 3 | Status: SHIPPED | OUTPATIENT
Start: 2022-01-03

## 2022-01-03 RX ORDER — METFORMIN HYDROCHLORIDE 500 MG/1
TABLET, EXTENDED RELEASE ORAL
Qty: 270 TABLET | Refills: 3 | Status: SHIPPED | OUTPATIENT
Start: 2022-01-03 | End: 2023-01-26

## 2022-01-03 RX ORDER — LOSARTAN POTASSIUM AND HYDROCHLOROTHIAZIDE 25; 100 MG/1; MG/1
1 TABLET ORAL DAILY
Qty: 90 TABLET | Refills: 3 | Status: SHIPPED | OUTPATIENT
Start: 2022-01-03

## 2022-01-03 NOTE — PROGRESS NOTES
CHIEF COMPLAINT:  This is a 66-year-old female here for preventive health exam.       SUBJECTIVE:  The patient is doing well except for grief related to the loss of her mother. She is concerned about taking Eliquis for paroxysmal atrial fibrillation. She recently passed a kidney stone and had copious blood in urine. She has type 2 diabetes.  Blood sugars range from 130-160.   She denies polyuria, polydipsia or polyphagia.  Last A1c was 7.6% in October 2021.  She takes metformin 1000 mg  twice daily and injects Humalog mix 75-25 twice daily.  Her blood pressure is controlled on losartan HCT.  She takes simvastatin for hyperlipidemia.  Patient has vitamin D deficiency and takes vitamin-D supplement daily. She has a history of uric acid kidney stones for which she takes allopurinol but admits to not taking medication daily. She takes Nexium as needed for GERD.  The patient is severely obese with a BMI of 39.36.       Eye exam June 2021.  Mammogram December 2020. Colonoscopy November 2015, due again in November 2025.  Tdap July 2017. COVID 19 vaccine February 2021.     ROS:  GENERAL: Patient denies fever, chills, night sweats. Patient denies weight gain or loss. Patient denies anorexia, fatigue, weakness or swollen glands.  SKIN: Patient denies rash or hair loss.  HEENT: Patient denies sore throat, ear pain, hearing loss, nasal congestion, or runny nose. Patient denies visual disturbance, eye irritation or discharge.  LUNGS: Patient denies cough, wheeze or hemoptysis.  CARDIOVASCULAR: Patient denies chest pain, shortness of breath, palpitations, syncope or lower extremity edema.  GI: Patient denies abdominal pain, nausea, vomiting, diarrhea, blood in stool or melena.  GENITOURINARY: Patient denies pelvic pain, vaginal discharge, itch or odor. Patient denies irregular vaginal bleeding. Patient denies dysuria, frequency, hematuria, nocturia, urgency or incontinence.  BREASTS: Patient denies breast pain, mass or nipple  discharge.  MUSCULOSKELETAL: Patient denies joint pain, swelling, redness or warmth.  NEUROLOGIC: Patient denies headache, vertigo, paresthesias, weakness in limb, dysarthria, dysphagia or abnormality of gait.  PSYCHIATRIC: Patient denies anxiety, depression, or memory loss.     OBJECTIVE:   GENERAL: Well-developed well-nourished, morbidly obese, black female alert and oriented x3, in no acute distress. Memory, judgment and cognition without deficit.   SKIN: Clear without rash. Normal color and tone.  Onychomycosis left great toenail.  HEENT: Eyes: No scleral icterus. Clear conjunctivae. Pupils equal reactive to light and accommodation. Ears: Clear canals.  Clear TMs.  Nose: Without congestion. Crusty scabs in nares. Pharynx: Without injection or exudates.  NECK: Supple, normal range of motion. No masses, nodes or enlarged thyroid. No JVD. Carotids 2+ and equal. No bruits.  LUNGS: Clear to auscultation. Normal respiratory effort.  CARDIOVASCULAR: Regular rhythm, normal S1, S2 without murmur, gallop or rub.  BACK: No CVA or spinal tenderness.  BREASTS: No masses, tenderness or nipple discharge.  ABDOMEN: Normal appearance. Active bowel sounds. Soft, nontender without mass or organomegaly. No rebound or guarding.  EXTREMITIES: Without cyanosis, clubbing or edema. Distal pulses 2+ and equal. Normal range of motion in all extremities. No joint effusion, erythema or warmth.  NEUROLOGIC: Cranial nerves II through XII without deficit. Motor strength equal bilaterally. Sensation normal to touch. Deep tendon reflexes 2+ and equal. Gait without abnormality. No tremor. Negative cerebellar signs.  FOOT EVALUATION: 10 gram monofilament exam with protective sensation intact bilaterally. Nails appropriately trimmed. No ulcers. Distal pulses palpable.  PELVIC: External: Without lesions or inflammation. Vaginal: Atrophic changes, malodor, cuff intact. Bimanual: Non-tender, without masses. Rectovaginal: Confirms, heme-negative stool  x2.     ASSESSMENT:  1. Preventative health care    2. Essential hypertension    3. Type 2 diabetes mellitus with both eyes affected by mild nonproliferative retinopathy and macular edema, with long-term current use of insulin    4. Type 2 diabetes mellitus without complication, with long-term current use of insulin    5. Hyperlipidemia, unspecified hyperlipidemia type    6. Kidney stones, calcium oxalate    7. Anemia, unspecified type    8. Paroxysmal atrial fibrillation    9. AP (angina pectoris)    10. Postmenopausal    11. Severe obesity (BMI 35.0-39.9) with comorbidity    12. Encounter for screening mammogram for malignant neoplasm of breast      PLAN:  1. Weight reduction. Exercise regularly.  2. Age-appropriate counseling.  3. Fasting lab.  4. Mammogram.  5. Bone DEXA scan.  6. Pneumovax.  7. Needs a COVID-19 booster.  8. Take allopurinol daily.  9.  Refill medications.  10. Follow-up in 6 months.    This note is generated with speech recognition software and is subject to transcription error and sound alike phrases that may be missed by proofreading.

## 2022-01-05 ENCOUNTER — LAB VISIT (OUTPATIENT)
Dept: LAB | Facility: HOSPITAL | Age: 67
End: 2022-01-05
Attending: FAMILY MEDICINE
Payer: COMMERCIAL

## 2022-01-05 DIAGNOSIS — Z79.4 TYPE 2 DIABETES MELLITUS WITH BOTH EYES AFFECTED BY MILD NONPROLIFERATIVE RETINOPATHY AND MACULAR EDEMA, WITH LONG-TERM CURRENT USE OF INSULIN: ICD-10-CM

## 2022-01-05 DIAGNOSIS — D64.9 ANEMIA, UNSPECIFIED TYPE: ICD-10-CM

## 2022-01-05 DIAGNOSIS — E11.3213 TYPE 2 DIABETES MELLITUS WITH BOTH EYES AFFECTED BY MILD NONPROLIFERATIVE RETINOPATHY AND MACULAR EDEMA, WITH LONG-TERM CURRENT USE OF INSULIN: ICD-10-CM

## 2022-01-05 LAB
BASOPHILS # BLD AUTO: 0.07 K/UL (ref 0–0.2)
BASOPHILS NFR BLD: 0.9 % (ref 0–1.9)
DIFFERENTIAL METHOD: ABNORMAL
EOSINOPHIL # BLD AUTO: 0.1 K/UL (ref 0–0.5)
EOSINOPHIL NFR BLD: 1.7 % (ref 0–8)
ERYTHROCYTE [DISTWIDTH] IN BLOOD BY AUTOMATED COUNT: 13 % (ref 11.5–14.5)
HCT VFR BLD AUTO: 38 % (ref 37–48.5)
HGB BLD-MCNC: 11.7 G/DL (ref 12–16)
IMM GRANULOCYTES # BLD AUTO: 0.01 K/UL (ref 0–0.04)
IMM GRANULOCYTES NFR BLD AUTO: 0.1 % (ref 0–0.5)
LYMPHOCYTES # BLD AUTO: 2.7 K/UL (ref 1–4.8)
LYMPHOCYTES NFR BLD: 35.3 % (ref 18–48)
MCH RBC QN AUTO: 30.4 PG (ref 27–31)
MCHC RBC AUTO-ENTMCNC: 30.8 G/DL (ref 32–36)
MCV RBC AUTO: 99 FL (ref 82–98)
MONOCYTES # BLD AUTO: 0.5 K/UL (ref 0.3–1)
MONOCYTES NFR BLD: 7.1 % (ref 4–15)
NEUTROPHILS # BLD AUTO: 4.2 K/UL (ref 1.8–7.7)
NEUTROPHILS NFR BLD: 54.9 % (ref 38–73)
NRBC BLD-RTO: 0 /100 WBC
PLATELET # BLD AUTO: 273 K/UL (ref 150–450)
PMV BLD AUTO: 11.1 FL (ref 9.2–12.9)
RBC # BLD AUTO: 3.85 M/UL (ref 4–5.4)
WBC # BLD AUTO: 7.65 K/UL (ref 3.9–12.7)

## 2022-01-05 PROCEDURE — 36415 COLL VENOUS BLD VENIPUNCTURE: CPT | Performed by: FAMILY MEDICINE

## 2022-01-05 PROCEDURE — 83036 HEMOGLOBIN GLYCOSYLATED A1C: CPT | Performed by: FAMILY MEDICINE

## 2022-01-05 PROCEDURE — 85025 COMPLETE CBC W/AUTO DIFF WBC: CPT | Performed by: FAMILY MEDICINE

## 2022-01-06 ENCOUNTER — OFFICE VISIT (OUTPATIENT)
Dept: PRIMARY CARE CLINIC | Facility: CLINIC | Age: 67
End: 2022-01-06
Payer: COMMERCIAL

## 2022-01-06 VITALS
DIASTOLIC BLOOD PRESSURE: 76 MMHG | WEIGHT: 251.56 LBS | HEART RATE: 87 BPM | HEIGHT: 67 IN | TEMPERATURE: 99 F | BODY MASS INDEX: 39.48 KG/M2 | RESPIRATION RATE: 14 BRPM | OXYGEN SATURATION: 97 % | SYSTOLIC BLOOD PRESSURE: 124 MMHG

## 2022-01-06 DIAGNOSIS — M17.11 PRIMARY OSTEOARTHRITIS OF RIGHT KNEE: Primary | ICD-10-CM

## 2022-01-06 LAB
ESTIMATED AVG GLUCOSE: 143 MG/DL (ref 68–131)
HBA1C MFR BLD: 6.6 % (ref 4–5.6)

## 2022-01-06 PROCEDURE — 3078F PR MOST RECENT DIASTOLIC BLOOD PRESSURE < 80 MM HG: ICD-10-PCS | Mod: CPTII,S$GLB,, | Performed by: FAMILY MEDICINE

## 2022-01-06 PROCEDURE — 1101F PR PT FALLS ASSESS DOC 0-1 FALLS W/OUT INJ PAST YR: ICD-10-PCS | Mod: CPTII,S$GLB,, | Performed by: FAMILY MEDICINE

## 2022-01-06 PROCEDURE — 1160F RVW MEDS BY RX/DR IN RCRD: CPT | Mod: CPTII,S$GLB,, | Performed by: FAMILY MEDICINE

## 2022-01-06 PROCEDURE — 3074F PR MOST RECENT SYSTOLIC BLOOD PRESSURE < 130 MM HG: ICD-10-PCS | Mod: CPTII,S$GLB,, | Performed by: FAMILY MEDICINE

## 2022-01-06 PROCEDURE — 3288F FALL RISK ASSESSMENT DOCD: CPT | Mod: CPTII,S$GLB,, | Performed by: FAMILY MEDICINE

## 2022-01-06 PROCEDURE — 1125F AMNT PAIN NOTED PAIN PRSNT: CPT | Mod: CPTII,S$GLB,, | Performed by: FAMILY MEDICINE

## 2022-01-06 PROCEDURE — 3078F DIAST BP <80 MM HG: CPT | Mod: CPTII,S$GLB,, | Performed by: FAMILY MEDICINE

## 2022-01-06 PROCEDURE — 1159F PR MEDICATION LIST DOCUMENTED IN MEDICAL RECORD: ICD-10-PCS | Mod: CPTII,S$GLB,, | Performed by: FAMILY MEDICINE

## 2022-01-06 PROCEDURE — 3044F HG A1C LEVEL LT 7.0%: CPT | Mod: CPTII,S$GLB,, | Performed by: FAMILY MEDICINE

## 2022-01-06 PROCEDURE — 99213 PR OFFICE/OUTPT VISIT, EST, LEVL III, 20-29 MIN: ICD-10-PCS | Mod: 25,S$GLB,, | Performed by: FAMILY MEDICINE

## 2022-01-06 PROCEDURE — 1160F PR REVIEW ALL MEDS BY PRESCRIBER/CLIN PHARMACIST DOCUMENTED: ICD-10-PCS | Mod: CPTII,S$GLB,, | Performed by: FAMILY MEDICINE

## 2022-01-06 PROCEDURE — 1125F PR PAIN SEVERITY QUANTIFIED, PAIN PRESENT: ICD-10-PCS | Mod: CPTII,S$GLB,, | Performed by: FAMILY MEDICINE

## 2022-01-06 PROCEDURE — 99999 PR PBB SHADOW E&M-EST. PATIENT-LVL V: CPT | Mod: PBBFAC,,, | Performed by: FAMILY MEDICINE

## 2022-01-06 PROCEDURE — 1101F PT FALLS ASSESS-DOCD LE1/YR: CPT | Mod: CPTII,S$GLB,, | Performed by: FAMILY MEDICINE

## 2022-01-06 PROCEDURE — 20610 DRAIN/INJ JOINT/BURSA W/O US: CPT | Mod: S$GLB,,, | Performed by: FAMILY MEDICINE

## 2022-01-06 PROCEDURE — 3008F BODY MASS INDEX DOCD: CPT | Mod: CPTII,S$GLB,, | Performed by: FAMILY MEDICINE

## 2022-01-06 PROCEDURE — 3044F PR MOST RECENT HEMOGLOBIN A1C LEVEL <7.0%: ICD-10-PCS | Mod: CPTII,S$GLB,, | Performed by: FAMILY MEDICINE

## 2022-01-06 PROCEDURE — 1159F MED LIST DOCD IN RCRD: CPT | Mod: CPTII,S$GLB,, | Performed by: FAMILY MEDICINE

## 2022-01-06 PROCEDURE — 3288F PR FALLS RISK ASSESSMENT DOCUMENTED: ICD-10-PCS | Mod: CPTII,S$GLB,, | Performed by: FAMILY MEDICINE

## 2022-01-06 PROCEDURE — 99999 PR PBB SHADOW E&M-EST. PATIENT-LVL V: ICD-10-PCS | Mod: PBBFAC,,, | Performed by: FAMILY MEDICINE

## 2022-01-06 PROCEDURE — 20610 PR DRAIN/INJECT LARGE JOINT/BURSA: ICD-10-PCS | Mod: S$GLB,,, | Performed by: FAMILY MEDICINE

## 2022-01-06 PROCEDURE — 3074F SYST BP LT 130 MM HG: CPT | Mod: CPTII,S$GLB,, | Performed by: FAMILY MEDICINE

## 2022-01-06 PROCEDURE — 3008F PR BODY MASS INDEX (BMI) DOCUMENTED: ICD-10-PCS | Mod: CPTII,S$GLB,, | Performed by: FAMILY MEDICINE

## 2022-01-06 PROCEDURE — 99213 OFFICE O/P EST LOW 20 MIN: CPT | Mod: 25,S$GLB,, | Performed by: FAMILY MEDICINE

## 2022-01-06 RX ORDER — TRIAMCINOLONE ACETONIDE 40 MG/ML
40 INJECTION, SUSPENSION INTRA-ARTICULAR; INTRAMUSCULAR
Status: COMPLETED | OUTPATIENT
Start: 2022-01-06 | End: 2022-01-06

## 2022-01-06 RX ORDER — METOPROLOL SUCCINATE 50 MG/1
50 TABLET, EXTENDED RELEASE ORAL 2 TIMES DAILY
COMMUNITY
Start: 2021-12-22 | End: 2022-01-06 | Stop reason: SDUPTHER

## 2022-01-06 RX ORDER — LIDOCAINE HYDROCHLORIDE 10 MG/ML
4 INJECTION INFILTRATION; PERINEURAL
Status: COMPLETED | OUTPATIENT
Start: 2022-01-06 | End: 2022-01-06

## 2022-01-06 RX ADMIN — LIDOCAINE HYDROCHLORIDE 4 ML: 10 INJECTION INFILTRATION; PERINEURAL at 12:01

## 2022-01-06 RX ADMIN — TRIAMCINOLONE ACETONIDE 40 MG: 40 INJECTION, SUSPENSION INTRA-ARTICULAR; INTRAMUSCULAR at 12:01

## 2022-01-06 NOTE — PROGRESS NOTES
CHIEF COMPLAINT: This is a 66-year-old female complaining of right knee pain.     SUBJECTIVE:  Patient complains of flare-up of right knee pain for several months which she describes as a tightness in the posterior knee that radiates anteriorly.  Patient has difficulty walking because of stiffness and inability to flex the knee.  Her mother recently  and she needs to be mobile for her  in 48 hours.   She denies joint swelling, redness or warmth. Past x-rays revealed mild to moderate tricompartmental degenerative changes.  patient was seen by orthopedic surgeon in 2021. She reports no improvement from knee aspiration injection at that time.  However previous injection here at the clinic was effective and she requests repeat injection.      Recent lab work was reviewed.  Patient has had improvement in A1c from 7.7% to 6.6%.  CBC was acceptable.    ROS:  GENERAL: Patient denies fever, chills, night sweats.  Patient denies weight gain or loss. Patient denies anorexia, fatigue, weakness or swollen glands.  SKIN: Patient denies rash.  LUNGS: Patient denies cough, wheeze or hemoptysis.  CARDIOVASCULAR: Patient denies chest pain, shortness of breath, palpitations, syncope or lower extremity edema.  GI: Patient denies abdominal pain, nausea, vomiting, diarrhea, constipation, blood in stool or melena.  MUSCULOSKELETAL: Patient denies joint  redness or warmth.  NEUROLOGIC: Patient denies numbness, tingling or weakness in limb.       OBJECTIVE:   GENERAL: Well-developed well-nourished, obese, black female alert and oriented x3, in no acute distress. Memory, judgment and cognition without deficit.   SKIN: Clear without rash. Normal color and tone.   HEENT: Eyes: No scleral icterus. Clear conjunctivae.   NECK: Supple, normal range of motion.   LUNGS: Clear to auscultation. Normal respiratory effort.  CARDIOVASCULAR: Regular rhythm, normal S1, S2 without murmur, gallop or rub.  BACK: No CVA or spinal  tenderness.  EXTREMITIES: Without cyanosis, clubbing or edema. Distal pulses 2+ and equal. Normal range of motion in hips, knees and ankles. No joint effusion, erythema or warmth.  NEUROLOGIC:  Motor strength equal bilaterally. Sensation normal to touch. Deep tendon reflexes 2+ and equal. Gait antalgic.     Reviewed treatment options including physical therapy and orthopedic consult. Discussed potential complications of procedure including bleeding, infection, and nerve damage.  Patient understands and accepts risks.  Verbal consent obtained.     Procedure: After sterile prep and drape, 1 cc of Kenalog 40 mg/cc +1 cc of 1% Xylocaine injected into right knee via lateral approach without difficulty.     ASSESSMENT:  1. Primary osteoarthritis of right knee      PLAN:  1. Apply ice q.i.d. for 15-20 minutes for the next 2-3 days.  2. Avoid prolonged weight-bearing for 48-72 hours.  3. Follow-up if no improvement or worsening symptoms.    This note is generated with speech recognition software and is subject to transcription error and sound alike phrases that may be missed by proofreading.

## 2022-01-23 NOTE — TELEPHONE ENCOUNTER
Care Due:                  Date            Visit Type   Department     Provider  --------------------------------------------------------------------------------                                             [facility] PRIMARY  Last Visit: 01-      None         CARE           Gabbie Ronquillo  Next Visit: None Scheduled  None         None Found                                                            Last  Test          Frequency    Reason                     Performed    Due Date  --------------------------------------------------------------------------------    Uric Acid...  12 months..  allopurinoL..............  Not Found    Overdue    Powered by [employer]. Reference number: [id number].   1/23/2022 4:01:47 AM CST

## 2022-02-08 RX ORDER — PANTOPRAZOLE SODIUM 20 MG/1
20 TABLET, DELAYED RELEASE ORAL DAILY
Qty: 90 TABLET | Refills: 3 | Status: SHIPPED | OUTPATIENT
Start: 2022-02-08 | End: 2022-02-24 | Stop reason: SDUPTHER

## 2022-02-08 NOTE — TELEPHONE ENCOUNTER
Refill Routing Note   Medication(s) are not appropriate for processing by [facility] for the following reason(s):      - Allergy/Contraindication (Allergy/Contraindication: allopurinoL, baclofen, metoprolol tartrate, simvastatin, potassium chloride, gabapentin, fexofenadine, pantoprazole, HYDROcodone-acetaminophen, metFORMIN)    [facility] action(s):  Defer          --->Care Gap information included in message below if applicable.   Medication reconciliation completed: No   Automatic Epic Generated Protocol Data:        Requested Prescriptions   Pending Prescriptions Disp Refills    pantoprazole (PROTONIX) 20 MG tablet [Pharmacy Med Name: PANTOPRAZOLE 20MG TABLETS] 90 tablet 3     Sig: Take 1 tablet (20 mg total) by mouth once daily.       Gastroenterology: Proton Pump Inhibitors 2 Passed - 1/23/2022  4:01 AM        Passed - Patient is at least 18 years old        Passed - Osteoporosis is not on problem list        Passed - An appropriate indication is on the problem list        Passed - Valid encounter within last 15 months     Recent Visits  Date Type Provider Dept   01/06/22 Office Visit Gabbie Ronquillo MD [facility]   01/03/22 Office Visit Gabbie Ronquillo MD [facility]   06/22/21 Office Visit Gabbie Ronquillo MD [facility]   05/04/21 Office Visit Gabbie Ronquillo MD [facility]   12/14/20 Office Visit Gabbie Ronquillo MD [facility]   07/13/20 Office Visit Gabbie Ronquillo MD [facility]   Showing recent visits within past 720 days and meeting all other requirements  Future Appointments  No visits were found meeting these conditions.  Showing future appointments within next 150 days and meeting all other requirements                      Appointments  past 12m or future 3m with PCP    Date Provider   Last Visit   1/6/2022 Gabbie Ronquillo MD   Next Visit   Visit date not found Gabbie Ronquillo MD   ED visits in past 90 days: 0        Note  composed:1:15 PM 02/08/2022

## 2022-02-15 NOTE — TELEPHONE ENCOUNTER
Pt states that she last got her hydrocodone in 2014 and states that the reason for that is because she only takes the medication as needed when she is passing kidney stones which is most of the time once a year. Pt states that medication was not suppose to be removed from her medication list and states that she is passing kidney stones now and need a refill on her medication.  Lp: 11/29/19   [FreeTextEntry3] : AAOx3, pleasant, NAD, no visual lymphadenopathy\par hair, scalp, face, nose, eyelids, ears, lips, oropharynx, neck, chest, abdomen, back, right arm, left arm, nails, and hands examined with all normal findings,\par pertinent findings include:\par \par multiple benign nevi and lentigines\par scaling erythematous papules x4 on left forehead (x2) and right cheek (x2)\par Scaling waxy stuck on papule on right upper chest\par erosion on left buccal in mouth

## 2022-02-17 ENCOUNTER — HOSPITAL ENCOUNTER (OUTPATIENT)
Dept: RADIOLOGY | Facility: HOSPITAL | Age: 67
Discharge: HOME | End: 2022-02-17
Attending: PHYSICIAN ASSISTANT
Payer: COMMERCIAL

## 2022-02-17 ENCOUNTER — OFFICE VISIT (OUTPATIENT)
Dept: INTERNAL MEDICINE | Facility: CLINIC | Age: 67
End: 2022-02-17
Payer: COMMERCIAL

## 2022-02-17 VITALS
OXYGEN SATURATION: 95 % | DIASTOLIC BLOOD PRESSURE: 64 MMHG | HEIGHT: 67 IN | HEART RATE: 81 BPM | WEIGHT: 255.31 LBS | BODY MASS INDEX: 40.07 KG/M2 | SYSTOLIC BLOOD PRESSURE: 114 MMHG

## 2022-02-17 DIAGNOSIS — R10.9 ABDOMINAL DISCOMFORT: Primary | ICD-10-CM

## 2022-02-17 DIAGNOSIS — I10 ESSENTIAL HYPERTENSION: Chronic | ICD-10-CM

## 2022-02-17 DIAGNOSIS — Z79.4 TYPE 2 DIABETES MELLITUS WITHOUT COMPLICATION, WITH LONG-TERM CURRENT USE OF INSULIN: Chronic | ICD-10-CM

## 2022-02-17 DIAGNOSIS — E11.9 TYPE 2 DIABETES MELLITUS WITHOUT COMPLICATION, WITH LONG-TERM CURRENT USE OF INSULIN: Chronic | ICD-10-CM

## 2022-02-17 DIAGNOSIS — R10.9 ABDOMINAL DISCOMFORT: ICD-10-CM

## 2022-02-17 PROCEDURE — 74019 RADEX ABDOMEN 2 VIEWS: CPT | Mod: TC

## 2022-02-17 PROCEDURE — 3078F PR MOST RECENT DIASTOLIC BLOOD PRESSURE < 80 MM HG: ICD-10-PCS | Mod: CPTII,S$GLB,, | Performed by: PHYSICIAN ASSISTANT

## 2022-02-17 PROCEDURE — 1101F PT FALLS ASSESS-DOCD LE1/YR: CPT | Mod: CPTII,S$GLB,, | Performed by: PHYSICIAN ASSISTANT

## 2022-02-17 PROCEDURE — 3288F PR FALLS RISK ASSESSMENT DOCUMENTED: ICD-10-PCS | Mod: CPTII,S$GLB,, | Performed by: PHYSICIAN ASSISTANT

## 2022-02-17 PROCEDURE — 99214 PR OFFICE/OUTPT VISIT, EST, LEVL IV, 30-39 MIN: ICD-10-PCS | Mod: S$GLB,,, | Performed by: PHYSICIAN ASSISTANT

## 2022-02-17 PROCEDURE — 3044F HG A1C LEVEL LT 7.0%: CPT | Mod: CPTII,S$GLB,, | Performed by: PHYSICIAN ASSISTANT

## 2022-02-17 PROCEDURE — 74019 XR ABDOMEN FLAT AND ERECT: ICD-10-PCS | Mod: 26,,, | Performed by: RADIOLOGY

## 2022-02-17 PROCEDURE — 3288F FALL RISK ASSESSMENT DOCD: CPT | Mod: CPTII,S$GLB,, | Performed by: PHYSICIAN ASSISTANT

## 2022-02-17 PROCEDURE — 99214 OFFICE O/P EST MOD 30 MIN: CPT | Mod: S$GLB,,, | Performed by: PHYSICIAN ASSISTANT

## 2022-02-17 PROCEDURE — 1101F PR PT FALLS ASSESS DOC 0-1 FALLS W/OUT INJ PAST YR: ICD-10-PCS | Mod: CPTII,S$GLB,, | Performed by: PHYSICIAN ASSISTANT

## 2022-02-17 PROCEDURE — 3074F PR MOST RECENT SYSTOLIC BLOOD PRESSURE < 130 MM HG: ICD-10-PCS | Mod: CPTII,S$GLB,, | Performed by: PHYSICIAN ASSISTANT

## 2022-02-17 PROCEDURE — 3008F PR BODY MASS INDEX (BMI) DOCUMENTED: ICD-10-PCS | Mod: CPTII,S$GLB,, | Performed by: PHYSICIAN ASSISTANT

## 2022-02-17 PROCEDURE — 99999 PR PBB SHADOW E&M-EST. PATIENT-LVL V: CPT | Mod: PBBFAC,,, | Performed by: PHYSICIAN ASSISTANT

## 2022-02-17 PROCEDURE — 3074F SYST BP LT 130 MM HG: CPT | Mod: CPTII,S$GLB,, | Performed by: PHYSICIAN ASSISTANT

## 2022-02-17 PROCEDURE — 1159F MED LIST DOCD IN RCRD: CPT | Mod: CPTII,S$GLB,, | Performed by: PHYSICIAN ASSISTANT

## 2022-02-17 PROCEDURE — 1125F AMNT PAIN NOTED PAIN PRSNT: CPT | Mod: CPTII,S$GLB,, | Performed by: PHYSICIAN ASSISTANT

## 2022-02-17 PROCEDURE — 3044F PR MOST RECENT HEMOGLOBIN A1C LEVEL <7.0%: ICD-10-PCS | Mod: CPTII,S$GLB,, | Performed by: PHYSICIAN ASSISTANT

## 2022-02-17 PROCEDURE — 3078F DIAST BP <80 MM HG: CPT | Mod: CPTII,S$GLB,, | Performed by: PHYSICIAN ASSISTANT

## 2022-02-17 PROCEDURE — 1159F PR MEDICATION LIST DOCUMENTED IN MEDICAL RECORD: ICD-10-PCS | Mod: CPTII,S$GLB,, | Performed by: PHYSICIAN ASSISTANT

## 2022-02-17 PROCEDURE — 3008F BODY MASS INDEX DOCD: CPT | Mod: CPTII,S$GLB,, | Performed by: PHYSICIAN ASSISTANT

## 2022-02-17 PROCEDURE — 74019 RADEX ABDOMEN 2 VIEWS: CPT | Mod: 26,,, | Performed by: RADIOLOGY

## 2022-02-17 PROCEDURE — 1125F PR PAIN SEVERITY QUANTIFIED, PAIN PRESENT: ICD-10-PCS | Mod: CPTII,S$GLB,, | Performed by: PHYSICIAN ASSISTANT

## 2022-02-17 PROCEDURE — 99999 PR PBB SHADOW E&M-EST. PATIENT-LVL V: ICD-10-PCS | Mod: PBBFAC,,, | Performed by: PHYSICIAN ASSISTANT

## 2022-02-17 NOTE — PROGRESS NOTES
Subjective:      Patient ID: Latosha Enriquez is a 66 y.o. female.    Chief Complaint: Abdominal Pain    Grandson has diarrhea. Grandson's mom also has abdominal discomfort. No diarrhea.       Abdominal Pain  This is a new problem. The current episode started in the past 7 days. The problem occurs constantly. The problem has been gradually worsening. The pain is located in the generalized abdominal region. The quality of the pain is a sensation of fullness and aching. The abdominal pain does not radiate. Associated symptoms include nausea. Pertinent negatives include no anorexia, arthralgias, belching, constipation, diarrhea, dysuria, fever, flatus, frequency, headaches, hematochezia, hematuria, melena, myalgias, vomiting or weight loss. Nothing aggravates the pain. The pain is relieved by nothing. She has tried nothing for the symptoms.     Patient Active Problem List   Diagnosis    Hemorrhoids, internal    Essential hypertension    Vitamin D deficiency disease    Primary osteoarthritis of both knees    GERD (gastroesophageal reflux disease)    Hyperlipidemia    Type 2 diabetes mellitus with both eyes affected by mild nonproliferative retinopathy and macular edema, with long-term current use of insulin    Kidney stones, calcium oxalate    AP (angina pectoris)    CANALES (dyspnea on exertion)    PAF (paroxysmal atrial fibrillation)    Amaurosis fugax    Type 2 diabetes mellitus without complication, with long-term current use of insulin    Dryness, eye    KATHERINE (obstructive sleep apnea)    PVC's (premature ventricular contractions)    Severe obesity (BMI 35.0-39.9) with comorbidity       Current Outpatient Medications:     albuterol (PROVENTIL/VENTOLIN HFA) 90 mcg/actuation inhaler, Inhale 2 puffs into the lungs every 6 (six) hours as needed., Disp: 18 g, Rfl: 3    allopurinoL (ZYLOPRIM) 100 MG tablet, Take 1 tablet (100 mg total) by mouth once daily., Disp: 90 tablet, Rfl: 3    apixaban (ELIQUIS) 5 mg  "Tab, Take 1 tablet (5 mg total) by mouth 2 (two) times daily., Disp: 180 tablet, Rfl: 1    BD ULTRA-FINE SHORT PEN NEEDLE 31 gauge x 5/16" Ndle, USE 1 PEN NEEDLE UNDER THE SKIN TWICE A DAY, Disp: 180 each, Rfl: 0    cyanocobalamin (VITAMIN B-12) 100 MCG tablet, Take by mouth., Disp: , Rfl:     diclofenac sodium (VOLTAREN) 1 % Gel, Apply 2 g topically 3 (three) times daily., Disp: 350 g, Rfl: 2    ergocalciferol, vitamin D2, 400 unit Tab, Take by mouth., Disp: , Rfl:     famotidine (PEPCID) 40 MG tablet, Take [id number] the evening and morning before your procedure along with Benadryl and Prednisone, Disp: 3 tablet, Rfl: 0    ferrous sulfate, dried (SLOW FE) 160 mg (50 mg iron) TbSR, Take by mouth., Disp: , Rfl:     gabapentin (NEURONTIN) 100 MG capsule, Take 1 capsule (100 mg total) by mouth 3 (three) times daily., Disp: 270 capsule, Rfl: 3    HYDROcodone-acetaminophen (NORCO)  mg per tablet, Take 1 tablet by mouth every 6 (six) hours as needed., Disp: 9 tablet, Rfl: 0    insulin lispro protamin-lispro (HUMALOG MIX 75-25 KWIKPEN) 100 unit/mL (75-25) InPn, INJECT 30 UNITS UNDER THE SKIN IN THE MORNING AND 20 UNITS IN THE EVENING, Disp: 45 mL, Rfl: 11    ketorolac 0.5% (ACULAR) 0.5 % Drop, Place 1 drop into both eyes 4 (four) times daily., Disp: 5 mL, Rfl: 0    losartan-hydrochlorothiazide 100-25 mg (HYZAAR) 100-25 mg per tablet, Take 1 tablet by mouth once daily., Disp: 90 tablet, Rfl: 3    metFORMIN (GLUCOPHAGE-XR) 500 MG ER 24hr tablet, TAKE 2 TABLETS BY MOUTH WITH BREAKFAST AND 1 TABLET BY MOUTH WITH DINNER, Disp: 270 tablet, Rfl: 3    metoprolol tartrate (LOPRESSOR) 50 MG tablet, Take 1 tablet (50 mg total) by mouth 2 (two) times daily., Disp: 180 tablet, Rfl: 1    montelukast (SINGULAIR) 10 mg tablet, TAKE 1 TABLET(10 MG) BY MOUTH EVERY NIGHT AS NEEDED, Disp: 90 tablet, Rfl: 0    pantoprazole (PROTONIX) 20 MG tablet, Take 1 tablet (20 mg total) by mouth once daily., Disp: 90 tablet, Rfl: " 3    potassium chloride (MICRO-K) 10 MEQ CpSR, Take 1 capsule (10 mEq total) by mouth once daily., Disp: 90 capsule, Rfl: 0    prednisoLONE acetate (PRED FORTE) 1 % DrpS, Place 1 drop into both eyes 4 (four) times daily., Disp: 10 mL, Rfl: 1    ranolazine (RANEXA) 500 MG Tb12, Take 1 tablet (500 mg total) by mouth 2 (two) times daily., Disp: 180 tablet, Rfl: 1    simvastatin (ZOCOR) 20 MG tablet, Take 1 tablet (20 mg total) by mouth every evening., Disp: 90 tablet, Rfl: 3    tamsulosin (FLOMAX) 0.4 mg Cap, TAKE 1 CAPSULE(0.4 MG) BY MOUTH EVERY DAY, Disp: 30 capsule, Rfl: 2    triamcinolone (NASACORT) 55 mcg nasal inhaler, 2 sprays by Nasal route once daily., Disp: 17 g, Rfl: 3    baclofen (LIORESAL) 10 MG tablet, Take 1 tablet (10 mg total) by mouth 3 (three) times daily., Disp: 30 tablet, Rfl: 0    fexofenadine (ALLEGRA) 180 MG tablet, Take 1 tablet (180 mg total) by mouth once daily., Disp: 30 tablet, Rfl: 2    nitroGLYCERIN (NITROSTAT) 0.4 MG SL tablet, Place 1 tablet (0.4 mg total) under the tongue every 5 (five) minutes as needed for Chest pain. (Patient not taking: Reported on 12/15/2021), Disp: 20 tablet, Rfl: 1    Review of Systems   Constitutional: Negative for activity change, appetite change, chills, diaphoresis, fatigue, fever, unexpected weight change and weight loss.   HENT: Negative.  Negative for congestion, hearing loss, postnasal drip, rhinorrhea, sore throat, trouble swallowing and voice change.    Eyes: Negative.  Negative for visual disturbance.   Respiratory: Negative.  Negative for cough, choking, chest tightness and shortness of breath.    Cardiovascular: Negative for chest pain, palpitations and leg swelling.   Gastrointestinal: Positive for abdominal distention, abdominal pain and nausea. Negative for anal bleeding, anorexia, blood in stool, constipation, diarrhea, flatus, hematochezia, melena, rectal pain and vomiting.   Endocrine: Negative for cold intolerance, heat intolerance,  "polydipsia and polyuria.   Genitourinary: Negative.  Negative for difficulty urinating, dysuria, frequency and hematuria.   Musculoskeletal: Negative for arthralgias, back pain, gait problem, joint swelling and myalgias.   Skin: Negative for color change, pallor, rash and wound.   Neurological: Negative for dizziness, tremors, weakness, light-headedness, numbness and headaches.   Hematological: Negative for adenopathy.   Psychiatric/Behavioral: Negative for behavioral problems, confusion, self-injury, sleep disturbance and suicidal ideas. The patient is not nervous/anxious.      Objective:   /64 (BP Location: Right arm, Patient Position: Sitting, BP Method: Large (Manual))   Pulse 81   Ht 5' 7" (1.702 m)   Wt 115.8 kg (255 lb 4.7 oz)   SpO2 95%   BMI 39.98 kg/m²     Physical Exam  Vitals reviewed.   Constitutional:       General: She is not in acute distress.     Appearance: Normal appearance. She is well-developed and well-nourished. She is obese. She is not ill-appearing, toxic-appearing or diaphoretic.   HENT:      Head: Normocephalic and atraumatic.      Right Ear: External ear normal.      Left Ear: External ear normal.      Nose: Nose normal.      Mouth/Throat:      Mouth: Oropharynx is clear and moist.   Eyes:      Extraocular Movements: EOM normal.      Conjunctiva/sclera: Conjunctivae normal.      Pupils: Pupils are equal, round, and reactive to light.   Cardiovascular:      Rate and Rhythm: Normal rate and regular rhythm.      Heart sounds: Normal heart sounds. No murmur heard.  No friction rub. No gallop.    Pulmonary:      Effort: Pulmonary effort is normal. No respiratory distress.      Breath sounds: Normal breath sounds. No wheezing or rales.   Chest:      Chest wall: No tenderness.   Abdominal:      General: Abdomen is protuberant. Bowel sounds are decreased. There is no distension.      Palpations: Abdomen is soft. There is no mass.      Tenderness: There is no abdominal tenderness. There " is no guarding or rebound.      Hernia: No hernia is present.   Musculoskeletal:         General: Normal range of motion.      Cervical back: Normal range of motion and neck supple.   Lymphadenopathy:      Cervical: No cervical adenopathy.   Skin:     General: Skin is warm and dry.   Neurological:      Mental Status: She is alert and oriented to person, place, and time.   Psychiatric:         Mood and Affect: Mood and affect normal.         Behavior: Behavior normal.         Thought Content: Thought content normal.         Judgment: Judgment normal.         Assessment:     1. Abdominal discomfort    2. Essential hypertension    3. Type 2 diabetes mellitus without complication, with long-term current use of insulin      Plan:   Abdominal discomfort  -     H. pylori antigen, stool; Future; Expected date: 02/17/2022  -     X-Ray Abdomen Flat And Erect; Future; Expected date: 02/17/2022    Essential hypertension    Type 2 diabetes mellitus without complication, with long-term current use of insulin    -if no improvement by next week, refer to GI  -bland foods  -increase fluids (water)  -pepto  -bp and diabetes stable and controlled.     Follow up if symptoms worsen or fail to improve.

## 2022-02-17 NOTE — PATIENT INSTRUCTIONS
Patient Education       [place] Diet   About this topic   A bland diet is made up of foods which are soft, not spicy, cooked, low in fat, and low in fiber. These foods are not likely to upset the GI tract.  What will the results be?   This diet will not make your stomach upset. This diet will help you get nutrients while you do not feel well.  What changes to diet are needed?   · Eat small meals more often during the day.  · Avoid large meals.  · Stay away from spicy or seasoned foods or other foods that should be avoided.  · Stay away from fatty foods, like fried foods and high-fat dairy products.  · Eat slowly and chew your food carefully.  · Drink fluids slowly.  · Do not eat for at least 2 hours before going to bed.  Who should use this diet?   People with ulcers, heartburn, upset stomach, gas, loose stools, or throwing up should eat a bland diet.  What foods are good to eat?   · Low-fat milk and other dairy products  · Lactose-free products, like soy milk  · Cooked, canned, or frozen vegetables  · Fruit and vegetable juices without pulp. Dilute fruit juices with water if you have a problem with loose stools.  · Cooked or canned fruit with no skin or seeds, like applesauce  · Ripe bananas and melons  · Breads, crackers, and pasta made with white flour  · Hot cereals like cream of rice or cream of wheat  · Lean, tender meats that are steamed, baked, or grilled with no added fat  · Creamy peanut butter  · Eggs  · Tofu  · Soup and broth  · Drinks without caffeine     What foods should be limited or avoided?   · Avoid any food or drinks that make your symptoms worse  · Full-fat dairy foods, like whole milk  · Raw vegetables  · Strong cheese, like suyapa cheese  · Vegetables that can cause gas like broccoli, cabbage, and [place] sprouts  · Any fruits or vegetables that cause heartburn symptoms like tomatoes and citrus fruits  · Fresh fruits (besides ripe bananas and melons), dried fruits, or fruits canned in heavy  syrup  · Whole grain or bran cereals, bread, crackers, or pasta  · Fried pastries, such as donuts  · Pickles, sauerkraut, and similar foods  · Spicy foods and seasonings  · Pepper  · Foods with a lot of sugar or honey in them  · Seeds and nuts  · Higher fat cuts of meat, poultry with the skin, hotdogs, salami, and sausage  · Meat or fish that has been seasoned or cured, like sardines and martinez  · Fried foods  · Chocolate  · Beer, wine, and mixed drinks (alcohol)  · Peppermint and spearmint flavored foods and drinks  · Drinks with caffeine  When do I need to call the doctor?   · You are not feeling better in 2 to 3 days or you are feeling worse  · If you have questions about your diet  · Signs of fluid loss. These include dark-colored urine or no urine for more than 8 hours, dry mouth, cracked lips, sunken eyes, lack of energy, feeling faint, or passing out.  Last Reviewed Date   2021-03-12  Consumer Information Use and Disclaimer   This information is not specific medical advice and does not replace information you receive from your health care provider. This is only a brief summary of general information. It does NOT include all information about conditions, illnesses, injuries, tests, procedures, treatments, therapies, discharge instructions or life-style choices that may apply to you. You must talk with your health care provider for complete information about your health and treatment options. This information should not be used to decide whether or not to accept your health care providers advice, instructions or recommendations. Only your health care provider has the knowledge and training to provide advice that is right for you.  Copyright   Copyright © 2021 Up[employer], Inc. and its affiliates and/or licensors. All rights reserved.

## 2022-02-18 ENCOUNTER — LAB VISIT (OUTPATIENT)
Dept: LAB | Facility: HOSPITAL | Age: 67
End: 2022-02-18
Payer: COMMERCIAL

## 2022-02-18 DIAGNOSIS — R10.9 ABDOMINAL DISCOMFORT: ICD-10-CM

## 2022-02-18 PROCEDURE — 87338 HPYLORI STOOL AG IA: CPT | Performed by: PHYSICIAN ASSISTANT

## 2022-02-23 LAB
H PYLORI AG STL QL IA: ABNORMAL
SPECIMEN SOURCE: ABNORMAL

## 2022-02-24 ENCOUNTER — PATIENT MESSAGE (OUTPATIENT)
Dept: INTERNAL MEDICINE | Facility: CLINIC | Age: 67
End: 2022-02-24
Payer: COMMERCIAL

## 2022-02-24 DIAGNOSIS — A04.8 H. PYLORI INFECTION: Primary | ICD-10-CM

## 2022-02-24 RX ORDER — BISMUTH SUBSALICYLATE
300 POWDER (GRAM) MISCELLANEOUS 4 TIMES DAILY
Qty: 12000 G | Refills: 0 | Status: SHIPPED | OUTPATIENT
Start: 2022-02-24 | End: 2022-03-01 | Stop reason: SDUPTHER

## 2022-02-24 RX ORDER — METRONIDAZOLE 250 MG/1
250 TABLET ORAL 4 TIMES DAILY
Qty: 40 TABLET | Refills: 0 | Status: SHIPPED | OUTPATIENT
Start: 2022-02-24 | End: 2022-03-07

## 2022-02-24 RX ORDER — PANTOPRAZOLE SODIUM 20 MG/1
20 TABLET, DELAYED RELEASE ORAL
Qty: 20 TABLET | Refills: 0 | Status: SHIPPED | OUTPATIENT
Start: 2022-02-24 | End: 2022-03-07

## 2022-02-24 RX ORDER — TETRACYCLINE HYDROCHLORIDE 500 MG/1
500 CAPSULE ORAL 4 TIMES DAILY
Qty: 40 CAPSULE | Refills: 0 | Status: SHIPPED | OUTPATIENT
Start: 2022-02-24 | End: 2022-03-07

## 2022-02-27 NOTE — TELEPHONE ENCOUNTER
No new care gaps identified.  Powered by [employer]. Reference number: [id number].   2/27/2022 9:51:42 AM CST

## 2022-03-02 ENCOUNTER — TELEPHONE (OUTPATIENT)
Dept: INTERNAL MEDICINE | Facility: CLINIC | Age: 67
End: 2022-03-02
Payer: COMMERCIAL

## 2022-03-02 NOTE — TELEPHONE ENCOUNTER
Fax notice rec'vd from pt's pharm bismuth subsalicylate rx sent 03/01/22 not carried at pharmacy, pharmacy suggested alternative OTC Kaopectate. Please advise?

## 2022-03-02 NOTE — TELEPHONE ENCOUNTER
Pt never rec'vd original Bismuth rx, pt also stated she has been having asthma exacerbation w/ wheezing and upset stomach since starting 2d after taking abx rx from [facility]. Advised pt would send message to Dr. Patel RE alternative to Bismuth and abx rx's. Pt christian.

## 2022-03-03 ENCOUNTER — TELEPHONE (OUTPATIENT)
Dept: FAMILY MEDICINE | Facility: CLINIC | Age: 67
End: 2022-03-03

## 2022-03-03 NOTE — PROGRESS NOTES
"Subjective:       Patient ID: Latosha Enriquez is a 66 y.o. female.      Chief Complaint   Patient presents with    H-Pylori infection       HPI    Mrs. Enriquez had a positive H-Pylori test 2/18/2022.  Was treated with:  · bismuth subsalicylate 300 mg QID  · Flagyl 250 mg QID  · tetracycline 500 mg QID +++++ allergic to doxycycline  · Took x 5 days --- "almost killed me".  · Severe stomach pain, diarrhea  · Ended on Wed of last week  · Protonix 20 mg bid AC --- taking      Continues with stomach pain, no NVD.  She is wondering if the infection was adequately treated.      Review of Systems   Constitutional: Positive for appetite change. Negative for chills and fever.   Respiratory: Negative.    Cardiovascular: Negative.    Gastrointestinal: Positive for abdominal pain. Negative for abdominal distention, anal bleeding, blood in stool, constipation, diarrhea, nausea, rectal pain and vomiting.   Genitourinary: Negative.    Neurological: Negative.          Review of patient's allergies indicates:   Allergen Reactions    Antihistamines - alkylamine Anaphylaxis, cough, throat itching    Chocolate flavor Disorientation    Doxycycline Stomach pain    Betadine [povidone-iodine] Itching    Iodine and iodide containing products Tongue swelling    Tramadol Nausea Only    Yeast Itching, constipation, facial swelling         Patient Active Problem List   Diagnosis    Hemorrhoids, internal    Essential hypertension    Vitamin D deficiency disease    Primary osteoarthritis of both knees    GERD (gastroesophageal reflux disease)    Hyperlipidemia    Type 2 diabetes mellitus with both eyes affected by mild nonproliferative retinopathy and macular edema, with long-term current use of insulin    Kidney stones, calcium oxalate    AP (angina pectoris)    CANALES (dyspnea on exertion)    PAF (paroxysmal atrial fibrillation)    Amaurosis fugax    Type 2 diabetes mellitus without complication, with long-term current use of " "insulin    Dryness, eye    KATHERINE (obstructive sleep apnea)    PVC's (premature ventricular contractions)    Severe obesity (BMI 35.0-39.9) with comorbidity           Current Outpatient Medications:     albuterol (PROVENTIL/VENTOLIN HFA) 90 mcg/actuation inhaler, Inhale 2 puffs into the lungs every 6 (six) hours as needed., Disp: 18 g, Rfl: 3    allopurinoL (ZYLOPRIM) 100 MG tablet, Take 1 tablet (100 mg total) by mouth once daily., Disp: 90 tablet, Rfl: 3    apixaban (ELIQUIS) 5 mg Tab, Take 1 tablet (5 mg total) by mouth 2 (two) times daily., Disp: 180 tablet, Rfl: 1    baclofen (LIORESAL) 10 MG tablet, Take 1 tablet (10 mg total) by mouth 3 (three) times daily., Disp: 30 tablet, Rfl: 0    BD ULTRA-FINE SHORT PEN NEEDLE 31 gauge x 5/16" Ndle, USE 1 PEN NEEDLE UNDER THE SKIN TWICE A DAY, Disp: 180 each, Rfl: 0    cyanocobalamin (VITAMIN B-12) 100 MCG tablet, Take by mouth., Disp: , Rfl:     diclofenac sodium (VOLTAREN) 1 % Gel, Apply 2 g topically 3 (three) times daily., Disp: 350 g, Rfl: 2    ergocalciferol, vitamin D2, 400 unit Tab, Take by mouth., Disp: , Rfl:     ferrous sulfate, dried (SLOW FE) 160 mg (50 mg iron) TbSR, Take by mouth., Disp: , Rfl:     fexofenadine (ALLEGRA) 180 MG tablet, Take 1 tablet (180 mg total) by mouth once daily., Disp: 30 tablet, Rfl: 2    gabapentin (NEURONTIN) 100 MG capsule, Take 1 capsule (100 mg total) by mouth 3 (three) times daily., Disp: 270 capsule, Rfl: 3    HYDROcodone-acetaminophen (NORCO)  mg per tablet, Take 1 tablet by mouth every 6 (six) hours as needed., Disp: 9 tablet, Rfl: 0    insulin lispro protamin-lispro (HUMALOG MIX 75-25 KWIKPEN) 100 unit/mL (75-25) InPn, INJECT 30 UNITS UNDER THE SKIN IN THE MORNING AND 20 UNITS IN THE EVENING, Disp: 45 mL, Rfl: 11    ketorolac 0.5% (ACULAR) 0.5 % Drop, Place 1 drop into both eyes 4 (four) times daily., Disp: 5 mL, Rfl: 0    losartan-hydrochlorothiazide 100-25 mg (HYZAAR) 100-25 mg per tablet, Take 1 " "tablet by mouth once daily., Disp: 90 tablet, Rfl: 3    metFORMIN (GLUCOPHAGE-XR) 500 MG ER 24hr tablet, TAKE 2 TABLETS BY MOUTH WITH BREAKFAST AND 1 TABLET BY MOUTH WITH DINNER, Disp: 270 tablet, Rfl: 3    metoprolol tartrate (LOPRESSOR) 50 MG tablet, Take 1 tablet (50 mg total) by mouth 2 (two) times daily., Disp: 180 tablet, Rfl: 1    metroNIDAZOLE (FLAGYL) 250 MG tablet, Take 1 tablet (250 mg total) by mouth 4 (four) times daily. for 10 days, Disp: 40 tablet, Rfl: 0    montelukast (SINGULAIR) 10 mg tablet, TAKE 1 TABLET(10 MG) BY MOUTH EVERY NIGHT AS NEEDED, Disp: 90 tablet, Rfl: 0    pantoprazole (PROTONIX) 20 MG tablet, Take 1 tablet (20 mg total) by mouth 2 (two) times daily before meals. for 10 days, Disp: 20 tablet, Rfl: 0    potassium chloride (MICRO-K) 10 MEQ CpSR, Take 1 capsule (10 mEq total) by mouth once daily., Disp: 90 capsule, Rfl: 0    prednisoLONE acetate (PRED FORTE) 1 % DrpS, Place 1 drop into both eyes 4 (four) times daily., Disp: 10 mL, Rfl: 1    ranolazine (RANEXA) 500 MG Tb12, Take 1 tablet (500 mg total) by mouth 2 (two) times daily., Disp: 180 tablet, Rfl: 1    simvastatin (ZOCOR) 20 MG tablet, Take 1 tablet (20 mg total) by mouth every evening., Disp: 90 tablet, Rfl: 3    tamsulosin (FLOMAX) 0.4 mg Cap, TAKE 1 CAPSULE(0.4 MG) BY MOUTH EVERY DAY, Disp: 30 capsule, Rfl: 2    tetracycline (ACHROMYCIN,SUMYCIN) 500 MG capsule, Take 1 capsule (500 mg total) by mouth 4 (four) times daily. for 10 days, Disp: 40 capsule, Rfl: 0    triamcinolone (NASACORT) 55 mcg nasal inhaler, 2 sprays by Nasal route once daily., Disp: 17 g, Rfl: 3        Past medical, surgical, family and social histories have been reviewed today.      Objective:     Vitals:    03/07/22 1507   BP: 100/64   Pulse: 80   Temp: 97.6 °F (36.4 °C)   SpO2: 95%   Weight: 118.1 kg (260 lb 5.8 oz)   Height: 5' 7" (1.702 m)   PainSc: 0-No pain         Physical Exam  Vitals reviewed.   Constitutional:       General: She is not " in acute distress.  Eyes:      Pupils: Pupils are equal, round, and reactive to light.   Neck:      Vascular: No carotid bruit.   Cardiovascular:      Rate and Rhythm: Normal rate and regular rhythm.      Pulses: Normal pulses.      Heart sounds: Normal heart sounds.   Pulmonary:      Effort: Pulmonary effort is normal.      Breath sounds: Normal breath sounds.   Abdominal:      General: Bowel sounds are normal.      Palpations: Abdomen is soft.      Tenderness: There is generalized abdominal tenderness. There is guarding. There is no rebound.   Musculoskeletal:         General: Normal range of motion.      Cervical back: Normal range of motion and neck supple. No rigidity.      Right lower leg: No edema.      Left lower leg: No edema.   Skin:     Capillary Refill: Capillary refill takes less than 2 seconds.   Neurological:      Mental Status: She is alert and oriented to person, place, and time.      Sensory: No sensory deficit.      Motor: No weakness.      Coordination: Coordination normal.      Gait: Gait normal.   Psychiatric:         Mood and Affect: Mood normal.         Behavior: Behavior normal.         Thought Content: Thought content normal.         Judgment: Judgment normal.           Assessment     1. Bacterial infection due to Helicobacter pylori  - H. pylori antigen, stool; Future  Check antigen for active infection.  Continue PPI for now.       Follow-up     To GI as needed.  Return to clinic as needed.      LUIS Erickson  [facility]     20 minutes of total time spent on the encounter, which includes face to face time and non-face to face time preparing to see the patient (eg, review of tests), obtaining and/or reviewing separately obtained history, and documenting clinical information in the electronic or other health record.  Also includes independent interpretation of results (not separately reported) and communicating results to the patient/family/caregiver,  with care coordination (not separately reported).

## 2022-03-03 NOTE — TELEPHONE ENCOUNTER
Pt states she had an upset stomach and they did a stool test and found she had bacteria (H. Pylori) in her stomach. Pt was put on tetracycline and metronidazole, but she cannot take these. They make her feel bad and she may be allergic to one. Physician she has been seeing states she needs 2 antibiotics, or other treatment for H. Pylori. Pt states all this information should be in our system. Please advise. Pt also has appt with NP on Monday so she will be seen in clinic then.

## 2022-03-03 NOTE — TELEPHONE ENCOUNTER
She needs to address her concerns regarding the GI meds with the provider who ordered med for this issue and is treating her.  I am seeing her on Monday for ASTHMA only as scheduled.

## 2022-03-03 NOTE — TELEPHONE ENCOUNTER
----- Message from Sam Simon sent at 3/3/2022  8:37 AM CST -----  Contact: self/ [phone]  Patient would like to consult with a nurse in regards to her current medications. Please call back at [phone]. Thanks r/s

## 2022-03-04 NOTE — TELEPHONE ENCOUNTER
Refill Authorization Note   Latosha Enriquez  is requesting a refill authorization.  Brief Assessment and Rationale for Refill:  Approve     Medication Therapy Plan:       Medication Reconciliation Completed: No   Comments:   --->Care Gap information included below if applicable.       Requested Prescriptions   Pending Prescriptions Disp Refills    insulin lispro protamin-lispro (HUMALOG MIX 75-25 KWIKPEN) 100 unit/mL (75-25) InPn [Pharmacy Med Name: HUMALOG MIX 75/25 KWIKPEN INJ 3ML] 45 mL 0     Sig: INJECT 30 UNITS UNDER THE SKIN EVERY MORNING AND 20 UNITS EVERY EVENING       Endocrinology:  Diabetes - Insulins Passed - 2/27/2022  9:50 AM        Passed - Patient is at least 18 years old        Passed - Valid encounter within last 15 months     Recent Visits  Date Type Provider Dept   01/06/22 Office Visit Gabbie Ronquillo MD [facility]   01/03/22 Office Visit Gabbie Ronquillo MD [facility]   06/22/21 Office Visit Gabbie Ronquillo MD [facility]   05/04/21 Office Visit Gabbie Ronquillo MD [facility]   12/14/20 Office Visit Gabbie Ronquillo MD [facility]   07/13/20 Office Visit Gabbie Ronquillo MD [facility]   Showing recent visits within past 720 days and meeting all other requirements  Future Appointments  No visits were found meeting these conditions.  Showing future appointments within next 150 days and meeting all other requirements      Future Appointments              In 3 days Lior Beckman NP [facility]                Passed - HBA1C within 180 days     Lab Results   Component Value Date    HGBA1C 6.6 (H) 01/05/2022    HGBA1C 7.6 (H) 10/08/2021    HGBA1C 7.2 (H) 12/14/2020              Passed - eGFR is 30 or above and within 360 days     Lab Results   Component Value Date    EGFRNONAA >60.0 10/08/2021    EGFRNONAA >60.0 09/27/2021    EGFRNONAA >60.0 02/26/2021                Passed - Cr is 1.39 or below and  within 360 days     Lab Results   Component Value Date    CREATININE 0.7 10/08/2021    CREATININE 0.8 09/27/2021    CREATININE 0.8 02/26/2021                  Appointments  past 12m or future 3m with PCP    Date Provider   Last Visit   1/6/2022 Gabbie Ronquillo MD   Next Visit   Visit date not found Gabbie Ronquillo MD   ED visits in past 90 days: 0     Note composed:12:18 PM 03/04/2022

## 2022-03-04 NOTE — TELEPHONE ENCOUNTER
I have reviewed and agree with the assessment below with changes. Refill number adjusted to 1 per protocol. Approve.

## 2022-03-04 NOTE — TELEPHONE ENCOUNTER
Spoke with pt who states her Monday appt is not for asthma, it is for her stomach problems. Edited appt notes to state that is what will be addressed on Monday. Pt will be treated at that time.

## 2022-03-06 RX ORDER — INSULIN LISPRO 100 [IU]/ML
INJECTION, SUSPENSION SUBCUTANEOUS
Qty: 45 ML | Refills: 1 | Status: SHIPPED | OUTPATIENT
Start: 2022-03-06 | End: 2023-03-25

## 2022-03-07 ENCOUNTER — OFFICE VISIT (OUTPATIENT)
Dept: FAMILY MEDICINE | Facility: CLINIC | Age: 67
End: 2022-03-07
Payer: COMMERCIAL

## 2022-03-07 ENCOUNTER — TELEPHONE (OUTPATIENT)
Dept: FAMILY MEDICINE | Facility: CLINIC | Age: 67
End: 2022-03-07

## 2022-03-07 VITALS
BODY MASS INDEX: 40.87 KG/M2 | HEIGHT: 67 IN | OXYGEN SATURATION: 95 % | SYSTOLIC BLOOD PRESSURE: 100 MMHG | WEIGHT: 260.38 LBS | DIASTOLIC BLOOD PRESSURE: 64 MMHG | HEART RATE: 80 BPM | TEMPERATURE: 98 F

## 2022-03-07 DIAGNOSIS — A04.8 BACTERIAL INFECTION DUE TO HELICOBACTER PYLORI: Primary | ICD-10-CM

## 2022-03-07 PROCEDURE — 99213 OFFICE O/P EST LOW 20 MIN: CPT | Mod: S$GLB,,, | Performed by: REGISTERED NURSE

## 2022-03-07 PROCEDURE — 99999 PR PBB SHADOW E&M-EST. PATIENT-LVL V: ICD-10-PCS | Mod: PBBFAC,,, | Performed by: REGISTERED NURSE

## 2022-03-07 PROCEDURE — 3044F HG A1C LEVEL LT 7.0%: CPT | Mod: CPTII,S$GLB,, | Performed by: REGISTERED NURSE

## 2022-03-07 PROCEDURE — 3044F PR MOST RECENT HEMOGLOBIN A1C LEVEL <7.0%: ICD-10-PCS | Mod: CPTII,S$GLB,, | Performed by: REGISTERED NURSE

## 2022-03-07 PROCEDURE — 1101F PR PT FALLS ASSESS DOC 0-1 FALLS W/OUT INJ PAST YR: ICD-10-PCS | Mod: CPTII,S$GLB,, | Performed by: REGISTERED NURSE

## 2022-03-07 PROCEDURE — 1101F PT FALLS ASSESS-DOCD LE1/YR: CPT | Mod: CPTII,S$GLB,, | Performed by: REGISTERED NURSE

## 2022-03-07 PROCEDURE — 99213 PR OFFICE/OUTPT VISIT, EST, LEVL III, 20-29 MIN: ICD-10-PCS | Mod: S$GLB,,, | Performed by: REGISTERED NURSE

## 2022-03-07 PROCEDURE — 3078F PR MOST RECENT DIASTOLIC BLOOD PRESSURE < 80 MM HG: ICD-10-PCS | Mod: CPTII,S$GLB,, | Performed by: REGISTERED NURSE

## 2022-03-07 PROCEDURE — 3074F SYST BP LT 130 MM HG: CPT | Mod: CPTII,S$GLB,, | Performed by: REGISTERED NURSE

## 2022-03-07 PROCEDURE — 3074F PR MOST RECENT SYSTOLIC BLOOD PRESSURE < 130 MM HG: ICD-10-PCS | Mod: CPTII,S$GLB,, | Performed by: REGISTERED NURSE

## 2022-03-07 PROCEDURE — 3008F PR BODY MASS INDEX (BMI) DOCUMENTED: ICD-10-PCS | Mod: CPTII,S$GLB,, | Performed by: REGISTERED NURSE

## 2022-03-07 PROCEDURE — 3078F DIAST BP <80 MM HG: CPT | Mod: CPTII,S$GLB,, | Performed by: REGISTERED NURSE

## 2022-03-07 PROCEDURE — 3288F PR FALLS RISK ASSESSMENT DOCUMENTED: ICD-10-PCS | Mod: CPTII,S$GLB,, | Performed by: REGISTERED NURSE

## 2022-03-07 PROCEDURE — 99999 PR PBB SHADOW E&M-EST. PATIENT-LVL V: CPT | Mod: PBBFAC,,, | Performed by: REGISTERED NURSE

## 2022-03-07 PROCEDURE — 3008F BODY MASS INDEX DOCD: CPT | Mod: CPTII,S$GLB,, | Performed by: REGISTERED NURSE

## 2022-03-07 PROCEDURE — 1159F PR MEDICATION LIST DOCUMENTED IN MEDICAL RECORD: ICD-10-PCS | Mod: CPTII,S$GLB,, | Performed by: REGISTERED NURSE

## 2022-03-07 PROCEDURE — 3288F FALL RISK ASSESSMENT DOCD: CPT | Mod: CPTII,S$GLB,, | Performed by: REGISTERED NURSE

## 2022-03-07 PROCEDURE — 1126F AMNT PAIN NOTED NONE PRSNT: CPT | Mod: CPTII,S$GLB,, | Performed by: REGISTERED NURSE

## 2022-03-07 PROCEDURE — 1126F PR PAIN SEVERITY QUANTIFIED, NO PAIN PRESENT: ICD-10-PCS | Mod: CPTII,S$GLB,, | Performed by: REGISTERED NURSE

## 2022-03-07 PROCEDURE — 1159F MED LIST DOCD IN RCRD: CPT | Mod: CPTII,S$GLB,, | Performed by: REGISTERED NURSE

## 2022-03-07 NOTE — TELEPHONE ENCOUNTER
----- Message from Teresa Bliss sent at 3/7/2022  4:06 PM CST -----  Pt called requesting a refill on her cough syrup , please fill the medication at .  [id number] - [place] - [address]  [address]  [place] [address]  Phone: [phone] Fax: [phone]           Please give a call back at [phone]  if have any questions or concerns.     Thanks

## 2022-03-07 NOTE — TELEPHONE ENCOUNTER
Attempted to call pt and no answer. LVM. Pt states in message below she wants cough syrup refilled, but I don't see one in med list. Please advise.

## 2022-03-30 NOTE — TELEPHONE ENCOUNTER
Care Due:                  Date            Visit Type   Department     Provider  --------------------------------------------------------------------------------                                EP -                              PRIMARY      [facility] PRIMARY  Last Visit: 01-      CARE ([facility])   CARE           Gabbie Ronquillo  Next Visit: None Scheduled  None         None Found                                                            Last  Test          Frequency    Reason                     Performed    Due Date  --------------------------------------------------------------------------------    Uric Acid...  12 months..  allopurinoL..............  Not Found    Overdue    Powered by [employer]. Reference number: [id number].   3/30/2022 11:27:15 AM CDT   <--- Click to Launch ICDx for PreOp, PostOp and Procedure

## 2022-03-31 RX ORDER — POTASSIUM CHLORIDE 750 MG/1
10 CAPSULE, EXTENDED RELEASE ORAL DAILY
Qty: 90 CAPSULE | Refills: 1 | Status: SHIPPED | OUTPATIENT
Start: 2022-03-31 | End: 2023-01-03 | Stop reason: SDUPTHER

## 2022-03-31 NOTE — TELEPHONE ENCOUNTER
Refill Authorization Note   Latosha Enriquez  is requesting a refill authorization.  Brief Assessment and Rationale for Refill:  Approve     Medication Therapy Plan:       Medication Reconciliation Completed: No   Comments:   --->Care Gap information included below if applicable.       Requested Prescriptions   Pending Prescriptions Disp Refills    potassium chloride (MICRO-K) 10 MEQ CpSR 90 capsule 1     Sig: Take 1 capsule (10 mEq total) by mouth once daily.       Endocrinology:  Minerals - Potassium Supplementation Passed - 3/30/2022 11:26 AM        Passed - Patient is at least 18 years old        Passed - Valid encounter within last 15 months     Recent Visits  Date Type Provider Dept   01/06/22 Office Visit Gabbie Ronquillo MD [facility]   01/03/22 Office Visit Gabbie Ronquillo MD [facility]   06/22/21 Office Visit Gabbie Ronquillo MD [facility]   05/04/21 Office Visit Gabbie Ronquillo MD [facility]   12/14/20 Office Visit Gabbie Ronquillo MD [facility]   07/13/20 Office Visit Gabbie Ronquillo MD [facility]   Showing recent visits within past 720 days and meeting all other requirements  Future Appointments  No visits were found meeting these conditions.  Showing future appointments within next 150 days and meeting all other requirements                Passed - Matches previous order       Previous Authorizing Provider: Gabbie Ronquillo MD (potassium chloride (MICRO-K) 10 MEQ CpSR)  Previous Pharmacy: [facility]            Passed - No ED/Hospital visits since last PCP visit     Last PCP Visit: 1/6/2022 Last Admission: 2/20/2019 Last ED Visit: 1/16/2021          Passed - K is 5.2 or below and within 360 days     Potassium   Date Value Ref Range Status   10/08/2021 3.6 3.5 - 5.1 mmol/L Final   02/26/2021 3.6 3.5 - 5.1 mmol/L Final   01/16/2021 3.7 3.5 - 5.1 mmol/L Final              Passed - Cr is 1.39 or below and within 360 days      Lab Results   Component Value Date    CREATININE 0.7 10/08/2021    CREATININE 0.8 09/27/2021    CREATININE 0.8 02/26/2021              Passed - eGFR within 360 days     Lab Results   Component Value Date    EGFRNONAA >60.0 10/08/2021    EGFRNONAA >60.0 09/27/2021    EGFRNONAA >60.0 02/26/2021                    Appointments  past 12m or future 3m with PCP    Date Provider   Last Visit   1/6/2022 Gabbie Ronquillo MD   Next Visit   Visit date not found Gabbie Ronquillo MD   ED visits in past 90 days: 0     Note composed:12:05 PM 03/31/2022

## 2022-04-07 RX ORDER — PROMETHAZINE HYDROCHLORIDE AND DEXTROMETHORPHAN HYDROBROMIDE 6.25; 15 MG/5ML; MG/5ML
5 SYRUP ORAL
Qty: 180 ML | Refills: 0 | Status: SHIPPED | OUTPATIENT
Start: 2022-04-07 | End: 2022-04-17

## 2022-04-07 NOTE — TELEPHONE ENCOUNTER
[id number] - [place], [place] - [address] [facility] [address]

## 2022-04-07 NOTE — TELEPHONE ENCOUNTER
Called pt and pt stated that she has had post nasal drip and dry cough since Sunday. Pt states that she can not talk with out coughing. Pt would like something called in and pt has been taking tylenol. Please advise

## 2022-04-07 NOTE — TELEPHONE ENCOUNTER
Only thing pt has been taking is tylenol, pt has not tried any sinus meds. Pt was requesting some cough syrup. Please advise

## 2022-05-11 NOTE — PROGRESS NOTES
Subjective:       Patient ID: Latosha Enriquez is a 66 y.o. female.      Chief Complaint   Patient presents with    Chest Pain         HPI      Chest Pain   This is a recurrent problem. The current episode started more than 1 month ago. The problem occurs intermittently. The problem has been waxing and waning. The pain is present in the substernal region. The quality of the pain is described as tightness and pressure. The pain does not radiate. Associated symptoms include back pain, a cough, dizziness, irregular heartbeat, lower extremity edema, malaise/fatigue, near-syncope, orthopnea, palpitations, shortness of breath, sputum production and weakness. Pertinent negatives include no abdominal pain, claudication, diaphoresis, exertional chest pressure, fever, headaches, hemoptysis, leg pain, nausea, numbness, syncope or vomiting. It is unknown what precipitates the cough. The cough is productive. There is no color change associated with the cough. The cough is relieved by a beta-agonist and rest (not completely). The cough is worsened by a supine position.   Her past medical history is significant for arrhythmia, diabetes, hyperlipidemia, hypertension and sleep apnea.   Pertinent negatives for past medical history include no aortic aneurysm, no aortic dissection, no CAD, no cancer, no COPD, no CHF, no DVT, no mitral valve prolapse, no pacemaker, no PE, no PVD, no rheumatic fever, no seizures, no sickle cell disease, no spontaneous pneumothorax, no stimulant use, no strokes, no thyroid problem and no TIA.   Her family medical history is significant for CAD, diabetes, heart disease, hyperlipidemia, hypertension and stroke.          Review of Systems   Constitutional: Positive for malaise/fatigue and unexpected weight change. Negative for diaphoresis and fever.        Wt Readings from Last 3 Encounters:  05/24/22 1147 : 120 kg (264 lb 8.8 oz)  05/16/22 1534 : 120.3 kg (265 lb 3.4 oz)  03/07/22 1507 : 118.1 kg (260 lb 5.8  oz)   Respiratory: Positive for cough, sputum production, chest tightness, shortness of breath and wheezing. Negative for hemoptysis.         Has h/o asthma, tx with inhaler.  Symptoms worse at night, lots of mucus (clear, thick).   Cardiovascular: Positive for chest pain, palpitations, orthopnea, leg swelling (intermittent) and near-syncope. Negative for claudication and syncope.        Has issues with irreg rhythm and PVCs, followed by Cardiology.  Has not started the flecainide as was ordered.   Gastrointestinal: Negative for abdominal pain, nausea and vomiting.   Endocrine: Positive for polyuria. Negative for polydipsia and polyphagia.        Reports home FBS ~ 160, does not take metformin consistently due to GI issues from rx.  Is ordered to take 2 tabs in AM and 1 in PM.  On insulin 30 units bid.   Genitourinary: Positive for difficulty urinating, enuresis, frequency and urgency. Negative for flank pain, hematuria and pelvic pain.        Reports urinary incontinence, leaking and nocturia.  Is on Flomax daily in AM to help w/ urinary issues r/t kidney stones.   Musculoskeletal: Positive for back pain. Negative for gait problem, joint swelling, neck pain and neck stiffness.        Reports chronic lower midline back pain, requests injection today.   Skin: Negative.    Neurological: Positive for dizziness and weakness. Negative for seizures, numbness and headaches.         Review of patient's allergies indicates:   Allergen Reactions    Antihistamines - alkylamine Anaphylaxis and Itching     Cough, throat itches    Chocolate flavor Other (See Comments)     disoriented    Doxycycline Other (See Comments)     Stomach pain    Betadine [povidone-iodine] Itching    Iodine and iodide containing products Other (See Comments)     Tongue swelling    Tramadol Nausea Only    Yeast Itching     Facial swelling, constipation       Patient Active Problem List   Diagnosis    Hemorrhoids, internal    Essential  "hypertension    Vitamin D deficiency disease    Primary osteoarthritis of both knees    GERD (gastroesophageal reflux disease)    Hyperlipidemia    Type 2 diabetes mellitus with both eyes affected by mild nonproliferative retinopathy and macular edema, with long-term current use of insulin    Kidney stones, calcium oxalate    AP (angina pectoris)    CANALES (dyspnea on exertion)    PAF (paroxysmal atrial fibrillation)    Amaurosis fugax    Type 2 diabetes mellitus without complication, with long-term current use of insulin    Dryness, eye    KATHERINE (obstructive sleep apnea)    PVC's (premature ventricular contractions)    Severe obesity (BMI 35.0-39.9) with comorbidity         Current Outpatient Medications:     albuterol (PROVENTIL/VENTOLIN HFA) 90 mcg/actuation inhaler, Inhale 2 puffs into the lungs every 6 (six) hours as needed., Disp: 18 g, Rfl: 3    allopurinoL (ZYLOPRIM) 100 MG tablet, Take 1 tablet (100 mg total) by mouth once daily., Disp: 90 tablet, Rfl: 3    apixaban (ELIQUIS) 5 mg Tab, Take 1 tablet (5 mg total) by mouth 2 (two) times daily., Disp: 180 tablet, Rfl: 1    BD ULTRA-FINE SHORT PEN NEEDLE 31 gauge x 5/16" Ndle, USE 1 PEN NEEDLE UNDER THE SKIN TWICE A DAY, Disp: 180 each, Rfl: 0    cyanocobalamin (VITAMIN B-12) 100 MCG tablet, Take by mouth., Disp: , Rfl:     diclofenac sodium (VOLTAREN) 1 % Gel, Apply 2 g topically 3 (three) times daily., Disp: 350 g, Rfl: 2    ergocalciferol, vitamin D2, 400 unit Tab, Take by mouth., Disp: , Rfl:     ferrous sulfate, dried (SLOW FE) 160 mg (50 mg iron) TbSR, Take by mouth., Disp: , Rfl:     flecainide (TAMBOCOR) 50 MG Tab, Take 50 mg by mouth 2 (two) times daily., Disp: , Rfl:     gabapentin (NEURONTIN) 100 MG capsule, Take 1 capsule (100 mg total) by mouth 3 (three) times daily., Disp: 270 capsule, Rfl: 3    HYDROcodone-acetaminophen (NORCO)  mg per tablet, Take 1 tablet by mouth every 6 (six) hours as needed., Disp: 9 tablet, Rfl: " "0    insulin lispro protamin-lispro (HUMALOG MIX 75-25 KWIKPEN) 100 unit/mL (75-25) InPn, INJECT 30 UNITS UNDER THE SKIN EVERY MORNING AND 20 UNITS EVERY EVENING, Disp: 45 mL, Rfl: 1    losartan-hydrochlorothiazide 100-25 mg (HYZAAR) 100-25 mg per tablet, Take 1 tablet by mouth once daily., Disp: 90 tablet, Rfl: 3    metFORMIN (GLUCOPHAGE-XR) 500 MG ER 24hr tablet, TAKE 2 TABLETS BY MOUTH WITH BREAKFAST AND 1 TABLET BY MOUTH WITH DINNER, Disp: 270 tablet, Rfl: 3    metoprolol succinate (TOPROL-XL) 50 MG 24 hr tablet, Take 50 mg by mouth 2 (two) times daily., Disp: , Rfl:     montelukast (SINGULAIR) 10 mg tablet, TAKE 1 TABLET(10 MG) BY MOUTH EVERY NIGHT AS NEEDED, Disp: 90 tablet, Rfl: 0    pantoprazole (PROTONIX) 20 MG tablet, Take 20 mg by mouth every morning., Disp: , Rfl:     potassium chloride (MICRO-K) 10 MEQ CpSR, Take 1 capsule (10 mEq total) by mouth once daily., Disp: 90 capsule, Rfl: 1    ranolazine (RANEXA) 500 MG Tb12, Take 1 tablet (500 mg total) by mouth 2 (two) times daily., Disp: 180 tablet, Rfl: 1    simvastatin (ZOCOR) 20 MG tablet, Take 1 tablet (20 mg total) by mouth every evening., Disp: 90 tablet, Rfl: 3    tamsulosin (FLOMAX) 0.4 mg Cap, TAKE 1 CAPSULE(0.4 MG) BY MOUTH EVERY DAY, Disp: 30 capsule, Rfl: 2    triamcinolone (NASACORT) 55 mcg nasal inhaler, 2 sprays by Nasal route once daily., Disp: 17 g, Rfl: 3    baclofen (LIORESAL) 10 MG tablet, Take 1 tablet (10 mg total) by mouth 3 (three) times daily., Disp: 30 tablet, Rfl: 0    fexofenadine (ALLEGRA) 180 MG tablet, Take 1 tablet (180 mg total) by mouth once daily., Disp: 30 tablet, Rfl: 2      Past medical, surgical, family and social histories have been reviewed today.      Objective:     Vitals:    05/16/22 1534   BP: (!) 120/58   Pulse: (!) 56 ---- 85 per EKG report   Temp: 96.9 °F (36.1 °C)   SpO2: 100%   Weight: 120.3 kg (265 lb 3.4 oz)   Height: 5' 7" (1.702 m)   PainSc: 0-No pain       Physical Exam  Vitals reviewed. "   Constitutional:       General: She is not in acute distress.  HENT:      Head: Normocephalic and atraumatic.   Eyes:      Pupils: Pupils are equal, round, and reactive to light.   Neck:      Vascular: No carotid bruit.   Cardiovascular:      Rate and Rhythm: Normal rate. Rhythm irregularly irregular.      Pulses: Normal pulses.      Heart sounds: Normal heart sounds.   Pulmonary:      Effort: Pulmonary effort is normal. No respiratory distress.      Breath sounds: Normal breath sounds. No stridor. No wheezing, rhonchi or rales.   Chest:      Chest wall: No tenderness.   Musculoskeletal:         General: Normal range of motion.      Cervical back: Normal, normal range of motion and neck supple. No rigidity.      Thoracic back: Normal.      Lumbar back: Bony tenderness present. No swelling, edema, deformity or spasms. Normal range of motion.        Back:       Right lower leg: No edema.      Left lower leg: No edema.   Skin:     Capillary Refill: Capillary refill takes less than 2 seconds.   Neurological:      Mental Status: She is alert and oriented to person, place, and time.      Sensory: No sensory deficit.      Motor: No weakness.      Coordination: Coordination normal.      Gait: Gait normal.   Psychiatric:         Mood and Affect: Mood normal.         Behavior: Behavior normal.         Thought Content: Thought content normal.         Judgment: Judgment normal.             Results for orders placed or performed in visit on 05/16/22   IN OFFICE EKG 12-LEAD (to [facility])    Collection Time: 05/16/22  4:22 PM    Narrative    Test Reason : I49.9,    Vent. Rate : 085 BPM     Atrial Rate : 085 BPM     P-R Int : 150 ms          QRS Dur : 074 ms      QT Int : 366 ms       P-R-T Axes : 039 -07 -38 degrees     QTc Int : 435 ms    Sinus rhythm with frequent Premature ventricular complexes in a pattern of  bigeminy  Possible Left atrial enlargement  T wave abnormality, consider inferior ischemia  Abnormal ECG  When compared  with ECG of 16-JAN-2021 22:22,  Premature ventricular complexes are now Present  Borderline criteria for Lateral infarct are no longer Present  T wave inversion now evident in Inferior leads  Nonspecific T wave abnormality now evident in Anterior-lateral leads  Confirmed by DAVE BATES MD ([id number]) on 5/17/2022 4:28:30 PM    Referred By:             Confirmed By:DAVE BATES MD       Assessment     1. Essential hypertension --- stable on current rx, taking as ordered.  Per Cardiology.    2. Frequent PVCs --- followed by Cardiology --- has not started flecainide as ordered.  To discuss further with Card.    3. Bigeminal rhythm --- # 2    4. Irregular heart rhythm --- # 2/3  - IN OFFICE EKG 12-LEAD (to [facility])    5. Type 2 diabetes mellitus with both eyes affected by mild nonproliferative retinopathy and macular edema, with long-term current use of insulin  Does not take metformin consistently as it causes her GI issues.  Ordered 2 in AM 1 in PM --- trial of med to take 1 in AM or with lunch if skipping BF, and 2 tabs with dinner.  MUST TAKE WITH FOOD.  If GI issues persist, may consider changing to XR formulation.  Monitor.    Lab Results   Component Value Date    HGBA1C 6.6 (H) 01/05/2022       6. Asthma in adult, unspecified asthma severity, unspecified whether complicated, unspecified whether persistent  - albuterol (PROVENTIL/VENTOLIN HFA) 90 mcg/actuation inhaler; Inhale 1-2 puffs into the lungs every 4 to 6 hours as needed for Wheezing or Shortness of Breath.  Dispense: 18 g; Refill: 3  - fluticasone propion-salmeterol 45-21 mcg/dose (ADVAIR HFA) 45-21 mcg/actuation HFAA inhaler; Inhale 2 puffs into the lungs every 12 (twelve) hours. Controller  Dispense: 12 g; Refill: 2  - montelukast (SINGULAIR) 10 mg tablet; Take 1 tablet (10 mg total) by mouth every evening.  Dispense: 90 tablet; Refill: 1    7. Chronic cough  - albuterol (PROVENTIL/VENTOLIN HFA) 90 mcg/actuation inhaler; Inhale 1-2 puffs into the lungs every 4  to 6 hours as needed for Wheezing or Shortness of Breath.  Dispense: 18 g; Refill: 3  - fluticasone propion-salmeterol 45-21 mcg/dose (ADVAIR HFA) 45-21 mcg/actuation HFAA inhaler; Inhale 2 puffs into the lungs every 12 (twelve) hours. Controller  Dispense: 12 g; Refill: 2  - montelukast (SINGULAIR) 10 mg tablet; Take 1 tablet (10 mg total) by mouth every evening.  Dispense: 90 tablet; Refill: 1    8. Chronic midline low back pain, unspecified whether sciatica present  - Ambulatory referral/consult to Pain Clinic; Future    9. Urinary incontinence, unspecified type ---- trial of Flomax nightly as her s/s are more evening/bedtime.  Monitor.  - tamsulosin (FLOMAX) 0.4 mg Cap; Take 1 capsule (0.4 mg total) by mouth every evening.  Dispense: 90 capsule; Refill: 1    10. Nocturia --- # 9  - tamsulosin (FLOMAX) 0.4 mg Cap; Take 1 capsule (0.4 mg total) by mouth every evening.  Dispense: 90 capsule; Refill: 1    11. Voiding difficulty ---- w/ h/o renal stones --- # 9  - tamsulosin (FLOMAX) 0.4 mg Cap; Take 1 capsule (0.4 mg total) by mouth every evening.  Dispense: 90 capsule; Refill: 1    12. Weight gain --- examine diet & eating habits, no exercise.  But may also be d/t medication ---- insulin vs gabapentin ???  Monitor.    13. Morbid obesity with BMI of 40.0-44.9, adult --- healthy diet, exercise, stay active, weight reduction as needed.       Follow-up     A1c due July.  RTC as directed or on prn basis.        LUIS Erickson  [facility]       50 minutes of total time spent on the encounter, which includes face to face time and non-face to face time preparing to see the patient (eg, review of tests), obtaining and/or reviewing separately obtained history, and documenting clinical information in the electronic or other health record.  Also includes independent interpretation of results (not separately reported) and communicating results to the patient/family/caregiver, with care  coordination (not separately reported).

## 2022-05-16 ENCOUNTER — OFFICE VISIT (OUTPATIENT)
Dept: FAMILY MEDICINE | Facility: CLINIC | Age: 67
End: 2022-05-16
Payer: COMMERCIAL

## 2022-05-16 VITALS
DIASTOLIC BLOOD PRESSURE: 58 MMHG | BODY MASS INDEX: 41.62 KG/M2 | SYSTOLIC BLOOD PRESSURE: 120 MMHG | HEART RATE: 56 BPM | HEIGHT: 67 IN | WEIGHT: 265.19 LBS | OXYGEN SATURATION: 100 % | TEMPERATURE: 97 F

## 2022-05-16 DIAGNOSIS — R39.198 VOIDING DIFFICULTY: ICD-10-CM

## 2022-05-16 DIAGNOSIS — E66.01 MORBID OBESITY WITH BMI OF 40.0-44.9, ADULT: ICD-10-CM

## 2022-05-16 DIAGNOSIS — E11.3213 TYPE 2 DIABETES MELLITUS WITH BOTH EYES AFFECTED BY MILD NONPROLIFERATIVE RETINOPATHY AND MACULAR EDEMA, WITH LONG-TERM CURRENT USE OF INSULIN: ICD-10-CM

## 2022-05-16 DIAGNOSIS — R32 URINARY INCONTINENCE, UNSPECIFIED TYPE: ICD-10-CM

## 2022-05-16 DIAGNOSIS — I49.8 BIGEMINAL RHYTHM: ICD-10-CM

## 2022-05-16 DIAGNOSIS — I49.3 FREQUENT PVCS: ICD-10-CM

## 2022-05-16 DIAGNOSIS — M54.50 CHRONIC MIDLINE LOW BACK PAIN, UNSPECIFIED WHETHER SCIATICA PRESENT: ICD-10-CM

## 2022-05-16 DIAGNOSIS — R63.5 WEIGHT GAIN: ICD-10-CM

## 2022-05-16 DIAGNOSIS — I49.9 IRREGULAR HEART RHYTHM: ICD-10-CM

## 2022-05-16 DIAGNOSIS — R35.1 NOCTURIA: ICD-10-CM

## 2022-05-16 DIAGNOSIS — R05.3 CHRONIC COUGH: ICD-10-CM

## 2022-05-16 DIAGNOSIS — G89.29 CHRONIC MIDLINE LOW BACK PAIN, UNSPECIFIED WHETHER SCIATICA PRESENT: ICD-10-CM

## 2022-05-16 DIAGNOSIS — I10 ESSENTIAL HYPERTENSION: Primary | ICD-10-CM

## 2022-05-16 DIAGNOSIS — Z79.4 TYPE 2 DIABETES MELLITUS WITH BOTH EYES AFFECTED BY MILD NONPROLIFERATIVE RETINOPATHY AND MACULAR EDEMA, WITH LONG-TERM CURRENT USE OF INSULIN: ICD-10-CM

## 2022-05-16 DIAGNOSIS — J45.909 ASTHMA IN ADULT, UNSPECIFIED ASTHMA SEVERITY, UNSPECIFIED WHETHER COMPLICATED, UNSPECIFIED WHETHER PERSISTENT: ICD-10-CM

## 2022-05-16 PROCEDURE — 3074F SYST BP LT 130 MM HG: CPT | Mod: CPTII,S$GLB,, | Performed by: REGISTERED NURSE

## 2022-05-16 PROCEDURE — 1101F PT FALLS ASSESS-DOCD LE1/YR: CPT | Mod: CPTII,S$GLB,, | Performed by: REGISTERED NURSE

## 2022-05-16 PROCEDURE — 1101F PR PT FALLS ASSESS DOC 0-1 FALLS W/OUT INJ PAST YR: ICD-10-PCS | Mod: CPTII,S$GLB,, | Performed by: REGISTERED NURSE

## 2022-05-16 PROCEDURE — 3008F PR BODY MASS INDEX (BMI) DOCUMENTED: ICD-10-PCS | Mod: CPTII,S$GLB,, | Performed by: REGISTERED NURSE

## 2022-05-16 PROCEDURE — 99215 OFFICE O/P EST HI 40 MIN: CPT | Mod: 25,S$GLB,, | Performed by: REGISTERED NURSE

## 2022-05-16 PROCEDURE — 93005 EKG 12-LEAD: ICD-10-PCS | Mod: S$GLB,,, | Performed by: REGISTERED NURSE

## 2022-05-16 PROCEDURE — 99999 PR PBB SHADOW E&M-EST. PATIENT-LVL V: ICD-10-PCS | Mod: PBBFAC,,, | Performed by: REGISTERED NURSE

## 2022-05-16 PROCEDURE — 3044F HG A1C LEVEL LT 7.0%: CPT | Mod: CPTII,S$GLB,, | Performed by: REGISTERED NURSE

## 2022-05-16 PROCEDURE — 3008F BODY MASS INDEX DOCD: CPT | Mod: CPTII,S$GLB,, | Performed by: REGISTERED NURSE

## 2022-05-16 PROCEDURE — 1126F PR PAIN SEVERITY QUANTIFIED, NO PAIN PRESENT: ICD-10-PCS | Mod: CPTII,S$GLB,, | Performed by: REGISTERED NURSE

## 2022-05-16 PROCEDURE — 93010 EKG 12-LEAD: ICD-10-PCS | Mod: S$GLB,,, | Performed by: INTERNAL MEDICINE

## 2022-05-16 PROCEDURE — 3288F PR FALLS RISK ASSESSMENT DOCUMENTED: ICD-10-PCS | Mod: CPTII,S$GLB,, | Performed by: REGISTERED NURSE

## 2022-05-16 PROCEDURE — 99215 PR OFFICE/OUTPT VISIT, EST, LEVL V, 40-54 MIN: ICD-10-PCS | Mod: 25,S$GLB,, | Performed by: REGISTERED NURSE

## 2022-05-16 PROCEDURE — 93010 ELECTROCARDIOGRAM REPORT: CPT | Mod: S$GLB,,, | Performed by: INTERNAL MEDICINE

## 2022-05-16 PROCEDURE — 3078F DIAST BP <80 MM HG: CPT | Mod: CPTII,S$GLB,, | Performed by: REGISTERED NURSE

## 2022-05-16 PROCEDURE — 3288F FALL RISK ASSESSMENT DOCD: CPT | Mod: CPTII,S$GLB,, | Performed by: REGISTERED NURSE

## 2022-05-16 PROCEDURE — 99999 PR PBB SHADOW E&M-EST. PATIENT-LVL V: CPT | Mod: PBBFAC,,, | Performed by: REGISTERED NURSE

## 2022-05-16 PROCEDURE — 3044F PR MOST RECENT HEMOGLOBIN A1C LEVEL <7.0%: ICD-10-PCS | Mod: CPTII,S$GLB,, | Performed by: REGISTERED NURSE

## 2022-05-16 PROCEDURE — 1126F AMNT PAIN NOTED NONE PRSNT: CPT | Mod: CPTII,S$GLB,, | Performed by: REGISTERED NURSE

## 2022-05-16 PROCEDURE — 3078F PR MOST RECENT DIASTOLIC BLOOD PRESSURE < 80 MM HG: ICD-10-PCS | Mod: CPTII,S$GLB,, | Performed by: REGISTERED NURSE

## 2022-05-16 PROCEDURE — 93005 ELECTROCARDIOGRAM TRACING: CPT | Mod: S$GLB,,, | Performed by: REGISTERED NURSE

## 2022-05-16 PROCEDURE — 3074F PR MOST RECENT SYSTOLIC BLOOD PRESSURE < 130 MM HG: ICD-10-PCS | Mod: CPTII,S$GLB,, | Performed by: REGISTERED NURSE

## 2022-05-16 RX ORDER — PANTOPRAZOLE SODIUM 20 MG/1
20 TABLET, DELAYED RELEASE ORAL EVERY MORNING
COMMUNITY
Start: 2022-04-19

## 2022-05-16 RX ORDER — MONTELUKAST SODIUM 10 MG/1
10 TABLET ORAL NIGHTLY
Qty: 90 TABLET | Refills: 1 | Status: SHIPPED | OUTPATIENT
Start: 2022-05-16 | End: 2023-02-13 | Stop reason: SDUPTHER

## 2022-05-16 RX ORDER — METOPROLOL SUCCINATE 50 MG/1
50 TABLET, EXTENDED RELEASE ORAL 2 TIMES DAILY
COMMUNITY
Start: 2022-04-19 | End: 2022-12-27

## 2022-05-16 RX ORDER — FLUTICASONE PROPIONATE AND SALMETEROL XINAFOATE 45; 21 UG/1; UG/1
2 AEROSOL, METERED RESPIRATORY (INHALATION) EVERY 12 HOURS
Qty: 12 G | Refills: 2 | Status: SHIPPED | OUTPATIENT
Start: 2022-05-16 | End: 2024-01-11

## 2022-05-16 RX ORDER — ALBUTEROL SULFATE 90 UG/1
1-2 AEROSOL, METERED RESPIRATORY (INHALATION)
Qty: 18 G | Refills: 3 | Status: SHIPPED | OUTPATIENT
Start: 2022-05-16 | End: 2024-01-11

## 2022-05-16 RX ORDER — TAMSULOSIN HYDROCHLORIDE 0.4 MG/1
0.4 CAPSULE ORAL NIGHTLY
Qty: 90 CAPSULE | Refills: 1 | Status: SHIPPED | OUTPATIENT
Start: 2022-05-16 | End: 2023-08-08 | Stop reason: SDUPTHER

## 2022-05-16 RX ORDER — FLECAINIDE ACETATE 50 MG/1
50 TABLET ORAL 2 TIMES DAILY
COMMUNITY
Start: 2022-04-12

## 2022-05-17 ENCOUNTER — TELEPHONE (OUTPATIENT)
Dept: PAIN MEDICINE | Facility: CLINIC | Age: 67
End: 2022-05-17
Payer: COMMERCIAL

## 2022-05-18 ENCOUNTER — TELEPHONE (OUTPATIENT)
Dept: PAIN MEDICINE | Facility: CLINIC | Age: 67
End: 2022-05-18
Payer: COMMERCIAL

## 2022-05-19 ENCOUNTER — TELEPHONE (OUTPATIENT)
Dept: PAIN MEDICINE | Facility: CLINIC | Age: 67
End: 2022-05-19
Payer: COMMERCIAL

## 2022-05-24 ENCOUNTER — OFFICE VISIT (OUTPATIENT)
Dept: PAIN MEDICINE | Facility: CLINIC | Age: 67
End: 2022-05-24
Payer: COMMERCIAL

## 2022-05-24 ENCOUNTER — HOSPITAL ENCOUNTER (OUTPATIENT)
Dept: RADIOLOGY | Facility: HOSPITAL | Age: 67
Discharge: HOME | End: 2022-05-24
Attending: FAMILY MEDICINE
Payer: COMMERCIAL

## 2022-05-24 VITALS
HEIGHT: 67 IN | SYSTOLIC BLOOD PRESSURE: 103 MMHG | WEIGHT: 264.56 LBS | HEART RATE: 44 BPM | BODY MASS INDEX: 41.52 KG/M2 | DIASTOLIC BLOOD PRESSURE: 57 MMHG

## 2022-05-24 DIAGNOSIS — Z12.31 ENCOUNTER FOR SCREENING MAMMOGRAM FOR MALIGNANT NEOPLASM OF BREAST: ICD-10-CM

## 2022-05-24 DIAGNOSIS — M79.18 MYOFASCIAL PAIN: Primary | ICD-10-CM

## 2022-05-24 DIAGNOSIS — M54.9 DORSALGIA, UNSPECIFIED: ICD-10-CM

## 2022-05-24 DIAGNOSIS — M54.16 LUMBAR RADICULOPATHY: ICD-10-CM

## 2022-05-24 LAB
LEFT EYE DM RETINOPATHY: POSITIVE
RIGHT EYE DM RETINOPATHY: POSITIVE

## 2022-05-24 PROCEDURE — 3078F DIAST BP <80 MM HG: CPT | Mod: CPTII,S$GLB,, | Performed by: ANESTHESIOLOGY

## 2022-05-24 PROCEDURE — 1159F MED LIST DOCD IN RCRD: CPT | Mod: CPTII,S$GLB,, | Performed by: ANESTHESIOLOGY

## 2022-05-24 PROCEDURE — 99204 PR OFFICE/OUTPT VISIT, NEW, LEVL IV, 45-59 MIN: ICD-10-PCS | Mod: S$GLB,,, | Performed by: ANESTHESIOLOGY

## 2022-05-24 PROCEDURE — 3074F PR MOST RECENT SYSTOLIC BLOOD PRESSURE < 130 MM HG: ICD-10-PCS | Mod: CPTII,S$GLB,, | Performed by: ANESTHESIOLOGY

## 2022-05-24 PROCEDURE — 1101F PR PT FALLS ASSESS DOC 0-1 FALLS W/OUT INJ PAST YR: ICD-10-PCS | Mod: CPTII,S$GLB,, | Performed by: ANESTHESIOLOGY

## 2022-05-24 PROCEDURE — 99999 PR PBB SHADOW E&M-EST. PATIENT-LVL V: ICD-10-PCS | Mod: PBBFAC,,, | Performed by: ANESTHESIOLOGY

## 2022-05-24 PROCEDURE — 1125F AMNT PAIN NOTED PAIN PRSNT: CPT | Mod: CPTII,S$GLB,, | Performed by: ANESTHESIOLOGY

## 2022-05-24 PROCEDURE — 77067 SCR MAMMO BI INCL CAD: CPT | Mod: 26,,, | Performed by: RADIOLOGY

## 2022-05-24 PROCEDURE — 99999 PR PBB SHADOW E&M-EST. PATIENT-LVL V: CPT | Mod: PBBFAC,,, | Performed by: ANESTHESIOLOGY

## 2022-05-24 PROCEDURE — 3008F BODY MASS INDEX DOCD: CPT | Mod: CPTII,S$GLB,, | Performed by: ANESTHESIOLOGY

## 2022-05-24 PROCEDURE — 1125F PR PAIN SEVERITY QUANTIFIED, PAIN PRESENT: ICD-10-PCS | Mod: CPTII,S$GLB,, | Performed by: ANESTHESIOLOGY

## 2022-05-24 PROCEDURE — 77063 BREAST TOMOSYNTHESIS BI: CPT | Mod: 26,,, | Performed by: RADIOLOGY

## 2022-05-24 PROCEDURE — 1101F PT FALLS ASSESS-DOCD LE1/YR: CPT | Mod: CPTII,S$GLB,, | Performed by: ANESTHESIOLOGY

## 2022-05-24 PROCEDURE — 3044F PR MOST RECENT HEMOGLOBIN A1C LEVEL <7.0%: ICD-10-PCS | Mod: CPTII,S$GLB,, | Performed by: ANESTHESIOLOGY

## 2022-05-24 PROCEDURE — 77067 MAMMO DIGITAL SCREENING BILAT WITH TOMO: ICD-10-PCS | Mod: 26,,, | Performed by: RADIOLOGY

## 2022-05-24 PROCEDURE — 3074F SYST BP LT 130 MM HG: CPT | Mod: CPTII,S$GLB,, | Performed by: ANESTHESIOLOGY

## 2022-05-24 PROCEDURE — 99204 OFFICE O/P NEW MOD 45 MIN: CPT | Mod: S$GLB,,, | Performed by: ANESTHESIOLOGY

## 2022-05-24 PROCEDURE — 77063 BREAST TOMOSYNTHESIS BI: CPT | Mod: TC

## 2022-05-24 PROCEDURE — 3008F PR BODY MASS INDEX (BMI) DOCUMENTED: ICD-10-PCS | Mod: CPTII,S$GLB,, | Performed by: ANESTHESIOLOGY

## 2022-05-24 PROCEDURE — 77063 MAMMO DIGITAL SCREENING BILAT WITH TOMO: ICD-10-PCS | Mod: 26,,, | Performed by: RADIOLOGY

## 2022-05-24 PROCEDURE — 3044F HG A1C LEVEL LT 7.0%: CPT | Mod: CPTII,S$GLB,, | Performed by: ANESTHESIOLOGY

## 2022-05-24 PROCEDURE — 3288F FALL RISK ASSESSMENT DOCD: CPT | Mod: CPTII,S$GLB,, | Performed by: ANESTHESIOLOGY

## 2022-05-24 PROCEDURE — 1159F PR MEDICATION LIST DOCUMENTED IN MEDICAL RECORD: ICD-10-PCS | Mod: CPTII,S$GLB,, | Performed by: ANESTHESIOLOGY

## 2022-05-24 PROCEDURE — 3288F PR FALLS RISK ASSESSMENT DOCUMENTED: ICD-10-PCS | Mod: CPTII,S$GLB,, | Performed by: ANESTHESIOLOGY

## 2022-05-24 PROCEDURE — 3078F PR MOST RECENT DIASTOLIC BLOOD PRESSURE < 80 MM HG: ICD-10-PCS | Mod: CPTII,S$GLB,, | Performed by: ANESTHESIOLOGY

## 2022-05-24 RX ORDER — DULOXETIN HYDROCHLORIDE 30 MG/1
CAPSULE, DELAYED RELEASE ORAL
Qty: 53 CAPSULE | Refills: 0 | Status: SHIPPED | OUTPATIENT
Start: 2022-05-24 | End: 2022-05-25 | Stop reason: SDUPTHER

## 2022-05-24 NOTE — PROGRESS NOTES
New Patient Chronic Pain Note (Initial Visit)    Referring Physician: Lior Beckman NP    PCP: Gabbie Ronquillo MD    Chief Complaint:   Chief Complaint   Patient presents with    midline low back pain        SUBJECTIVE:    Latosha Enriquez is a 66 y.o. female with past medical history significant for hypertension, hyperlipidemia, paroxysmal atrial fibrillation, nephrolithiasis, morbid obesity, type 2 diabetes complicated by retinopathy, GERD, gout, obstructive sleep apnea who presents to the clinic for the evaluation of lower back and leg pain.  Patient reports pain began approximately 4-5 months prior without inciting accident injury or trauma.  Patient reports she has had lower back weakness for several years.  Today patient reports pain which is intermittent which is rated a 4/10.  Patient reports pain in a bandlike distribution in the lower back which radiates down the anterior and lateral aspect of the left lower extremity to the knee in L3-4 distribution.  Pain is described as throbbing in nature.  Pain is exacerbated with most movement including lumbar flexion, standing and with ambulation.  Patient reports she is able to ambulate approximately 5 minutes before requiring rest.  Patient does report associated heaviness or weakness in the lower extremities associated with her pain.  Patient reports this has significantly affected her quality of life.  Pain is improved with sitting down, stretching as well as with gabapentin.  Patient reports taking gabapentin 100 mg twice daily.  Patient reports she cannot increase this dose secondary to significant daytime somnolence.  Patient does report improvement in left lower extremity radiculopathy with gabapentin.    Patient reports significant motor weakness and loss of sensations.  Patient denies night fever/night sweats, urinary incontinence, bowel incontinence and significant weight loss.      Pain Disability Index Review:     Last 3 PDI Scores 5/24/2022    Pain Disability Index (PDI) 64       Non-Pharmacologic Treatments:  Physical Therapy/Home Exercise: yes  Ice/Heat:no  TENS: no  Acupuncture: no  Massage: no  Chiropractic: no    Other: no      Pain Medications:  - Opioids: Vicodin ( Hydrocodone/Acetaminophen)  - Adjuvant Medications: Baclofen (Lioresal), Neurontin (Gabapentin) and Topical Ointment (Voltaren Gel, Steroid cream, Anti-Inflammatory Cream, Compound cream)  - Anti-Coagulants: Eliquis    Pain Procedures:   -bilateral knee intra-articular corticosteroid injection:  Outside institution    Past Medical History:   Diagnosis Date    Asthma     Colon polyps     Diabetes mellitus type II, uncontrolled     Diabetic retinopathy     Diverticulosis of large intestine without hemorrhage 2015    Elevated liver enzymes     GERD (gastroesophageal reflux disease)     Gout, unspecified     Hemorrhoids, internal 2015    History of blood transfusion     Hyperlipidemia     Hypertension     IBS (irritable bowel syndrome)     Morbid obesity with BMI of 40.0-44.9, adult     Nasal vestibulitis     Osteoarthritis of multiple joints     Urolithiasis     Vitamin D deficiency disease      Past Surgical History:   Procedure Laterality Date    BREAST BIOPSY Right      SECTION, CLASSIC      COLONOSCOPY N/A 2015    Procedure: COLONOSCOPY;  Surgeon: Stanislav Pickard MD;  Location: [facility] ENDO;  Service: Endoscopy;  Laterality: N/A;    CYSTOSCOPY W/ URETERAL STENT PLACEMENT  2017    EXTRACORPOREAL SHOCK WAVE LITHOTRIPSY      LEFT HEART CATHETERIZATION Left 2019    Procedure: CATHETERIZATION, HEART, LEFT;  Surgeon: Haile Suggs MD;  Location: [facility] CATH LAB;  Service: Cardiology;  Laterality: Left;  10:30-11am start/poss rt radial approach    TOTAL ABDOMINAL HYSTERECTOMY      URETEROSCOPY  2017     Review of patient's allergies indicates:   Allergen Reactions    Antihistamines - alkylamine Anaphylaxis and Itching     Cough,  "throat itches    Chocolate flavor Other (See Comments)     disoriented    Doxycycline Other (See Comments)     Stomach pain    Betadine [povidone-iodine] Itching    Iodine and iodide containing products Other (See Comments)     Tongue swelling    Tramadol Nausea Only    Yeast Itching     Facial swelling, constipation       Current Outpatient Medications   Medication Sig    albuterol (PROVENTIL/VENTOLIN HFA) 90 mcg/actuation inhaler Inhale 1-2 puffs into the lungs every 4 to 6 hours as needed for Wheezing or Shortness of Breath.    allopurinoL (ZYLOPRIM) 100 MG tablet Take 1 tablet (100 mg total) by mouth once daily.    apixaban (ELIQUIS) 5 mg Tab Take 1 tablet (5 mg total) by mouth 2 (two) times daily.    BD ULTRA-FINE SHORT PEN NEEDLE 31 gauge x 5/16" Ndle USE 1 PEN NEEDLE UNDER THE SKIN TWICE A DAY    cyanocobalamin (VITAMIN B-12) 100 MCG tablet Take by mouth.    diclofenac sodium (VOLTAREN) 1 % Gel Apply 2 g topically 3 (three) times daily.    ergocalciferol, vitamin D2, 400 unit Tab Take by mouth.    ferrous sulfate, dried (SLOW FE) 160 mg (50 mg iron) TbSR Take by mouth.    flecainide (TAMBOCOR) 50 MG Tab Take 50 mg by mouth 2 (two) times daily.    fluticasone propion-salmeterol 45-21 mcg/dose (ADVAIR HFA) 45-21 mcg/actuation HFAA inhaler Inhale 2 puffs into the lungs every 12 (twelve) hours. Controller    gabapentin (NEURONTIN) 100 MG capsule Take 1 capsule (100 mg total) by mouth 3 (three) times daily.    HYDROcodone-acetaminophen (NORCO)  mg per tablet Take 1 tablet by mouth every 6 (six) hours as needed.    insulin lispro protamin-lispro (HUMALOG MIX 75-25 KWIKPEN) 100 unit/mL (75-25) InPn INJECT 30 UNITS UNDER THE SKIN EVERY MORNING AND 20 UNITS EVERY EVENING    losartan-hydrochlorothiazide 100-25 mg (HYZAAR) 100-25 mg per tablet Take 1 tablet by mouth once daily.    metFORMIN (GLUCOPHAGE-XR) 500 MG ER 24hr tablet TAKE 2 TABLETS BY MOUTH WITH BREAKFAST AND 1 TABLET BY MOUTH WITH " DINNER    metoprolol succinate (TOPROL-XL) 50 MG 24 hr tablet Take 50 mg by mouth 2 (two) times daily.    montelukast (SINGULAIR) 10 mg tablet Take 1 tablet (10 mg total) by mouth every evening.    pantoprazole (PROTONIX) 20 MG tablet Take 20 mg by mouth every morning.    potassium chloride (MICRO-K) 10 MEQ CpSR Take 1 capsule (10 mEq total) by mouth once daily.    ranolazine (RANEXA) 500 MG Tb12 Take 1 tablet (500 mg total) by mouth 2 (two) times daily.    simvastatin (ZOCOR) 20 MG tablet Take 1 tablet (20 mg total) by mouth every evening.    tamsulosin (FLOMAX) 0.4 mg Cap Take 1 capsule (0.4 mg total) by mouth every evening.    triamcinolone (NASACORT) 55 mcg nasal inhaler 2 sprays by Nasal route once daily.    baclofen (LIORESAL) 10 MG tablet Take 1 tablet (10 mg total) by mouth 3 (three) times daily.    DULoxetine (CYMBALTA) 30 MG capsule Take 1 capsule (30 mg total) by mouth once daily for 7 days, THEN 2 capsules (60 mg total) once daily for 23 days.    fexofenadine (ALLEGRA) 180 MG tablet Take 1 tablet (180 mg total) by mouth once daily.     No current facility-administered medications for this visit.       Review of Systems     GENERAL:  No weight loss, malaise or fevers.  HEENT:   No recent changes in vision or hearing  NECK:  Negative for lumps, no difficulty with swallowing.  RESPIRATORY:  Negative for cough, wheezing or shortness of breath, patient denies any recent URI.  CARDIOVASCULAR:  Negative for chest pain, leg swelling or palpitations.  GI:  Negative for abdominal discomfort, blood in stools or black stools or change in bowel habits.  MUSCULOSKELETAL:  See HPI.  SKIN:  Negative for lesions, rash, and itching.  PSYCH:  No mood disorder or recent psychosocial stressors.   HEMATOLOGY/LYMPHOLOGY:  Negative for prolonged bleeding, bruising easily or swollen nodes.    NEURO:   No history of headaches, syncope, paralysis, seizures or tremors.  All other reviewed and negative other than  "HPI.    OBJECTIVE:    BP (!) 103/57   Pulse (!) 44   Ht 5' 7" (1.702 m)   Wt 120 kg (264 lb 8.8 oz)   BMI 41.43 kg/m²       Physical Exam    GENERAL: Well appearing, in no acute distress, alert and oriented x3.  PSYCH:  Mood and affect appropriate.  SKIN: Skin color, texture, turgor normal, no rashes or lesions.  HEAD/FACE:  Normocephalic, atraumatic. Cranial nerves grossly intact.    CV: RRR with palpation of the radial artery.  PULM: No evidence of respiratory difficulty, symmetric chest rise.  GI:  Soft and non-tender.    BACK:  Mild thoracic kyphosis.  Straight leg raising in the sitting and supine positions is negative to radicular pain. No pain to palpation over the facet joints of the lumbar spine or spinous processes. reduced range of motion without pain reproduction.  EXTREMITIES: Peripheral joint ROM is full and pain free without obvious instability or laxity in all four extremities. No deformities, edema, or skin discoloration. Good capillary refill.  MUSCULOSKELETAL: Unable to stand on toes.  Able to ambulate on heels   hip, and knee provocative maneuvers are negative.  There is no pain with palpation over the sacroiliac joints bilaterally.  FABERs test is negative.  Facet loading test is negative bilaterally.   Bilateral upper and lower extremity strength is normal and symmetric.  No atrophy or tone abnormalities are noted.  RIGHT Lower extremity: Hip flexion 4+/5, Hip Abduction 4+/5, Hip Adduction 4+/5, Knee extension 5/5, Knee flexion 5/5, Ankle dorsiflexion5/5, Extensor hallucis longus 5/5, Ankle plantarflexion 5/5  LEFT Lower extremity:  Hip flexion 4+/5, Hip Abduction 4+/5,Hip Adduction 4+/5, Knee extension 5/5, Knee flexion 5/5, Ankle dorsiflexion 5/5, Extensor hallucis longus 5/5, Ankle plantarflexion 5/5  -Normal testing knee (patellar) jerk and ankle (achilles) jerk    NEURO: Bilateral upper and lower extremity coordination and muscle stretch reflexes are physiologic and symmetric. No loss " of sensation is noted.  GAIT: normal.    Imagin/02/17    X-Ray Cervical Spine AP And Lateral    Narrative  AP and lateral views of the cervical spine    Findings: The vertebral bodies demonstrate normal height.  There is reversal of the normal lordotic curvature with a few millimeters of anterolisthesis of C4 on C5.  There is also a couple of millimeters of anterolisthesis of C3 on C4. Mild disc space narrowing present at the C4-C5 level.  There is moderate to severe disc space narrowing and spondylosis present at the C5-C6 level. The C1-C2 articulation is within normal limits. No prevertebral soft tissue swelling.        ASSESSMENT: 66 y.o. year old female with lower back pain, consistent with     1. Myofascial pain  Ambulatory referral/consult to Physical/Occupational Therapy   2. Dorsalgia, unspecified  MRI Lumbar Spine Without Contrast   3. Lumbar radiculopathy  MRI Lumbar Spine Without Contrast    Ambulatory referral/consult to Physical/Occupational Therapy         PLAN:   - Interventions:  We discussed considering a lumbar medial branch block to address axial back pain versus lumbar epidural to address radicular symptoms.  We will 1st review lumbar MRI.  Explained the risks and benefits of the procedure in detail with the patient today in clinic along with alternative treatment options    - Anticoagulation use: yes Eliquis     report:  Reviewed and consistent with medication use as prescribed.    - Medications:  --We will start Cymbalta (duloxetine) 30 mg once daily.  I have discussed with the patient to increase this dose to 2 tablets, 60 mg in 1 week if well tolerated.  We have discussed potential common side effects of duloxetine including nausea, diarrhea/constipation, fatigue, drowsiness or dry mouth.  Patient expresses understanding.      - Therapy:   We discussed initiating physical therapy to help manage the patient/s painful condition. The patient was counseled that muscle strengthening  will improve the long term prognosis in regards to pain and may also help increase range of motion and mobility. They were told that one of the goals of physical therapy is that they learn how to do the exercises so that they can do them independently at home daily upon completion. The patient's questions were answered and they were agreeable to this course. A referral for physical therapy was provided to the patient.      - Imaging: Reviewed available imaging with patient and answered any questions they had regarding study.Order MRI of the Lumbar Spine to help evaluate possible source of pain and weakness.    - Follow up visit: return to clinic in 4-6 weeks      The above plan and management options were discussed at length with patient. Patient is in agreement with the above and verbalized understanding.    - I discussed the goals of interventional chronic pain management with the patient on today's visit. We discussed a multimodal and systematic approach to pain.  This includes diagnostic and therapeutic injections, adjuvant pharmacologic treatment, physical therapy, and at times psychiatry.  I emphasized the importance of regular exercise, core strengthening and stretching, diet and weight loss as a cornerstone of long-term pain management.    - This condition does not require this patient to take time off of work, and the primary goal of our Pain Management services is to improve the patient's functional capacity.  - Patient Questions: Answered all of the patient's questions regarding diagnoses, therapy, treatment and next steps        Pradeep Oconnell MD  Interventional Pain Management  [facility]    Disclaimer:  This note was prepared using voice recognition system and is likely to have sound alike errors that may have been overlooked even after proof reading.  Please call me with any questions

## 2022-05-24 NOTE — PATIENT INSTRUCTIONS
General Neck and Back Pain    Both neck and back pain are usually caused by injury to the muscles or ligaments of the spine. Sometimes the disks that separate each bone of the spine may cause pain by pressing on a nearby nerve. Back and neck pain may appear after a sudden twisting or bending force (such as in a car accident), or sometimes after a simple awkward movement. In either case, muscle spasm is often present and adds to the pain.  Acute neck and back pain usually gets better in 1 to 2 weeks. Pain related to disk disease, arthritis in the spinal joints or spinal stenosis (narrowing of the spinal canal) can become chronic and last for months or years.  Back and neck pain are common problems. Most people feel better in 1 or 2 weeks, and most of the rest in 1 to 2 months. Most people can remain active.  People experience and describe pain differently.  Pain can be sharp, stabbing, shooting, aching, cramping, or burning  Movement, standing, bending, lifting, sitting, or walking may worsen the pain  Pain can be localized to one spot or area, or it can be more generalized  Pain can spread or radiate upwards, downwards, to the front, or go down your arms  Muscle spasm may occur.  Most of the time mechanical problems with the muscles or spine cause the pain. it is usually caused by an injury, whether known or not, to the muscles or ligaments. While illnesses can cause back pain, it is usually not caused by a serious illness. Pain is usually related to physical activity, whether sports, exercise, work, or normal activity. Sometimes it can occur without an identifiable cause. This can happen simply by stretching or moving wrong, without noting pain at the time. Other causes include:  Overexertion, lifting, pushing, pulling incorrectly or too aggressively.  Sudden twisting, bending or stretching from an accident (car or fall), or accidental movement.  Poor posture  Poor conditioning, lack of regular exercise  Spinal  disc disease or arthritis  Stress  Pregnancy, or illness like appendicitis, bladder or kidney infection, pelvic infections   Home care  For neck pain: Use a comfortable pillow that supports the head and keeps the spine in a neutral position. The position of the head should not be tilted forward or backward.  When in bed, try to find a position of comfort. A firm mattress is best. Try lying flat on your back with pillows under your knees. You can also try lying on your side with your knees bent up towards your chest and a pillow between your knees.  At first, do not try to stretch out the sore spots. If there is a strain, it is not like the good soreness you get after exercising without an injury. In this case, stretching may make it worse.  Avoid prolonged sitting, long car rides or travel. This puts more stress on the lower back than standing or walking.  During the first 24 to 72 hours after an injury, apply an ice pack to the painful area for 20 minutes and then remove it for 20 minutes over a period of 60 to 90 minutes or several times a day.   You can alternate ice and heat therapies. Talk with your healthcare provider about the best treatment for your back or neck pain. As a safety precaution, do not use a heating pad at bedtime. Sleeping with a heating pad can lead to skin burns or tissue damage.  Therapeutic massage can help relax the back and neck muscles without stretching them.  Be aware of safe lifting methods and do not lift anything over 15 pounds until all the pain is gone.  Medications  Talk to your healthcare provider before using medicine, especially if you have other medical problems or are taking other medicines.  You may use over-the-counter medicine to control pain, unless another pain medicine was prescribed. If you have chronic conditions like diabetes, liver or kidney disease, stomach ulcers,  gastrointestinal bleeding, or are taking blood thinner medicines.  Be careful if you are given pain  medicines, narcotics, or medicine for muscle spasm. They can cause drowsiness, and can affect your coordination, reflexes, and judgment. Do not drive or operate heavy machinery.  Follow-up care  Follow up with your healthcare provider, or as advised. Physical therapy or further tests may be needed.  If X-rays were taken, you will be notified of any new findings that may affect your care.  Call 911  Seek emergency medical care if any of the following occur:  Trouble breathing  Confusion  Very drowsy or trouble awakening  Fainting or loss of consciousness  Rapid or very slow heart rate  Loss of bowel or bladder control  When to seek medical advice  Call your healthcare provider right away if any of these occur:  Pain becomes worse or spreads into your arms or legs  Weakness, numbness or pain in one or both arms or legs  Numbness in the groin area  Difficulty walking  Fever of 100.4ºF (38ºC) or higher, or as directed by your healthcare provider  Date Last Reviewed: 7/1/2016  © 9279-4428 [employer] [address] [place] [address]. All rights reserved. This information is not intended as a substitute for professional medical care. Always follow your healthcare professional's instructions.       Understanding Lumbar Radiculopathy    Lumbar radiculopathy is irritation or inflammation of a nerve root in the low back. It causes symptoms that spread out from the back down one or both legs. To understand this condition, it helps to understand the parts of the spine:  Vertebrae. These are bones that stack to form the spine. The lumbar spine contains the 5 bottom vertebrae.  Disks. These are soft pads of tissue between the vertebrae. They act as shock absorbers for the spine.  Spinal canal. This is a tunnel formed within the stacked vertebrae. In the lumbar spine, nerves run through this canal.  Nerves. These branch off and leave the spinal canal, traveling out to parts of the body. As they leave the  spinal canal, nerves pass through openings between the vertebrae. The nerve root is the part of the nerve that is closest to the spinal canal.  Sciatic nerve. This is a large nerve formed from several nerve roots in the low back. This nerve extends down the back of the leg to the foot.  With lumbar radiculopathy, nerve roots in the low back become irritated. This leads to pain and symptoms. The sciatic nerve is commonly involved, so the condition is often called sciatica.  What causes lumbar radiculopathy?  Aging, injury, poor posture, extra body weight, and other issues can lead to problems in the low back. These problems may then irritate nerve roots. They include:  Damage to a disk in the lumbar spine. The damaged disk may then press on nearby nerve roots.  Degeneration from wear and tear, and aging. This can lead to narrowing (stenosis) of the openings between the vertebrae. The narrowed openings press on nerve roots as they leave the spinal canal.  Unstable spine. This is when a vertebra slips forward. It can then press on a nerve root.  Other, less common things can put pressure on nerves in the low back. These include diabetes, infection, or a tumor.  Symptoms of lumbar radiculopathy  These include:  Pain in the low back  Pain, numbness, tingling, or weakness that travels into the buttocks, hip, groin, or leg  Muscle spasms  Treatment for lumbar radiculopathy  In most cases, your healthcare provider will first try treatments that help relieve symptoms. These may include:  Prescription and over-the-counter pain medicines. These help relieve pain, swelling, and irritation.  Limits on positions and activities that increase pain. But lying in bed or avoiding all movement is only recommended for a short period of time.  Physical therapy, including exercises and stretches. This helps decrease pain and increase movement and function.  Steroid shots into the lower back. This may help relieve symptoms for a  time.  Weight-loss program. If you are overweight, losing extra pounds may help relieve symptoms.  In some cases, you may need surgery to fix the underlying problem. This depends on the cause, the symptoms, and how long the pain has lasted.  Possible complications  Over time, an irritated and inflamed nerve may become damaged. This may lead to long-lasting (permanent) numbness or weakness in your legs and feet. If symptoms change suddenly or get worse, be sure to let your healthcare provider know.  When to call your healthcare provider  Call your healthcare provider right away if you have any of these:  New pain or pain that gets worse  New or increasing weakness, tingling, or numbness in your leg or foot  Problems controlling your bladder or bowel   Date Last Reviewed: 3/10/2016  © 6157-3332 [employer]. [address] [place] [address], [place] [address]. All rights reserved. This information is not intended as a substitute for professional medical care. Always follow your healthcare professional's instructions.       Exercises to Strengthen Your Lower Back  Strong lower back and abdominal muscles work together to support your spine. The exercises below will help strengthen the lower back. It is important that you begin exercising slowly and increase levels gradually.  Always begin any exercise program with stretching. If you feel pain while doing any of these exercises, stop and talk to your doctor about a more specific exercise program that better suits your condition.   Low back stretch  The point of stretching is to make you more flexible and increase your range of motion. Stretch only as much as you are able. Stretch slowly. Do not push your stretch to the limit. If at any point you feel pain while stretching, this is your (temporary) limit.  Lie on your back with your knees bent and both feet on the ground.  Slowly raise your left knee to your chest as you flatten your lower back against the floor. Hold  for 5 seconds.  Relax and repeat the exercise with your right knee.  Do 10 of these exercises for each leg.  Repeat hugging both knees to your chest at the same time.  Building lower back strength  Start your exercise routine with 10 to 30 minutes a day, 1 to 3 times a day.  Initial exercises  Lying on your back:  Ankle pumps: Move your foot up and down, towards your head, and then away. Repeat 10 times with each foot.  Heel slides: Slowly bend your knee, drawing the heel of your foot towards you. Then slide your heel/foot from you, straightening your knee. Do not lift your foot off the floor (this is not a leg lift).  Abdominal contraction: Bend your knees and put your hands on your stomach. Tighten your stomach muscles. Hold for 5 seconds, then relax. Repeat 10 times.  Straight leg raise: Bend one leg at the knee and keep the other leg straight. Tighten your stomach muscles. Slowly lift your straight leg 6 to 12 inches off the floor and hold for up to 5 seconds. Repeat 10 times on each side.  Standing:  Wall squats: Stand with your back against the wall. Move your feet about 12 inches away from the wall. Tighten your stomach muscles, and slowly bend your knees until they are at about a 45 degree angle. Do not go down too far. Hold about 5 seconds. Then slowly return to your starting position. Repeat 10 times.  Heel raises: Stand facing the wall. Slowly raise the heels of your feet up and down, while keeping your toes on the floor. If you have trouble balancing, you can touch the wall with your hands. Repeat 10 times.  More advanced exercises  When you feel comfortable enough, try these exercises.  Kneeling lumbar extension: Begin on your hands and knees. At the same time, raise and straighten your right arm and left leg until they are parallel to the ground. Hold for 2 seconds and come back slowly to a starting position. Repeat with left arm and right leg, alternating 10 times.  Prone lumbar extension: Lie face  down, arms extended overhead, palms on the floor. At the same time, raise your right arm and left leg as high as comfortably possible. Hold for 10 seconds and slowly return to start. Repeat with left arm and right leg, alternating 10 times. Gradually build up to 20 times. (Advanced: Repeat this exercise raising both arms and both legs a few inches off the floor at the same time. Hold for 5 seconds and release.)  Pelvic tilt: Lie on the floor on your back with your knees bent at 90 degrees. Your feet should be flat on the floor. Inhale, exhale, then slowly contract your abdominal muscles bringing your navel toward your spine. Let your pelvis rock back until your lower back is flat on the floor. Hold for 10 seconds while breathing smoothly.  Abdominal crunch: Perform a pelvic tilt (above) flattening your lower back against the floor. Holding the tension in your abdominal muscles, take another breath and raise your shoulder blades off the ground (this is not a full sit-up). Keep your head in line with your body (dont bend your neck forward). Hold for 2 seconds, then slowly lower.  Date Last Reviewed: 6/1/2016  © 5218-0020 [employer]. [address] [place] [address], [place] [address]. All rights reserved. This information is not intended as a substitute for professional medical care. Always follow your healthcare professional's instructions.

## 2022-05-25 ENCOUNTER — TELEPHONE (OUTPATIENT)
Dept: PAIN MEDICINE | Facility: CLINIC | Age: 67
End: 2022-05-25
Payer: COMMERCIAL

## 2022-05-25 DIAGNOSIS — M54.16 LUMBAR RADICULOPATHY, CHRONIC: ICD-10-CM

## 2022-05-25 RX ORDER — DULOXETIN HYDROCHLORIDE 30 MG/1
CAPSULE, DELAYED RELEASE ORAL
Qty: 53 CAPSULE | Refills: 0 | Status: SHIPPED | OUTPATIENT
Start: 2022-05-25 | End: 2022-09-23 | Stop reason: SDUPTHER

## 2022-05-25 NOTE — TELEPHONE ENCOUNTER
----- Message from Neris Alonso sent at 5/25/2022 10:22 AM CDT -----  Contact: self  Pt states medication went to wrong pharmacy, [facility] [address] , please call back at .[phone]     [id number] - [place], [place] - [address] OF [facility] [place] [address] [address] [place] [address]  Phone: [phone] Fax: [phone]

## 2022-05-25 NOTE — TELEPHONE ENCOUNTER
----- Message from Pradeep Oconnell MD sent at 5/25/2022  1:09 PM CDT -----  Sent Rx to correct pharmacy and just placed orders for external MRI.  Can we let the patient know please?  Thank you  ----- Message -----  From: Daksha Villalobos LPN  Sent: 5/25/2022  11:04 AM CDT  To: Pradeep Oconnell MD    Please place orders for MRI at [facility].  ----- Message -----  From: Linda Ruvalcaba  Sent: 5/25/2022  11:03 AM CDT  To: Anish Fernández Staff    [facility] is calling in regards to pt wanting to have MRI done at [facility] and need the orders fax to [phone]. Pt can be reached at [phone] (home)

## 2022-05-25 NOTE — TELEPHONE ENCOUNTER
Prescription will be sent to [facility] pharmacy [facility].  Message sent to Dr. Oconnell so that she can send to correct pharmacy.

## 2022-05-26 ENCOUNTER — TELEPHONE (OUTPATIENT)
Dept: CARDIOLOGY | Facility: CLINIC | Age: 67
End: 2022-05-26
Payer: COMMERCIAL

## 2022-05-26 NOTE — TELEPHONE ENCOUNTER
Initiated PA for Eliquis. RX approved.    This request has been approved using information available on the patient's profile. CaseId:[id number];Status:Approved;Review Type:Prior Auth;Coverage Start Date:04/26/2022;Coverage End Date:05/26/2023;

## 2022-06-14 ENCOUNTER — OFFICE VISIT (OUTPATIENT)
Dept: FAMILY MEDICINE | Facility: CLINIC | Age: 67
End: 2022-06-14
Payer: COMMERCIAL

## 2022-06-14 DIAGNOSIS — I10 ESSENTIAL HYPERTENSION: Chronic | ICD-10-CM

## 2022-06-14 DIAGNOSIS — J45.20 MILD INTERMITTENT ASTHMA WITHOUT COMPLICATION: ICD-10-CM

## 2022-06-14 DIAGNOSIS — T88.7XXA MEDICATION SIDE EFFECT: Primary | ICD-10-CM

## 2022-06-14 DIAGNOSIS — I49.3 PVC'S (PREMATURE VENTRICULAR CONTRACTIONS): ICD-10-CM

## 2022-06-14 DIAGNOSIS — I48.0 PAF (PAROXYSMAL ATRIAL FIBRILLATION): ICD-10-CM

## 2022-06-14 PROCEDURE — 3074F SYST BP LT 130 MM HG: CPT | Mod: CPTII,95,, | Performed by: FAMILY MEDICINE

## 2022-06-14 PROCEDURE — 1159F PR MEDICATION LIST DOCUMENTED IN MEDICAL RECORD: ICD-10-PCS | Mod: CPTII,95,, | Performed by: FAMILY MEDICINE

## 2022-06-14 PROCEDURE — 1160F PR REVIEW ALL MEDS BY PRESCRIBER/CLIN PHARMACIST DOCUMENTED: ICD-10-PCS | Mod: CPTII,95,, | Performed by: FAMILY MEDICINE

## 2022-06-14 PROCEDURE — 99213 OFFICE O/P EST LOW 20 MIN: CPT | Mod: 95,,, | Performed by: FAMILY MEDICINE

## 2022-06-14 PROCEDURE — 3044F PR MOST RECENT HEMOGLOBIN A1C LEVEL <7.0%: ICD-10-PCS | Mod: CPTII,95,, | Performed by: FAMILY MEDICINE

## 2022-06-14 PROCEDURE — 3074F PR MOST RECENT SYSTOLIC BLOOD PRESSURE < 130 MM HG: ICD-10-PCS | Mod: CPTII,95,, | Performed by: FAMILY MEDICINE

## 2022-06-14 PROCEDURE — 99213 PR OFFICE/OUTPT VISIT, EST, LEVL III, 20-29 MIN: ICD-10-PCS | Mod: 95,,, | Performed by: FAMILY MEDICINE

## 2022-06-14 PROCEDURE — 3078F DIAST BP <80 MM HG: CPT | Mod: CPTII,95,, | Performed by: FAMILY MEDICINE

## 2022-06-14 PROCEDURE — 3078F PR MOST RECENT DIASTOLIC BLOOD PRESSURE < 80 MM HG: ICD-10-PCS | Mod: CPTII,95,, | Performed by: FAMILY MEDICINE

## 2022-06-14 PROCEDURE — 1160F RVW MEDS BY RX/DR IN RCRD: CPT | Mod: CPTII,95,, | Performed by: FAMILY MEDICINE

## 2022-06-14 PROCEDURE — 3044F HG A1C LEVEL LT 7.0%: CPT | Mod: CPTII,95,, | Performed by: FAMILY MEDICINE

## 2022-06-14 PROCEDURE — 1159F MED LIST DOCD IN RCRD: CPT | Mod: CPTII,95,, | Performed by: FAMILY MEDICINE

## 2022-06-14 NOTE — PROGRESS NOTES
The patient location is: At home  The chief complaint leading to consultation is: Chest tightness    Visit type: Audiovisual    Face to Face time with patient:  15 min  20 minutes of total time spent on the encounter, which includes face to face time and non-face to face time preparing to see the patient (eg, review of tests), Obtaining and/or reviewing separately obtained history, Documenting clinical information in the electronic or other health record, Independently interpreting results (not separately reported) and communicating results to the patient/family/caregiver, or Care coordination (not separately reported).     Each patient to whom he or she provides medical services by telemedicine is:  (1) informed of the relationship between the physician and patient and the respective role of any other health care provider with respect to management of the patient; and (2) notified that he or she may decline to receive medical services by telemedicine and may withdraw from such care at any time.      CHIEF COMPLAINT:  This is a 66-year-old female complaining of side effects from medication.    SUBJECTIVE:  Patient complains of inability to tolerate metoprolol.  She was taking metoprolol XL 50 mg twice daily but was experiencing chest tightness and shortness of breath 1-1/2 hours later.  She has gradually weaned herself off of metoprolol and no longer has chest tightness.  However she is concerned about slow heart rate of 45-49 beats per minute.  Patient has a history of frequent PVCs and paroxysmal atrial fibrillation for which she was recenlty placed on flecainide.  Patient has asthma for which she uses rescue inhaler.  She has essential hypertension for which she is currently taking losartan -25 mg daily.  Her blood pressure today was 124/65.    ROS:  GENERAL: Patient denies fever, chills, night sweats.  Patient denies weight gain or loss. Patient denies anorexia, fatigue, weakness or swollen glands.  SKIN:  Patient denies rash.  HEENT: Patient denies sore throat, ear pain, hearing loss, nasal congestion, or runny nose. Patient denies visual disturbance, eye irritation or discharge.  LUNGS: Patient denies cough, wheeze or hemoptysis.  CARDIOVASCULAR: Patient denies syncope or lower extremity edema.  Positive for chest tightness, shortness of breath and palpitations.  GI: Patient denies abdominal pain, nausea, vomiting, diarrhea, constipation, blood in stool or melena.  GENITOURINARY: Patient denies dysuria, frequency, hematuria, nocturia, urgency or incontinence.  MUSCULOSKELETAL: Patient denies joint pain, swelling, redness or warmth.  NEUROLOGIC: Patient denies headache, vertigo, paresthesias, weakness in limb, dysarthria, dysphagia or abnormality of gait.  PSYCHIATRIC: Patient denies depression, anxiety or memory loss.    OBJECTIVE:   GENERAL: Well-developed well-nourished, obese black female alert and oriented x3 in no acute distress. Memory, judgment and cognition without deficit.  Anxious affect.  SKIN: Clear without rash. Normal color and tone.  HEENT: Eyes: Clear conjunctivae. No scleral icterus.  Nose:  Not congested-sounding.    NECK: Supple, normal range of motion.  LUNGS:  Normal respiratory effort.  No audible wheezing.  EXTREMITIES:  Normal range of motion in all extremities.  NEUROLOGIC:  Gait without abnormality.  No tremor.    ASSESSMENT:  1. Medication side effect    2. Essential hypertension    3. Mild intermittent asthma without complication    4. PAF (paroxysmal atrial fibrillation)    5. PVC's (premature ventricular contractions)      PLAN:   1. May hold metoprolol until appointment with cardiologist in the next week.  2. Restart metoprolol XL 25 mg with increased palpitations or elevated blood pressure.  3. Seek medical attention for worsening condition.    This note is generated with speech recognition software and is subject to transcription error and sound alike phrases that may be missed by  proofreading.

## 2022-06-16 VITALS — DIASTOLIC BLOOD PRESSURE: 65 MMHG | SYSTOLIC BLOOD PRESSURE: 124 MMHG | HEART RATE: 45 BPM

## 2022-06-16 PROBLEM — J45.909 ASTHMA: Status: ACTIVE | Noted: 2022-06-16

## 2022-06-21 ENCOUNTER — TELEPHONE (OUTPATIENT)
Dept: PAIN MEDICINE | Facility: CLINIC | Age: 67
End: 2022-06-21
Payer: COMMERCIAL

## 2022-07-20 DIAGNOSIS — E11.9 TYPE 2 DIABETES MELLITUS WITHOUT COMPLICATION: ICD-10-CM

## 2022-07-27 DIAGNOSIS — M17.0 PRIMARY OSTEOARTHRITIS OF BOTH KNEES: ICD-10-CM

## 2022-07-27 RX ORDER — DICLOFENAC SODIUM 10 MG/G
2 GEL TOPICAL 3 TIMES DAILY
Qty: 350 G | Refills: 2 | Status: SHIPPED | OUTPATIENT
Start: 2022-07-27 | End: 2023-09-12 | Stop reason: SDUPTHER

## 2022-08-12 ENCOUNTER — TELEPHONE (OUTPATIENT)
Dept: FAMILY MEDICINE | Facility: CLINIC | Age: 67
End: 2022-08-12

## 2022-08-12 ENCOUNTER — OFFICE VISIT (OUTPATIENT)
Dept: URGENT CARE | Facility: CLINIC | Age: 67
End: 2022-08-12
Payer: COMMERCIAL

## 2022-08-12 ENCOUNTER — TELEPHONE (OUTPATIENT)
Dept: PAIN MEDICINE | Facility: CLINIC | Age: 67
End: 2022-08-12
Payer: COMMERCIAL

## 2022-08-12 VITALS
HEIGHT: 67 IN | BODY MASS INDEX: 40.49 KG/M2 | RESPIRATION RATE: 20 BRPM | HEART RATE: 75 BPM | TEMPERATURE: 99 F | OXYGEN SATURATION: 99 % | WEIGHT: 258 LBS | SYSTOLIC BLOOD PRESSURE: 126 MMHG | DIASTOLIC BLOOD PRESSURE: 86 MMHG

## 2022-08-12 DIAGNOSIS — J06.9 UPPER RESPIRATORY TRACT INFECTION, UNSPECIFIED TYPE: Primary | ICD-10-CM

## 2022-08-12 DIAGNOSIS — R09.81 NASAL CONGESTION: ICD-10-CM

## 2022-08-12 LAB
CTP QC/QA: YES
SARS-COV-2 RDRP RESP QL NAA+PROBE: NEGATIVE

## 2022-08-12 PROCEDURE — 3044F PR MOST RECENT HEMOGLOBIN A1C LEVEL <7.0%: ICD-10-PCS | Mod: CPTII,S$GLB,, | Performed by: EMERGENCY MEDICINE

## 2022-08-12 PROCEDURE — 1126F AMNT PAIN NOTED NONE PRSNT: CPT | Mod: CPTII,S$GLB,, | Performed by: EMERGENCY MEDICINE

## 2022-08-12 PROCEDURE — 3079F DIAST BP 80-89 MM HG: CPT | Mod: CPTII,S$GLB,, | Performed by: EMERGENCY MEDICINE

## 2022-08-12 PROCEDURE — 1160F PR REVIEW ALL MEDS BY PRESCRIBER/CLIN PHARMACIST DOCUMENTED: ICD-10-PCS | Mod: CPTII,S$GLB,, | Performed by: EMERGENCY MEDICINE

## 2022-08-12 PROCEDURE — 3008F PR BODY MASS INDEX (BMI) DOCUMENTED: ICD-10-PCS | Mod: CPTII,S$GLB,, | Performed by: EMERGENCY MEDICINE

## 2022-08-12 PROCEDURE — 1159F PR MEDICATION LIST DOCUMENTED IN MEDICAL RECORD: ICD-10-PCS | Mod: CPTII,S$GLB,, | Performed by: EMERGENCY MEDICINE

## 2022-08-12 PROCEDURE — 3079F PR MOST RECENT DIASTOLIC BLOOD PRESSURE 80-89 MM HG: ICD-10-PCS | Mod: CPTII,S$GLB,, | Performed by: EMERGENCY MEDICINE

## 2022-08-12 PROCEDURE — U0002 COVID-19 LAB TEST NON-CDC: HCPCS | Mod: QW,S$GLB,, | Performed by: EMERGENCY MEDICINE

## 2022-08-12 PROCEDURE — 1160F RVW MEDS BY RX/DR IN RCRD: CPT | Mod: CPTII,S$GLB,, | Performed by: EMERGENCY MEDICINE

## 2022-08-12 PROCEDURE — 3044F HG A1C LEVEL LT 7.0%: CPT | Mod: CPTII,S$GLB,, | Performed by: EMERGENCY MEDICINE

## 2022-08-12 PROCEDURE — 99214 OFFICE O/P EST MOD 30 MIN: CPT | Mod: S$GLB,,, | Performed by: EMERGENCY MEDICINE

## 2022-08-12 PROCEDURE — 99214 PR OFFICE/OUTPT VISIT, EST, LEVL IV, 30-39 MIN: ICD-10-PCS | Mod: S$GLB,,, | Performed by: EMERGENCY MEDICINE

## 2022-08-12 PROCEDURE — 3008F BODY MASS INDEX DOCD: CPT | Mod: CPTII,S$GLB,, | Performed by: EMERGENCY MEDICINE

## 2022-08-12 PROCEDURE — 3074F SYST BP LT 130 MM HG: CPT | Mod: CPTII,S$GLB,, | Performed by: EMERGENCY MEDICINE

## 2022-08-12 PROCEDURE — U0002: ICD-10-PCS | Mod: QW,S$GLB,, | Performed by: EMERGENCY MEDICINE

## 2022-08-12 PROCEDURE — 1126F PR PAIN SEVERITY QUANTIFIED, NO PAIN PRESENT: ICD-10-PCS | Mod: CPTII,S$GLB,, | Performed by: EMERGENCY MEDICINE

## 2022-08-12 PROCEDURE — 3074F PR MOST RECENT SYSTOLIC BLOOD PRESSURE < 130 MM HG: ICD-10-PCS | Mod: CPTII,S$GLB,, | Performed by: EMERGENCY MEDICINE

## 2022-08-12 PROCEDURE — 1159F MED LIST DOCD IN RCRD: CPT | Mod: CPTII,S$GLB,, | Performed by: EMERGENCY MEDICINE

## 2022-08-12 RX ORDER — PROMETHAZINE HYDROCHLORIDE AND DEXTROMETHORPHAN HYDROBROMIDE 6.25; 15 MG/5ML; MG/5ML
5 SYRUP ORAL EVERY 4 HOURS PRN
Qty: 180 ML | Refills: 0 | Status: SHIPPED | OUTPATIENT
Start: 2022-08-12 | End: 2022-08-22

## 2022-08-12 RX ORDER — ATENOLOL 25 MG/1
25 TABLET ORAL DAILY
COMMUNITY
Start: 2022-08-09

## 2022-08-12 RX ORDER — AMOXICILLIN 500 MG/1
500 CAPSULE ORAL EVERY 12 HOURS
Qty: 20 CAPSULE | Refills: 0 | Status: SHIPPED | OUTPATIENT
Start: 2022-08-12 | End: 2022-08-22

## 2022-08-12 RX ORDER — TRIAMCINOLONE ACETONIDE 55 UG/1
2 SPRAY, METERED NASAL DAILY
Qty: 16.9 ML | Refills: 0 | Status: SHIPPED | OUTPATIENT
Start: 2022-08-12 | End: 2023-02-13 | Stop reason: SDUPTHER

## 2022-08-12 NOTE — TELEPHONE ENCOUNTER
Spoke with pt and pt states that she has been tested for covid twice end of June and also at the end of July. Pt states since Saturday she has been having nasal congestions, headaches, coughing, body aches, no fever. Informed pt that she should go to  to get tested for the flu and f/u on Monday if symptoms worsen. Informed pt how to book same-day appt to get in quicker. Please advise

## 2022-08-12 NOTE — PATIENT INSTRUCTIONS
Nasacort spray as prescribed.    Nasal saline:  2 drops per nostril 10 to 15 times a day to help with postnasal drip.  Amoxicillin as prescribed for 10 days.  Phenergan DM as prescribed for cough.  This medication is sedating.  Use with caution

## 2022-08-12 NOTE — TELEPHONE ENCOUNTER
----- Message from Zenaida Patton sent at 8/12/2022 11:44 AM CDT -----  Patient would like a call back at [phone] in regards to getting the results of her MRI.    Thanks

## 2022-08-12 NOTE — TELEPHONE ENCOUNTER
LM for pt in regards to pt wanting to know her MRI results. Pt will need an appt with Marry Chatman PA-C to go over MRI results.

## 2022-08-12 NOTE — PROGRESS NOTES
"Subjective:       Patient ID: Latosha Enriquez is a 66 y.o. female.    Vitals:  height is 5' 7" (1.702 m) and weight is 117 kg (258 lb). Her oral temperature is 98.6 °F (37 °C). Her blood pressure is 126/86 and her pulse is 75. Her respiration is 20 and oxygen saturation is 99%.     Chief Complaint: Sinus Problem (Pt stated that she has been having sinus issues since 08/16/2022. Stated she has fluid building up )    66-year-old female presents to urgent care for evaluation of URI symptoms which started about a week ago after eating a plum.  Patient states that she has issues with eating very sugary foods and this sometimes turns into a sinus infection.  Patient states this usually requires antibiotics.  Patient states she has been on Tylenol around the clock nonstop since last weekend and any time it is time to read does, she feels like she has fever again.  No definite facial pain but reports some purulence mucus coming from her nose.  No definite known sick contacts.  Patient with multiple drug allergies and drug sensitivities.  She cannot use antihistamines because it causes itching in her throat.  She attempted to use montelukast this week but it caused her to have some palpitations.  Patient is on flecainide for abnormal heart rhythm.  When patient is out of her nasal steroid spray.  Reports that she is using saline liberally to help with her coughing.  Patient reports that she is out of her Phenergan with codeine cough syrup and is requesting a refill of that      Pt stated that after eating a plum Saturday, she starting having a sinus infection.    Sinus Problem  This is a new problem. The current episode started in the past 7 days. The problem has been gradually worsening since onset. There has been no fever. She is experiencing no pain. Associated symptoms include chills, congestion, coughing, headaches and sinus pressure. Pertinent negatives include no diaphoresis, ear pain, hoarse voice, neck pain, shortness " of breath, sneezing, sore throat or swollen glands. Past treatments include nothing. The treatment provided no relief.       Constitution: Positive for chills. Negative for sweating.   HENT: Positive for congestion and sinus pressure. Negative for ear pain and sore throat.    Neck: Negative for neck pain.   Respiratory: Positive for cough. Negative for shortness of breath.    Allergic/Immunologic: Negative for sneezing.   Neurological: Positive for headaches.       Objective:      Physical Exam   Constitutional: She is oriented to person, place, and time. She appears well-developed. She is cooperative.  Non-toxic appearance. She does not appear ill. No distress.   HENT:   Head: Normocephalic and atraumatic.   Ears:   Right Ear: Hearing and external ear normal.   Left Ear: Hearing and external ear normal.   Nose: Congestion present. No mucosal edema, rhinorrhea or nasal deformity. No epistaxis. Right sinus exhibits no maxillary sinus tenderness and no frontal sinus tenderness. Left sinus exhibits no maxillary sinus tenderness and no frontal sinus tenderness.      Comments: No bleeding apparent.  Nasal mucosa is congested and erythematous  Mouth/Throat: Uvula is midline and mucous membranes are normal. No trismus in the jaw. Normal dentition. No uvula swelling. Posterior oropharyngeal erythema present. No oropharyngeal exudate or posterior oropharyngeal edema.      Comments: Postnasal drip and cobblestoning.  Eyes: Conjunctivae and lids are normal. No scleral icterus. Extraocular movement intact   Neck: Trachea normal and phonation normal. Neck supple. No edema present. No erythema present. No neck rigidity present.   Cardiovascular: Normal rate, regular rhythm, normal heart sounds and normal pulses.   Pulmonary/Chest: Effort normal and breath sounds normal. No respiratory distress. She has no decreased breath sounds. She has no rhonchi.   Abdominal: Normal appearance.   Musculoskeletal: Normal range of motion.          General: No deformity. Normal range of motion.   Neurological: She is alert and oriented to person, place, and time. She exhibits normal muscle tone. Coordination normal.   Skin: Skin is warm, dry, intact, not diaphoretic and not pale.   Psychiatric: Her speech is normal and behavior is normal. Judgment and thought content normal.   Nursing note and vitals reviewed.        Assessment:       1. Upper respiratory tract infection, unspecified type    2. Nasal congestion        Results for orders placed or performed in visit on 08/12/22   POCT COVID-19 Rapid Screening   Result Value Ref Range    POC Rapid COVID Negative Negative     Acceptable Yes          Plan:       At patient's request, we are prescribing antibiotics.  She reports subjective fever for a week.  No tenderness to percussion of her sinuses at this time the patient states that when she has been sick for over a week, she usually needs antibiotics to get over it.  Patient with many drug and food sensitivities.  Advised patient that we cannot do a narcotic cough syrup for her as we do not have a previous relationship with her.  Instead we are sending in Phenergan DM.  Upper respiratory tract infection, unspecified type  -     amoxicillin (AMOXIL) 500 MG capsule; Take 1 capsule (500 mg total) by mouth every 12 (twelve) hours. for 10 days  Dispense: 20 capsule; Refill: 0  -     triamcinolone (NASACORT) 55 mcg nasal inhaler; 2 sprays by Nasal route once daily.  Dispense: 16.9 mL; Refill: 0  -     promethazine-dextromethorphan (PROMETHAZINE-DM) 6.25-15 mg/5 mL Syrp; Take 5 mLs by mouth every 4 (four) hours as needed (cough).  Dispense: 180 mL; Refill: 0    Nasal congestion  -     POCT COVID-19 Rapid Screening

## 2022-08-31 RX ORDER — PROMETHAZINE HYDROCHLORIDE AND DEXTROMETHORPHAN HYDROBROMIDE 6.25; 15 MG/5ML; MG/5ML
5 SYRUP ORAL EVERY 4 HOURS PRN
Qty: 180 ML | Refills: 0 | Status: SHIPPED | OUTPATIENT
Start: 2022-08-31 | End: 2023-01-03

## 2022-08-31 RX ORDER — PROMETHAZINE HYDROCHLORIDE AND DEXTROMETHORPHAN HYDROBROMIDE 6.25; 15 MG/5ML; MG/5ML
SYRUP ORAL
COMMUNITY
End: 2022-08-31 | Stop reason: SDUPTHER

## 2022-08-31 NOTE — TELEPHONE ENCOUNTER
----- Message from Juju Hussein sent at 8/31/2022  3:54 PM CDT -----  Pt went to Urgent Care 8/12/2022 and she need a refill of the cough medicine promethezane without codeine and nexium. Call back number is [phone]. Thx. [id number] - [place] - [address] AT SEC OF [facility] & [facility]  [facility] [address]  [place] [address]  Phone: [phone] Fax: [phone]

## 2022-09-09 ENCOUNTER — OFFICE VISIT (OUTPATIENT)
Dept: PAIN MEDICINE | Facility: CLINIC | Age: 67
End: 2022-09-09
Payer: COMMERCIAL

## 2022-09-09 VITALS
DIASTOLIC BLOOD PRESSURE: 84 MMHG | BODY MASS INDEX: 41.05 KG/M2 | HEART RATE: 79 BPM | HEIGHT: 67 IN | WEIGHT: 261.56 LBS | RESPIRATION RATE: 17 BRPM | SYSTOLIC BLOOD PRESSURE: 143 MMHG

## 2022-09-09 DIAGNOSIS — M54.16 LUMBAR RADICULOPATHY, CHRONIC: Primary | ICD-10-CM

## 2022-09-09 PROCEDURE — 99999 PR PBB SHADOW E&M-EST. PATIENT-LVL III: CPT | Mod: PBBFAC,,, | Performed by: PHYSICIAN ASSISTANT

## 2022-09-09 PROCEDURE — 1159F MED LIST DOCD IN RCRD: CPT | Mod: CPTII,S$GLB,, | Performed by: PHYSICIAN ASSISTANT

## 2022-09-09 PROCEDURE — 3044F HG A1C LEVEL LT 7.0%: CPT | Mod: CPTII,S$GLB,, | Performed by: PHYSICIAN ASSISTANT

## 2022-09-09 PROCEDURE — 99214 OFFICE O/P EST MOD 30 MIN: CPT | Mod: S$GLB,,, | Performed by: PHYSICIAN ASSISTANT

## 2022-09-09 PROCEDURE — 1125F AMNT PAIN NOTED PAIN PRSNT: CPT | Mod: CPTII,S$GLB,, | Performed by: PHYSICIAN ASSISTANT

## 2022-09-09 PROCEDURE — 3077F SYST BP >= 140 MM HG: CPT | Mod: CPTII,S$GLB,, | Performed by: PHYSICIAN ASSISTANT

## 2022-09-09 PROCEDURE — 1125F PR PAIN SEVERITY QUANTIFIED, PAIN PRESENT: ICD-10-PCS | Mod: CPTII,S$GLB,, | Performed by: PHYSICIAN ASSISTANT

## 2022-09-09 PROCEDURE — 3044F PR MOST RECENT HEMOGLOBIN A1C LEVEL <7.0%: ICD-10-PCS | Mod: CPTII,S$GLB,, | Performed by: PHYSICIAN ASSISTANT

## 2022-09-09 PROCEDURE — 1159F PR MEDICATION LIST DOCUMENTED IN MEDICAL RECORD: ICD-10-PCS | Mod: CPTII,S$GLB,, | Performed by: PHYSICIAN ASSISTANT

## 2022-09-09 PROCEDURE — 3008F BODY MASS INDEX DOCD: CPT | Mod: CPTII,S$GLB,, | Performed by: PHYSICIAN ASSISTANT

## 2022-09-09 PROCEDURE — 1101F PT FALLS ASSESS-DOCD LE1/YR: CPT | Mod: CPTII,S$GLB,, | Performed by: PHYSICIAN ASSISTANT

## 2022-09-09 PROCEDURE — 3288F PR FALLS RISK ASSESSMENT DOCUMENTED: ICD-10-PCS | Mod: CPTII,S$GLB,, | Performed by: PHYSICIAN ASSISTANT

## 2022-09-09 PROCEDURE — 99214 PR OFFICE/OUTPT VISIT, EST, LEVL IV, 30-39 MIN: ICD-10-PCS | Mod: S$GLB,,, | Performed by: PHYSICIAN ASSISTANT

## 2022-09-09 PROCEDURE — 3079F PR MOST RECENT DIASTOLIC BLOOD PRESSURE 80-89 MM HG: ICD-10-PCS | Mod: CPTII,S$GLB,, | Performed by: PHYSICIAN ASSISTANT

## 2022-09-09 PROCEDURE — 99999 PR PBB SHADOW E&M-EST. PATIENT-LVL III: ICD-10-PCS | Mod: PBBFAC,,, | Performed by: PHYSICIAN ASSISTANT

## 2022-09-09 PROCEDURE — 1101F PR PT FALLS ASSESS DOC 0-1 FALLS W/OUT INJ PAST YR: ICD-10-PCS | Mod: CPTII,S$GLB,, | Performed by: PHYSICIAN ASSISTANT

## 2022-09-09 PROCEDURE — 3008F PR BODY MASS INDEX (BMI) DOCUMENTED: ICD-10-PCS | Mod: CPTII,S$GLB,, | Performed by: PHYSICIAN ASSISTANT

## 2022-09-09 PROCEDURE — 3077F PR MOST RECENT SYSTOLIC BLOOD PRESSURE >= 140 MM HG: ICD-10-PCS | Mod: CPTII,S$GLB,, | Performed by: PHYSICIAN ASSISTANT

## 2022-09-09 PROCEDURE — 3288F FALL RISK ASSESSMENT DOCD: CPT | Mod: CPTII,S$GLB,, | Performed by: PHYSICIAN ASSISTANT

## 2022-09-09 PROCEDURE — 3079F DIAST BP 80-89 MM HG: CPT | Mod: CPTII,S$GLB,, | Performed by: PHYSICIAN ASSISTANT

## 2022-09-09 NOTE — PROGRESS NOTES
Est Patient Chronic Pain Note (Follow up Visit)    Referring Physician: No ref. provider found    PCP: Gabbie Ronquillo MD    Chief Complaint:   Chief Complaint   Patient presents with    Back Pain     Patient has pain in lower back when walking, pain scale 8/10.  No pain when sitting          SUBJECTIVE:    Interval History (9/9/2022):  Latosha Enriquez presents today for follow-up visit.  Patient was last seen on 5/24/2022. At that visit, the plan was to order MRI of lumbar spine to further evaluate for low back pain and leg weakness. She again reports low back pain in a band-like distribution with radiation into her bilateral lower extremities. She reports she is unable to stand or walk for longer than a few minutes before requiring rest. Patient does report associated heaviness or weakness in the lower extremities associated with her pain.  Patient reports this has significantly affected her quality of life.  Pain is improved with sitting down, stretching as well as with gabapentin.  She reports in the AM she is bent over and has a hard time standing up straight. Reports that she is interested in epidural injections as her brother has had them and they have been helpful. Not interested in MBB or RFA as a co-worker had nerve ablation with poor results.  Patient reports pain as 8/10 today. Pain and weakness interferes with daily activities.  She reports she has not started the Cymbalta yet.    Of note, patient voiced frustrations over wait times. She reports that she arrived on time/early for her appointment but check in required an extended amount of time and she was marked as arriving 18 minutes past her appointment time. She also voiced frustrations that other patients were roomed before her. However, it appears that they were patients being seen by another provider.           Latosha Enriquez is a 66 y.o. female with past medical history significant for hypertension, hyperlipidemia, paroxysmal atrial fibrillation,  nephrolithiasis, morbid obesity, type 2 diabetes complicated by retinopathy, GERD, gout, obstructive sleep apnea who presents to the clinic for the evaluation of lower back and leg pain.  Patient reports pain began approximately 4-5 months prior without inciting accident injury or trauma.  Patient reports she has had lower back weakness for several years.  Today patient reports pain which is intermittent which is rated a 4/10.  Patient reports pain in a bandlike distribution in the lower back which radiates down the anterior and lateral aspect of the left lower extremity to the knee in L3-4 distribution.  Pain is described as throbbing in nature.  Pain is exacerbated with most movement including lumbar flexion, standing and with ambulation.  Patient reports she is able to ambulate approximately 5 minutes before requiring rest.  Patient does report associated heaviness or weakness in the lower extremities associated with her pain.  Patient reports this has significantly affected her quality of life.  Pain is improved with sitting down, stretching as well as with gabapentin.  Patient reports taking gabapentin 100 mg twice daily.  Patient reports she cannot increase this dose secondary to significant daytime somnolence.  Patient does report improvement in left lower extremity radiculopathy with gabapentin.    Patient reports significant motor weakness and loss of sensations.  Patient denies night fever/night sweats, urinary incontinence, bowel incontinence and significant weight loss.      Pain Disability Index Review:     Last 3 PDI Scores 9/9/2022 5/24/2022   Pain Disability Index (PDI) 46 64       Non-Pharmacologic Treatments:  Physical Therapy/Home Exercise: yes  Ice/Heat:no  TENS: no  Acupuncture: no  Massage: no  Chiropractic: no    Other: no      Pain Medications:  - Opioids: Vicodin ( Hydrocodone/Acetaminophen)  - Adjuvant Medications: Baclofen (Lioresal), Neurontin (Gabapentin) and Topical Ointment (Voltaren  Gel, Steroid cream, Anti-Inflammatory Cream, Compound cream)  - Anti-Coagulants: Eliquis    Pain Procedures:   -bilateral knee intra-articular corticosteroid injection:  Outside institution    Past Medical History:   Diagnosis Date    Asthma     Colon polyps     Diabetes mellitus type II, uncontrolled     Diabetic retinopathy     Diverticulosis of large intestine without hemorrhage 2015    Elevated liver enzymes     GERD (gastroesophageal reflux disease)     Gout, unspecified     Hemorrhoids, internal 2015    History of blood transfusion     Hyperlipidemia     Hypertension     IBS (irritable bowel syndrome)     Morbid obesity with BMI of 40.0-44.9, adult     Nasal vestibulitis     Osteoarthritis of multiple joints     Urolithiasis     Vitamin D deficiency disease      Past Surgical History:   Procedure Laterality Date    BREAST BIOPSY Right      SECTION, CLASSIC      COLONOSCOPY N/A 2015    Procedure: COLONOSCOPY;  Surgeon: Stanislav Pickard MD;  Location: [facility] ENDO;  Service: Endoscopy;  Laterality: N/A;    CYSTOSCOPY W/ URETERAL STENT PLACEMENT  2017    EXTRACORPOREAL SHOCK WAVE LITHOTRIPSY      LEFT HEART CATHETERIZATION Left 2019    Procedure: CATHETERIZATION, HEART, LEFT;  Surgeon: Haile Suggs MD;  Location: [facility] CATH LAB;  Service: Cardiology;  Laterality: Left;  10:30-11am start/poss rt radial approach    TOTAL ABDOMINAL HYSTERECTOMY      URETEROSCOPY  2017     Review of patient's allergies indicates:   Allergen Reactions    Antihistamines - alkylamine Anaphylaxis and Itching     Cough, throat itches    Chocolate flavor Other (See Comments)     disoriented    Doxycycline Other (See Comments)     Stomach pain    Betadine [povidone-iodine] Itching    Strawberries [strawberry] Other (See Comments)    Iodine and iodide containing products Other (See Comments)     Tongue swelling    Tramadol Nausea Only    Yeast Itching     Facial swelling, constipation       Current  "Outpatient Medications   Medication Sig    albuterol (PROVENTIL/VENTOLIN HFA) 90 mcg/actuation inhaler Inhale 1-2 puffs into the lungs every 4 to 6 hours as needed for Wheezing or Shortness of Breath.    allopurinoL (ZYLOPRIM) 100 MG tablet Take 1 tablet (100 mg total) by mouth once daily.    apixaban (ELIQUIS) 5 mg Tab Take 1 tablet (5 mg total) by mouth 2 (two) times daily.    atenoloL (TENORMIN) 25 MG tablet Take 25 mg by mouth once daily.    BD ULTRA-FINE SHORT PEN NEEDLE 31 gauge x 5/16" Ndle USE 1 PEN NEEDLE UNDER THE SKIN TWICE A DAY    cyanocobalamin (VITAMIN B-12) 100 MCG tablet Take by mouth.    diclofenac sodium (VOLTAREN) 1 % Gel Apply 2 g topically 3 (three) times daily.    ergocalciferol, vitamin D2, 400 unit Tab Take by mouth.    ferrous sulfate, dried (SLOW FE) 160 mg (50 mg iron) TbSR Take by mouth.    flecainide (TAMBOCOR) 50 MG Tab Take 50 mg by mouth 2 (two) times daily.    fluticasone propion-salmeterol 45-21 mcg/dose (ADVAIR HFA) 45-21 mcg/actuation HFAA inhaler Inhale 2 puffs into the lungs every 12 (twelve) hours. Controller    gabapentin (NEURONTIN) 100 MG capsule Take 1 capsule (100 mg total) by mouth 3 (three) times daily.    HYDROcodone-acetaminophen (NORCO)  mg per tablet Take 1 tablet by mouth every 6 (six) hours as needed.    insulin lispro protamin-lispro (HUMALOG MIX 75-25 KWIKPEN) 100 unit/mL (75-25) InPn INJECT 30 UNITS UNDER THE SKIN EVERY MORNING AND 20 UNITS EVERY EVENING    losartan-hydrochlorothiazide 100-25 mg (HYZAAR) 100-25 mg per tablet Take 1 tablet by mouth once daily.    metFORMIN (GLUCOPHAGE-XR) 500 MG ER 24hr tablet TAKE 2 TABLETS BY MOUTH WITH BREAKFAST AND 1 TABLET BY MOUTH WITH DINNER    metoprolol succinate (TOPROL-XL) 50 MG 24 hr tablet Take 50 mg by mouth 2 (two) times daily.    montelukast (SINGULAIR) 10 mg tablet Take 1 tablet (10 mg total) by mouth every evening.    pantoprazole (PROTONIX) 20 MG tablet Take 20 mg by mouth every morning.    potassium " "chloride (MICRO-K) 10 MEQ CpSR Take 1 capsule (10 mEq total) by mouth once daily.    promethazine-dextromethorphan (PROMETHAZINE-DM) 6.25-15 mg/5 mL Syrp Take 5 mLs by mouth every 4 (four) hours as needed (cough).    simvastatin (ZOCOR) 20 MG tablet Take 1 tablet (20 mg total) by mouth every evening.    tamsulosin (FLOMAX) 0.4 mg Cap Take 1 capsule (0.4 mg total) by mouth every evening.    triamcinolone (NASACORT) 55 mcg nasal inhaler 2 sprays by Nasal route once daily.    baclofen (LIORESAL) 10 MG tablet Take 1 tablet (10 mg total) by mouth 3 (three) times daily.    DULoxetine (CYMBALTA) 30 MG capsule Take 1 capsule (30 mg total) by mouth once daily for 7 days, THEN 2 capsules (60 mg total) once daily for 23 days.    fexofenadine (ALLEGRA) 180 MG tablet Take 1 tablet (180 mg total) by mouth once daily.    ranolazine (RANEXA) 500 MG Tb12 Take 1 tablet (500 mg total) by mouth 2 (two) times daily.     No current facility-administered medications for this visit.       Review of Systems     GENERAL:  No weight loss, malaise or fevers.  HEENT:   No recent changes in vision or hearing  NECK:  Negative for lumps, no difficulty with swallowing.  RESPIRATORY:  Negative for cough, wheezing or shortness of breath, patient denies any recent URI.  CARDIOVASCULAR:  Negative for chest pain, leg swelling or palpitations.  GI:  Negative for abdominal discomfort, blood in stools or black stools or change in bowel habits.  MUSCULOSKELETAL:  See HPI.  SKIN:  Negative for lesions, rash, and itching.  PSYCH:  No mood disorder or recent psychosocial stressors.   HEMATOLOGY/LYMPHOLOGY:  Negative for prolonged bleeding, bruising easily or swollen nodes.    NEURO:   No history of headaches, syncope, paralysis, seizures or tremors.  All other reviewed and negative other than HPI.    OBJECTIVE:    BP (!) 143/84   Pulse 79   Resp 17   Ht 5' 7" (1.702 m)   Wt 118.6 kg (261 lb 9.2 oz)   BMI 40.97 kg/m²       Physical Exam    GENERAL: Well " appearing, in no acute distress, alert and oriented x3.  PSYCH:  Aggressive, irritated, but consolable.  SKIN: Skin color, texture, turgor normal, no rashes or lesions.  HEAD/FACE:  Normocephalic, atraumatic. Cranial nerves grossly intact.    CV: RRR with palpation of the radial artery.  PULM: No evidence of respiratory difficulty, symmetric chest rise.  GI:  Soft and non-tender.    BACK:  Mild thoracic kyphosis.  Straight leg raising in the sitting and supine positions is negative to radicular pain. No pain to palpation over the facet joints of the lumbar spine or spinous processes. reduced range of motion without pain reproduction.  EXTREMITIES: Peripheral joint ROM is full and pain free without obvious instability or laxity in all four extremities. No deformities, edema, or skin discoloration. Good capillary refill.  MUSCULOSKELETAL: Unable to stand on toes.  Able to ambulate on heels   hip, and knee provocative maneuvers are negative.  There is no pain with palpation over the sacroiliac joints bilaterally.  FABERs test is negative.  Facet loading test is negative bilaterally.   Bilateral upper and lower extremity strength is normal and symmetric.  No atrophy or tone abnormalities are noted.  RIGHT Lower extremity: Hip flexion 4+/5, Hip Abduction 4+/5, Hip Adduction 4+/5, Knee extension 5/5, Knee flexion 5/5, Ankle dorsiflexion5/5, Extensor hallucis longus 5/5, Ankle plantarflexion 5/5  LEFT Lower extremity:  Hip flexion 4+/5, Hip Abduction 4+/5,Hip Adduction 4+/5, Knee extension 5/5, Knee flexion 5/5, Ankle dorsiflexion 5/5, Extensor hallucis longus 5/5, Ankle plantarflexion 5/5  -Normal testing knee (patellar) jerk and ankle (achilles) jerk    NEURO: Bilateral upper and lower extremity coordination and muscle stretch reflexes are physiologic and symmetric. No loss of sensation is noted.  GAIT: normal.    Imagin/02/17    X-Ray Cervical Spine AP And Lateral    Narrative  AP and lateral views of the  cervical spine    Findings: The vertebral bodies demonstrate normal height.  There is reversal of the normal lordotic curvature with a few millimeters of anterolisthesis of C4 on C5.  There is also a couple of millimeters of anterolisthesis of C3 on C4. Mild disc space narrowing present at the C4-C5 level.  There is moderate to severe disc space narrowing and spondylosis present at the C5-C6 level. The C1-C2 articulation is within normal limits. No prevertebral soft tissue swelling.        ASSESSMENT: 66 y.o. year old female with lower back pain, consistent with     1. Lumbar radiculopathy, chronic  IR CHRISTOPHER Lumbar w/ Img    Case Request-RAD/Other Procedure Area: Lumbar L4/5 IL CHRISTOPHER with left paramedian approach RN IV Sedation              PLAN:   - Interventions:  Schedule for lumbar L4/5 IL CHRISTOPHER  to see if this offers relief from radicular symptoms and LE weakness. Explained the risks and benefits of the procedure in detail with the patient today in clinic along with alternative treatment options. Patient is agreeable with plan    - Anticoagulation use: yes  Eliquis , Need to hold for 3 days prior to procedure, we will send clearance to Dr. Ryan (Cardiology)     report:  Reviewed and consistent with medication use as prescribed.    - Medications:  --We will start Cymbalta (duloxetine) 30 mg once daily.   We have discussed potential common side effects of duloxetine including nausea, diarrhea/constipation, fatigue, drowsiness or dry mouth.  Patient expresses understanding.      - Therapy:   We discussed continuing activities as tolerated      - Imaging: Reviewed lumbar MRI with patient and answered any questions they had regarding study.    - Follow up visit: return to clinic in 4-6 weeks after injection      The above plan and management options were discussed at length with patient. Patient is in agreement with the above and verbalized understanding.    - I discussed the goals of interventional chronic pain  management with the patient on today's visit. We discussed a multimodal and systematic approach to pain.  This includes diagnostic and therapeutic injections, adjuvant pharmacologic treatment, physical therapy, and at times psychiatry.  I emphasized the importance of regular exercise, core strengthening and stretching, diet and weight loss as a cornerstone of long-term pain management.    - This condition does not require this patient to take time off of work, and the primary goal of our Pain Management services is to improve the patient's functional capacity.  - Patient Questions: Answered all of the patient's questions regarding diagnoses, therapy, treatment and next steps        Katelin Farmer PA-C  Interventional Pain Management  [facility] [place] [facility]    Disclaimer:  This note was prepared using voice recognition system and is likely to have sound alike errors that may have been overlooked even after proof reading.  Please call me with any questions

## 2022-09-19 ENCOUNTER — TELEPHONE (OUTPATIENT)
Dept: PAIN MEDICINE | Facility: CLINIC | Age: 67
End: 2022-09-19
Payer: COMMERCIAL

## 2022-09-20 ENCOUNTER — PATIENT MESSAGE (OUTPATIENT)
Dept: PAIN MEDICINE | Facility: CLINIC | Age: 67
End: 2022-09-20
Payer: COMMERCIAL

## 2022-09-23 DIAGNOSIS — M54.16 LUMBAR RADICULOPATHY, CHRONIC: Primary | ICD-10-CM

## 2022-09-26 RX ORDER — DULOXETIN HYDROCHLORIDE 30 MG/1
30 CAPSULE, DELAYED RELEASE ORAL DAILY
Qty: 30 CAPSULE | Refills: 2 | Status: SHIPPED | OUTPATIENT
Start: 2022-09-26 | End: 2024-01-11

## 2022-09-27 ENCOUNTER — PATIENT OUTREACH (OUTPATIENT)
Dept: ADMINISTRATIVE | Facility: HOSPITAL | Age: 67
End: 2022-09-27
Payer: COMMERCIAL

## 2022-10-03 ENCOUNTER — PATIENT MESSAGE (OUTPATIENT)
Dept: ADMINISTRATIVE | Facility: HOSPITAL | Age: 67
End: 2022-10-03
Payer: COMMERCIAL

## 2022-10-03 DIAGNOSIS — E11.9 TYPE 2 DIABETES MELLITUS WITHOUT COMPLICATION, UNSPECIFIED WHETHER LONG TERM INSULIN USE: ICD-10-CM

## 2022-10-04 ENCOUNTER — TELEPHONE (OUTPATIENT)
Dept: PAIN MEDICINE | Facility: CLINIC | Age: 67
End: 2022-10-04
Payer: COMMERCIAL

## 2022-10-04 NOTE — TELEPHONE ENCOUNTER
Reached out to the pt in regards of her procedure. Pt stated that she would like to cancel her procure because the co pay in $900. I inform pt to call Ms.Kamethia Reed with the finical department. Pt understood and will call back once she speak to her.      Camacho Richey   Medical Assistant

## 2022-10-04 NOTE — TELEPHONE ENCOUNTER
----- Message from Neelam Romeo sent at 10/4/2022  2:23 PM CDT -----  Contact: Latosha  The patient is requesting a callback from the nurse in regards to cancelling the appointment      Please call Latosha at [phone] (home)      Thanks

## 2022-10-07 ENCOUNTER — TELEPHONE (OUTPATIENT)
Dept: PAIN MEDICINE | Facility: CLINIC | Age: 67
End: 2022-10-07
Payer: COMMERCIAL

## 2022-10-07 NOTE — TELEPHONE ENCOUNTER
----- Message from Teresa Alvarado sent at 10/7/2022  4:32 PM CDT -----  Contact: [phone]  Pt is calling  to rescheduling her procedure appt. Pt would like a call back at [phone]. Thanks KB

## 2022-10-07 NOTE — TELEPHONE ENCOUNTER
Patient called and wants to reschedule procedure until the first or second week in November due to financial issues.  Will forward to [facility].

## 2022-10-13 DIAGNOSIS — E11.9 TYPE 2 DIABETES MELLITUS WITHOUT COMPLICATION: ICD-10-CM

## 2022-10-13 DIAGNOSIS — E11.9 TYPE 2 DIABETES MELLITUS WITHOUT COMPLICATION, WITH LONG-TERM CURRENT USE OF INSULIN: ICD-10-CM

## 2022-10-13 DIAGNOSIS — Z79.4 TYPE 2 DIABETES MELLITUS WITHOUT COMPLICATION, WITH LONG-TERM CURRENT USE OF INSULIN: ICD-10-CM

## 2022-10-18 ENCOUNTER — PATIENT MESSAGE (OUTPATIENT)
Dept: ADMINISTRATIVE | Facility: HOSPITAL | Age: 67
End: 2022-10-18
Payer: COMMERCIAL

## 2022-10-25 ENCOUNTER — OFFICE VISIT (OUTPATIENT)
Dept: URGENT CARE | Facility: CLINIC | Age: 67
End: 2022-10-25
Payer: COMMERCIAL

## 2022-10-25 VITALS
TEMPERATURE: 101 F | HEIGHT: 67 IN | DIASTOLIC BLOOD PRESSURE: 64 MMHG | HEART RATE: 98 BPM | BODY MASS INDEX: 40.97 KG/M2 | RESPIRATION RATE: 18 BRPM | OXYGEN SATURATION: 96 % | WEIGHT: 261 LBS | SYSTOLIC BLOOD PRESSURE: 132 MMHG

## 2022-10-25 DIAGNOSIS — Z20.828 EXPOSURE TO THE FLU: ICD-10-CM

## 2022-10-25 DIAGNOSIS — R05.9 COUGH, UNSPECIFIED TYPE: Primary | ICD-10-CM

## 2022-10-25 DIAGNOSIS — J06.9 UPPER RESPIRATORY TRACT INFECTION, UNSPECIFIED TYPE: ICD-10-CM

## 2022-10-25 LAB
CTP QC/QA: YES
CTP QC/QA: YES
POC MOLECULAR INFLUENZA A AGN: NEGATIVE
POC MOLECULAR INFLUENZA B AGN: NEGATIVE
SARS-COV-2 RDRP RESP QL NAA+PROBE: NEGATIVE

## 2022-10-25 PROCEDURE — 3075F PR MOST RECENT SYSTOLIC BLOOD PRESS GE 130-139MM HG: ICD-10-PCS | Mod: CPTII,S$GLB,, | Performed by: NURSE PRACTITIONER

## 2022-10-25 PROCEDURE — 1160F PR REVIEW ALL MEDS BY PRESCRIBER/CLIN PHARMACIST DOCUMENTED: ICD-10-PCS | Mod: CPTII,S$GLB,, | Performed by: NURSE PRACTITIONER

## 2022-10-25 PROCEDURE — 3044F PR MOST RECENT HEMOGLOBIN A1C LEVEL <7.0%: ICD-10-PCS | Mod: CPTII,S$GLB,, | Performed by: NURSE PRACTITIONER

## 2022-10-25 PROCEDURE — 3075F SYST BP GE 130 - 139MM HG: CPT | Mod: CPTII,S$GLB,, | Performed by: NURSE PRACTITIONER

## 2022-10-25 PROCEDURE — 1160F RVW MEDS BY RX/DR IN RCRD: CPT | Mod: CPTII,S$GLB,, | Performed by: NURSE PRACTITIONER

## 2022-10-25 PROCEDURE — 1125F PR PAIN SEVERITY QUANTIFIED, PAIN PRESENT: ICD-10-PCS | Mod: CPTII,S$GLB,, | Performed by: NURSE PRACTITIONER

## 2022-10-25 PROCEDURE — 87635 SARS-COV-2 COVID-19 AMP PRB: CPT | Mod: QW,S$GLB,, | Performed by: NURSE PRACTITIONER

## 2022-10-25 PROCEDURE — 99214 PR OFFICE/OUTPT VISIT, EST, LEVL IV, 30-39 MIN: ICD-10-PCS | Mod: S$GLB,,, | Performed by: NURSE PRACTITIONER

## 2022-10-25 PROCEDURE — 87502 POCT INFLUENZA A/B MOLECULAR: ICD-10-PCS | Mod: QW,S$GLB,, | Performed by: NURSE PRACTITIONER

## 2022-10-25 PROCEDURE — 99214 OFFICE O/P EST MOD 30 MIN: CPT | Mod: S$GLB,,, | Performed by: NURSE PRACTITIONER

## 2022-10-25 PROCEDURE — 3078F DIAST BP <80 MM HG: CPT | Mod: CPTII,S$GLB,, | Performed by: NURSE PRACTITIONER

## 2022-10-25 PROCEDURE — 1159F PR MEDICATION LIST DOCUMENTED IN MEDICAL RECORD: ICD-10-PCS | Mod: CPTII,S$GLB,, | Performed by: NURSE PRACTITIONER

## 2022-10-25 PROCEDURE — 87502 INFLUENZA DNA AMP PROBE: CPT | Mod: QW,S$GLB,, | Performed by: NURSE PRACTITIONER

## 2022-10-25 PROCEDURE — 1125F AMNT PAIN NOTED PAIN PRSNT: CPT | Mod: CPTII,S$GLB,, | Performed by: NURSE PRACTITIONER

## 2022-10-25 PROCEDURE — 3078F PR MOST RECENT DIASTOLIC BLOOD PRESSURE < 80 MM HG: ICD-10-PCS | Mod: CPTII,S$GLB,, | Performed by: NURSE PRACTITIONER

## 2022-10-25 PROCEDURE — 1159F MED LIST DOCD IN RCRD: CPT | Mod: CPTII,S$GLB,, | Performed by: NURSE PRACTITIONER

## 2022-10-25 PROCEDURE — 87635: ICD-10-PCS | Mod: QW,S$GLB,, | Performed by: NURSE PRACTITIONER

## 2022-10-25 PROCEDURE — 3044F HG A1C LEVEL LT 7.0%: CPT | Mod: CPTII,S$GLB,, | Performed by: NURSE PRACTITIONER

## 2022-10-25 RX ORDER — ALBUTEROL SULFATE 90 UG/1
2 AEROSOL, METERED RESPIRATORY (INHALATION) EVERY 6 HOURS PRN
Qty: 1 G | Refills: 0 | Status: SHIPPED | OUTPATIENT
Start: 2022-10-25 | End: 2024-01-02 | Stop reason: SDUPTHER

## 2022-10-25 RX ORDER — BENZONATATE 200 MG/1
200 CAPSULE ORAL 3 TIMES DAILY PRN
Qty: 30 CAPSULE | Refills: 0 | Status: SHIPPED | OUTPATIENT
Start: 2022-10-25 | End: 2022-11-04

## 2022-10-25 NOTE — PROGRESS NOTES
"Subjective:       Patient ID: Latosha Enriquez is a 66 y.o. female.    Vitals:  height is 5' 7" (1.702 m) and weight is 118.4 kg (261 lb). Her tympanic temperature is 100.8 °F (38.2 °C) (abnormal). Her blood pressure is 132/64 and her pulse is 98. Her respiration is 18 and oxygen saturation is 96%.     Chief Complaint: Cough (Body aches)    Pt presents to the clinic today with cough, body aches, and fatigue that began 2 days ago. Today feeling the worst. He daughter tested positive for the flu today.     Cough  This is a new problem. The current episode started in the past 7 days. The problem has been gradually worsening. The problem occurs every few minutes. The cough is Productive of sputum. Associated symptoms include chills, a fever, nasal congestion, postnasal drip, a sore throat and sweats. Pertinent negatives include no chest pain, ear congestion, ear pain, headaches or rash. Nothing aggravates the symptoms. She has tried prescription cough suppressant (Tylenol) for the symptoms. The treatment provided mild relief.     Constitution: Positive for chills and fever.   HENT:  Positive for postnasal drip and sore throat. Negative for ear pain.    Neck: Negative for neck pain.   Cardiovascular:  Negative for chest pain.   Eyes:  Negative for eye pain.   Respiratory:  Positive for cough.    Gastrointestinal:  Negative for nausea and vomiting.   Genitourinary:  Negative for dysuria.   Skin:  Negative for rash.   Allergic/Immunologic: Negative for immunocompromised state.   Neurological:  Negative for headaches.   Hematologic/Lymphatic: Negative for easy bruising/bleeding. Does not bruise/bleed easily.   Psychiatric/Behavioral:  Negative for confusion.      Objective:      Physical Exam   Constitutional: She is oriented to person, place, and time. She appears well-developed. She is cooperative.  Non-toxic appearance. She does not appear ill. No distress.   HENT:   Head: Normocephalic and atraumatic.   Ears:   Right Ear: " Hearing, tympanic membrane, external ear and ear canal normal.   Left Ear: Hearing, tympanic membrane, external ear and ear canal normal.   Nose: Nose normal. No mucosal edema, rhinorrhea or nasal deformity. No epistaxis. Right sinus exhibits no maxillary sinus tenderness and no frontal sinus tenderness. Left sinus exhibits no maxillary sinus tenderness and no frontal sinus tenderness.   Mouth/Throat: Uvula is midline, oropharynx is clear and moist and mucous membranes are normal. No trismus in the jaw. Normal dentition. No uvula swelling. No oropharyngeal exudate, posterior oropharyngeal edema or posterior oropharyngeal erythema.   Eyes: Conjunctivae and lids are normal. No scleral icterus.   Neck: Trachea normal and phonation normal. Neck supple. No edema present. No erythema present. No neck rigidity present.   Cardiovascular: Normal rate, regular rhythm, normal heart sounds and normal pulses.   Pulmonary/Chest: Effort normal and breath sounds normal. No respiratory distress. She has no decreased breath sounds. She has no rhonchi.   Abdominal: Normal appearance.   Musculoskeletal: Normal range of motion.         General: No deformity. Normal range of motion.   Neurological: She is alert and oriented to person, place, and time. She exhibits normal muscle tone. Coordination normal.   Skin: Skin is warm, dry, intact, not diaphoretic and not pale.   Psychiatric: Her speech is normal and behavior is normal. Judgment and thought content normal.   Nursing note and vitals reviewed.      Assessment:       1. Cough, unspecified type    2. Exposure to the flu    3. Upper respiratory tract infection, unspecified type          Plan:         Cough, unspecified type  -     POCT Influenza A/B MOLECULAR  -     POCT COVID-19 Rapid Screening    Exposure to the flu    Upper respiratory tract infection, unspecified type  -     benzonatate (TESSALON) 200 MG capsule; Take 1 capsule (200 mg total) by mouth 3 (three) times daily as  needed for Cough.  Dispense: 30 capsule; Refill: 0  -     albuterol (VENTOLIN HFA) 90 mcg/actuation inhaler; Inhale 2 puffs into the lungs every 6 (six) hours as needed for Wheezing. Rescue  Dispense: 1 g; Refill: 0       If symptoms worsen or fail to improve, follow-up with primary care doctor or nearest ER. After visit summary given and discussed. Patient verbalized understanding and agrees with treatment plan. Patient remained stable and was discharged in no acute distress.

## 2022-10-25 NOTE — LETTER
October 25, 2022      [facility]  [address]   [place] [address]  Phone: [phone]       Patient: Latosha Enriquez   YOB: 1955  Date of Visit: 10/25/2022    To Whom It May Concern:    Rodo Enriquez  was at [facility] on 10/25/2022. The patient may return to work/schoolwhen fever free for 24 hrs without the use of fever reducers. If you have any questions or concerns, or if I can be of further assistance, please do not hesitate to contact me.    Sincerely,    THU Kaufman

## 2022-11-15 ENCOUNTER — TELEPHONE (OUTPATIENT)
Dept: PODIATRY | Facility: CLINIC | Age: 67
End: 2022-11-15
Payer: COMMERCIAL

## 2022-11-15 ENCOUNTER — TELEPHONE (OUTPATIENT)
Dept: PAIN MEDICINE | Facility: CLINIC | Age: 67
End: 2022-11-15
Payer: COMMERCIAL

## 2022-11-15 NOTE — TELEPHONE ENCOUNTER
----- Message from Saima Buchanan sent at 11/15/2022 10:14 AM CST -----  Contact: Latosha  Type:  Sooner Apoointment Request    Caller is requesting a sooner appointment. Caller will not accept being placed on the waitlist and is requesting a message be sent to doctor.  Name of Caller:Latosha  When is the first available appointment?scheduled 11/22/22  Symptoms:Procedure  Would the patient rather a call back or a response via [facility]? call  Best Call Back Number:[phone]   Additional Information: Patient request to reschedule procedure for 12/19/22 as patient reports will have funds for procedure at this time.   Thank you,  GH

## 2022-11-15 NOTE — TELEPHONE ENCOUNTER
Called pt to set up their procedure. Pt answered and procedure has been made. Inform pt on the procedure instruction. Pt  does take any blood thinners. Pt understood and all question answered.         Camacho Richey   Medical Assistant

## 2022-11-15 NOTE — TELEPHONE ENCOUNTER
----- Message from Saima Buchanan sent at 11/15/2022 10:17 AM CST -----  Contact: Latosha  Type:  Sooner Apoointment Request     Caller is requesting a sooner appointment. Caller will not accept being placed on the waitlist and is requesting a message be sent to doctor.   Name of Caller:Latosha   When is the first available appointment?scheduled 11/23/22   Symptoms:Procedure   Would the patient rather a call back or a response via [facility]? call   Best Call Back Number:[phone]   Additional Information: Patient request to reschedule procedure for the month of December.  Thank you,   GH

## 2022-11-17 ENCOUNTER — TELEPHONE (OUTPATIENT)
Dept: PODIATRY | Facility: CLINIC | Age: 67
End: 2022-11-17
Payer: COMMERCIAL

## 2022-11-28 ENCOUNTER — PATIENT OUTREACH (OUTPATIENT)
Dept: ADMINISTRATIVE | Facility: HOSPITAL | Age: 67
End: 2022-11-28
Payer: COMMERCIAL

## 2022-11-29 ENCOUNTER — TELEPHONE (OUTPATIENT)
Dept: FAMILY MEDICINE | Facility: CLINIC | Age: 67
End: 2022-11-29
Payer: COMMERCIAL

## 2022-11-29 NOTE — TELEPHONE ENCOUNTER
----- Message from Neris Alonso sent at 11/28/2022  2:48 PM CST -----  Contact: self/[phone]  Type:  Sooner Apoointment Request    Caller is requesting a sooner appointment.  Caller declined first available appointment listed below.  Caller will not accept being placed on the waitlist and is requesting a message be sent to doctor.  Name of Caller:Latosha Enriquez  When is the first available appointment?  Symptoms:pre-op  Would the patient rather a call back or a response [facility]? Call back  Best Call Back Number:[phone]  Additional Information: patient is having surgery on 12-. Thanks/ar

## 2022-12-14 DIAGNOSIS — E11.9 TYPE 2 DIABETES MELLITUS WITHOUT COMPLICATION: ICD-10-CM

## 2022-12-16 ENCOUNTER — LAB VISIT (OUTPATIENT)
Dept: LAB | Facility: HOSPITAL | Age: 67
End: 2022-12-16
Attending: FAMILY MEDICINE
Payer: COMMERCIAL

## 2022-12-16 DIAGNOSIS — E11.9 TYPE 2 DIABETES MELLITUS WITHOUT COMPLICATION, WITH LONG-TERM CURRENT USE OF INSULIN: ICD-10-CM

## 2022-12-16 DIAGNOSIS — Z79.4 TYPE 2 DIABETES MELLITUS WITHOUT COMPLICATION, WITH LONG-TERM CURRENT USE OF INSULIN: ICD-10-CM

## 2022-12-16 DIAGNOSIS — E11.9 TYPE 2 DIABETES MELLITUS WITHOUT COMPLICATION: ICD-10-CM

## 2022-12-16 LAB
ALBUMIN SERPL BCP-MCNC: 3.7 G/DL (ref 3.5–5.2)
ALP SERPL-CCNC: 86 U/L (ref 55–135)
ALT SERPL W/O P-5'-P-CCNC: 39 U/L (ref 10–44)
ANION GAP SERPL CALC-SCNC: 6 MMOL/L (ref 8–16)
AST SERPL-CCNC: 21 U/L (ref 10–40)
BILIRUB SERPL-MCNC: 0.5 MG/DL (ref 0.1–1)
BUN SERPL-MCNC: 17 MG/DL (ref 8–23)
CALCIUM SERPL-MCNC: 9.4 MG/DL (ref 8.7–10.5)
CHLORIDE SERPL-SCNC: 105 MMOL/L (ref 95–110)
CHOLEST SERPL-MCNC: 181 MG/DL (ref 120–199)
CHOLEST/HDLC SERPL: 4.2 {RATIO} (ref 2–5)
CO2 SERPL-SCNC: 28 MMOL/L (ref 23–29)
CREAT SERPL-MCNC: 0.7 MG/DL (ref 0.5–1.4)
EST. GFR  (NO RACE VARIABLE): >60 ML/MIN/1.73 M^2
ESTIMATED AVG GLUCOSE: 166 MG/DL (ref 68–131)
GLUCOSE SERPL-MCNC: 152 MG/DL (ref 70–110)
HBA1C MFR BLD: 7.4 % (ref 4–5.6)
HDLC SERPL-MCNC: 43 MG/DL (ref 40–75)
HDLC SERPL: 23.8 % (ref 20–50)
LDLC SERPL CALC-MCNC: 119.6 MG/DL (ref 63–159)
NONHDLC SERPL-MCNC: 138 MG/DL
POTASSIUM SERPL-SCNC: 3.6 MMOL/L (ref 3.5–5.1)
PROT SERPL-MCNC: 6.9 G/DL (ref 6–8.4)
SODIUM SERPL-SCNC: 139 MMOL/L (ref 136–145)
TRIGL SERPL-MCNC: 92 MG/DL (ref 30–150)

## 2022-12-16 PROCEDURE — 83036 HEMOGLOBIN GLYCOSYLATED A1C: CPT | Performed by: FAMILY MEDICINE

## 2022-12-16 PROCEDURE — 80061 LIPID PANEL: CPT | Performed by: FAMILY MEDICINE

## 2022-12-16 PROCEDURE — 36415 COLL VENOUS BLD VENIPUNCTURE: CPT | Mod: PO | Performed by: FAMILY MEDICINE

## 2022-12-16 PROCEDURE — 80053 COMPREHEN METABOLIC PANEL: CPT | Performed by: FAMILY MEDICINE

## 2022-12-19 ENCOUNTER — OFFICE VISIT (OUTPATIENT)
Dept: FAMILY MEDICINE | Facility: CLINIC | Age: 67
End: 2022-12-19
Payer: COMMERCIAL

## 2022-12-19 DIAGNOSIS — E11.9 TYPE 2 DIABETES MELLITUS WITHOUT COMPLICATION, WITH LONG-TERM CURRENT USE OF INSULIN: Primary | Chronic | ICD-10-CM

## 2022-12-19 DIAGNOSIS — Z79.4 TYPE 2 DIABETES MELLITUS WITHOUT COMPLICATION, WITH LONG-TERM CURRENT USE OF INSULIN: Primary | Chronic | ICD-10-CM

## 2022-12-19 DIAGNOSIS — I10 ESSENTIAL HYPERTENSION: Chronic | ICD-10-CM

## 2022-12-19 DIAGNOSIS — I48.0 PAF (PAROXYSMAL ATRIAL FIBRILLATION): ICD-10-CM

## 2022-12-19 DIAGNOSIS — B35.1 TOENAIL FUNGUS: ICD-10-CM

## 2022-12-19 PROCEDURE — 3066F PR DOCUMENTATION OF TREATMENT FOR NEPHROPATHY: ICD-10-PCS | Mod: CPTII,95,, | Performed by: FAMILY MEDICINE

## 2022-12-19 PROCEDURE — 3051F HG A1C>EQUAL 7.0%<8.0%: CPT | Mod: CPTII,95,, | Performed by: FAMILY MEDICINE

## 2022-12-19 PROCEDURE — 99214 OFFICE O/P EST MOD 30 MIN: CPT | Mod: 95,,, | Performed by: FAMILY MEDICINE

## 2022-12-19 PROCEDURE — 1159F PR MEDICATION LIST DOCUMENTED IN MEDICAL RECORD: ICD-10-PCS | Mod: CPTII,95,, | Performed by: FAMILY MEDICINE

## 2022-12-19 PROCEDURE — 3066F NEPHROPATHY DOC TX: CPT | Mod: CPTII,95,, | Performed by: FAMILY MEDICINE

## 2022-12-19 PROCEDURE — 3061F PR NEG MICROALBUMINURIA RESULT DOCUMENTED/REVIEW: ICD-10-PCS | Mod: CPTII,95,, | Performed by: FAMILY MEDICINE

## 2022-12-19 PROCEDURE — 3051F PR MOST RECENT HEMOGLOBIN A1C LEVEL 7.0 - < 8.0%: ICD-10-PCS | Mod: CPTII,95,, | Performed by: FAMILY MEDICINE

## 2022-12-19 PROCEDURE — 99214 PR OFFICE/OUTPT VISIT, EST, LEVL IV, 30-39 MIN: ICD-10-PCS | Mod: 95,,, | Performed by: FAMILY MEDICINE

## 2022-12-19 PROCEDURE — 1159F MED LIST DOCD IN RCRD: CPT | Mod: CPTII,95,, | Performed by: FAMILY MEDICINE

## 2022-12-19 PROCEDURE — 1160F RVW MEDS BY RX/DR IN RCRD: CPT | Mod: CPTII,95,, | Performed by: FAMILY MEDICINE

## 2022-12-19 PROCEDURE — 3061F NEG MICROALBUMINURIA REV: CPT | Mod: CPTII,95,, | Performed by: FAMILY MEDICINE

## 2022-12-19 PROCEDURE — 1160F PR REVIEW ALL MEDS BY PRESCRIBER/CLIN PHARMACIST DOCUMENTED: ICD-10-PCS | Mod: CPTII,95,, | Performed by: FAMILY MEDICINE

## 2022-12-19 NOTE — PRE-PROCEDURE INSTRUCTIONS
Spoke with patient regarding procedure scheduled on 12.23    Arrival time 0900    Has patient been sick with fever or on antibiotics within the last 7 days? No    Does the patient have any open wounds, sores or rashes? No    Does the patient have any recent fractures? no    Has patient received a vaccination within the last 7 days? No    Received the COVID vaccination? yes    Has the patient stopped all medications as directed? Hold eliquis 3 days prior to procedure. Cardiac clearance obtained from dr martinez on 9.19. hold dm meds am of procedure.     Does patient have a pacemaker and or defibrillator? no    Does the patient have a ride to and from procedure and someone reliable to remain with patient? Daughter     Is the patient diabetic? yes    Does the patient have sleep apnea? Or use O2 at home? Hiro on cpap     Is the patient receiving sedation? yes    Is the patient instructed to remain NPO beginning at midnight the night before their procedure? yes    Procedure location confirmed with patient? Yes    Covid- Denies signs/symptoms. Instructed to notify PAT/MD if any changes.

## 2022-12-19 NOTE — PROGRESS NOTES
The patient location is: At home  The chief complaint leading to consultation is: Follow up diabetes    Visit type: audiovisual    Face to Face time with patient: 20 min   30 minutes of total time spent on the encounter, which includes face to face time and non-face to face time preparing to see the patient (eg, review of tests), Obtaining and/or reviewing separately obtained history, Documenting clinical information in the electronic or other health record, Independently interpreting results (not separately reported) and communicating results to the patient/family/caregiver, or Care coordination (not separately reported).     Each patient to whom he or she provides medical services by telemedicine is:  (1) informed of the relationship between the physician and patient and the respective role of any other health care provider with respect to management of the patient; and (2) notified that he or she may decline to receive medical services by telemedicine and may withdraw from such care at any time.      CHIEF COMPLAINT:  This is a 67-year-old female here for follow-up diabetes along with other medical concerns.     SUBJECTIVE:  The patient has type 2 diabetes for which she takes Humalog mix 75/25 30 units in the morning and 20 units in the evening and metformin  mg 2 tablets with breakfast and 1 tablet with dinner. Her last A1c was 7.4%, 3 days ago.  She is concerned about worsening diabetic control and upcoming epidural steroid injection.  Blood sugars range from 130-160.   She denies polyuria, polydipsia or polyphagia.  She also reports needing another knee injection due to advanced osteoarthritis.    Patient complains of toenail fungus on both great toenails and would like to discuss removal of toenail which has been done in the past with success.    Patient is being treated for paroxysmal atrial fibrillation and ventricular ectopy with flecainide 50 mg twice daily.  The patient has concerns about side  effects of medication after reading package insert and would like to consider alternatives.  Patient takes losartan  mg daily for essential hypertension which is controlled.  Blood pressure today is 114/63.    ROS:  GENERAL: Patient denies fever, chills, night sweats.  Patient denies weight gain or loss. Patient denies anorexia, fatigue, weakness or swollen glands.  SKIN: Patient denies rash.  HEENT: Patient denies sore throat, ear pain, hearing loss, nasal congestion, or runny nose. Patient denies visual disturbance, eye irritation or discharge.  LUNGS: Patient denies cough, wheeze or hemoptysis.  CARDIOVASCULAR: Patient denies syncope or lower extremity edema.  Positive for chest tightness, shortness of breath and palpitations.  GI: Patient denies abdominal pain, nausea, vomiting, diarrhea, constipation, blood in stool or melena.  GENITOURINARY: Patient denies dysuria, frequency, hematuria, nocturia, urgency or incontinence.  MUSCULOSKELETAL: Patient denies joint pain, swelling, redness or warmth.  NEUROLOGIC: Patient denies headache, vertigo, paresthesias, weakness in limb, dysarthria, dysphagia or abnormality of gait.  PSYCHIATRIC: Patient denies depression, anxiety or memory loss.     OBJECTIVE:   GENERAL: Well-developed well-nourished, obese black female alert and oriented x3 in no acute distress. Memory, judgment and cognition without deficit.  Anxious affect.  SKIN: Clear without rash. Normal color and tone.  HEENT: Eyes: Clear conjunctivae. No scleral icterus.  Nose:  Not congested-sounding.    NECK: Supple, normal range of motion.  LUNGS:  Normal respiratory effort.  No audible wheezing.  EXTREMITIES:  Normal range of motion in all extremities.  NEUROLOGIC:  Gait without abnormality.  No tremor.     ASSESSMENT:  1. Type 2 diabetes mellitus without complication, with long-term current use of insulin    2. PAF (paroxysmal atrial fibrillation)    3. Toenail fungus    4. Essential hypertension       PLAN:  1.  Lengthy discussion regarding diabetes management in the setting of steroids.  Patient encouraged to keep well hydrated and monitor blood sugar regularly.  Discussed increasing dose of Humalog Mix if necessary.  2.  Lengthy discussion regarding treatment of AFib and ventricular ectopy.  Patient needs to discuss alternatives to anti arrhythmic medication with cardiologist.  3.  Consider toenail removal in the future.  4.  Follow-up if no improvement or worsening symptoms.    This note is generated with speech recognition software and is subject to transcription error and sound alike phrases that may be missed by proofreading.

## 2022-12-23 ENCOUNTER — TELEPHONE (OUTPATIENT)
Dept: PAIN MEDICINE | Facility: HOSPITAL | Age: 67
End: 2022-12-23
Payer: COMMERCIAL

## 2022-12-23 ENCOUNTER — HOSPITAL ENCOUNTER (OUTPATIENT)
Facility: HOSPITAL | Age: 67
Discharge: HOME | End: 2022-12-23
Attending: ANESTHESIOLOGY | Admitting: ANESTHESIOLOGY
Payer: COMMERCIAL

## 2022-12-23 VITALS
OXYGEN SATURATION: 95 % | BODY MASS INDEX: 40.21 KG/M2 | HEART RATE: 60 BPM | DIASTOLIC BLOOD PRESSURE: 63 MMHG | RESPIRATION RATE: 14 BRPM | HEIGHT: 67 IN | WEIGHT: 256.19 LBS | SYSTOLIC BLOOD PRESSURE: 114 MMHG | TEMPERATURE: 98 F

## 2022-12-23 DIAGNOSIS — M54.16 LUMBAR RADICULOPATHY: ICD-10-CM

## 2022-12-23 LAB — POCT GLUCOSE: 155 MG/DL (ref 70–110)

## 2022-12-23 PROCEDURE — 62323 NJX INTERLAMINAR LMBR/SAC: CPT | Performed by: ANESTHESIOLOGY

## 2022-12-23 PROCEDURE — 25000003 PHARM REV CODE 250: Performed by: ANESTHESIOLOGY

## 2022-12-23 PROCEDURE — 62323 PR INJ LUMBAR/SACRAL, W/IMAGING GUIDANCE: ICD-10-PCS | Mod: ,,, | Performed by: ANESTHESIOLOGY

## 2022-12-23 PROCEDURE — 62323 NJX INTERLAMINAR LMBR/SAC: CPT | Mod: ,,, | Performed by: ANESTHESIOLOGY

## 2022-12-23 PROCEDURE — 63600175 PHARM REV CODE 636 W HCPCS: Performed by: ANESTHESIOLOGY

## 2022-12-23 RX ORDER — DEXAMETHASONE SODIUM PHOSPHATE 10 MG/ML
INJECTION INTRAMUSCULAR; INTRAVENOUS
Status: DISCONTINUED | OUTPATIENT
Start: 2022-12-23 | End: 2022-12-23 | Stop reason: HOSPADM

## 2022-12-23 RX ORDER — MIDAZOLAM HYDROCHLORIDE 1 MG/ML
INJECTION, SOLUTION INTRAMUSCULAR; INTRAVENOUS
Status: DISCONTINUED | OUTPATIENT
Start: 2022-12-23 | End: 2022-12-23 | Stop reason: HOSPADM

## 2022-12-23 RX ORDER — BUPIVACAINE HYDROCHLORIDE 2.5 MG/ML
INJECTION, SOLUTION EPIDURAL; INFILTRATION; INTRACAUDAL
Status: DISCONTINUED | OUTPATIENT
Start: 2022-12-23 | End: 2022-12-23 | Stop reason: HOSPADM

## 2022-12-23 RX ORDER — FENTANYL CITRATE 50 UG/ML
INJECTION, SOLUTION INTRAMUSCULAR; INTRAVENOUS
Status: DISCONTINUED | OUTPATIENT
Start: 2022-12-23 | End: 2022-12-23 | Stop reason: HOSPADM

## 2022-12-23 RX ORDER — INDOMETHACIN 25 MG/1
CAPSULE ORAL
Status: DISCONTINUED | OUTPATIENT
Start: 2022-12-23 | End: 2022-12-23 | Stop reason: HOSPADM

## 2022-12-23 NOTE — TELEPHONE ENCOUNTER
Spoke with the patient today regarding numb sensation along the buttocks and lower extremities post injection. Pt does report full strength in BLE. We dicussed this can be expected after an epidural injection with bupivacaine. We discussed the resolution of these symptoms within the next few hours. Patient has been encouraged to rest, drink fluids.     Dr. Pradeep Oconnell

## 2022-12-23 NOTE — H&P
HPI  Patient presenting for Procedure(s) (LRB):  Lumbar L4/5 IL CHRISTOPHER with left paramedian approach RN IV Sedation (Left)     Patient on Anti-coagulation No    No health changes since previous encounter    Past Medical History:   Diagnosis Date    Asthma     Colon polyps     Diabetes mellitus type II, uncontrolled     Diabetic retinopathy     Diverticulosis of large intestine without hemorrhage 2015    Elevated liver enzymes     GERD (gastroesophageal reflux disease)     Gout, unspecified     Hemorrhoids, internal 2015    History of blood transfusion     Hyperlipidemia     Hypertension     IBS (irritable bowel syndrome)     Morbid obesity with BMI of 40.0-44.9, adult     Nasal vestibulitis     Osteoarthritis of multiple joints     Urolithiasis     Vitamin D deficiency disease      Past Surgical History:   Procedure Laterality Date    BREAST BIOPSY Right      SECTION, CLASSIC      COLONOSCOPY N/A 2015    Procedure: COLONOSCOPY;  Surgeon: Stanislav Pickard MD;  Location: [facility] ENDO;  Service: Endoscopy;  Laterality: N/A;    CYSTOSCOPY W/ URETERAL STENT PLACEMENT  2017    EXTRACORPOREAL SHOCK WAVE LITHOTRIPSY      LEFT HEART CATHETERIZATION Left 2019    Procedure: CATHETERIZATION, HEART, LEFT;  Surgeon: Haile Suggs MD;  Location: [facility] CATH LAB;  Service: Cardiology;  Laterality: Left;  10:30-11am start/poss rt radial approach    TOTAL ABDOMINAL HYSTERECTOMY      URETEROSCOPY  2017     Review of patient's allergies indicates:   Allergen Reactions    Antihistamines - alkylamine Anaphylaxis and Itching     Cough, throat itches    Chocolate flavor Other (See Comments)     disoriented    Doxycycline Other (See Comments)     Stomach pain    Betadine [povidone-iodine] Itching    Strawberries [strawberry] Other (See Comments)    Iodine and iodide containing products Other (See Comments)     Tongue swelling    Tramadol Nausea Only    Yeast Itching     Facial swelling, constipation        No  "current facility-administered medications on file prior to encounter.     Current Outpatient Medications on File Prior to Encounter   Medication Sig Dispense Refill    albuterol (PROVENTIL/VENTOLIN HFA) 90 mcg/actuation inhaler Inhale 1-2 puffs into the lungs every 4 to 6 hours as needed for Wheezing or Shortness of Breath. 18 g 3    allopurinoL (ZYLOPRIM) 100 MG tablet Take 1 tablet (100 mg total) by mouth once daily. 90 tablet 3    apixaban (ELIQUIS) 5 mg Tab Take 1 tablet (5 mg total) by mouth 2 (two) times daily. 180 tablet 1    atenoloL (TENORMIN) 25 MG tablet Take 25 mg by mouth once daily.      baclofen (LIORESAL) 10 MG tablet Take 1 tablet (10 mg total) by mouth 3 (three) times daily. 30 tablet 0    BD ULTRA-FINE SHORT PEN NEEDLE 31 gauge x 5/16" Ndle USE 1 PEN NEEDLE UNDER THE SKIN TWICE A  each 0    cyanocobalamin (VITAMIN B-12) 100 MCG tablet Take by mouth.      diclofenac sodium (VOLTAREN) 1 % Gel Apply 2 g topically 3 (three) times daily. 350 g 2    ergocalciferol, vitamin D2, 400 unit Tab Take by mouth.      ferrous sulfate, dried (SLOW FE) 160 mg (50 mg iron) TbSR Take by mouth.      fexofenadine (ALLEGRA) 180 MG tablet Take 1 tablet (180 mg total) by mouth once daily. 30 tablet 2    flecainide (TAMBOCOR) 50 MG Tab Take 50 mg by mouth 2 (two) times daily.      fluticasone propion-salmeterol 45-21 mcg/dose (ADVAIR HFA) 45-21 mcg/actuation HFAA inhaler Inhale 2 puffs into the lungs every 12 (twelve) hours. Controller 12 g 2    HYDROcodone-acetaminophen (NORCO)  mg per tablet Take 1 tablet by mouth every 6 (six) hours as needed. 9 tablet 0    insulin lispro protamin-lispro (HUMALOG MIX 75-25 KWIKPEN) 100 unit/mL (75-25) InPn INJECT 30 UNITS UNDER THE SKIN EVERY MORNING AND 20 UNITS EVERY EVENING 45 mL 1    losartan-hydrochlorothiazide 100-25 mg (HYZAAR) 100-25 mg per tablet Take 1 tablet by mouth once daily. 90 tablet 3    metFORMIN (GLUCOPHAGE-XR) 500 MG ER 24hr tablet TAKE 2 TABLETS BY " MOUTH WITH BREAKFAST AND 1 TABLET BY MOUTH WITH DINNER 270 tablet 3    metoprolol succinate (TOPROL-XL) 50 MG 24 hr tablet Take 50 mg by mouth 2 (two) times daily.      montelukast (SINGULAIR) 10 mg tablet Take 1 tablet (10 mg total) by mouth every evening. 90 tablet 1    pantoprazole (PROTONIX) 20 MG tablet Take 20 mg by mouth every morning.      potassium chloride (MICRO-K) 10 MEQ CpSR Take 1 capsule (10 mEq total) by mouth once daily. 90 capsule 1    promethazine-dextromethorphan (PROMETHAZINE-DM) 6.25-15 mg/5 mL Syrp Take 5 mLs by mouth every 4 (four) hours as needed (cough). 180 mL 0    ranolazine (RANEXA) 500 MG Tb12 Take 1 tablet (500 mg total) by mouth 2 (two) times daily. 180 tablet 1    simvastatin (ZOCOR) 20 MG tablet Take 1 tablet (20 mg total) by mouth every evening. 90 tablet 3    tamsulosin (FLOMAX) 0.4 mg Cap Take 1 capsule (0.4 mg total) by mouth every evening. 90 capsule 1    triamcinolone (NASACORT) 55 mcg nasal inhaler 2 sprays by Nasal route once daily. 16.9 mL 0        PMHx, PSHx, Allergies, Medications reviewed in epic    ROS negative except pain complaints in HPI    OBJECTIVE:    There were no vitals taken for this visit.    PHYSICAL EXAMINATION:    GENERAL: Well appearing, in no acute distress, alert and oriented x3.  PSYCH:  Mood and affect appropriate.  SKIN: Skin color, texture, turgor normal, no rashes or lesions which will impact the procedure.  CV: RRR with palpation of the radial artery.  PULM: No evidence of respiratory difficulty, symmetric chest rise. Clear to auscultation.  NEURO: Cranial nerves grossly intact.    Plan:    Proceed with procedure as planned Procedure(s) (LRB):  Lumbar L4/5 IL CHRISTOPHER with left paramedian approach RN IV Sedation (Left)    Pradeep Oconnell MD  12/23/2022

## 2022-12-23 NOTE — OP NOTE
Lumbar Interlaminar Epidural Steroid Injection under Fluoroscopic Guidance.   Time-out taken to identify patient and procedure prior to starting the procedure.     12/23/2022    PROCEDURE: Interlaminar epidural steroid injection under fluoroscopic guidance.     Pre-Op diagnosis: Lumbar radiculopathy, chronic [M54.16]    Post-Op diagnosis: Lumbar radiculopathy, chronic [M54.16]    PHYSICIAN: Pradeep Oconnell MD    ASSISTANTS: None     ESTIMATED BLOOD LOSS: none.     COMPLICATIONS: none.     SPECIMENS: none    TECHNIQUE: With the patient laying in a prone position, the area was prepped and draped in the usual sterile fashion using ChloraPrep and a fenestrated drape. 1% lidocaine was given using a 27-gauge needle by raising a wheal and going down to the hub of the needle over the L4/5 interlaminar space.  The interlaminar space was then approached with a 3.5 inch 18-gauge Touhy needle was introduced from a L paramedian approach under fluoroscopic guidance in the AP and Lateral view. Once the Ligamentum flavum was encountered loss of resistance to saline was used to enter the epidural space. With positive loss of resistance and negative CSF or Blood, 3mL contrast dye Omnipaque (300mg/ml) was injected to confirm placement and there was no vascular runoff. Then 1ml 80mg/ml Depomedrol + 5 mL 0.25% Bupivicaine  was injected slowly. Displacement of the radio opaque contrast after injection of the medication confirmed that the medication went into the area of the epidural space.  The patient tolerated the procedure well.     Conscious sedation provided by M.D    The patient was monitored with continuous pulse oximetry, EKG, and intermittent blood pressure monitors.  The patient was hemodynamically stable throughout the entire process was responsive to voice, and breathing spontaneously.  Supplemental O2 was provided at 2L/min via nasal cannula.  Patient was comfortable for the duration of the procedure. (See nurse documentation  and case log for sedation time)    There was a total of 2mg IV Midazolam and 50mcg Fentanyl titrated for the procedure      The patient was monitored after the procedure.   They were given post-procedure and discharge instructions to follow at home.  The patient was discharged in a stable condition.

## 2022-12-23 NOTE — DISCHARGE SUMMARY
Discharge Note  Short Stay      SUMMARY     Admit Date: 12/23/2022    Attending Physician: Pradeep Oconnell MD        Discharge Physician: Pradeep Oconnell MD        Discharge Date: 12/23/2022 9:46 AM    Procedure(s) (LRB):  Lumbar L4/5 IL CHRISTOPHER with left paramedian approach RN IV Sedation (Left)    Final Diagnosis: Lumbar radiculopathy, chronic [M54.16]    Disposition: Home or self care    Patient Instructions:   Current Discharge Medication List        CONTINUE these medications which have NOT CHANGED    Details   allopurinoL (ZYLOPRIM) 100 MG tablet Take 1 tablet (100 mg total) by mouth once daily.  Qty: 90 tablet, Refills: 3      atenoloL (TENORMIN) 25 MG tablet Take 25 mg by mouth once daily.      losartan-hydrochlorothiazide 100-25 mg (HYZAAR) 100-25 mg per tablet Take 1 tablet by mouth once daily.  Qty: 90 tablet, Refills: 3    Comments: .      metFORMIN (GLUCOPHAGE-XR) 500 MG ER 24hr tablet TAKE 2 TABLETS BY MOUTH WITH BREAKFAST AND 1 TABLET BY MOUTH WITH DINNER  Qty: 270 tablet, Refills: 3      metoprolol succinate (TOPROL-XL) 50 MG 24 hr tablet Take 50 mg by mouth 2 (two) times daily.      simvastatin (ZOCOR) 20 MG tablet Take 1 tablet (20 mg total) by mouth every evening.  Qty: 90 tablet, Refills: 3      !! albuterol (PROVENTIL/VENTOLIN HFA) 90 mcg/actuation inhaler Inhale 1-2 puffs into the lungs every 4 to 6 hours as needed for Wheezing or Shortness of Breath.  Qty: 18 g, Refills: 3    Associated Diagnoses: Asthma in adult, unspecified asthma severity, unspecified whether complicated, unspecified whether persistent; Chronic cough      !! albuterol (VENTOLIN HFA) 90 mcg/actuation inhaler Inhale 2 puffs into the lungs every 6 (six) hours as needed for Wheezing. Rescue  Qty: 1 g, Refills: 0    Associated Diagnoses: Upper respiratory tract infection, unspecified type      apixaban (ELIQUIS) 5 mg Tab Take 1 tablet (5 mg total) by mouth 2 (two) times daily.  Qty: 180 tablet, Refills: 1    Associated Diagnoses: PAF  "(paroxysmal atrial fibrillation)      baclofen (LIORESAL) 10 MG tablet Take 1 tablet (10 mg total) by mouth 3 (three) times daily.  Qty: 30 tablet, Refills: 0    Associated Diagnoses: Back strain, initial encounter      BD ULTRA-FINE SHORT PEN NEEDLE 31 gauge x 5/16" Ndle USE 1 PEN NEEDLE UNDER THE SKIN TWICE A DAY  Qty: 180 each, Refills: 0      cyanocobalamin (VITAMIN B-12) 100 MCG tablet Take by mouth.      diclofenac sodium (VOLTAREN) 1 % Gel Apply 2 g topically 3 (three) times daily.  Qty: 350 g, Refills: 2    Associated Diagnoses: Primary osteoarthritis of both knees      DULoxetine (CYMBALTA) 30 MG capsule Take 1 capsule (30 mg total) by mouth once daily.  Qty: 30 capsule, Refills: 2    Associated Diagnoses: Lumbar radiculopathy, chronic      ergocalciferol, vitamin D2, 400 unit Tab Take by mouth.      ferrous sulfate, dried (SLOW FE) 160 mg (50 mg iron) TbSR Take by mouth.      fexofenadine (ALLEGRA) 180 MG tablet Take 1 tablet (180 mg total) by mouth once daily.  Qty: 30 tablet, Refills: 2    Associated Diagnoses: Dysfunction of both eustachian tubes      flecainide (TAMBOCOR) 50 MG Tab Take 50 mg by mouth 2 (two) times daily.      fluticasone propion-salmeterol 45-21 mcg/dose (ADVAIR HFA) 45-21 mcg/actuation HFAA inhaler Inhale 2 puffs into the lungs every 12 (twelve) hours. Controller  Qty: 12 g, Refills: 2    Associated Diagnoses: Asthma in adult, unspecified asthma severity, unspecified whether complicated, unspecified whether persistent; Chronic cough      gabapentin (NEURONTIN) 100 MG capsule TAKE 1 CAPSULE(100 MG) BY MOUTH THREE TIMES DAILY  Qty: 90 capsule, Refills: 2      HYDROcodone-acetaminophen (NORCO)  mg per tablet Take 1 tablet by mouth every 6 (six) hours as needed.  Qty: 9 tablet, Refills: 0    Comments: Quantity prescribed more than 7 day supply? No      insulin lispro protamin-lispro (HUMALOG MIX 75-25 KWIKPEN) 100 unit/mL (75-25) InPn INJECT 30 UNITS UNDER THE SKIN EVERY MORNING " AND 20 UNITS EVERY EVENING  Qty: 45 mL, Refills: 1    Comments: Please inactivate all prior scripts with same name and strength including any scripts on hold.      montelukast (SINGULAIR) 10 mg tablet Take 1 tablet (10 mg total) by mouth every evening.  Qty: 90 tablet, Refills: 1    Associated Diagnoses: Asthma in adult, unspecified asthma severity, unspecified whether complicated, unspecified whether persistent; Chronic cough      pantoprazole (PROTONIX) 20 MG tablet Take 20 mg by mouth every morning.      potassium chloride (MICRO-K) 10 MEQ CpSR Take 1 capsule (10 mEq total) by mouth once daily.  Qty: 90 capsule, Refills: 1      promethazine-dextromethorphan (PROMETHAZINE-DM) 6.25-15 mg/5 mL Syrp Take 5 mLs by mouth every 4 (four) hours as needed (cough).  Qty: 180 mL, Refills: 0      ranolazine (RANEXA) 500 MG Tb12 Take 1 tablet (500 mg total) by mouth 2 (two) times daily.  Qty: 180 tablet, Refills: 1    Associated Diagnoses: AP (angina pectoris); Paroxysmal atrial fibrillation      tamsulosin (FLOMAX) 0.4 mg Cap Take 1 capsule (0.4 mg total) by mouth every evening.  Qty: 90 capsule, Refills: 1    Associated Diagnoses: Urinary incontinence, unspecified type; Nocturia; Voiding difficulty      triamcinolone (NASACORT) 55 mcg nasal inhaler 2 sprays by Nasal route once daily.  Qty: 16.9 mL, Refills: 0    Associated Diagnoses: Upper respiratory tract infection, unspecified type       !! - Potential duplicate medications found. Please discuss with provider.              Discharge Diagnosis: Lumbar radiculopathy, chronic [M54.16]  Condition on Discharge: Stable with no complications to procedure   Diet on Discharge: Same as before.  Activity: as per instruction sheet.  Discharge to: Home with a responsible adult.  Follow up: 2-4 weeks       Please call the office at [phone] if you experience any weakness or loss of sensation, fever > 101.5, pain uncontrolled with oral medications, persistent nausea/vomiting/or  diarrhea, redness or drainage from the incisions, or any other worrisome concerns. If physician on call was not reached or could not communicate with our office for any reason please go to the nearest emergency department

## 2022-12-23 NOTE — DISCHARGE INSTRUCTIONS

## 2022-12-29 ENCOUNTER — PATIENT MESSAGE (OUTPATIENT)
Dept: FAMILY MEDICINE | Facility: CLINIC | Age: 67
End: 2022-12-29
Payer: COMMERCIAL

## 2023-01-03 ENCOUNTER — OFFICE VISIT (OUTPATIENT)
Dept: FAMILY MEDICINE | Facility: CLINIC | Age: 68
End: 2023-01-03
Payer: COMMERCIAL

## 2023-01-03 ENCOUNTER — LAB VISIT (OUTPATIENT)
Dept: LAB | Facility: HOSPITAL | Age: 68
End: 2023-01-03
Attending: FAMILY MEDICINE
Payer: COMMERCIAL

## 2023-01-03 VITALS
HEIGHT: 67 IN | OXYGEN SATURATION: 98 % | DIASTOLIC BLOOD PRESSURE: 70 MMHG | BODY MASS INDEX: 40.26 KG/M2 | SYSTOLIC BLOOD PRESSURE: 130 MMHG | WEIGHT: 256.5 LBS | HEART RATE: 104 BPM | TEMPERATURE: 98 F

## 2023-01-03 DIAGNOSIS — E11.3213 TYPE 2 DIABETES MELLITUS WITH BOTH EYES AFFECTED BY MILD NONPROLIFERATIVE RETINOPATHY AND MACULAR EDEMA, WITH LONG-TERM CURRENT USE OF INSULIN: ICD-10-CM

## 2023-01-03 DIAGNOSIS — I48.0 PAF (PAROXYSMAL ATRIAL FIBRILLATION): Chronic | ICD-10-CM

## 2023-01-03 DIAGNOSIS — Z00.00 PREVENTATIVE HEALTH CARE: Primary | ICD-10-CM

## 2023-01-03 DIAGNOSIS — E66.01 MORBID OBESITY WITH BMI OF 40.0-44.9, ADULT: ICD-10-CM

## 2023-01-03 DIAGNOSIS — Z23 NEED FOR VACCINATION: ICD-10-CM

## 2023-01-03 DIAGNOSIS — Z00.00 PREVENTATIVE HEALTH CARE: ICD-10-CM

## 2023-01-03 DIAGNOSIS — E11.9 TYPE 2 DIABETES MELLITUS WITHOUT COMPLICATION, WITH LONG-TERM CURRENT USE OF INSULIN: Chronic | ICD-10-CM

## 2023-01-03 DIAGNOSIS — I10 ESSENTIAL HYPERTENSION: Chronic | ICD-10-CM

## 2023-01-03 DIAGNOSIS — Z79.4 TYPE 2 DIABETES MELLITUS WITH BOTH EYES AFFECTED BY MILD NONPROLIFERATIVE RETINOPATHY AND MACULAR EDEMA, WITH LONG-TERM CURRENT USE OF INSULIN: ICD-10-CM

## 2023-01-03 DIAGNOSIS — E78.5 HYPERLIPIDEMIA, UNSPECIFIED HYPERLIPIDEMIA TYPE: Chronic | ICD-10-CM

## 2023-01-03 DIAGNOSIS — Z79.4 TYPE 2 DIABETES MELLITUS WITHOUT COMPLICATION, WITH LONG-TERM CURRENT USE OF INSULIN: Chronic | ICD-10-CM

## 2023-01-03 DIAGNOSIS — I20.9 ANGINA PECTORIS: ICD-10-CM

## 2023-01-03 PROBLEM — R06.09 DOE (DYSPNEA ON EXERTION): Status: RESOLVED | Noted: 2019-02-18 | Resolved: 2023-01-03

## 2023-01-03 LAB
BASOPHILS # BLD AUTO: 0.04 K/UL (ref 0–0.2)
BASOPHILS NFR BLD: 0.6 % (ref 0–1.9)
DIFFERENTIAL METHOD: ABNORMAL
EOSINOPHIL # BLD AUTO: 0.2 K/UL (ref 0–0.5)
EOSINOPHIL NFR BLD: 2.1 % (ref 0–8)
ERYTHROCYTE [DISTWIDTH] IN BLOOD BY AUTOMATED COUNT: 13.2 % (ref 11.5–14.5)
HCT VFR BLD AUTO: 36.5 % (ref 37–48.5)
HGB BLD-MCNC: 10.8 G/DL (ref 12–16)
IMM GRANULOCYTES # BLD AUTO: 0.01 K/UL (ref 0–0.04)
IMM GRANULOCYTES NFR BLD AUTO: 0.1 % (ref 0–0.5)
LYMPHOCYTES # BLD AUTO: 2.3 K/UL (ref 1–4.8)
LYMPHOCYTES NFR BLD: 33 % (ref 18–48)
MCH RBC QN AUTO: 29.8 PG (ref 27–31)
MCHC RBC AUTO-ENTMCNC: 29.6 G/DL (ref 32–36)
MCV RBC AUTO: 101 FL (ref 82–98)
MONOCYTES # BLD AUTO: 0.4 K/UL (ref 0.3–1)
MONOCYTES NFR BLD: 5.9 % (ref 4–15)
NEUTROPHILS # BLD AUTO: 4.1 K/UL (ref 1.8–7.7)
NEUTROPHILS NFR BLD: 58.3 % (ref 38–73)
NRBC BLD-RTO: 0 /100 WBC
PLATELET # BLD AUTO: 288 K/UL (ref 150–450)
PMV BLD AUTO: 10.6 FL (ref 9.2–12.9)
RBC # BLD AUTO: 3.62 M/UL (ref 4–5.4)
TSH SERPL DL<=0.005 MIU/L-ACNC: 0.53 UIU/ML (ref 0.4–4)
WBC # BLD AUTO: 7.1 K/UL (ref 3.9–12.7)

## 2023-01-03 PROCEDURE — 3078F PR MOST RECENT DIASTOLIC BLOOD PRESSURE < 80 MM HG: ICD-10-PCS | Mod: CPTII,S$GLB,, | Performed by: FAMILY MEDICINE

## 2023-01-03 PROCEDURE — 1159F MED LIST DOCD IN RCRD: CPT | Mod: CPTII,S$GLB,, | Performed by: FAMILY MEDICINE

## 2023-01-03 PROCEDURE — 3288F FALL RISK ASSESSMENT DOCD: CPT | Mod: CPTII,S$GLB,, | Performed by: FAMILY MEDICINE

## 2023-01-03 PROCEDURE — 90471 IMMUNIZATION ADMIN: CPT | Mod: S$GLB,,, | Performed by: FAMILY MEDICINE

## 2023-01-03 PROCEDURE — 3078F DIAST BP <80 MM HG: CPT | Mod: CPTII,S$GLB,, | Performed by: FAMILY MEDICINE

## 2023-01-03 PROCEDURE — 99999 PR PBB SHADOW E&M-EST. PATIENT-LVL V: CPT | Mod: PBBFAC,,, | Performed by: FAMILY MEDICINE

## 2023-01-03 PROCEDURE — 36415 COLL VENOUS BLD VENIPUNCTURE: CPT | Mod: PO | Performed by: FAMILY MEDICINE

## 2023-01-03 PROCEDURE — 90677 PNEUMOCOCCAL CONJUGATE VACCINE 20-VALENT: ICD-10-PCS | Mod: S$GLB,,, | Performed by: FAMILY MEDICINE

## 2023-01-03 PROCEDURE — 1100F PTFALLS ASSESS-DOCD GE2>/YR: CPT | Mod: CPTII,S$GLB,, | Performed by: FAMILY MEDICINE

## 2023-01-03 PROCEDURE — 3008F PR BODY MASS INDEX (BMI) DOCUMENTED: ICD-10-PCS | Mod: CPTII,S$GLB,, | Performed by: FAMILY MEDICINE

## 2023-01-03 PROCEDURE — 84443 ASSAY THYROID STIM HORMONE: CPT | Performed by: FAMILY MEDICINE

## 2023-01-03 PROCEDURE — 90677 PCV20 VACCINE IM: CPT | Mod: S$GLB,,, | Performed by: FAMILY MEDICINE

## 2023-01-03 PROCEDURE — 1125F PR PAIN SEVERITY QUANTIFIED, PAIN PRESENT: ICD-10-PCS | Mod: CPTII,S$GLB,, | Performed by: FAMILY MEDICINE

## 2023-01-03 PROCEDURE — 3075F PR MOST RECENT SYSTOLIC BLOOD PRESS GE 130-139MM HG: ICD-10-PCS | Mod: CPTII,S$GLB,, | Performed by: FAMILY MEDICINE

## 2023-01-03 PROCEDURE — 3288F PR FALLS RISK ASSESSMENT DOCUMENTED: ICD-10-PCS | Mod: CPTII,S$GLB,, | Performed by: FAMILY MEDICINE

## 2023-01-03 PROCEDURE — 1159F PR MEDICATION LIST DOCUMENTED IN MEDICAL RECORD: ICD-10-PCS | Mod: CPTII,S$GLB,, | Performed by: FAMILY MEDICINE

## 2023-01-03 PROCEDURE — 90471 PNEUMOCOCCAL CONJUGATE VACCINE 20-VALENT: ICD-10-PCS | Mod: S$GLB,,, | Performed by: FAMILY MEDICINE

## 2023-01-03 PROCEDURE — 85025 COMPLETE CBC W/AUTO DIFF WBC: CPT | Performed by: FAMILY MEDICINE

## 2023-01-03 PROCEDURE — 99397 PER PM REEVAL EST PAT 65+ YR: CPT | Mod: 25,S$GLB,, | Performed by: FAMILY MEDICINE

## 2023-01-03 PROCEDURE — 3075F SYST BP GE 130 - 139MM HG: CPT | Mod: CPTII,S$GLB,, | Performed by: FAMILY MEDICINE

## 2023-01-03 PROCEDURE — 3008F BODY MASS INDEX DOCD: CPT | Mod: CPTII,S$GLB,, | Performed by: FAMILY MEDICINE

## 2023-01-03 PROCEDURE — 99397 PR PREVENTIVE VISIT,EST,65 & OVER: ICD-10-PCS | Mod: 25,S$GLB,, | Performed by: FAMILY MEDICINE

## 2023-01-03 PROCEDURE — 1100F PR PT FALLS ASSESS DOC 2+ FALLS/FALL W/INJURY/YR: ICD-10-PCS | Mod: CPTII,S$GLB,, | Performed by: FAMILY MEDICINE

## 2023-01-03 PROCEDURE — 1125F AMNT PAIN NOTED PAIN PRSNT: CPT | Mod: CPTII,S$GLB,, | Performed by: FAMILY MEDICINE

## 2023-01-03 PROCEDURE — 99999 PR PBB SHADOW E&M-EST. PATIENT-LVL V: ICD-10-PCS | Mod: PBBFAC,,, | Performed by: FAMILY MEDICINE

## 2023-01-03 RX ORDER — HYDROCODONE BITARTRATE AND ACETAMINOPHEN 10; 325 MG/1; MG/1
1 TABLET ORAL EVERY 6 HOURS PRN
Qty: 12 TABLET | Refills: 0 | Status: SHIPPED | OUTPATIENT
Start: 2023-01-03

## 2023-01-03 RX ORDER — POTASSIUM CHLORIDE 750 MG/1
10 CAPSULE, EXTENDED RELEASE ORAL DAILY
Qty: 90 CAPSULE | Refills: 3 | Status: SHIPPED | OUTPATIENT
Start: 2023-01-03 | End: 2023-12-26

## 2023-01-03 NOTE — PROGRESS NOTES
CHIEF COMPLAINT:  This is a 67-year-old female here for preventive health exam.       SUBJECTIVE:  The patient reports falling after epidural steroid injection on December 23. Her back pain has not improved. Patient is being treated for paroxysmal atrial fibrillation and ventricular ectopy with flecainide 50 mg twice daily and Eliquis 5 mg twice daily. The patient has type 2 diabetes for which she takes Humalog mix 75/25 30 units in the morning and 20 units in the evening and metformin  mg 2 tablets with breakfast and 1 tablet with dinner. Her last A1c was 7.4%, 3 weeks ago.   Blood sugars range from 130-160.   She denies polyuria, polydipsia or polyphagia. Her blood pressure is controlled on losartan HCT.  She takes simvastatin for hyperlipidemia.  Patient has a history of angina pectoris diagnosed by cardiologist in the past for which she has prescription of nitroglycerin.  Patient was on Ranexa in the past but medication was discontinued by present cardiologist.  Patient has vitamin D deficiency and takes vitamin-D supplement daily. She has a history of uric acid kidney stones for which she takes allopurinol but admits to not taking medication daily. She takes Nexium as needed for GERD.  The patient is severely obese with a BMI of 40.17.  She also reports needing another knee injection due to advanced osteoarthritis.  Patient complains of toenail fungus.      Eye exam June 2021.  Mammogram May 2022. Colonoscopy November 2015, due again in November 2025.  Tdap July 2017. COVID 19 vaccine February, March 2021, April 2022.     ROS:  GENERAL: Patient denies fever, chills, night sweats. Patient denies weight gain or loss. Patient denies anorexia, fatigue, weakness or swollen glands.  SKIN: Patient denies rash or hair loss.  HEENT: Patient denies sore throat, ear pain, hearing loss, nasal congestion, or runny nose. Patient denies visual disturbance, eye irritation or discharge.  LUNGS: Patient denies cough,  wheeze or hemoptysis.  CARDIOVASCULAR: Patient denies chest pain, shortness of breath, palpitations, syncope or lower extremity edema.  GI: Patient denies abdominal pain, nausea, vomiting, diarrhea, blood in stool or melena.  GENITOURINARY: Patient denies pelvic pain, vaginal discharge, itch or odor. Patient denies irregular vaginal bleeding. Patient denies dysuria, frequency, hematuria, nocturia, urgency or incontinence.  BREASTS: Patient denies breast pain, mass or nipple discharge.  MUSCULOSKELETAL: Patient denies joint pain, swelling, redness or warmth.  NEUROLOGIC: Patient denies headache, vertigo, paresthesias, weakness in limb, dysarthria, dysphagia or abnormality of gait.  PSYCHIATRIC: Patient denies anxiety, depression, or memory loss.     OBJECTIVE:   GENERAL: Well-developed well-nourished, morbidly obese, black female alert and oriented x3, in no acute distress. Memory, judgment and cognition without deficit.   SKIN: Clear without rash. Normal color and tone.  Onychomycosis left great toenail.  HEENT: Eyes: No scleral icterus. Clear conjunctivae. Pupils equal reactive to light and accommodation. Ears: Clear canals.  Clear TMs.  Nose: Without congestion. Crusty scabs in nares. Pharynx: Without injection or exudates.  NECK: Supple, normal range of motion. No masses, nodes or enlarged thyroid. No JVD. Carotids 2+ and equal. No bruits.  LUNGS: Clear to auscultation. Normal respiratory effort.  CARDIOVASCULAR: Regular rhythm, normal S1, S2 without murmur, gallop or rub.  BACK: No CVA or spinal tenderness.  BREASTS: No masses, tenderness or nipple discharge.  ABDOMEN: Normal appearance. Active bowel sounds. Soft, nontender without mass or organomegaly. No rebound or guarding.  EXTREMITIES: Without cyanosis, clubbing or edema. Distal pulses 2+ and equal. Normal range of motion in all extremities. No joint effusion, erythema or warmth.  NEUROLOGIC: Cranial nerves II through XII without deficit. Motor strength  equal bilaterally. Sensation normal to touch. Deep tendon reflexes 2+ and equal. Gait without abnormality. No tremor. Negative cerebellar signs.  FOOT EVALUATION: 10 gram monofilament exam with protective sensation intact bilaterally. Nails appropriately trimmed. No ulcers. Distal pulses palpable.  Onychomycosis both great toenails.  PELVIC: External: Without lesions or inflammation. Vaginal: Atrophic changes, malodor, cuff intact. Bimanual: Non-tender, without masses. Rectovaginal: Confirms, heme-negative stool x2.     ASSESSMENT:  1. Preventative health care    2. Type 2 diabetes mellitus without complication, with long-term current use of insulin    3. Hyperlipidemia, unspecified hyperlipidemia type    4. Essential hypertension    5. PAF (paroxysmal atrial fibrillation)    6. Morbid obesity with BMI of 40.0-44.9, adult    7. Type 2 diabetes mellitus with both eyes affected by mild nonproliferative retinopathy and macular edema, with long-term current use of insulin    8. Angina pectoris    9. Need for vaccination      PLAN:  1. Weight reduction. Exercise regularly.  2. Age-appropriate counseling.  3. Fasting lab.  4. Refill potassium chloride.  5. Patient requests refill of hydrocodone APAP on hand due to passing kidney stone recently.    6. Pneumococcal 20 vaccine.  7. Refill medications as needed.    8. Briefly discussed treatment of onychomycosis.  Patient will trim back nail and apply Vicks Vaporub.    9.  Follow-up in 6 months.    This note is generated with speech recognition software and is subject to transcription error and sound alike phrases that may be missed by proofreading.

## 2023-01-13 ENCOUNTER — PATIENT OUTREACH (OUTPATIENT)
Dept: ADMINISTRATIVE | Facility: HOSPITAL | Age: 68
End: 2023-01-13
Payer: COMMERCIAL

## 2023-01-13 NOTE — LETTER
AUTHORIZATION FOR RELEASE OF   CONFIDENTIAL INFORMATION    Dear ***,    We are seeing Latosha Enriquez, date of birth 1955, in the clinic at [facility]. Gabbie Ronquillo MD is the patient's PCP. Latosha Enriquez has an outstanding lab/procedure at the time we reviewed her chart. In order to help keep her health information updated, she has authorized us to request the following medical record(s):        (  )  MAMMOGRAM                                      (  )  COLONOSCOPY      (  )  PAP SMEAR                                          (  )  OUTSIDE LAB RESULTS     (  )  DEXA SCAN                                          (  )  EYE EXAM            (  )  FOOT EXAM                                          (  )  ENTIRE RECORD     (  )  OUTSIDE IMMUNIZATIONS                 (  )  _______________         [facility] ***     If you have any questions, please contact *** at [phone]*.           Patient Name: Latosha Enriquez  : 1955  Patient Phone #: [phone]

## 2023-01-13 NOTE — PROGRESS NOTES
Attempted to contact patient by phone for Diabetic Eye Exam visit, was able to speak to patient.  Patient had exam at [facility]  Updated Care Everywhere and Team. Faxed for DM exams.

## 2023-01-19 ENCOUNTER — OFFICE VISIT (OUTPATIENT)
Dept: PAIN MEDICINE | Facility: CLINIC | Age: 68
End: 2023-01-19
Payer: COMMERCIAL

## 2023-01-19 DIAGNOSIS — M54.16 LUMBAR RADICULOPATHY: Primary | ICD-10-CM

## 2023-01-19 DIAGNOSIS — M48.062 LUMBAR STENOSIS WITH NEUROGENIC CLAUDICATION: ICD-10-CM

## 2023-01-19 DIAGNOSIS — M48.061 LUMBAR FORAMINAL STENOSIS: ICD-10-CM

## 2023-01-19 PROCEDURE — 1159F PR MEDICATION LIST DOCUMENTED IN MEDICAL RECORD: ICD-10-PCS | Mod: CPTII,95,, | Performed by: ANESTHESIOLOGY

## 2023-01-19 PROCEDURE — 99214 OFFICE O/P EST MOD 30 MIN: CPT | Mod: 95,,, | Performed by: ANESTHESIOLOGY

## 2023-01-19 PROCEDURE — 1160F PR REVIEW ALL MEDS BY PRESCRIBER/CLIN PHARMACIST DOCUMENTED: ICD-10-PCS | Mod: CPTII,95,, | Performed by: ANESTHESIOLOGY

## 2023-01-19 PROCEDURE — 99214 PR OFFICE/OUTPT VISIT, EST, LEVL IV, 30-39 MIN: ICD-10-PCS | Mod: 95,,, | Performed by: ANESTHESIOLOGY

## 2023-01-19 PROCEDURE — 1159F MED LIST DOCD IN RCRD: CPT | Mod: CPTII,95,, | Performed by: ANESTHESIOLOGY

## 2023-01-19 PROCEDURE — 1160F RVW MEDS BY RX/DR IN RCRD: CPT | Mod: CPTII,95,, | Performed by: ANESTHESIOLOGY

## 2023-01-19 NOTE — PROGRESS NOTES
Established Patient Interventional Pain Note     The patient location is: work  The chief complaint leading to consultation is: LBP+ leg pain  Visit type: Virtual visit with synchronous audio and video  Total time spent with patient: 10-15m  Each patient to whom he or she provides medical services by telemedicine is: (1) informed of the relationship between the physician and patient and the respective role of any other health care provider with respect to management of the patient; and (2) notified that he or she may decline to receive medical services by telemedicine and may withdraw from such care at any time.    Referring Physician: No ref. provider found    PCP: Gabbie Ronquillo MD    Chief Complaint:   Back and leg pain         SUBJECTIVE:  Interval history 01/19/2023  Patient presents status post L4-5 interlaminar epidural steroid injection with left paramedian approach 12/23/2022.  Patient reports mild improvement in lower back (30%) and leg pain following her interlaminar epidural steroid injection.  Patient does report following her injection significant numbness in the lower extremities which led to a mechanical fall on the day of the procedure which she believes reduced benefit from the procedure.  Today she reports she notices decreased numbness along the left lateral aspect of her lower extremity and she also reports significant improvement in pain when lying in right or left lateral decubitus position.  She continues to report needing support from her Rollator or granddaughter stroller when ambulating.  She reports she is able to ambulate approximately 10 minutes before requiring rest.  She again reports pain along the lateral and posterior aspects of bilateral lower extremities in L5-S2 distribution to the feet.  Patient has not continued Cymbalta she is currently on gabapentin with noticeable but mild relief in her symptoms.  She is concerned about adding an additional medication and interference  with other medications.  She would like to pursue intervention at this time and hold off on any pharmacologic management.      Interval History (1/19/2023):  Latosha Enriquez presents today for follow-up visit.  Patient was last seen on 5/24/2022. At that visit, the plan was to order MRI of lumbar spine to further evaluate for low back pain and leg weakness. She again reports low back pain in a band-like distribution with radiation into her bilateral lower extremities. She reports she is unable to stand or walk for longer than a few minutes before requiring rest. Patient does report associated heaviness or weakness in the lower extremities associated with her pain.  Patient reports this has significantly affected her quality of life.  Pain is improved with sitting down, stretching as well as with gabapentin.  She reports in the AM she is bent over and has a hard time standing up straight. Reports that she is interested in epidural injections as her brother has had them and they have been helpful. Not interested in MBB or RFA as a co-worker had nerve ablation with poor results.  Patient reports pain as 8/10 today. Pain and weakness interferes with daily activities.  She reports she has not started the Cymbalta yet.    Of note, patient voiced frustrations over wait times. She reports that she arrived on time/early for her appointment but check in required an extended amount of time and she was marked as arriving 18 minutes past her appointment time. She also voiced frustrations that other patients were roomed before her. However, it appears that they were patients being seen by another provider.           Latosha Enriquez is a 67 y.o. female with past medical history significant for hypertension, hyperlipidemia, paroxysmal atrial fibrillation, nephrolithiasis, morbid obesity, type 2 diabetes complicated by retinopathy, GERD, gout, obstructive sleep apnea who presents to the clinic for the evaluation of lower back and leg  pain.  Patient reports pain began approximately 4-5 months prior without inciting accident injury or trauma.  Patient reports she has had lower back weakness for several years.  Today patient reports pain which is intermittent which is rated a 4/10.  Patient reports pain in a bandlike distribution in the lower back which radiates down the anterior and lateral aspect of the left lower extremity to the knee in L3-4 distribution.  Pain is described as throbbing in nature.  Pain is exacerbated with most movement including lumbar flexion, standing and with ambulation.  Patient reports she is able to ambulate approximately 5 minutes before requiring rest.  Patient does report associated heaviness or weakness in the lower extremities associated with her pain.  Patient reports this has significantly affected her quality of life.  Pain is improved with sitting down, stretching as well as with gabapentin.  Patient reports taking gabapentin 100 mg twice daily.  Patient reports she cannot increase this dose secondary to significant daytime somnolence.  Patient does report improvement in left lower extremity radiculopathy with gabapentin.    Patient reports significant motor weakness and loss of sensations.  Patient denies night fever/night sweats, urinary incontinence, bowel incontinence and significant weight loss.      Pain Disability Index Review:     Last 3 PDI Scores 9/9/2022 5/24/2022   Pain Disability Index (PDI) 46 64       Non-Pharmacologic Treatments:  Physical Therapy/Home Exercise: yes  Ice/Heat:no  TENS: no  Acupuncture: no  Massage: no  Chiropractic: no    Other: no      Pain Medications:  - Opioids: Vicodin ( Hydrocodone/Acetaminophen)  - Adjuvant Medications: Baclofen (Lioresal), Neurontin (Gabapentin) and Topical Ointment (Voltaren Gel, Steroid cream, Anti-Inflammatory Cream, Compound cream)  - Anti-Coagulants: Eliquis    Pain Procedures:   -12/23/2022: L4-5 interlaminar epidural steroid injection with left  paramedian approach    -bilateral knee intra-articular corticosteroid injection:  Outside institution    Past Medical History:   Diagnosis Date    Asthma     Colon polyps     Diabetes mellitus type II, uncontrolled     Diabetic retinopathy     Diverticulosis of large intestine without hemorrhage 2015    Elevated liver enzymes     GERD (gastroesophageal reflux disease)     Gout, unspecified     Hemorrhoids, internal 2015    History of blood transfusion     Hyperlipidemia     Hypertension     IBS (irritable bowel syndrome)     Morbid obesity with BMI of 40.0-44.9, adult     Nasal vestibulitis     Osteoarthritis of multiple joints     Urolithiasis     Vitamin D deficiency disease      Past Surgical History:   Procedure Laterality Date    BREAST BIOPSY Right      SECTION, CLASSIC      COLONOSCOPY N/A 2015    Procedure: COLONOSCOPY;  Surgeon: Stanislav Pickard MD;  Location: [facility] ENDO;  Service: Endoscopy;  Laterality: N/A;    CYSTOSCOPY W/ URETERAL STENT PLACEMENT  2017    EPIDURAL STEROID INJECTION Left 2022    Procedure: Lumbar L4/5 IL CHRISTOPHER with left paramedian approach RN IV Sedation;  Surgeon: Pradeep Oconnell MD;  Location: [facility] PAIN MGT;  Service: Pain Management;  Laterality: Left;    EXTRACORPOREAL SHOCK WAVE LITHOTRIPSY      LEFT HEART CATHETERIZATION Left 2019    Procedure: CATHETERIZATION, HEART, LEFT;  Surgeon: Haile Suggs MD;  Location: [facility] CATH LAB;  Service: Cardiology;  Laterality: Left;  10:30-11am start/poss rt radial approach    TOTAL ABDOMINAL HYSTERECTOMY      URETEROSCOPY  2017     Review of patient's allergies indicates:   Allergen Reactions    Antihistamines - alkylamine Anaphylaxis and Itching     Cough, throat itches    Chocolate flavor Other (See Comments)     disoriented    Doxycycline Other (See Comments)     Stomach pain    Betadine [povidone-iodine] Itching    Strawberries [strawberry] Other (See Comments)    Iodine and iodide containing products  "Other (See Comments)     Tongue swelling    Tramadol Nausea Only    Yeast Itching     Facial swelling, constipation       Current Outpatient Medications   Medication Sig    albuterol (PROVENTIL/VENTOLIN HFA) 90 mcg/actuation inhaler Inhale 1-2 puffs into the lungs every 4 to 6 hours as needed for Wheezing or Shortness of Breath.    albuterol (VENTOLIN HFA) 90 mcg/actuation inhaler Inhale 2 puffs into the lungs every 6 (six) hours as needed for Wheezing. Rescue    allopurinoL (ZYLOPRIM) 100 MG tablet Take 1 tablet (100 mg total) by mouth once daily.    apixaban (ELIQUIS) 5 mg Tab Take 1 tablet (5 mg total) by mouth 2 (two) times daily.    atenoloL (TENORMIN) 25 MG tablet Take 25 mg by mouth once daily.    baclofen (LIORESAL) 10 MG tablet Take 1 tablet (10 mg total) by mouth 3 (three) times daily.    BD ULTRA-FINE SHORT PEN NEEDLE 31 gauge x 5/16" Ndle USE 1 PEN NEEDLE UNDER THE SKIN TWICE A DAY    cyanocobalamin (VITAMIN B-12) 100 MCG tablet Take by mouth.    diclofenac sodium (VOLTAREN) 1 % Gel Apply 2 g topically 3 (three) times daily.    DULoxetine (CYMBALTA) 30 MG capsule Take 1 capsule (30 mg total) by mouth once daily.    ergocalciferol, vitamin D2, 400 unit Tab Take by mouth.    ferrous sulfate, dried (SLOW FE) 160 mg (50 mg iron) TbSR Take by mouth.    fexofenadine (ALLEGRA) 180 MG tablet Take 1 tablet (180 mg total) by mouth once daily.    flecainide (TAMBOCOR) 50 MG Tab Take 50 mg by mouth 2 (two) times daily.    fluticasone propion-salmeterol 45-21 mcg/dose (ADVAIR HFA) 45-21 mcg/actuation HFAA inhaler Inhale 2 puffs into the lungs every 12 (twelve) hours. Controller    gabapentin (NEURONTIN) 100 MG capsule TAKE 1 CAPSULE(100 MG) BY MOUTH THREE TIMES DAILY    HYDROcodone-acetaminophen (NORCO)  mg per tablet Take 1 tablet by mouth every 6 (six) hours as needed for Pain.    insulin lispro protamin-lispro (HUMALOG MIX 75-25 KWIKPEN) 100 unit/mL (75-25) InPn INJECT 30 UNITS UNDER THE SKIN EVERY MORNING " AND 20 UNITS EVERY EVENING    losartan-hydrochlorothiazide 100-25 mg (HYZAAR) 100-25 mg per tablet Take 1 tablet by mouth once daily.    metFORMIN (GLUCOPHAGE-XR) 500 MG ER 24hr tablet TAKE 2 TABLETS BY MOUTH WITH BREAKFAST AND 1 TABLET BY MOUTH WITH DINNER    montelukast (SINGULAIR) 10 mg tablet Take 1 tablet (10 mg total) by mouth every evening.    pantoprazole (PROTONIX) 20 MG tablet Take 20 mg by mouth every morning.    potassium chloride (MICRO-K) 10 MEQ CpSR Take 1 capsule (10 mEq total) by mouth once daily.    simvastatin (ZOCOR) 20 MG tablet Take 1 tablet (20 mg total) by mouth every evening.    tamsulosin (FLOMAX) 0.4 mg Cap Take 1 capsule (0.4 mg total) by mouth every evening.    triamcinolone (NASACORT) 55 mcg nasal inhaler 2 sprays by Nasal route once daily.     No current facility-administered medications for this visit.       Review of Systems     GENERAL:  No weight loss, malaise or fevers.  HEENT:   No recent changes in vision or hearing  NECK:  Negative for lumps, no difficulty with swallowing.  RESPIRATORY:  Negative for cough, wheezing or shortness of breath, patient denies any recent URI.  CARDIOVASCULAR:  Negative for chest pain, leg swelling or palpitations.  GI:  Negative for abdominal discomfort, blood in stools or black stools or change in bowel habits.  MUSCULOSKELETAL:  See HPI.  SKIN:  Negative for lesions, rash, and itching.  PSYCH:  No mood disorder or recent psychosocial stressors.   HEMATOLOGY/LYMPHOLOGY:  Negative for prolonged bleeding, bruising easily or swollen nodes.    NEURO:   No history of headaches, syncope, paralysis, seizures or tremors.  All other reviewed and negative other than HPI.    OBJECTIVE:    There were no vitals taken for this visit.      Physical Exam  Last clinic visit:  GENERAL: Well appearing, in no acute distress, alert and oriented x3.  PSYCH:  Aggressive, irritated, but consolable.  SKIN: Skin color, texture, turgor normal, no rashes or  lesions.  HEAD/FACE:  Normocephalic, atraumatic. Cranial nerves grossly intact.    CV: RRR with palpation of the radial artery.  PULM: No evidence of respiratory difficulty, symmetric chest rise.  GI:  Soft and non-tender.    BACK:  Mild thoracic kyphosis.  Straight leg raising in the sitting and supine positions is negative to radicular pain. No pain to palpation over the facet joints of the lumbar spine or spinous processes. reduced range of motion without pain reproduction.  EXTREMITIES: Peripheral joint ROM is full and pain free without obvious instability or laxity in all four extremities. No deformities, edema, or skin discoloration. Good capillary refill.  MUSCULOSKELETAL: Unable to stand on toes.  Able to ambulate on heels   hip, and knee provocative maneuvers are negative.  There is no pain with palpation over the sacroiliac joints bilaterally.  FABERs test is negative.  Facet loading test is negative bilaterally.   Bilateral upper and lower extremity strength is normal and symmetric.  No atrophy or tone abnormalities are noted.  RIGHT Lower extremity: Hip flexion 4+/5, Hip Abduction 4+/5, Hip Adduction 4+/5, Knee extension 5/5, Knee flexion 5/5, Ankle dorsiflexion5/5, Extensor hallucis longus 5/5, Ankle plantarflexion 5/5  LEFT Lower extremity:  Hip flexion 4+/5, Hip Abduction 4+/5,Hip Adduction 4+/5, Knee extension 5/5, Knee flexion 5/5, Ankle dorsiflexion 5/5, Extensor hallucis longus 5/5, Ankle plantarflexion 5/5  -Normal testing knee (patellar) jerk and ankle (achilles) jerk    NEURO: Bilateral upper and lower extremity coordination and muscle stretch reflexes are physiologic and symmetric. No loss of sensation is noted.  GAIT: normal.    Imagin/02/17    X-Ray Cervical Spine AP And Lateral    Narrative  AP and lateral views of the cervical spine    Findings: The vertebral bodies demonstrate normal height.  There is reversal of the normal lordotic curvature with a few millimeters of  anterolisthesis of C4 on C5.  There is also a couple of millimeters of anterolisthesis of C3 on C4. Mild disc space narrowing present at the C4-C5 level.  There is moderate to severe disc space narrowing and spondylosis present at the C5-C6 level. The C1-C2 articulation is within normal limits. No prevertebral soft tissue swelling.        ASSESSMENT: 67 y.o. year old female with lower back pain, consistent with     1. Lumbar radiculopathy  IR Epidural Transfor 1st Vert Lumbar [place]    Case Request-RAD/Other Procedure Area: Bilateral L4/5 +L5/S1 TF CHRISTOPHER      2. Lumbar stenosis with neurogenic claudication        3. Lumbar foraminal stenosis                  PLAN:   - Interventions:  Patient obtained approximately 30% relief from lumbar interlaminar epidural steroid injection.  Schedule for bilateral L4/5 and L5/S1 transforaminal epidural steroid injection to see if this confers more significant and sustained relief and lower extremity radiculopathy and weakness.  Explained the risks and benefits of the procedure in detail with the patient today in clinic along with alternative treatment options. Patient is agreeable with plan    - Anticoagulation use: yes  Eliquis , Need to hold for 3 days prior to procedure, we will send clearance to Dr. Mendoza ([facility])     report:  Reviewed and consistent with medication use as prescribed.    - Medications:  -continue gabapentin per primary care physician.      - Therapy:   -We discussed initiating physical therapy to help manage the patient/s painful condition. The patient was counseled that muscle strengthening will improve the long term prognosis in regards to pain and may also help increase range of motion and mobility. They were told that one of the goals of physical therapy is that they learn how to do the exercises so that they can do them independently at home daily upon completion. The patient's questions were answered and they were agreeable to this course. A  referral for physical therapy was provided to the patient.      - Imaging: Reviewed lumbar MRI with patient and answered any questions they had regarding study.    - Follow up visit: return to clinic in 4-6 weeks after injection      The above plan and management options were discussed at length with patient. Patient is in agreement with the above and verbalized understanding.    - I discussed the goals of interventional chronic pain management with the patient on today's visit. We discussed a multimodal and systematic approach to pain.  This includes diagnostic and therapeutic injections, adjuvant pharmacologic treatment, physical therapy, and at times psychiatry.  I emphasized the importance of regular exercise, core strengthening and stretching, diet and weight loss as a cornerstone of long-term pain management.    - This condition does not require this patient to take time off of work, and the primary goal of our Pain Management services is to improve the patient's functional capacity.  - Patient Questions: Answered all of the patient's questions regarding diagnoses, therapy, treatment and next steps        Pradeep Oconnell MD  Interventional Pain Management  [facility]    Disclaimer:  This note was prepared using voice recognition system and is likely to have sound alike errors that may have been overlooked even after proof reading.  Please call me with any questions

## 2023-01-25 ENCOUNTER — PATIENT MESSAGE (OUTPATIENT)
Dept: PAIN MEDICINE | Facility: CLINIC | Age: 68
End: 2023-01-25
Payer: COMMERCIAL

## 2023-01-25 ENCOUNTER — PATIENT MESSAGE (OUTPATIENT)
Dept: ADMINISTRATIVE | Facility: HOSPITAL | Age: 68
End: 2023-01-25
Payer: COMMERCIAL

## 2023-01-31 ENCOUNTER — PATIENT MESSAGE (OUTPATIENT)
Dept: PAIN MEDICINE | Facility: CLINIC | Age: 68
End: 2023-01-31
Payer: COMMERCIAL

## 2023-02-02 ENCOUNTER — TELEPHONE (OUTPATIENT)
Dept: PAIN MEDICINE | Facility: CLINIC | Age: 68
End: 2023-02-02
Payer: COMMERCIAL

## 2023-02-03 ENCOUNTER — TELEPHONE (OUTPATIENT)
Dept: FAMILY MEDICINE | Facility: CLINIC | Age: 68
End: 2023-02-03

## 2023-02-03 NOTE — TELEPHONE ENCOUNTER
Pt is calling to get added to the scx for her knee injection please advise     ----- Message from Antionette Ramires sent at 2/3/2023 11:01 AM CST -----  Contact: self/[phone]  Pt is calling in regards to scheduling her appointment for her knee injections for one day next week. Please give her a call back at [phone]. Thank you s/g

## 2023-02-10 ENCOUNTER — TELEPHONE (OUTPATIENT)
Dept: FAMILY MEDICINE | Facility: CLINIC | Age: 68
End: 2023-02-10
Payer: COMMERCIAL

## 2023-02-10 NOTE — TELEPHONE ENCOUNTER
----- Message from Valerio Llanos sent at 2/10/2023  3:53 PM CST -----  Contact: nato  .Type:  Sooner Apoointment Request    Caller is requesting a sooner appointment.  Caller declined first available appointment listed below.  Caller will not accept being placed on the waitlist and is requesting a message be sent to doctor.  Name of Caller:Nato  When is the first available appointment?03/21  Symptoms:injection in both knees  Would the patient rather a call back or a response via [facility]? Call back   Best Call Back Number:[phone]  Additional Information: n/a        Thanks  DD

## 2023-02-13 ENCOUNTER — OFFICE VISIT (OUTPATIENT)
Dept: FAMILY MEDICINE | Facility: CLINIC | Age: 68
End: 2023-02-13
Payer: COMMERCIAL

## 2023-02-13 VITALS
TEMPERATURE: 99 F | DIASTOLIC BLOOD PRESSURE: 79 MMHG | SYSTOLIC BLOOD PRESSURE: 116 MMHG | WEIGHT: 254.06 LBS | HEIGHT: 67 IN | HEART RATE: 88 BPM | OXYGEN SATURATION: 98 % | BODY MASS INDEX: 39.88 KG/M2

## 2023-02-13 DIAGNOSIS — J45.20 MILD INTERMITTENT ASTHMA IN ADULT WITHOUT COMPLICATION: ICD-10-CM

## 2023-02-13 DIAGNOSIS — M17.0 PRIMARY OSTEOARTHRITIS OF BOTH KNEES: Primary | ICD-10-CM

## 2023-02-13 DIAGNOSIS — J31.0 CHRONIC RHINITIS: ICD-10-CM

## 2023-02-13 PROCEDURE — 1159F MED LIST DOCD IN RCRD: CPT | Mod: CPTII,S$GLB,, | Performed by: FAMILY MEDICINE

## 2023-02-13 PROCEDURE — 20610 DRAIN/INJ JOINT/BURSA W/O US: CPT | Mod: 50,S$GLB,, | Performed by: FAMILY MEDICINE

## 2023-02-13 PROCEDURE — 3288F PR FALLS RISK ASSESSMENT DOCUMENTED: ICD-10-PCS | Mod: CPTII,S$GLB,, | Performed by: FAMILY MEDICINE

## 2023-02-13 PROCEDURE — 1126F AMNT PAIN NOTED NONE PRSNT: CPT | Mod: CPTII,S$GLB,, | Performed by: FAMILY MEDICINE

## 2023-02-13 PROCEDURE — 3078F PR MOST RECENT DIASTOLIC BLOOD PRESSURE < 80 MM HG: ICD-10-PCS | Mod: CPTII,S$GLB,, | Performed by: FAMILY MEDICINE

## 2023-02-13 PROCEDURE — 99213 OFFICE O/P EST LOW 20 MIN: CPT | Mod: 25,S$GLB,, | Performed by: FAMILY MEDICINE

## 2023-02-13 PROCEDURE — 3074F PR MOST RECENT SYSTOLIC BLOOD PRESSURE < 130 MM HG: ICD-10-PCS | Mod: CPTII,S$GLB,, | Performed by: FAMILY MEDICINE

## 2023-02-13 PROCEDURE — 3078F DIAST BP <80 MM HG: CPT | Mod: CPTII,S$GLB,, | Performed by: FAMILY MEDICINE

## 2023-02-13 PROCEDURE — 99213 PR OFFICE/OUTPT VISIT, EST, LEVL III, 20-29 MIN: ICD-10-PCS | Mod: 25,S$GLB,, | Performed by: FAMILY MEDICINE

## 2023-02-13 PROCEDURE — 3288F FALL RISK ASSESSMENT DOCD: CPT | Mod: CPTII,S$GLB,, | Performed by: FAMILY MEDICINE

## 2023-02-13 PROCEDURE — 3074F SYST BP LT 130 MM HG: CPT | Mod: CPTII,S$GLB,, | Performed by: FAMILY MEDICINE

## 2023-02-13 PROCEDURE — 1100F PTFALLS ASSESS-DOCD GE2>/YR: CPT | Mod: CPTII,S$GLB,, | Performed by: FAMILY MEDICINE

## 2023-02-13 PROCEDURE — 99999 PR PBB SHADOW E&M-EST. PATIENT-LVL V: ICD-10-PCS | Mod: PBBFAC,,, | Performed by: FAMILY MEDICINE

## 2023-02-13 PROCEDURE — 3008F BODY MASS INDEX DOCD: CPT | Mod: CPTII,S$GLB,, | Performed by: FAMILY MEDICINE

## 2023-02-13 PROCEDURE — 1160F PR REVIEW ALL MEDS BY PRESCRIBER/CLIN PHARMACIST DOCUMENTED: ICD-10-PCS | Mod: CPTII,S$GLB,, | Performed by: FAMILY MEDICINE

## 2023-02-13 PROCEDURE — 1100F PR PT FALLS ASSESS DOC 2+ FALLS/FALL W/INJURY/YR: ICD-10-PCS | Mod: CPTII,S$GLB,, | Performed by: FAMILY MEDICINE

## 2023-02-13 PROCEDURE — 3008F PR BODY MASS INDEX (BMI) DOCUMENTED: ICD-10-PCS | Mod: CPTII,S$GLB,, | Performed by: FAMILY MEDICINE

## 2023-02-13 PROCEDURE — 1159F PR MEDICATION LIST DOCUMENTED IN MEDICAL RECORD: ICD-10-PCS | Mod: CPTII,S$GLB,, | Performed by: FAMILY MEDICINE

## 2023-02-13 PROCEDURE — 20610 PR DRAIN/INJECT LARGE JOINT/BURSA: ICD-10-PCS | Mod: 50,S$GLB,, | Performed by: FAMILY MEDICINE

## 2023-02-13 PROCEDURE — 1126F PR PAIN SEVERITY QUANTIFIED, NO PAIN PRESENT: ICD-10-PCS | Mod: CPTII,S$GLB,, | Performed by: FAMILY MEDICINE

## 2023-02-13 PROCEDURE — 99999 PR PBB SHADOW E&M-EST. PATIENT-LVL V: CPT | Mod: PBBFAC,,, | Performed by: FAMILY MEDICINE

## 2023-02-13 PROCEDURE — 1160F RVW MEDS BY RX/DR IN RCRD: CPT | Mod: CPTII,S$GLB,, | Performed by: FAMILY MEDICINE

## 2023-02-13 RX ORDER — MONTELUKAST SODIUM 10 MG/1
10 TABLET ORAL NIGHTLY
Qty: 90 TABLET | Refills: 1 | Status: SHIPPED | OUTPATIENT
Start: 2023-02-13

## 2023-02-13 RX ORDER — TRIAMCINOLONE ACETONIDE 40 MG/ML
40 INJECTION, SUSPENSION INTRA-ARTICULAR; INTRAMUSCULAR
Status: COMPLETED | OUTPATIENT
Start: 2023-02-13 | End: 2023-02-13

## 2023-02-13 RX ORDER — TRIAMCINOLONE ACETONIDE 55 UG/1
2 SPRAY, METERED NASAL DAILY
Qty: 16.9 ML | Refills: 5 | Status: SHIPPED | OUTPATIENT
Start: 2023-02-13

## 2023-02-13 RX ORDER — LIDOCAINE HYDROCHLORIDE 10 MG/ML
4 INJECTION INFILTRATION; PERINEURAL
Status: COMPLETED | OUTPATIENT
Start: 2023-02-13 | End: 2023-02-13

## 2023-02-13 RX ADMIN — TRIAMCINOLONE ACETONIDE 40 MG: 40 INJECTION, SUSPENSION INTRA-ARTICULAR; INTRAMUSCULAR at 07:02

## 2023-02-13 RX ADMIN — LIDOCAINE HYDROCHLORIDE 4 ML: 10 INJECTION INFILTRATION; PERINEURAL at 07:02

## 2023-02-14 NOTE — PROGRESS NOTES
CHIEF COMPLAINT: This is a 67-year-old female complaining of bilateral knee pain.     SUBJECTIVE:  Patient complains of chronic pain in both knees for several months which she describes as a tightness in the posterior knee that radiates anteriorly.  Patient has difficulty walking because of stiffness and inability to flex the knee. She denies joint swelling, redness or warmth. Past x-rays showed mild to moderate tricompartmental degenerative changes.  The patient was seen by orthopedic surgeon in August 2021.  She reports improvement in knee pain after steroid injections in the past and requests injections today.       Patient requests refills of Singulair and Nasacort or for allergic rhinitis and asthma.     ROS:  GENERAL: Patient denies fever, chills, night sweats.  Patient denies weight gain or loss. Patient denies anorexia, fatigue, weakness or swollen glands.  SKIN: Patient denies rash.  LUNGS: Patient denies cough, wheeze or hemoptysis.  CARDIOVASCULAR: Patient denies chest pain, shortness of breath, palpitations, syncope or lower extremity edema.  GI: Patient denies abdominal pain, nausea, vomiting, diarrhea, constipation, blood in stool or melena.  MUSCULOSKELETAL: Patient denies joint swelling, redness or warmth.  Positive for joint pain.  NEUROLOGIC: Patient denies numbness, tingling or weakness in limb.       OBJECTIVE:   GENERAL: Well-developed well-nourished, obese, black female alert and oriented x3, in no acute distress. Memory, judgment and cognition without deficit.   SKIN: Clear without rash. Normal color and tone.   HEENT: Eyes: No scleral icterus. Clear conjunctivae.   NECK: Supple, normal range of motion.   LUNGS:  Normal respiratory effort.  EXTREMITIES: Without cyanosis, clubbing or edema. Distal pulses 2+ and equal. Normal range of motion in hips, knees and ankles. No joint effusion, erythema or warmth.  NEUROLOGIC:  Motor strength equal bilaterally. Sensation normal to touch. Deep tendon  reflexes 2+ and equal. Gait antalgic.     Reviewed treatment options including physical therapy and orthopedic consult. Discussed potential complications of procedure including bleeding, infection, and nerve damage.  Patient understands and accepts risks.  Verbal consent obtained.     Procedure: After sterile prep and drape, 1 cc of Kenalog 40 mg/cc +1 cc of 1% Xylocaine injected into right knee and left knee via lateral approach without difficulty.     ASSESSMENT:  1. Primary osteoarthritis of both knees    2. Mild intermittent asthma in adult without complication    3. Chronic rhinitis      PLAN:  1. Apply ice q.i.d. for 15-20 minutes for the next 2-3 days.  2. Avoid prolonged weight-bearing for 48-72 hours.  3. Follow-up if no improvement or worsening symptoms.  4. Refill Nasacort and Singulair.    20 minutes of total time spent on the encounter, which includes face to face time and non-face to face time preparing to see the patient (eg, review of tests), Obtaining and/or reviewing separately obtained history, Documenting clinical information in the electronic or other health record, Independently interpreting results (not separately reported) and communicating results to the patient/family/caregiver, or Care coordination (not separately reported).     This note is generated with speech recognition software and is subject to transcription error and sound alike phrases that may be missed by proofreading.

## 2023-02-21 ENCOUNTER — PATIENT OUTREACH (OUTPATIENT)
Dept: ADMINISTRATIVE | Facility: HOSPITAL | Age: 68
End: 2023-02-21
Payer: COMMERCIAL

## 2023-02-21 NOTE — PROGRESS NOTES
DM eye report: Per chart review, patient stated that eye exam was done at the [facility] on [address], record request has been sent.

## 2023-02-21 NOTE — LETTER
[facility]    AUTHORIZATION FOR RELEASE OF   CONFIDENTIAL INFORMATION      We are seeing Latosha Enriquez, date of birth 1955, in the clinic at [facility]. Gabbie Ronquillo MD is the patient's PCP. Latosha Enriquez has an outstanding lab/procedure at the time we reviewed her chart. In order to help keep her health information updated, she has authorized us to request the following medical record(s):        (  )  MAMMOGRAM                                      (  )  COLONOSCOPY      (  )  PAP SMEAR                                          (  )  OUTSIDE LAB RESULTS     (  )  DEXA SCAN                                          (  x)  EYE EXAM            (  )  FOOT EXAM                                          (  )  ENTIRE RECORD     (  )  OUTSIDE IMMUNIZATIONS                 (  )  _______________         [facility] [phone]     If you have any questions, please contact Roselia GARCIA at [phone].           Patient Name: Latosha Enriquez  : 1955  Patient Phone #: [phone]

## 2023-03-10 ENCOUNTER — TELEPHONE (OUTPATIENT)
Dept: PHYSICAL MEDICINE AND REHAB | Facility: CLINIC | Age: 68
End: 2023-03-10
Payer: COMMERCIAL

## 2023-03-10 NOTE — PROGRESS NOTES
Spoke with an associate at [facility] on [address], he states the patient did have an office visit in December. Verified fax number, will send another request.

## 2023-03-10 NOTE — TELEPHONE ENCOUNTER
----- Message from Eulalia Hair sent at 3/10/2023  3:57 PM CST -----  Contact: Latosha Reina is calling to speak with someone in regards to rescheduling her back procedure for the week after east if possible if not she will the appointment she has. Please call her at [phone]

## 2023-03-20 ENCOUNTER — TELEPHONE (OUTPATIENT)
Dept: PAIN MEDICINE | Facility: CLINIC | Age: 68
End: 2023-03-20
Payer: COMMERCIAL

## 2023-03-20 NOTE — TELEPHONE ENCOUNTER
----- Message from Celestino Choi MA sent at 3/20/2023 10:15 AM CDT -----  Contact: [phone]    ----- Message -----  From: Teresa Alvarado  Sent: 3/20/2023  10:14 AM CDT  To: Anish Fernández Staff    Pt is calling in regards to rescheduling her procedure appt. Please call pt back at [phone]. Thanks KB

## 2023-03-20 NOTE — TELEPHONE ENCOUNTER
Spoke with the pt and she stated that she will not have transportation on Friday 03/24/23 and rescheduled to 04/11/23.

## 2023-04-05 NOTE — PRE-PROCEDURE INSTRUCTIONS
Spoke with patient regarding procedure scheduled on 4.11     Arrival time 0800     Has patient been sick with fever or on antibiotics within the last 7 days? No     Does the patient have any open wounds, sores or rashes? No     Does the patient have any recent fractures? no     Has patient received a vaccination within the last 7 days? No     Received the COVID vaccination? yes     Has the patient stopped all medications as directed? Hold dm meds am of procedure. Hold eliquis 3 days prior to procedure. Cardiac clearance obtained from dr bajwa on 1.25 located in [place].      Does patient have a pacemaker and or defibrillator? no     Does the patient have a ride to and from procedure and someone reliable to remain with patient? daughter     Is the patient diabetic? yes     Does the patient have sleep apnea? Or use O2 at home? noel     Is the patient receiving sedation? yes     Is the patient instructed to remain NPO beginning at midnight the night before their procedure? yes     Procedure location confirmed with patient? Yes     Covid- Denies signs/symptoms. Instructed to notify PAT/MD if any changes.

## 2023-04-11 ENCOUNTER — HOSPITAL ENCOUNTER (OUTPATIENT)
Facility: HOSPITAL | Age: 68
Discharge: HOME | End: 2023-04-11
Attending: ANESTHESIOLOGY | Admitting: ANESTHESIOLOGY
Payer: COMMERCIAL

## 2023-04-11 VITALS
HEIGHT: 67 IN | SYSTOLIC BLOOD PRESSURE: 133 MMHG | WEIGHT: 254 LBS | OXYGEN SATURATION: 100 % | BODY MASS INDEX: 39.87 KG/M2 | RESPIRATION RATE: 18 BRPM | TEMPERATURE: 98 F | DIASTOLIC BLOOD PRESSURE: 69 MMHG | HEART RATE: 62 BPM

## 2023-04-11 PROBLEM — M54.16 LUMBAR RADICULOPATHY: Status: ACTIVE | Noted: 2023-04-11

## 2023-04-11 LAB — POCT GLUCOSE: 162 MG/DL (ref 70–110)

## 2023-04-11 PROCEDURE — 64484 NJX AA&/STRD TFRM EPI L/S EA: CPT | Mod: 50,,, | Performed by: ANESTHESIOLOGY

## 2023-04-11 PROCEDURE — 64483 NJX AA&/STRD TFRM EPI L/S 1: CPT | Mod: 50 | Performed by: ANESTHESIOLOGY

## 2023-04-11 PROCEDURE — 64484 NJX AA&/STRD TFRM EPI L/S EA: CPT | Mod: 50 | Performed by: ANESTHESIOLOGY

## 2023-04-11 PROCEDURE — 82962 GLUCOSE BLOOD TEST: CPT | Performed by: ANESTHESIOLOGY

## 2023-04-11 PROCEDURE — 63600175 PHARM REV CODE 636 W HCPCS: Performed by: ANESTHESIOLOGY

## 2023-04-11 PROCEDURE — A9585 GADOBUTROL INJECTION: HCPCS | Performed by: ANESTHESIOLOGY

## 2023-04-11 PROCEDURE — 64484 PRA INJECT ANES/STEROID FORAMEN LUMBAR/SACRAL W IMG GUIDE ,EA ADD LEVEL: ICD-10-PCS | Mod: 50,,, | Performed by: ANESTHESIOLOGY

## 2023-04-11 PROCEDURE — 25500020 PHARM REV CODE 255: Performed by: ANESTHESIOLOGY

## 2023-04-11 PROCEDURE — 64483 NJX AA&/STRD TFRM EPI L/S 1: CPT | Mod: 50,,, | Performed by: ANESTHESIOLOGY

## 2023-04-11 PROCEDURE — 25000003 PHARM REV CODE 250: Performed by: ANESTHESIOLOGY

## 2023-04-11 PROCEDURE — 64483 PR EPIDURAL INJ, ANES/STEROID, TRANSFORAMINAL, LUMB/SACR, SNGL LEVL: ICD-10-PCS | Mod: 50,,, | Performed by: ANESTHESIOLOGY

## 2023-04-11 RX ORDER — BUPIVACAINE HYDROCHLORIDE 2.5 MG/ML
INJECTION, SOLUTION EPIDURAL; INFILTRATION; INTRACAUDAL
Status: DISCONTINUED | OUTPATIENT
Start: 2023-04-11 | End: 2023-04-11 | Stop reason: HOSPADM

## 2023-04-11 RX ORDER — DEXAMETHASONE SODIUM PHOSPHATE 10 MG/ML
INJECTION INTRAMUSCULAR; INTRAVENOUS
Status: DISCONTINUED | OUTPATIENT
Start: 2023-04-11 | End: 2023-04-11 | Stop reason: HOSPADM

## 2023-04-11 RX ORDER — INDOMETHACIN 25 MG/1
CAPSULE ORAL
Status: DISCONTINUED | OUTPATIENT
Start: 2023-04-11 | End: 2023-04-11 | Stop reason: HOSPADM

## 2023-04-11 RX ORDER — GADOBUTROL 604.72 MG/ML
INJECTION INTRAVENOUS
Status: DISCONTINUED | OUTPATIENT
Start: 2023-04-11 | End: 2023-04-11 | Stop reason: HOSPADM

## 2023-04-11 NOTE — OP NOTE
Latosha Enriquez  67 y.o. female      Vitals:    04/11/23 0916   BP: (!) 146/79   Pulse: 60   Resp: 15   Temp:      Procedure Date: 04/11/2023        INFORMED CONSENT: The procedure, risks, benefits and options were discussed with patient. There are no contraindications to the procedure. The patient expressed understanding and agreed to proceed. The personnel performing the procedure was discussed. I verify that I personally obtained consent prior to the start of the procedure and the signed consent can be found on the patient's chart.       Anesthesia:   Local; pt ate a peppermint this AM. No sedation.    The patient was monitored with continuous pulse oximetry, EKG, and intermittent blood pressure monitors.  The patient was hemodynamically stable throughout the entire process was responsive to voice, and breathing spontaneously.  Supplemental O2 was provided at 2L/min via nasal cannula.  Patient was comfortable for the duration of the procedure. (See nurse documentation and case log for sedation time)        Pre Procedure diagnosis: Lumbar radiculopathy [M54.16]  Post-Procedure diagnosis: SAME     Complications: None    Specimens: None      DESCRIPTION OF PROCEDURE: The patient was brought to the procedure room. IV access was obtained prior to the procedure. The patient was positioned prone on the fluoroscopy table. Continuous hemodynamic monitoring was initiated including blood pressure, EKG, and pulse oximetry. . The skin was prepped with chlorhexidine and draped in a sterile fashion. Skin anesthesia was achieved using a total of 10mL of lidocaine, 5mL over each respective injection site.     The  L4/5 and L5/S1 transforaminal spaces were identified with fluoroscopy in the  AP, oblique, and lateral views.  A 22 gauge spinal quinke needle was then advanced into the area of the trans foraminal spaces bilateral with confirmation of proper needle position using AP, oblique, and lateral fluoroscopic views. Once the  needle tip was in the area of the transforaminal space, and there was no blood, CSF or paraesthesias,  1.5 mL of Omnipaque 300mg/ml was injected on bilateral for a total of 3mL.  Fluoroscopic imaging in the AP and lateral views revealed a clear outline of the spinal nerve with proximal spread of agent through the neural foramen into the epidural space. A total combination of 2 mL of Bupivicaine 0.25% and 10 mg dexamethasone was injected on each side for a total of 8 mL of injected medications with displacement of the contrast dye confirming that the medication went into the area of the transforaminal spaces bilateral. A sterile dressing was applied.   Patient tolerated the procedure well.    Patient was taken back to the recovery room for further observation.     The patient was discharged to home in stable condition

## 2023-04-11 NOTE — DISCHARGE INSTRUCTIONS

## 2023-04-11 NOTE — H&P
HPI  Patient presenting for Procedure(s) (LRB):  Bilateral L4/5 +L5/S1 TF CHRISTOPHER (Bilateral)     Patient on Anti-coagulation No    No health changes since previous encounter    Past Medical History:   Diagnosis Date    Asthma     Colon polyps     Diabetes mellitus type II, uncontrolled     Diabetic retinopathy     Diverticulosis of large intestine without hemorrhage 2015    Elevated liver enzymes     GERD (gastroesophageal reflux disease)     Gout, unspecified     Hemorrhoids, internal 2015    History of blood transfusion     Hyperlipidemia     Hypertension     IBS (irritable bowel syndrome)     Morbid obesity with BMI of 40.0-44.9, adult     Nasal vestibulitis     Osteoarthritis of multiple joints     Urolithiasis     Vitamin D deficiency disease      Past Surgical History:   Procedure Laterality Date    BREAST BIOPSY Right      SECTION, CLASSIC      COLONOSCOPY N/A 2015    Procedure: COLONOSCOPY;  Surgeon: Stanislav Pickard MD;  Location: [facility] ENDO;  Service: Endoscopy;  Laterality: N/A;    CYSTOSCOPY W/ URETERAL STENT PLACEMENT  2017    EPIDURAL STEROID INJECTION Left 2022    Procedure: Lumbar L4/5 IL CHRISTOPHER with left paramedian approach RN IV Sedation;  Surgeon: Pradeep Oconnell MD;  Location: [facility] PAIN MGT;  Service: Pain Management;  Laterality: Left;    EXTRACORPOREAL SHOCK WAVE LITHOTRIPSY      LEFT HEART CATHETERIZATION Left 2019    Procedure: CATHETERIZATION, HEART, LEFT;  Surgeon: Haile Suggs MD;  Location: [facility] CATH LAB;  Service: Cardiology;  Laterality: Left;  10:30-11am start/poss rt radial approach    TOTAL ABDOMINAL HYSTERECTOMY      URETEROSCOPY  2017     Review of patient's allergies indicates:   Allergen Reactions    Antihistamines - alkylamine Anaphylaxis and Itching     Cough, throat itches    Chocolate flavor Other (See Comments)     disoriented    Doxycycline Other (See Comments)     Stomach pain    Betadine [povidone-iodine] Itching    Strawberries  "[strawberry] Other (See Comments)    Iodine and iodide containing products Other (See Comments)     Tongue swelling    Tramadol Nausea Only    Yeast Itching     Facial swelling, constipation        No current facility-administered medications on file prior to encounter.     Current Outpatient Medications on File Prior to Encounter   Medication Sig Dispense Refill    albuterol (PROVENTIL/VENTOLIN HFA) 90 mcg/actuation inhaler Inhale 1-2 puffs into the lungs every 4 to 6 hours as needed for Wheezing or Shortness of Breath. 18 g 3    albuterol (VENTOLIN HFA) 90 mcg/actuation inhaler Inhale 2 puffs into the lungs every 6 (six) hours as needed for Wheezing. Rescue 1 g 0    allopurinoL (ZYLOPRIM) 100 MG tablet Take 1 tablet (100 mg total) by mouth once daily. 90 tablet 3    apixaban (ELIQUIS) 5 mg Tab Take 1 tablet (5 mg total) by mouth 2 (two) times daily. 180 tablet 1    atenoloL (TENORMIN) 25 MG tablet Take 25 mg by mouth once daily.      baclofen (LIORESAL) 10 MG tablet Take 1 tablet (10 mg total) by mouth 3 (three) times daily. 30 tablet 0    BD ULTRA-FINE SHORT PEN NEEDLE 31 gauge x 5/16" Ndle USE 1 PEN NEEDLE UNDER THE SKIN TWICE A  each 0    cyanocobalamin (VITAMIN B-12) 100 MCG tablet Take by mouth.      diclofenac sodium (VOLTAREN) 1 % Gel Apply 2 g topically 3 (three) times daily. 350 g 2    DULoxetine (CYMBALTA) 30 MG capsule Take 1 capsule (30 mg total) by mouth once daily. 30 capsule 2    ergocalciferol, vitamin D2, 400 unit Tab Take by mouth.      ferrous sulfate, dried (SLOW FE) 160 mg (50 mg iron) TbSR Take by mouth.      fexofenadine (ALLEGRA) 180 MG tablet Take 1 tablet (180 mg total) by mouth once daily. 30 tablet 2    flecainide (TAMBOCOR) 50 MG Tab Take 50 mg by mouth 2 (two) times daily.      fluticasone propion-salmeterol 45-21 mcg/dose (ADVAIR HFA) 45-21 mcg/actuation HFAA inhaler Inhale 2 puffs into the lungs every 12 (twelve) hours. Controller 12 g 2    gabapentin (NEURONTIN) 100 MG " capsule TAKE 1 CAPSULE(100 MG) BY MOUTH THREE TIMES DAILY 90 capsule 2    HYDROcodone-acetaminophen (NORCO)  mg per tablet Take 1 tablet by mouth every 6 (six) hours as needed for Pain. 12 tablet 0    losartan-hydrochlorothiazide 100-25 mg (HYZAAR) 100-25 mg per tablet Take 1 tablet by mouth once daily. 90 tablet 3    pantoprazole (PROTONIX) 20 MG tablet Take 20 mg by mouth every morning.      potassium chloride (MICRO-K) 10 MEQ CpSR Take 1 capsule (10 mEq total) by mouth once daily. 90 capsule 3    simvastatin (ZOCOR) 20 MG tablet Take 1 tablet (20 mg total) by mouth every evening. 90 tablet 3    tamsulosin (FLOMAX) 0.4 mg Cap Take 1 capsule (0.4 mg total) by mouth every evening. 90 capsule 1        PMHx, PSHx, Allergies, Medications reviewed in epic    ROS negative except pain complaints in HPI    OBJECTIVE:    There were no vitals taken for this visit.    PHYSICAL EXAMINATION:    GENERAL: Well appearing, in no acute distress, alert and oriented x3.  PSYCH:  Mood and affect appropriate.  SKIN: Skin color, texture, turgor normal, no rashes or lesions which will impact the procedure.  CV: RRR with palpation of the radial artery.  PULM: No evidence of respiratory difficulty, symmetric chest rise. Clear to auscultation.  NEURO: Cranial nerves grossly intact.    Plan:    Proceed with procedure as planned Procedure(s) (LRB):  Bilateral L4/5 +L5/S1 TF CHRISTOPHER (Bilateral)    Pradeep Oconnell MD  04/11/2023

## 2023-04-11 NOTE — DISCHARGE SUMMARY
Discharge Note  Short Stay      SUMMARY     Admit Date: 4/11/2023    Attending Physician: Pradeep Oconnell MD        Discharge Physician: Pradeep Oconnell MD        Discharge Date: 4/11/2023 9:27 AM    Procedure(s) (LRB):  Bilateral L4/5 +L5/S1 TF CHRISTOPHER (Bilateral)    Final Diagnosis: Lumbar radiculopathy [M54.16]    Disposition: Home or self care    Patient Instructions:   Current Discharge Medication List        CONTINUE these medications which have NOT CHANGED    Details   atenoloL (TENORMIN) 25 MG tablet Take 25 mg by mouth once daily.      losartan-hydrochlorothiazide 100-25 mg (HYZAAR) 100-25 mg per tablet Take 1 tablet by mouth once daily.  Qty: 90 tablet, Refills: 3    Comments: .      metFORMIN (GLUCOPHAGE-XR) 500 MG ER 24hr tablet TAKE 2 TABLETS BY MOUTH DAILY WITH BREAKFAST AND 1 TABLET WITH DINNER  Qty: 270 tablet, Refills: 1      !! albuterol (PROVENTIL/VENTOLIN HFA) 90 mcg/actuation inhaler Inhale 1-2 puffs into the lungs every 4 to 6 hours as needed for Wheezing or Shortness of Breath.  Qty: 18 g, Refills: 3    Associated Diagnoses: Asthma in adult, unspecified asthma severity, unspecified whether complicated, unspecified whether persistent; Chronic cough      !! albuterol (VENTOLIN HFA) 90 mcg/actuation inhaler Inhale 2 puffs into the lungs every 6 (six) hours as needed for Wheezing. Rescue  Qty: 1 g, Refills: 0    Associated Diagnoses: Upper respiratory tract infection, unspecified type      allopurinoL (ZYLOPRIM) 100 MG tablet Take 1 tablet (100 mg total) by mouth once daily.  Qty: 90 tablet, Refills: 3      apixaban (ELIQUIS) 5 mg Tab Take 1 tablet (5 mg total) by mouth 2 (two) times daily.  Qty: 180 tablet, Refills: 1    Associated Diagnoses: PAF (paroxysmal atrial fibrillation)      baclofen (LIORESAL) 10 MG tablet Take 1 tablet (10 mg total) by mouth 3 (three) times daily.  Qty: 30 tablet, Refills: 0    Associated Diagnoses: Back strain, initial encounter      BD ULTRA-FINE SHORT PEN NEEDLE 31 gauge x  "5/16" Ndle USE 1 PEN NEEDLE UNDER THE SKIN TWICE A DAY  Qty: 180 each, Refills: 0      cyanocobalamin (VITAMIN B-12) 100 MCG tablet Take by mouth.      diclofenac sodium (VOLTAREN) 1 % Gel Apply 2 g topically 3 (three) times daily.  Qty: 350 g, Refills: 2    Associated Diagnoses: Primary osteoarthritis of both knees      DULoxetine (CYMBALTA) 30 MG capsule Take 1 capsule (30 mg total) by mouth once daily.  Qty: 30 capsule, Refills: 2    Associated Diagnoses: Lumbar radiculopathy, chronic      ergocalciferol, vitamin D2, 400 unit Tab Take by mouth.      ferrous sulfate, dried (SLOW FE) 160 mg (50 mg iron) TbSR Take by mouth.      fexofenadine (ALLEGRA) 180 MG tablet Take 1 tablet (180 mg total) by mouth once daily.  Qty: 30 tablet, Refills: 2    Associated Diagnoses: Dysfunction of both eustachian tubes      flecainide (TAMBOCOR) 50 MG Tab Take 50 mg by mouth 2 (two) times daily.      fluticasone propion-salmeterol 45-21 mcg/dose (ADVAIR HFA) 45-21 mcg/actuation HFAA inhaler Inhale 2 puffs into the lungs every 12 (twelve) hours. Controller  Qty: 12 g, Refills: 2    Associated Diagnoses: Asthma in adult, unspecified asthma severity, unspecified whether complicated, unspecified whether persistent; Chronic cough      gabapentin (NEURONTIN) 100 MG capsule TAKE 1 CAPSULE(100 MG) BY MOUTH THREE TIMES DAILY  Qty: 90 capsule, Refills: 2      HYDROcodone-acetaminophen (NORCO)  mg per tablet Take 1 tablet by mouth every 6 (six) hours as needed for Pain.  Qty: 12 tablet, Refills: 0    Comments: Quantity prescribed more than 7 day supply? No      insulin lispro protamin-lispro 100 unit/mL (75-25) InPn INJECT 30 UNITS UNDER THE SKIN EVERY MORNING AND 20 UNITS EVERY EVENING  Qty: 60 mL, Refills: 0      montelukast (SINGULAIR) 10 mg tablet Take 1 tablet (10 mg total) by mouth every evening.  Qty: 90 tablet, Refills: 1    Associated Diagnoses: Mild intermittent asthma in adult without complication      pantoprazole (PROTONIX) " 20 MG tablet Take 20 mg by mouth every morning.      potassium chloride (MICRO-K) 10 MEQ CpSR Take 1 capsule (10 mEq total) by mouth once daily.  Qty: 90 capsule, Refills: 3      simvastatin (ZOCOR) 20 MG tablet Take 1 tablet (20 mg total) by mouth every evening.  Qty: 90 tablet, Refills: 3      tamsulosin (FLOMAX) 0.4 mg Cap Take 1 capsule (0.4 mg total) by mouth every evening.  Qty: 90 capsule, Refills: 1    Associated Diagnoses: Urinary incontinence, unspecified type; Nocturia; Voiding difficulty      triamcinolone (NASACORT) 55 mcg nasal inhaler 2 sprays by Nasal route once daily.  Qty: 16.9 mL, Refills: 5    Associated Diagnoses: Chronic rhinitis       !! - Potential duplicate medications found. Please discuss with provider.              Discharge Diagnosis: Lumbar radiculopathy [M54.16]  Condition on Discharge: Stable with no complications to procedure   Diet on Discharge: Same as before.  Activity: as per instruction sheet.  Discharge to: Home with a responsible adult.  Follow up: 2-4 weeks       Please call the office at [phone] if you experience any weakness or loss of sensation, fever > 101.5, pain uncontrolled with oral medications, persistent nausea/vomiting/or diarrhea, redness or drainage from the incisions, or any other worrisome concerns. If physician on call was not reached or could not communicate with our office for any reason please go to the nearest emergency department

## 2023-04-19 ENCOUNTER — PATIENT MESSAGE (OUTPATIENT)
Dept: ADMINISTRATIVE | Facility: HOSPITAL | Age: 68
End: 2023-04-19
Payer: COMMERCIAL

## 2023-05-01 RX ORDER — GABAPENTIN 100 MG/1
CAPSULE ORAL
Qty: 90 CAPSULE | Refills: 9 | Status: SHIPPED | OUTPATIENT
Start: 2023-05-01 | End: 2023-09-12 | Stop reason: SDUPTHER

## 2023-05-01 NOTE — TELEPHONE ENCOUNTER
Care Due:                  Date            Visit Type   Department     Provider  --------------------------------------------------------------------------------                                EP -                              PRIMARY      [facility]  Last Visit: 02-      CARE ([facility])   MEDICINE       Gabbie Ronquillo  Next Visit: None Scheduled  None         None Found                                                            Last  Test          Frequency    Reason                     Performed    Due Date  --------------------------------------------------------------------------------    Uric Acid...  12 months..  allopurinoL..............  Not Found    Overdue    [employer] Embedded Care Due Messages. Reference number: [id number].   5/01/2023 1:24:06 PM CDT

## 2023-06-07 DIAGNOSIS — Z12.31 OTHER SCREENING MAMMOGRAM: ICD-10-CM

## 2023-07-05 ENCOUNTER — OFFICE VISIT (OUTPATIENT)
Dept: PODIATRY | Facility: CLINIC | Age: 68
End: 2023-07-05
Payer: COMMERCIAL

## 2023-07-05 VITALS — BODY MASS INDEX: 39.71 KG/M2 | HEIGHT: 67 IN | WEIGHT: 253 LBS

## 2023-07-05 DIAGNOSIS — B35.1 PAIN DUE TO ONYCHOMYCOSIS OF TOENAILS OF BOTH FEET: Primary | ICD-10-CM

## 2023-07-05 DIAGNOSIS — M79.675 PAIN DUE TO ONYCHOMYCOSIS OF TOENAILS OF BOTH FEET: Primary | ICD-10-CM

## 2023-07-05 DIAGNOSIS — M79.674 PAIN DUE TO ONYCHOMYCOSIS OF TOENAILS OF BOTH FEET: Primary | ICD-10-CM

## 2023-07-05 DIAGNOSIS — M79.674 TOE PAIN, BILATERAL: ICD-10-CM

## 2023-07-05 DIAGNOSIS — M79.675 TOE PAIN, BILATERAL: ICD-10-CM

## 2023-07-05 PROCEDURE — 99213 OFFICE O/P EST LOW 20 MIN: CPT | Mod: 25,S$GLB,, | Performed by: PODIATRIST

## 2023-07-05 PROCEDURE — 99213 PR OFFICE/OUTPT VISIT, EST, LEVL III, 20-29 MIN: ICD-10-PCS | Mod: 25,S$GLB,, | Performed by: PODIATRIST

## 2023-07-05 PROCEDURE — 1126F PR PAIN SEVERITY QUANTIFIED, NO PAIN PRESENT: ICD-10-PCS | Mod: CPTII,S$GLB,, | Performed by: PODIATRIST

## 2023-07-05 PROCEDURE — 11721 DEBRIDE NAIL 6 OR MORE: CPT | Mod: S$GLB,,, | Performed by: PODIATRIST

## 2023-07-05 PROCEDURE — 99999 PR PBB SHADOW E&M-EST. PATIENT-LVL IV: CPT | Mod: PBBFAC,,, | Performed by: PODIATRIST

## 2023-07-05 PROCEDURE — 3008F PR BODY MASS INDEX (BMI) DOCUMENTED: ICD-10-PCS | Mod: CPTII,S$GLB,, | Performed by: PODIATRIST

## 2023-07-05 PROCEDURE — 1101F PT FALLS ASSESS-DOCD LE1/YR: CPT | Mod: CPTII,S$GLB,, | Performed by: PODIATRIST

## 2023-07-05 PROCEDURE — 3288F FALL RISK ASSESSMENT DOCD: CPT | Mod: CPTII,S$GLB,, | Performed by: PODIATRIST

## 2023-07-05 PROCEDURE — 3008F BODY MASS INDEX DOCD: CPT | Mod: CPTII,S$GLB,, | Performed by: PODIATRIST

## 2023-07-05 PROCEDURE — 1159F PR MEDICATION LIST DOCUMENTED IN MEDICAL RECORD: ICD-10-PCS | Mod: CPTII,S$GLB,, | Performed by: PODIATRIST

## 2023-07-05 PROCEDURE — 1126F AMNT PAIN NOTED NONE PRSNT: CPT | Mod: CPTII,S$GLB,, | Performed by: PODIATRIST

## 2023-07-05 PROCEDURE — 1159F MED LIST DOCD IN RCRD: CPT | Mod: CPTII,S$GLB,, | Performed by: PODIATRIST

## 2023-07-05 PROCEDURE — 1101F PR PT FALLS ASSESS DOC 0-1 FALLS W/OUT INJ PAST YR: ICD-10-PCS | Mod: CPTII,S$GLB,, | Performed by: PODIATRIST

## 2023-07-05 PROCEDURE — 3288F PR FALLS RISK ASSESSMENT DOCUMENTED: ICD-10-PCS | Mod: CPTII,S$GLB,, | Performed by: PODIATRIST

## 2023-07-05 PROCEDURE — 99999 PR PBB SHADOW E&M-EST. PATIENT-LVL IV: ICD-10-PCS | Mod: PBBFAC,,, | Performed by: PODIATRIST

## 2023-07-05 PROCEDURE — 11721 PR DEBRIDEMENT OF NAILS, 6 OR MORE: ICD-10-PCS | Mod: S$GLB,,, | Performed by: PODIATRIST

## 2023-07-05 NOTE — PROGRESS NOTES
[facility]  PODIATRIC MEDICINE AND SURGERY      CHIEF COMPLAINT   Chief Complaint   Patient presents with    Foot Problem     C/o Fungus on both great toenails, more on the left foot, 0 pain, x several years, diabetic, wears casual shoes         HPI:    Latosha Enriquez is a 67 y.o. female presenting to podiatry clinic with complaint of fungal infection on both great toenails. The patient has tried over the counter medications with some success, but the problem is recurrent and aggravating. Patient inquires about available treatment options. No further pedal complaints.      PMH  Past Medical History:   Diagnosis Date    Asthma     Colon polyps     Diabetes mellitus type II, uncontrolled     Diabetic retinopathy     Diverticulosis of large intestine without hemorrhage 11/23/2015    Elevated liver enzymes     GERD (gastroesophageal reflux disease)     Gout, unspecified     Hemorrhoids, internal 11/23/2015    History of blood transfusion     Hyperlipidemia     Hypertension     IBS (irritable bowel syndrome)     Morbid obesity with BMI of 40.0-44.9, adult     Nasal vestibulitis     Osteoarthritis of multiple joints     Urolithiasis     Vitamin D deficiency disease        PROBLEM LIST  Patient Active Problem List    Diagnosis Date Noted    Lumbar radiculopathy 04/11/2023    Lumbar radiculopathy, chronic 12/23/2022    Asthma 06/16/2022    Morbid obesity with BMI of 40.0-44.9, adult 01/03/2022    PVC's (premature ventricular contractions) 01/15/2021    KATHERINE (obstructive sleep apnea) 09/22/2020    Dryness, eye 07/23/2020    Type 2 diabetes mellitus without complication, with long-term current use of insulin 07/13/2020    Amaurosis fugax 05/21/2020    PAF (paroxysmal atrial fibrillation) 05/07/2020    Angina pectoris 02/10/2019    Kidney stones, calcium oxalate 05/11/2017    Type 2 diabetes mellitus with both eyes affected by mild nonproliferative retinopathy and macular edema, with long-term current use of  "insulin 11/09/2016    Essential hypertension     Vitamin D deficiency disease     Primary osteoarthritis of both knees     GERD (gastroesophageal reflux disease)     Hyperlipidemia     Hemorrhoids, internal 11/23/2015       MEDS  Current Outpatient Medications on File Prior to Visit   Medication Sig Dispense Refill    albuterol (PROVENTIL/VENTOLIN HFA) 90 mcg/actuation inhaler Inhale 1-2 puffs into the lungs every 4 to 6 hours as needed for Wheezing or Shortness of Breath. 18 g 3    albuterol (VENTOLIN HFA) 90 mcg/actuation inhaler Inhale 2 puffs into the lungs every 6 (six) hours as needed for Wheezing. Rescue 1 g 0    allopurinoL (ZYLOPRIM) 100 MG tablet Take 1 tablet (100 mg total) by mouth once daily. 90 tablet 3    apixaban (ELIQUIS) 5 mg Tab Take 1 tablet (5 mg total) by mouth 2 (two) times daily. 180 tablet 1    atenoloL (TENORMIN) 25 MG tablet Take 25 mg by mouth once daily.      BD ULTRA-FINE SHORT PEN NEEDLE 31 gauge x 5/16" Ndle USE 1 PEN NEEDLE UNDER THE SKIN TWICE A  each 0    cyanocobalamin (VITAMIN B-12) 100 MCG tablet Take by mouth.      diclofenac sodium (VOLTAREN) 1 % Gel Apply 2 g topically 3 (three) times daily. 350 g 2    ergocalciferol, vitamin D2, 400 unit Tab Take by mouth.      ferrous sulfate, dried (SLOW FE) 160 mg (50 mg iron) TbSR Take by mouth.      flecainide (TAMBOCOR) 50 MG Tab Take 50 mg by mouth 2 (two) times daily.      gabapentin (NEURONTIN) 100 MG capsule TAKE 1 CAPSULE(100 MG) BY MOUTH THREE TIMES DAILY 90 capsule 9    HYDROcodone-acetaminophen (NORCO)  mg per tablet Take 1 tablet by mouth every 6 (six) hours as needed for Pain. 12 tablet 0    insulin lispro protamin-lispro 100 unit/mL (75-25) InPn INJECT 30 UNITS UNDER THE SKIN EVERY MORNING AND 20 UNITS EVERY EVENING 60 mL 0    losartan-hydrochlorothiazide 100-25 mg (HYZAAR) 100-25 mg per tablet Take 1 tablet by mouth once daily. 90 tablet 3    metFORMIN (GLUCOPHAGE-XR) 500 MG ER 24hr tablet TAKE 2 TABLETS BY " MOUTH DAILY WITH BREAKFAST AND 1 TABLET WITH DINNER 270 tablet 1    montelukast (SINGULAIR) 10 mg tablet Take 1 tablet (10 mg total) by mouth every evening. 90 tablet 1    pantoprazole (PROTONIX) 20 MG tablet Take 20 mg by mouth every morning.      potassium chloride (MICRO-K) 10 MEQ CpSR Take 1 capsule (10 mEq total) by mouth once daily. 90 capsule 3    simvastatin (ZOCOR) 20 MG tablet Take 1 tablet (20 mg total) by mouth every evening. 90 tablet 3    tamsulosin (FLOMAX) 0.4 mg Cap Take 1 capsule (0.4 mg total) by mouth every evening. 90 capsule 1    triamcinolone (NASACORT) 55 mcg nasal inhaler 2 sprays by Nasal route once daily. 16.9 mL 5    baclofen (LIORESAL) 10 MG tablet Take 1 tablet (10 mg total) by mouth 3 (three) times daily. 30 tablet 0    DULoxetine (CYMBALTA) 30 MG capsule Take 1 capsule (30 mg total) by mouth once daily. 30 capsule 2    fexofenadine (ALLEGRA) 180 MG tablet Take 1 tablet (180 mg total) by mouth once daily. 30 tablet 2    fluticasone propion-salmeterol 45-21 mcg/dose (ADVAIR HFA) 45-21 mcg/actuation HFAA inhaler Inhale 2 puffs into the lungs every 12 (twelve) hours. Controller 12 g 2     No current facility-administered medications on file prior to visit.       PSH     Past Surgical History:   Procedure Laterality Date    BREAST BIOPSY Right      SECTION, CLASSIC      COLONOSCOPY N/A 2015    Procedure: COLONOSCOPY;  Surgeon: Stanislav Pickard MD;  Location: [facility] ENDO;  Service: Endoscopy;  Laterality: N/A;    CYSTOSCOPY W/ URETERAL STENT PLACEMENT  2017    EPIDURAL STEROID INJECTION Left 2022    Procedure: Lumbar L4/5 IL CHRISTOPHER with left paramedian approach RN IV Sedation;  Surgeon: Pradeep Oconnell MD;  Location: [facility] PAIN T;  Service: Pain Management;  Laterality: Left;    EXTRACORPOREAL SHOCK WAVE LITHOTRIPSY      LEFT HEART CATHETERIZATION Left 2019    Procedure: CATHETERIZATION, HEART, LEFT;  Surgeon: Haile Suggs MD;  Location: [facility] CATH LAB;  Service:  "Cardiology;  Laterality: Left;  10:30-11am start/poss rt radial approach    SELECTIVE INJECTION OF ANESTHETIC AGENT AROUND LUMBAR SPINAL NERVE ROOT BY TRANSFORAMINAL APPROACH Bilateral 4/11/2023    Procedure: Bilateral L4/5 +L5/S1 TF CHRISTOPHER;  Surgeon: Pradeep Oconnell MD;  Location: [facility] PAIN MGT;  Service: Pain Management;  Laterality: Bilateral;    TOTAL ABDOMINAL HYSTERECTOMY      URETEROSCOPY  05/2017        ALL  Review of patient's allergies indicates:   Allergen Reactions    Antihistamines - alkylamine Anaphylaxis and Itching     Cough, throat itches    Chocolate flavor Other (See Comments)     disoriented    Doxycycline Other (See Comments)     Stomach pain    Betadine [povidone-iodine] Itching    Strawberries [strawberry] Other (See Comments)    Iodine and iodide containing products Other (See Comments)     Tongue swelling    Tramadol Nausea Only    Yeast Itching     Facial swelling, constipation       SOC     Social History     Tobacco Use    Smoking status: Never    Smokeless tobacco: Never   Substance Use Topics    Alcohol use: No    Drug use: No         Family HX    Family History   Problem Relation Age of Onset    Hypertension Mother     Heart disease Mother     Diabetes Mother     Colon polyps Mother     Other Mother         Lower limb amputation    Hypertension Brother     Stroke Brother     Colon cancer Maternal Grandmother     Diabetes Brother     Breast cancer Maternal Cousin             REVIEW OF SYSTEMS  General: Denies any fever or chills  Chest: Denies shortness of breath, wheezing, coughing, or sputum production  Heart: Denies chest pain, cold extremities, orthopenia, or reduced exercise tolerance  As noted above and per history of current illness above, otherwise negative in the remainder of the 14 systems.      PHYSICAL EXAM:      Vitals:    07/05/23 1605   Weight: 114.8 kg (253 lb)   Height: 5' 7" (1.702 m)   PainSc: 0-No pain       General: This patient is well-developed, well-nourished and " appears stated age, well-oriented to person, place and time, and cooperative and pleasant on today's visit    LOWER EXTREMITY PHYSICAL EXAM  VASCULAR  Dorsalis pedis and posterior tibial pulses palpable 2/4 bilaterally.   Capillary refill time immediate to the toes.   Feet are warm to the touch. Skin temperature warm to warm from proximally to distally   There are no varicosities, telangiectasias noted to bilateral foot and ankle regions.   There are no ecchymoses noted to bilateral foot and ankle regions.   There is no gross lower extremity edema.    DERMATOLOGIC  There are thickened discolored, elongated mycotic nails suggestive of onychomycosis   Skin moist with healthy texture and turgor.  There are no open ulcerations, lacerations, or fissures to bilateral foot and ankle regions. There are no signs of infection as there is no erythema, no proximal-extending lymphangiitis, no fluctuance, or crepitus noted on palpation to bilateral foot and ankle regions.     NEUROLOGIC  Epicritic sensation is intact as the patient is able to sense light touch to bilateral foot and ankle regions.   Achilles and patellar deep tendon reflexes intact  Babinski reflex absent    ORTHOPEDIC/BIOMECHANICAL  No symptomatic structural abnormalities noted  Muscle strength AT/EHL/EDL/PT: 5/5; Achilles/Gastroc/Soleus: 5/5; PB/PL: 5/5 Muscle tone is normal.  Ankle joint ROM INTACT DF/PF, non-crepitus      ASSESSMENT   Pain due to onychomycosis of toenails of both feet    Toe pain, bilateral        PLAN    1. Patient was educated about clinical and imaging findings, and verbalizes understanding of above.  2. I Discussed treatment options for nail fungus.     -With patient's permission, the elongated onychomycotic toenails, as outlined in the physical examination, were sharply debrided with a double action nail nipper to their soft tissue attachment. If indicated, the nails were then smoothed down in thickness with a mechanical rotary mandy  and/or [place] board to facilitate in further debridement removing all offending nail and subungual debris.    3. RTC  for follow up/evaluation as scheduled      Report Electronically Signed By:     Samantha Menjivar DPM   Podiatry  [facility]  7/25/2023

## 2023-07-07 ENCOUNTER — PATIENT MESSAGE (OUTPATIENT)
Dept: INFECTIOUS DISEASES | Facility: CLINIC | Age: 68
End: 2023-07-07
Payer: COMMERCIAL

## 2023-07-19 ENCOUNTER — PATIENT MESSAGE (OUTPATIENT)
Dept: FAMILY MEDICINE | Facility: CLINIC | Age: 68
End: 2023-07-19
Payer: COMMERCIAL

## 2023-07-20 ENCOUNTER — HOSPITAL ENCOUNTER (OUTPATIENT)
Dept: RADIOLOGY | Facility: HOSPITAL | Age: 68
Discharge: HOME | End: 2023-07-20
Attending: FAMILY MEDICINE
Payer: COMMERCIAL

## 2023-07-20 VITALS — HEIGHT: 67 IN | WEIGHT: 251.31 LBS | BODY MASS INDEX: 39.44 KG/M2

## 2023-07-20 DIAGNOSIS — Z12.31 OTHER SCREENING MAMMOGRAM: ICD-10-CM

## 2023-07-20 PROCEDURE — 77063 MAMMO DIGITAL SCREENING BILAT WITH TOMO: ICD-10-PCS | Mod: 26,,, | Performed by: RADIOLOGY

## 2023-07-20 PROCEDURE — 77063 BREAST TOMOSYNTHESIS BI: CPT | Mod: 26,,, | Performed by: RADIOLOGY

## 2023-07-20 PROCEDURE — 77067 SCR MAMMO BI INCL CAD: CPT | Mod: 26,,, | Performed by: RADIOLOGY

## 2023-07-20 PROCEDURE — 77067 SCR MAMMO BI INCL CAD: CPT | Mod: TC

## 2023-07-20 PROCEDURE — 77067 MAMMO DIGITAL SCREENING BILAT WITH TOMO: ICD-10-PCS | Mod: 26,,, | Performed by: RADIOLOGY

## 2023-08-03 ENCOUNTER — HOSPITAL ENCOUNTER (EMERGENCY)
Facility: HOSPITAL | Age: 68
Discharge: HOME | End: 2023-08-03
Attending: EMERGENCY MEDICINE
Payer: COMMERCIAL

## 2023-08-03 VITALS
RESPIRATION RATE: 20 BRPM | SYSTOLIC BLOOD PRESSURE: 179 MMHG | HEART RATE: 69 BPM | TEMPERATURE: 98 F | OXYGEN SATURATION: 95 % | DIASTOLIC BLOOD PRESSURE: 83 MMHG

## 2023-08-03 DIAGNOSIS — N20.0 KIDNEY STONE: Primary | ICD-10-CM

## 2023-08-03 LAB
ALBUMIN SERPL BCP-MCNC: 3.9 G/DL (ref 3.5–5.2)
ALP SERPL-CCNC: 86 U/L (ref 55–135)
ALT SERPL W/O P-5'-P-CCNC: 30 U/L (ref 10–44)
ANION GAP SERPL CALC-SCNC: 10 MMOL/L (ref 8–16)
AST SERPL-CCNC: 14 U/L (ref 10–40)
BACTERIA #/AREA URNS HPF: ABNORMAL /HPF
BASOPHILS # BLD AUTO: 0.08 K/UL (ref 0–0.2)
BASOPHILS NFR BLD: 0.5 % (ref 0–1.9)
BILIRUB SERPL-MCNC: 0.3 MG/DL (ref 0.1–1)
BILIRUB UR QL STRIP: NEGATIVE
BUN SERPL-MCNC: 19 MG/DL (ref 8–23)
CALCIUM SERPL-MCNC: 9.6 MG/DL (ref 8.7–10.5)
CHLORIDE SERPL-SCNC: 105 MMOL/L (ref 95–110)
CLARITY UR: ABNORMAL
CO2 SERPL-SCNC: 25 MMOL/L (ref 23–29)
COLOR UR: ABNORMAL
CREAT SERPL-MCNC: 0.8 MG/DL (ref 0.5–1.4)
DIFFERENTIAL METHOD: ABNORMAL
EOSINOPHIL # BLD AUTO: 0.1 K/UL (ref 0–0.5)
EOSINOPHIL NFR BLD: 0.9 % (ref 0–8)
ERYTHROCYTE [DISTWIDTH] IN BLOOD BY AUTOMATED COUNT: 12.5 % (ref 11.5–14.5)
EST. GFR  (NO RACE VARIABLE): >60 ML/MIN/1.73 M^2
GLUCOSE SERPL-MCNC: 205 MG/DL (ref 70–110)
GLUCOSE UR QL STRIP: NEGATIVE
HCT VFR BLD AUTO: 37.1 % (ref 37–48.5)
HGB BLD-MCNC: 12 G/DL (ref 12–16)
HGB UR QL STRIP: ABNORMAL
HYALINE CASTS #/AREA URNS LPF: 0 /LPF
IMM GRANULOCYTES # BLD AUTO: 0.05 K/UL (ref 0–0.04)
IMM GRANULOCYTES NFR BLD AUTO: 0.3 % (ref 0–0.5)
KETONES UR QL STRIP: NEGATIVE
LEUKOCYTE ESTERASE UR QL STRIP: ABNORMAL
LYMPHOCYTES # BLD AUTO: 2.7 K/UL (ref 1–4.8)
LYMPHOCYTES NFR BLD: 18.3 % (ref 18–48)
MCH RBC QN AUTO: 30.7 PG (ref 27–31)
MCHC RBC AUTO-ENTMCNC: 32.3 G/DL (ref 32–36)
MCV RBC AUTO: 95 FL (ref 82–98)
MICROSCOPIC COMMENT: ABNORMAL
MONOCYTES # BLD AUTO: 0.8 K/UL (ref 0.3–1)
MONOCYTES NFR BLD: 5.2 % (ref 4–15)
NEUTROPHILS # BLD AUTO: 11.1 K/UL (ref 1.8–7.7)
NEUTROPHILS NFR BLD: 74.8 % (ref 38–73)
NITRITE UR QL STRIP: NEGATIVE
NRBC BLD-RTO: 0 /100 WBC
PH UR STRIP: 6 [PH] (ref 5–8)
PLATELET # BLD AUTO: 243 K/UL (ref 150–450)
PMV BLD AUTO: 9.9 FL (ref 9.2–12.9)
POTASSIUM SERPL-SCNC: 3.7 MMOL/L (ref 3.5–5.1)
PROT SERPL-MCNC: 7.6 G/DL (ref 6–8.4)
PROT UR QL STRIP: ABNORMAL
RBC # BLD AUTO: 3.91 M/UL (ref 4–5.4)
RBC #/AREA URNS HPF: >100 /HPF (ref 0–4)
SODIUM SERPL-SCNC: 140 MMOL/L (ref 136–145)
SP GR UR STRIP: >1.03 (ref 1–1.03)
SQUAMOUS #/AREA URNS HPF: 4 /HPF
URN SPEC COLLECT METH UR: ABNORMAL
UROBILINOGEN UR STRIP-ACNC: NEGATIVE EU/DL
WBC # BLD AUTO: 14.87 K/UL (ref 3.9–12.7)
WBC #/AREA URNS HPF: 28 /HPF (ref 0–5)
YEAST URNS QL MICRO: ABNORMAL

## 2023-08-03 PROCEDURE — 63600175 PHARM REV CODE 636 W HCPCS: Performed by: NURSE PRACTITIONER

## 2023-08-03 PROCEDURE — 25000003 PHARM REV CODE 250: Performed by: NURSE PRACTITIONER

## 2023-08-03 PROCEDURE — 81000 URINALYSIS NONAUTO W/SCOPE: CPT | Performed by: NURSE PRACTITIONER

## 2023-08-03 PROCEDURE — 87088 URINE BACTERIA CULTURE: CPT | Performed by: NURSE PRACTITIONER

## 2023-08-03 PROCEDURE — 87186 SC STD MICRODIL/AGAR DIL: CPT | Performed by: NURSE PRACTITIONER

## 2023-08-03 PROCEDURE — 85025 COMPLETE CBC W/AUTO DIFF WBC: CPT | Performed by: NURSE PRACTITIONER

## 2023-08-03 PROCEDURE — 96366 THER/PROPH/DIAG IV INF ADDON: CPT

## 2023-08-03 PROCEDURE — 96375 TX/PRO/DX INJ NEW DRUG ADDON: CPT

## 2023-08-03 PROCEDURE — 87077 CULTURE AEROBIC IDENTIFY: CPT | Performed by: NURSE PRACTITIONER

## 2023-08-03 PROCEDURE — 96365 THER/PROPH/DIAG IV INF INIT: CPT

## 2023-08-03 PROCEDURE — 80053 COMPREHEN METABOLIC PANEL: CPT | Performed by: NURSE PRACTITIONER

## 2023-08-03 PROCEDURE — 87086 URINE CULTURE/COLONY COUNT: CPT | Performed by: NURSE PRACTITIONER

## 2023-08-03 PROCEDURE — 99285 EMERGENCY DEPT VISIT HI MDM: CPT | Mod: 25

## 2023-08-03 RX ORDER — ONDANSETRON 2 MG/ML
4 INJECTION INTRAMUSCULAR; INTRAVENOUS
Status: COMPLETED | OUTPATIENT
Start: 2023-08-03 | End: 2023-08-03

## 2023-08-03 RX ORDER — CEPHALEXIN 500 MG/1
500 CAPSULE ORAL
Status: COMPLETED | OUTPATIENT
Start: 2023-08-03 | End: 2023-08-03

## 2023-08-03 RX ORDER — CEPHALEXIN 500 MG/1
500 CAPSULE ORAL 4 TIMES DAILY
Qty: 20 CAPSULE | Refills: 0 | Status: SHIPPED | OUTPATIENT
Start: 2023-08-03 | End: 2023-08-08

## 2023-08-03 RX ORDER — HYDROMORPHONE HYDROCHLORIDE 2 MG/ML
1 INJECTION, SOLUTION INTRAMUSCULAR; INTRAVENOUS; SUBCUTANEOUS
Status: COMPLETED | OUTPATIENT
Start: 2023-08-03 | End: 2023-08-03

## 2023-08-03 RX ADMIN — ONDANSETRON 4 MG: 2 INJECTION INTRAMUSCULAR; INTRAVENOUS at 07:08

## 2023-08-03 RX ADMIN — PROMETHAZINE HYDROCHLORIDE 12.5 MG: 25 INJECTION INTRAMUSCULAR; INTRAVENOUS at 09:08

## 2023-08-03 RX ADMIN — HYDROMORPHONE HYDROCHLORIDE 1 MG: 2 INJECTION INTRAMUSCULAR; INTRAVENOUS; SUBCUTANEOUS at 07:08

## 2023-08-03 RX ADMIN — SODIUM CHLORIDE 1000 ML: 9 INJECTION, SOLUTION INTRAVENOUS at 07:08

## 2023-08-03 RX ADMIN — CEPHALEXIN 500 MG: 500 CAPSULE ORAL at 08:08

## 2023-08-04 NOTE — FIRST PROVIDER EVALUATION
Medical screening examination initiated.  I have conducted a focused provider triage encounter, findings are as follows:    Brief history of present illness:  Patient complains of right flank pain she feels is certainly a kidney stone    There were no vitals filed for this visit.    Pertinent physical exam:  Pain distress    Brief workup plan:  Kidney stone workup    Preliminary workup initiated; this workup will be continued and followed by the physician or advanced practice provider that is assigned to the patient when roomed.

## 2023-08-04 NOTE — ED PROVIDER NOTES
Encounter Date: 8/3/2023       History     Chief Complaint   Patient presents with    Flank Pain     Pt states since 12 noon she has been dealing with increased right sided flank pain that radiates down to her right leg. pT has hx of this pain. Pt is visibly in pain in triage.      Patient complains of severe right flank pain is certain that she is had a kidney stone states that she thinks she is passed fragments of the stone already.        Review of patient's allergies indicates:   Allergen Reactions    Antihistamines - alkylamine Anaphylaxis and Itching     Cough, throat itches    Chocolate flavor Other (See Comments)     disoriented    Doxycycline Other (See Comments)     Stomach pain    Betadine [povidone-iodine] Itching    Strawberries [strawberry] Other (See Comments)    Iodine and iodide containing products Other (See Comments)     Tongue swelling    Tramadol Nausea Only    Yeast Itching     Facial swelling, constipation     Past Medical History:   Diagnosis Date    Asthma     Colon polyps     Diabetes mellitus type II, uncontrolled     Diabetic retinopathy     Diverticulosis of large intestine without hemorrhage 2015    Elevated liver enzymes     GERD (gastroesophageal reflux disease)     Gout, unspecified     Hemorrhoids, internal 2015    History of blood transfusion     Hyperlipidemia     Hypertension     IBS (irritable bowel syndrome)     Morbid obesity with BMI of 40.0-44.9, adult     Nasal vestibulitis     Osteoarthritis of multiple joints     Urolithiasis     Vitamin D deficiency disease      Past Surgical History:   Procedure Laterality Date    BREAST BIOPSY Right      SECTION, CLASSIC      COLONOSCOPY N/A 2015    Procedure: COLONOSCOPY;  Surgeon: Stanislav Pickard MD;  Location: [facility];  Service: Endoscopy;  Laterality: N/A;    CYSTOSCOPY W/ URETERAL STENT PLACEMENT  2017    EPIDURAL STEROID INJECTION Left 2022    Procedure: Lumbar L4/5 IL CHRISTOPHER with left paramedian  approach RN IV Sedation;  Surgeon: Pradeep Oconnell MD;  Location: [facility] PAIN MGT;  Service: Pain Management;  Laterality: Left;    EXTRACORPOREAL SHOCK WAVE LITHOTRIPSY      LEFT HEART CATHETERIZATION Left 2/20/2019    Procedure: CATHETERIZATION, HEART, LEFT;  Surgeon: Haile Suggs MD;  Location: [facility] CATH LAB;  Service: Cardiology;  Laterality: Left;  10:30-11am start/poss rt radial approach    SELECTIVE INJECTION OF ANESTHETIC AGENT AROUND LUMBAR SPINAL NERVE ROOT BY TRANSFORAMINAL APPROACH Bilateral 4/11/2023    Procedure: Bilateral L4/5 +L5/S1 TF CHRISTOPHER;  Surgeon: Pradeep Oconnell MD;  Location: [facility] PAIN MGT;  Service: Pain Management;  Laterality: Bilateral;    TOTAL ABDOMINAL HYSTERECTOMY      URETEROSCOPY  05/2017     Family History   Problem Relation Age of Onset    Hypertension Mother     Heart disease Mother     Diabetes Mother     Colon polyps Mother     Other Mother         Lower limb amputation    Hypertension Brother     Stroke Brother     Colon cancer Maternal Grandmother     Diabetes Brother     Breast cancer Maternal Cousin      Social History     Tobacco Use    Smoking status: Never    Smokeless tobacco: Never   Substance Use Topics    Alcohol use: No    Drug use: No     Review of Systems   Constitutional:  Negative for fever.   HENT:  Negative for sore throat.    Respiratory:  Negative for shortness of breath.    Cardiovascular:  Negative for chest pain.   Gastrointestinal:  Negative for nausea.   Genitourinary:  Negative for dysuria.   Musculoskeletal:  Negative for back pain.   Skin:  Negative for rash.   Neurological:  Negative for weakness.   Hematological:  Does not bruise/bleed easily.       Physical Exam     Initial Vitals   BP Pulse Resp Temp SpO2   08/03/23 1952 08/03/23 1906 08/03/23 1906 08/03/23 1906 08/03/23 1906   (!) 179/83 89 (!) 22 98.1 °F (36.7 °C) 98 %      MAP       --                Physical Exam    Nursing note and vitals reviewed.  Constitutional: She appears well-developed and  well-nourished. She is not diaphoretic. She is active.  Non-toxic appearance. No distress (Pain distress).   HENT:   Head: Normocephalic and atraumatic.   Eyes: Conjunctivae are normal. Right eye exhibits no discharge. Left eye exhibits no discharge. No scleral icterus.   Neck:   Normal range of motion.  Cardiovascular:  Normal rate, regular rhythm and intact distal pulses.           No murmur heard.  Pulmonary/Chest: Breath sounds normal. No respiratory distress. She has no wheezes.   Abdominal: She exhibits no distension.   Musculoskeletal:         General: No tenderness. Normal range of motion.      Cervical back: Normal range of motion.     Neurological: She is alert and oriented to person, place, and time. No cranial nerve deficit. GCS score is 15. GCS eye subscore is 4. GCS verbal subscore is 5. GCS motor subscore is 6.   Skin: Skin is warm and dry. Capillary refill takes less than 2 seconds. No rash noted.   Psychiatric: She has a normal mood and affect. Her behavior is normal. Judgment and thought content normal.         ED Course   Procedures  Labs Reviewed   CBC W/ AUTO DIFFERENTIAL - Abnormal; Notable for the following components:       Result Value    WBC 14.87 (*)     RBC 3.91 (*)     Gran # (ANC) 11.1 (*)     Immature Grans (Abs) 0.05 (*)     Gran % 74.8 (*)     All other components within normal limits   COMPREHENSIVE METABOLIC PANEL - Abnormal; Notable for the following components:    Glucose 205 (*)     All other components within normal limits   URINALYSIS, REFLEX TO URINE CULTURE - Abnormal; Notable for the following components:    Color, UA Orange (*)     Appearance, UA Cloudy (*)     Specific Gravity, UA >1.030 (*)     Protein, UA 1+ (*)     Occult Blood UA 3+ (*)     Leukocytes, UA 2+ (*)     All other components within normal limits    Narrative:     Specimen Source->Urine   URINALYSIS MICROSCOPIC - Abnormal; Notable for the following components:    RBC, UA >100 (*)     WBC, UA 28 (*)      Yeast, UA Occasional (*)     All other components within normal limits    Narrative:     Specimen Source->Urine   CULTURE, URINE          Imaging Results              CT Abdomen Pelvis  Without Contrast (Final result)  Result time 08/03/23 19:47:34      Final result by Esvin Artis MD (08/03/23 19:47:34)                   Impression:      Right-sided hydronephrosis and hydroureter. Question dilated right renal pelvis versus right peripelvic cyst measures up to 7 cm x 6.2 cm. Nonobstructing punctate bilateral renal calculi.  The ureter appears dilated down to the level of the acetabulum where a punctate hyperdensity is identified in the distal ureter measuring up to 6 mm in transverse dimension and 9 mm in superior inferior dimension consistent with a obstructing distal right ureteral calculi.. No evidence for left-sided hydronephrosis    Mild fat containing and vessel containing left inferior anterior abdominal wall hernia    All CT scans   are performed using dose optimization techniques including the following: automated exposure control; adjustment of the mA and/or kV; use of iterative reconstruction technique.  Dose modulation was employed for ALARA by means of: Automated exposure control; adjustment of the mA and/or kV according to patient size (this includes techniques or standardized protocols for targeted exams where dose is matched to indication/reason for exam; i.e. extremities or head); and/or use of iterative reconstructive technique.      Electronically signed by: Esvin Artis  Date:    08/03/2023  Time:    19:47               Narrative:    EXAMINATION:  CT ABDOMEN PELVIS WITHOUT CONTRAST    CLINICAL HISTORY:  Flank pain, kidney stone suspected;    TECHNIQUE:  Low dose axial images, sagittal and coronal reformations were obtained from the lung bases to the pubic symphysis,    COMPARISON:  None    FINDINGS:  Heart: Normal in size as far as seen.  No pericardial effusion as far seen.    Lung Bases:  Well aerated, without consolidation or pleural fluid.    Liver: Normal in size and attenuation, with no focal hepatic lesions.    Gallbladder: No calcified gallstones.    Bile Ducts: No evidence of dilated ducts.    Pancreas: No mass or peripancreatic fat stranding.    Spleen: Unremarkable.    Adrenals: Unremarkable.    Kidneys/ Ureters: Right-sided hydronephrosis and hydroureter.  Question dilated right renal pelvis versus right peripelvic cyst measures up to 7 cm x 6.2 cm.  Nonobstructing punctate bilateral renal calculi.  The ureter appears dilated down to the level of the acetabulum where a punctate hyperdensity is identified in the distal ureter measuring up to 6 mm in transverse dimension and 9 mm in superior inferior dimension consistent with a obstructing distal right ureteral calculi..  No evidence for left-sided hydronephrosis    Bladder: No evidence of wall thickening.    Reproductive organs: Status post hysterectomy.    GI Tract/Mesentery: No evidence of bowel obstruction or inflammation. No secondary signs of appendicitis.  Mild colonic diverticuli    Peritoneal Space: No ascites. No free air.    Retroperitoneum: No significant adenopathy.    Abdominal wall: Inferior abdominal wall hernia measuring up to 3.1 cm in the superior inferior dimension.    Vasculature: No aneurysm.    Bones: No acute fracture.                                       Medications   sodium chloride 0.9% bolus 1,000 mL 1,000 mL (0 mLs Intravenous Stopped 8/3/23 1954)   HYDROmorphone (PF) injection 1 mg (1 mg Intravenous Given 8/3/23 1945)   ondansetron injection 4 mg (4 mg Intravenous Given 8/3/23 1945)   cephALEXin capsule 500 mg (500 mg Oral Given 8/3/23 2040)   promethazine (PHENERGAN) 12.5 mg in dextrose 5 % (D5W) 50 mL IVPB (12.5 mg Intravenous New Bag 8/3/23 2112)     Medical Decision Making:   Patient passed a large stone and collected it for analysis to follow up with Urology.  Delmar better after passing the stone this was  after the CT scan.  We do not have Urology on-call have a believe the patient is stable to follow up with Urology outpatient.                          Clinical Impression:   Final diagnoses:  [N20.0] Kidney stone (Primary)        ED Disposition Condition    Discharge Stable          ED Prescriptions       Medication Sig Dispense Start Date End Date Auth. Provider    cephALEXin (KEFLEX) 500 MG capsule Take 1 capsule (500 mg total) by mouth 4 (four) times daily. for 5 days 20 capsule 8/3/2023 8/8/2023 Kevin Del Toro NP          Follow-up Information       Follow up With Specialties Details Why Contact Info Additional Information    [facility] Schedule an appointment as soon as possible for a visit today  [id number] [facility] [id number] [phone] Please park on the [address] side and use the Clinic entrance. Check in on the 3rd floor, to the right.             Kevin Del Toro NP  08/03/23 3566

## 2023-08-06 LAB — BACTERIA UR CULT: ABNORMAL

## 2023-08-07 ENCOUNTER — TELEPHONE (OUTPATIENT)
Dept: INTERNAL MEDICINE | Facility: CLINIC | Age: 68
End: 2023-08-07
Payer: COMMERCIAL

## 2023-08-07 NOTE — TELEPHONE ENCOUNTER
Patient is requesting Flomax refill. Prescription was last filled in May of 2022 for a 90 day supply. Refilled denied, patient needs an appt.

## 2023-08-08 ENCOUNTER — OFFICE VISIT (OUTPATIENT)
Dept: UROLOGY | Facility: CLINIC | Age: 68
End: 2023-08-08
Payer: COMMERCIAL

## 2023-08-08 VITALS
BODY MASS INDEX: 39.44 KG/M2 | HEART RATE: 81 BPM | HEIGHT: 67 IN | SYSTOLIC BLOOD PRESSURE: 124 MMHG | DIASTOLIC BLOOD PRESSURE: 81 MMHG | WEIGHT: 251.31 LBS

## 2023-08-08 DIAGNOSIS — R39.198 VOIDING DIFFICULTY: ICD-10-CM

## 2023-08-08 DIAGNOSIS — N20.0 KIDNEY STONE: Primary | ICD-10-CM

## 2023-08-08 DIAGNOSIS — R35.1 NOCTURIA: ICD-10-CM

## 2023-08-08 DIAGNOSIS — R32 URINARY INCONTINENCE, UNSPECIFIED TYPE: ICD-10-CM

## 2023-08-08 PROCEDURE — 99999 PR PBB SHADOW E&M-EST. PATIENT-LVL V: CPT | Mod: PBBFAC,,, | Performed by: UROLOGY

## 2023-08-08 PROCEDURE — 1160F PR REVIEW ALL MEDS BY PRESCRIBER/CLIN PHARMACIST DOCUMENTED: ICD-10-PCS | Mod: CPTII,S$GLB,, | Performed by: UROLOGY

## 2023-08-08 PROCEDURE — 81002 POCT URINE DIPSTICK WITHOUT MICROSCOPE: ICD-10-PCS | Mod: S$GLB,,, | Performed by: UROLOGY

## 2023-08-08 PROCEDURE — 3008F PR BODY MASS INDEX (BMI) DOCUMENTED: ICD-10-PCS | Mod: CPTII,S$GLB,, | Performed by: UROLOGY

## 2023-08-08 PROCEDURE — 3008F BODY MASS INDEX DOCD: CPT | Mod: CPTII,S$GLB,, | Performed by: UROLOGY

## 2023-08-08 PROCEDURE — 99204 OFFICE O/P NEW MOD 45 MIN: CPT | Mod: S$GLB,,, | Performed by: UROLOGY

## 2023-08-08 PROCEDURE — 1159F PR MEDICATION LIST DOCUMENTED IN MEDICAL RECORD: ICD-10-PCS | Mod: CPTII,S$GLB,, | Performed by: UROLOGY

## 2023-08-08 PROCEDURE — 1101F PT FALLS ASSESS-DOCD LE1/YR: CPT | Mod: CPTII,S$GLB,, | Performed by: UROLOGY

## 2023-08-08 PROCEDURE — 3288F FALL RISK ASSESSMENT DOCD: CPT | Mod: CPTII,S$GLB,, | Performed by: UROLOGY

## 2023-08-08 PROCEDURE — 3288F PR FALLS RISK ASSESSMENT DOCUMENTED: ICD-10-PCS | Mod: CPTII,S$GLB,, | Performed by: UROLOGY

## 2023-08-08 PROCEDURE — 3074F PR MOST RECENT SYSTOLIC BLOOD PRESSURE < 130 MM HG: ICD-10-PCS | Mod: CPTII,S$GLB,, | Performed by: UROLOGY

## 2023-08-08 PROCEDURE — 3079F DIAST BP 80-89 MM HG: CPT | Mod: CPTII,S$GLB,, | Performed by: UROLOGY

## 2023-08-08 PROCEDURE — 81002 URINALYSIS NONAUTO W/O SCOPE: CPT | Mod: S$GLB,,, | Performed by: UROLOGY

## 2023-08-08 PROCEDURE — 1101F PR PT FALLS ASSESS DOC 0-1 FALLS W/OUT INJ PAST YR: ICD-10-PCS | Mod: CPTII,S$GLB,, | Performed by: UROLOGY

## 2023-08-08 PROCEDURE — 99999 PR PBB SHADOW E&M-EST. PATIENT-LVL V: ICD-10-PCS | Mod: PBBFAC,,, | Performed by: UROLOGY

## 2023-08-08 PROCEDURE — 99204 PR OFFICE/OUTPT VISIT, NEW, LEVL IV, 45-59 MIN: ICD-10-PCS | Mod: S$GLB,,, | Performed by: UROLOGY

## 2023-08-08 PROCEDURE — 3079F PR MOST RECENT DIASTOLIC BLOOD PRESSURE 80-89 MM HG: ICD-10-PCS | Mod: CPTII,S$GLB,, | Performed by: UROLOGY

## 2023-08-08 PROCEDURE — 3074F SYST BP LT 130 MM HG: CPT | Mod: CPTII,S$GLB,, | Performed by: UROLOGY

## 2023-08-08 PROCEDURE — 1160F RVW MEDS BY RX/DR IN RCRD: CPT | Mod: CPTII,S$GLB,, | Performed by: UROLOGY

## 2023-08-08 PROCEDURE — 1159F MED LIST DOCD IN RCRD: CPT | Mod: CPTII,S$GLB,, | Performed by: UROLOGY

## 2023-08-08 RX ORDER — TAMSULOSIN HYDROCHLORIDE 0.4 MG/1
0.4 CAPSULE ORAL NIGHTLY
Qty: 90 CAPSULE | Refills: 1 | Status: SHIPPED | OUTPATIENT
Start: 2023-08-08

## 2023-08-08 RX ORDER — ALLOPURINOL 100 MG/1
100 TABLET ORAL
Qty: 90 TABLET | Refills: 1 | Status: SHIPPED | OUTPATIENT
Start: 2023-08-08

## 2023-08-08 NOTE — TELEPHONE ENCOUNTER
Care Due:                  Date            Visit Type   Department     Provider  --------------------------------------------------------------------------------                                EP -                              PRIMARY      [facility]  Last Visit: 02-      CARE ([facility])   MEDICINE       Gabbie Ronquillo                              [facility]                              PRIMARY      [facility]  Next Visit: 09-      CARE ([facility])   MEDICINE       Gabbie Ronquillo                                                            Last  Test          Frequency    Reason                     Performed    Due Date  --------------------------------------------------------------------------------    HBA1C.......  6 months...  insulin, metFORMIN.......  12-   06-    Uric Acid...  12 months..  allopurinoL..............  Not Found    Overdue    [employer] Embedded Care Due Messages. Reference number: [id number].   8/08/2023 11:50:19 AM CDT

## 2023-08-08 NOTE — PROGRESS NOTES
Chief Complaint:   Encounter Diagnoses   Name Primary?    Kidney stone Yes    Urinary incontinence, unspecified type     Nocturia     Voiding difficulty        HPI:  HPI Latosha Enriquez miquel 67 y.o. female who presents with a long history of kidney stones.  She was seen in the emergency room a few days ago with a 6 x 9 mm right distal ureteral stone.  She was sent home on tamsulosin and Keflex as well as hydrocodone.  She states her pain is better but she does not think she is passed the stone.  She is no longer requiring narcotics.  She denies any fevers or chills.  She has been taking her antibiotics.  She states she passes approximately 12 stones a year.  She is had multiple procedures in the past.  She no longer seeks care for the kidney stones as she states attempts at prevention have been unsuccessful in the past.  She states she stopped her Eliquis when she went to the emergency room due to significant hematuria she was having at the time.  She is not restarted it.  She was on it for paroxysmal AFib.  She is not had AFib in many years she states.    History:  Past Medical History:   Diagnosis Date    Asthma     Colon polyps     Diabetes mellitus type II, uncontrolled     Diabetic retinopathy     Diverticulosis of large intestine without hemorrhage 2015    Elevated liver enzymes     GERD (gastroesophageal reflux disease)     Gout, unspecified     Hemorrhoids, internal 2015    History of blood transfusion     Hyperlipidemia     Hypertension     IBS (irritable bowel syndrome)     Morbid obesity with BMI of 40.0-44.9, adult     Nasal vestibulitis     Osteoarthritis of multiple joints     Urolithiasis     Vitamin D deficiency disease      Past Surgical History:   Procedure Laterality Date    BREAST BIOPSY Right      SECTION, CLASSIC      COLONOSCOPY N/A 2015    Procedure: COLONOSCOPY;  Surgeon: Stanislav Pickard MD;  Location: [facility];  Service: Endoscopy;  Laterality: N/A;    CYSTOSCOPY W/  "URETERAL STENT PLACEMENT  05/2017    EPIDURAL STEROID INJECTION Left 12/23/2022    Procedure: Lumbar L4/5 IL CHRISTOPHER with left paramedian approach RN IV Sedation;  Surgeon: Pradeep Oconnell MD;  Location: [facility] PAIN [facility]GT;  Service: Pain Management;  Laterality: Left;    EXTRACORPOREAL SHOCK WAVE LITHOTRIPSY      LEFT HEART CATHETERIZATION Left 2/20/2019    Procedure: CATHETERIZATION, HEART, LEFT;  Surgeon: Haile Suggs MD;  Location: [facility] CATH LAB;  Service: Cardiology;  Laterality: Left;  10:30-11am start/poss rt radial approach    SELECTIVE INJECTION OF ANESTHETIC AGENT AROUND LUMBAR SPINAL NERVE ROOT BY TRANSFORAMINAL APPROACH Bilateral 4/11/2023    Procedure: Bilateral L4/5 +L5/S1 TF CHRISTOPHER;  Surgeon: Pradeep Oconnell MD;  Location: [facility];  Service: Pain Management;  Laterality: Bilateral;    TOTAL ABDOMINAL HYSTERECTOMY      URETEROSCOPY  05/2017     Family History   Problem Relation Age of Onset    Hypertension Mother     Heart disease Mother     Diabetes Mother     Colon polyps Mother     Other Mother         Lower limb amputation    Hypertension Brother     Stroke Brother     Colon cancer Maternal Grandmother     Diabetes Brother     Breast cancer Maternal Cousin        Current Outpatient Medications on File Prior to Visit   Medication Sig Dispense Refill    albuterol (PROVENTIL/VENTOLIN HFA) 90 mcg/actuation inhaler Inhale 1-2 puffs into the lungs every 4 to 6 hours as needed for Wheezing or Shortness of Breath. 18 g 3    albuterol (VENTOLIN HFA) 90 mcg/actuation inhaler Inhale 2 puffs into the lungs every 6 (six) hours as needed for Wheezing. Rescue 1 g 0    allopurinoL (ZYLOPRIM) 100 MG tablet TAKE 1 TABLET(100 MG) BY MOUTH EVERY DAY 90 tablet 1    apixaban (ELIQUIS) 5 mg Tab Take 1 tablet (5 mg total) by mouth 2 (two) times daily. 180 tablet 1    atenoloL (TENORMIN) 25 MG tablet Take 25 mg by mouth once daily.      BD ULTRA-FINE SHORT PEN NEEDLE 31 gauge x 5/16" Ndle USE 1 PEN NEEDLE UNDER THE SKIN TWICE A "  each 0    cephALEXin (KEFLEX) 500 MG capsule Take 1 capsule (500 mg total) by mouth 4 (four) times daily. for 5 days 20 capsule 0    cyanocobalamin (VITAMIN B-12) 100 MCG tablet Take by mouth.      diclofenac sodium (VOLTAREN) 1 % Gel Apply 2 g topically 3 (three) times daily. 350 g 2    ergocalciferol, vitamin D2, 400 unit Tab Take by mouth.      ferrous sulfate, dried (SLOW FE) 160 mg (50 mg iron) TbSR Take by mouth.      flecainide (TAMBOCOR) 50 MG Tab Take 50 mg by mouth 2 (two) times daily.      gabapentin (NEURONTIN) 100 MG capsule TAKE 1 CAPSULE(100 MG) BY MOUTH THREE TIMES DAILY 90 capsule 9    HYDROcodone-acetaminophen (NORCO)  mg per tablet Take 1 tablet by mouth every 6 (six) hours as needed for Pain. 12 tablet 0    insulin lispro protamin-lispro 100 unit/mL (75-25) InPn INJECT 30 UNITS UNDER THE SKIN EVERY MORNING AND 20 UNITS EVERY EVENING 60 mL 0    losartan-hydrochlorothiazide 100-25 mg (HYZAAR) 100-25 mg per tablet Take 1 tablet by mouth once daily. 90 tablet 3    metFORMIN (GLUCOPHAGE-XR) 500 MG ER 24hr tablet TAKE 2 TABLETS BY MOUTH DAILY WITH BREAKFAST AND 1 TABLET WITH DINNER 270 tablet 1    montelukast (SINGULAIR) 10 mg tablet Take 1 tablet (10 mg total) by mouth every evening. 90 tablet 1    pantoprazole (PROTONIX) 20 MG tablet Take 20 mg by mouth every morning.      potassium chloride (MICRO-K) 10 MEQ CpSR Take 1 capsule (10 mEq total) by mouth once daily. 90 capsule 3    simvastatin (ZOCOR) 20 MG tablet Take 1 tablet (20 mg total) by mouth every evening. 90 tablet 3    triamcinolone (NASACORT) 55 mcg nasal inhaler 2 sprays by Nasal route once daily. 16.9 mL 5    [DISCONTINUED] tamsulosin (FLOMAX) 0.4 mg Cap Take 1 capsule (0.4 mg total) by mouth every evening. 90 capsule 1    baclofen (LIORESAL) 10 MG tablet Take 1 tablet (10 mg total) by mouth 3 (three) times daily. 30 tablet 0    DULoxetine (CYMBALTA) 30 MG capsule Take 1 capsule (30 mg total) by mouth once daily. 30 capsule 2  "   fexofenadine (ALLEGRA) 180 MG tablet Take 1 tablet (180 mg total) by mouth once daily. 30 tablet 2    fluticasone propion-salmeterol 45-21 mcg/dose (ADVAIR HFA) 45-21 mcg/actuation HFAA inhaler Inhale 2 puffs into the lungs every 12 (twelve) hours. Controller 12 g 2    [DISCONTINUED] allopurinoL (ZYLOPRIM) 100 MG tablet Take 1 tablet (100 mg total) by mouth once daily. 90 tablet 3     No current facility-administered medications on file prior to visit.        Objective:     Vitals:    08/08/23 1603   BP: 124/81   Pulse: 81   Weight: 114 kg (251 lb 5.2 oz)   Height: 5' 7" (1.702 m)      BMI Readings from Last 1 Encounters:   08/08/23 39.36 kg/m²          Physical Exam  No acute distress alert and oriented x3   Respirations even unlabored   Abdomen is obese    Lab Results   Component Value Date    CREATININE 0.8 08/03/2023      Assessment:       1. Kidney stone    2. Urinary incontinence, unspecified type    3. Nocturia    4. Voiding difficulty        Plan:     1. Kidney stone    2. Urinary incontinence, unspecified type    3. Nocturia    4. Voiding difficulty       Orders Placed This Encounter    POCT URINE DIPSTICK WITHOUT MICROSCOPE    tamsulosin (FLOMAX) 0.4 mg Cap     Recurrent urolithiasis.  Right distal ureteral stone.  We will proceed with trial of passage.  Plan follow up in one-week to see how she is doing.  She may require repeat imaging prior to considering ureteroscopy.  Continue Flomax and fluids.  Patient will ultimately need a 24 hour urine for metabolic workup to evaluate the cause of a recurrent stone disease.    "

## 2023-08-10 LAB
BILIRUB SERPL-MCNC: NORMAL MG/DL
BLOOD URINE, POC: NORMAL
CLARITY, POC UA: CLEAR
COLOR, POC UA: YELLOW
GLUCOSE UR QL STRIP: NORMAL
KETONES UR QL STRIP: NORMAL
LEUKOCYTE ESTERASE URINE, POC: NORMAL
NITRITE, POC UA: NORMAL
PH, POC UA: 6
PROTEIN, POC: NORMAL
SPECIFIC GRAVITY, POC UA: 1.03
UROBILINOGEN, POC UA: 0.2

## 2023-08-15 ENCOUNTER — OFFICE VISIT (OUTPATIENT)
Dept: UROLOGY | Facility: CLINIC | Age: 68
End: 2023-08-15
Payer: COMMERCIAL

## 2023-08-15 ENCOUNTER — HOSPITAL ENCOUNTER (OUTPATIENT)
Dept: RADIOLOGY | Facility: HOSPITAL | Age: 68
Discharge: HOME | End: 2023-08-15
Attending: UROLOGY
Payer: COMMERCIAL

## 2023-08-15 VITALS
WEIGHT: 254.88 LBS | DIASTOLIC BLOOD PRESSURE: 73 MMHG | SYSTOLIC BLOOD PRESSURE: 107 MMHG | BODY MASS INDEX: 40 KG/M2 | HEIGHT: 67 IN | HEART RATE: 85 BPM

## 2023-08-15 DIAGNOSIS — N20.1 CALCULUS OF URETER: ICD-10-CM

## 2023-08-15 DIAGNOSIS — R10.9 ABDOMINAL PAIN, UNSPECIFIED ABDOMINAL LOCATION: Primary | ICD-10-CM

## 2023-08-15 DIAGNOSIS — R10.9 ABDOMINAL PAIN, UNSPECIFIED ABDOMINAL LOCATION: ICD-10-CM

## 2023-08-15 DIAGNOSIS — N20.0 KIDNEY STONE: ICD-10-CM

## 2023-08-15 PROCEDURE — 99999 PR PBB SHADOW E&M-EST. PATIENT-LVL V: CPT | Mod: PBBFAC,,, | Performed by: UROLOGY

## 2023-08-15 PROCEDURE — 1126F PR PAIN SEVERITY QUANTIFIED, NO PAIN PRESENT: ICD-10-PCS | Mod: CPTII,S$GLB,, | Performed by: UROLOGY

## 2023-08-15 PROCEDURE — 1159F PR MEDICATION LIST DOCUMENTED IN MEDICAL RECORD: ICD-10-PCS | Mod: CPTII,S$GLB,, | Performed by: UROLOGY

## 2023-08-15 PROCEDURE — 74018 XR ABDOMEN AP 1 VIEW: ICD-10-PCS | Mod: 26,,, | Performed by: RADIOLOGY

## 2023-08-15 PROCEDURE — 74176 CT ABD & PELVIS W/O CONTRAST: CPT | Mod: TC

## 2023-08-15 PROCEDURE — 74018 RADEX ABDOMEN 1 VIEW: CPT | Mod: 26,,, | Performed by: RADIOLOGY

## 2023-08-15 PROCEDURE — 1159F MED LIST DOCD IN RCRD: CPT | Mod: CPTII,S$GLB,, | Performed by: UROLOGY

## 2023-08-15 PROCEDURE — 3074F SYST BP LT 130 MM HG: CPT | Mod: CPTII,S$GLB,, | Performed by: UROLOGY

## 2023-08-15 PROCEDURE — 99999 PR PBB SHADOW E&M-EST. PATIENT-LVL V: ICD-10-PCS | Mod: PBBFAC,,, | Performed by: UROLOGY

## 2023-08-15 PROCEDURE — 3078F DIAST BP <80 MM HG: CPT | Mod: CPTII,S$GLB,, | Performed by: UROLOGY

## 2023-08-15 PROCEDURE — 3008F BODY MASS INDEX DOCD: CPT | Mod: CPTII,S$GLB,, | Performed by: UROLOGY

## 2023-08-15 PROCEDURE — 3074F PR MOST RECENT SYSTOLIC BLOOD PRESSURE < 130 MM HG: ICD-10-PCS | Mod: CPTII,S$GLB,, | Performed by: UROLOGY

## 2023-08-15 PROCEDURE — 3078F PR MOST RECENT DIASTOLIC BLOOD PRESSURE < 80 MM HG: ICD-10-PCS | Mod: CPTII,S$GLB,, | Performed by: UROLOGY

## 2023-08-15 PROCEDURE — 99214 PR OFFICE/OUTPT VISIT, EST, LEVL IV, 30-39 MIN: ICD-10-PCS | Mod: S$GLB,,, | Performed by: UROLOGY

## 2023-08-15 PROCEDURE — 1101F PR PT FALLS ASSESS DOC 0-1 FALLS W/OUT INJ PAST YR: ICD-10-PCS | Mod: CPTII,S$GLB,, | Performed by: UROLOGY

## 2023-08-15 PROCEDURE — 74176 CT RENAL STONE STUDY ABD PELVIS WO: ICD-10-PCS | Mod: 26,,, | Performed by: RADIOLOGY

## 2023-08-15 PROCEDURE — 74018 RADEX ABDOMEN 1 VIEW: CPT | Mod: TC

## 2023-08-15 PROCEDURE — 1126F AMNT PAIN NOTED NONE PRSNT: CPT | Mod: CPTII,S$GLB,, | Performed by: UROLOGY

## 2023-08-15 PROCEDURE — 3288F FALL RISK ASSESSMENT DOCD: CPT | Mod: CPTII,S$GLB,, | Performed by: UROLOGY

## 2023-08-15 PROCEDURE — 1101F PT FALLS ASSESS-DOCD LE1/YR: CPT | Mod: CPTII,S$GLB,, | Performed by: UROLOGY

## 2023-08-15 PROCEDURE — 99214 OFFICE O/P EST MOD 30 MIN: CPT | Mod: S$GLB,,, | Performed by: UROLOGY

## 2023-08-15 PROCEDURE — 1160F PR REVIEW ALL MEDS BY PRESCRIBER/CLIN PHARMACIST DOCUMENTED: ICD-10-PCS | Mod: CPTII,S$GLB,, | Performed by: UROLOGY

## 2023-08-15 PROCEDURE — 74176 CT ABD & PELVIS W/O CONTRAST: CPT | Mod: 26,,, | Performed by: RADIOLOGY

## 2023-08-15 PROCEDURE — 1160F RVW MEDS BY RX/DR IN RCRD: CPT | Mod: CPTII,S$GLB,, | Performed by: UROLOGY

## 2023-08-15 PROCEDURE — 3288F PR FALLS RISK ASSESSMENT DOCUMENTED: ICD-10-PCS | Mod: CPTII,S$GLB,, | Performed by: UROLOGY

## 2023-08-15 PROCEDURE — 3008F PR BODY MASS INDEX (BMI) DOCUMENTED: ICD-10-PCS | Mod: CPTII,S$GLB,, | Performed by: UROLOGY

## 2023-08-15 NOTE — PROGRESS NOTES
Chief Complaint:   Encounter Diagnoses   Name Primary?    Abdominal pain, unspecified abdominal location Yes    Calculus of ureter        HPI:  HPI Latosha Enriquez miquel 67 y.o. female who presents with right flank pain.  She was diagnosed with a right ureteral stone 2 weeks ago.  She states she still feels something in her urethra.  She denies any fevers or chills.  She is had no nausea or vomiting.  She is been controlling her pain with Tylenol and occasional hydrocodone.  She has been on tamsulosin.  She is also been taking allopurinol and sodium bicarb.  History:  Past Medical History:   Diagnosis Date    Asthma     Colon polyps     Diabetes mellitus type II, uncontrolled     Diabetic retinopathy     Diverticulosis of large intestine without hemorrhage 2015    Elevated liver enzymes     GERD (gastroesophageal reflux disease)     Gout, unspecified     Hemorrhoids, internal 2015    History of blood transfusion     Hyperlipidemia     Hypertension     IBS (irritable bowel syndrome)     Morbid obesity with BMI of 40.0-44.9, adult     Nasal vestibulitis     Osteoarthritis of multiple joints     Urolithiasis     Vitamin D deficiency disease      Past Surgical History:   Procedure Laterality Date    BREAST BIOPSY Right      SECTION, CLASSIC      COLONOSCOPY N/A 2015    Procedure: COLONOSCOPY;  Surgeon: Stanislav Pickard MD;  Location: [facility] ENDO;  Service: Endoscopy;  Laterality: N/A;    CYSTOSCOPY W/ URETERAL STENT PLACEMENT  2017    EPIDURAL STEROID INJECTION Left 2022    Procedure: Lumbar L4/5 IL CHRISTOPHER with left paramedian approach RN IV Sedation;  Surgeon: Pradeep Oconnell MD;  Location: [facility] PAIN MGT;  Service: Pain Management;  Laterality: Left;    EXTRACORPOREAL SHOCK WAVE LITHOTRIPSY      LEFT HEART CATHETERIZATION Left 2019    Procedure: CATHETERIZATION, HEART, LEFT;  Surgeon: Haile Suggs MD;  Location: [facility] CATH LAB;  Service: Cardiology;  Laterality: Left;  10:30-11am  "start/poss rt radial approach    SELECTIVE INJECTION OF ANESTHETIC AGENT AROUND LUMBAR SPINAL NERVE ROOT BY TRANSFORAMINAL APPROACH Bilateral 4/11/2023    Procedure: Bilateral L4/5 +L5/S1 TF CHRISTOPHER;  Surgeon: Pradeep Oconnell MD;  Location: [facility] PAIN [facility]GT;  Service: Pain Management;  Laterality: Bilateral;    TOTAL ABDOMINAL HYSTERECTOMY      URETEROSCOPY  05/2017     Family History   Problem Relation Age of Onset    Hypertension Mother     Heart disease Mother     Diabetes Mother     Colon polyps Mother     Other Mother         Lower limb amputation    Hypertension Brother     Stroke Brother     Colon cancer Maternal Grandmother     Diabetes Brother     Breast cancer Maternal Cousin        Current Outpatient Medications on File Prior to Visit   Medication Sig Dispense Refill    albuterol (PROVENTIL/VENTOLIN HFA) 90 mcg/actuation inhaler Inhale 1-2 puffs into the lungs every 4 to 6 hours as needed for Wheezing or Shortness of Breath. 18 g 3    albuterol (VENTOLIN HFA) 90 mcg/actuation inhaler Inhale 2 puffs into the lungs every 6 (six) hours as needed for Wheezing. Rescue 1 g 0    allopurinoL (ZYLOPRIM) 100 MG tablet TAKE 1 TABLET(100 MG) BY MOUTH EVERY DAY 90 tablet 1    apixaban (ELIQUIS) 5 mg Tab Take 1 tablet (5 mg total) by mouth 2 (two) times daily. 180 tablet 1    atenoloL (TENORMIN) 25 MG tablet Take 25 mg by mouth once daily.      BD ULTRA-FINE SHORT PEN NEEDLE 31 gauge x 5/16" Ndle USE 1 PEN NEEDLE UNDER THE SKIN TWICE A  each 0    cyanocobalamin (VITAMIN B-12) 100 MCG tablet Take by mouth.      diclofenac sodium (VOLTAREN) 1 % Gel Apply 2 g topically 3 (three) times daily. 350 g 2    ergocalciferol, vitamin D2, 400 unit Tab Take by mouth.      ferrous sulfate, dried (SLOW FE) 160 mg (50 mg iron) TbSR Take by mouth.      flecainide (TAMBOCOR) 50 MG Tab Take 50 mg by mouth 2 (two) times daily.      gabapentin (NEURONTIN) 100 MG capsule TAKE 1 CAPSULE(100 MG) BY MOUTH THREE TIMES DAILY 90 capsule 9    " "HYDROcodone-acetaminophen (NORCO)  mg per tablet Take 1 tablet by mouth every 6 (six) hours as needed for Pain. 12 tablet 0    insulin lispro protamin-lispro 100 unit/mL (75-25) InPn INJECT 30 UNITS UNDER THE SKIN EVERY MORNING AND 20 UNITS EVERY EVENING 60 mL 0    losartan-hydrochlorothiazide 100-25 mg (HYZAAR) 100-25 mg per tablet Take 1 tablet by mouth once daily. 90 tablet 3    metFORMIN (GLUCOPHAGE-XR) 500 MG ER 24hr tablet TAKE 2 TABLETS BY MOUTH DAILY WITH BREAKFAST AND 1 TABLET WITH DINNER 270 tablet 1    montelukast (SINGULAIR) 10 mg tablet Take 1 tablet (10 mg total) by mouth every evening. 90 tablet 1    pantoprazole (PROTONIX) 20 MG tablet Take 20 mg by mouth every morning.      potassium chloride (MICRO-K) 10 MEQ CpSR Take 1 capsule (10 mEq total) by mouth once daily. 90 capsule 3    simvastatin (ZOCOR) 20 MG tablet Take 1 tablet (20 mg total) by mouth every evening. 90 tablet 3    tamsulosin (FLOMAX) 0.4 mg Cap Take 1 capsule (0.4 mg total) by mouth every evening. 90 capsule 1    triamcinolone (NASACORT) 55 mcg nasal inhaler 2 sprays by Nasal route once daily. 16.9 mL 5    baclofen (LIORESAL) 10 MG tablet Take 1 tablet (10 mg total) by mouth 3 (three) times daily. 30 tablet 0    DULoxetine (CYMBALTA) 30 MG capsule Take 1 capsule (30 mg total) by mouth once daily. 30 capsule 2    fexofenadine (ALLEGRA) 180 MG tablet Take 1 tablet (180 mg total) by mouth once daily. 30 tablet 2    fluticasone propion-salmeterol 45-21 mcg/dose (ADVAIR HFA) 45-21 mcg/actuation HFAA inhaler Inhale 2 puffs into the lungs every 12 (twelve) hours. Controller 12 g 2     No current facility-administered medications on file prior to visit.        Objective:     Vitals:    08/15/23 1100   BP: 107/73   BP Location: Left arm   Patient Position: Sitting   Pulse: 85   Weight: 115.6 kg (254 lb 13.6 oz)   Height: 5' 7" (1.702 m)      BMI Readings from Last 1 Encounters:   08/15/23 39.92 kg/m²          Physical Exam  No acute " distress alert and oriented   Respirations even unlabored   Abdomen obese   No CVA tenderness    UA today is clear    KUB was personally reviewed and the stone is not readily visualized this KUB.    I also reviewed her previous CT scan images.    Lab Results   Component Value Date    CREATININE 0.8 08/03/2023      Assessment:       1. Abdominal pain, unspecified abdominal location    2. Calculus of ureter        Plan:     1. Abdominal pain, unspecified abdominal location    2. Calculus of ureter       Orders Placed This Encounter    CT Abdomen Pelvis  Without Contrast    POCT URINE DIPSTICK WITHOUT MICROSCOPE     Right flank pain right ureteral stone.  Discussed treatment options.  Patient wishes to have a ureteroscopy if the stone is still present.  Understands risks and benefits.  We will repeat CT today.  Call her with results and plan.

## 2023-08-18 ENCOUNTER — TELEPHONE (OUTPATIENT)
Dept: UROLOGY | Facility: CLINIC | Age: 68
End: 2023-08-18
Payer: COMMERCIAL

## 2023-08-18 NOTE — TELEPHONE ENCOUNTER
Per Dr. Heller's request, order faxed to [facility] for 24 hour urine; pt notified.

## 2023-09-03 NOTE — TELEPHONE ENCOUNTER
No care due was identified.  [facility] Embedded Care Due Messages. Reference number: [id number].   9/03/2023 5:30:54 PM CDT

## 2023-09-05 RX ORDER — METFORMIN HYDROCHLORIDE 500 MG/1
TABLET, EXTENDED RELEASE ORAL
Qty: 270 TABLET | Refills: 0 | Status: SHIPPED | OUTPATIENT
Start: 2023-09-05 | End: 2023-11-12

## 2023-09-12 ENCOUNTER — LAB VISIT (OUTPATIENT)
Dept: LAB | Facility: HOSPITAL | Age: 68
End: 2023-09-12
Attending: FAMILY MEDICINE
Payer: COMMERCIAL

## 2023-09-12 ENCOUNTER — OFFICE VISIT (OUTPATIENT)
Dept: FAMILY MEDICINE | Facility: CLINIC | Age: 68
End: 2023-09-12
Payer: COMMERCIAL

## 2023-09-12 VITALS
BODY MASS INDEX: 39.44 KG/M2 | HEART RATE: 73 BPM | DIASTOLIC BLOOD PRESSURE: 72 MMHG | SYSTOLIC BLOOD PRESSURE: 124 MMHG | TEMPERATURE: 98 F | HEIGHT: 67 IN | WEIGHT: 251.31 LBS | OXYGEN SATURATION: 95 %

## 2023-09-12 DIAGNOSIS — M17.0 PRIMARY OSTEOARTHRITIS OF BOTH KNEES: Primary | ICD-10-CM

## 2023-09-12 DIAGNOSIS — E11.9 TYPE 2 DIABETES MELLITUS WITHOUT COMPLICATION, WITH LONG-TERM CURRENT USE OF INSULIN: ICD-10-CM

## 2023-09-12 DIAGNOSIS — Z79.4 TYPE 2 DIABETES MELLITUS WITHOUT COMPLICATION, WITH LONG-TERM CURRENT USE OF INSULIN: ICD-10-CM

## 2023-09-12 PROBLEM — M54.16 LUMBAR RADICULOPATHY: Status: RESOLVED | Noted: 2023-04-11 | Resolved: 2023-09-12

## 2023-09-12 LAB
ESTIMATED AVG GLUCOSE: 154 MG/DL (ref 68–131)
HBA1C MFR BLD: 7 % (ref 4–5.6)

## 2023-09-12 PROCEDURE — 3078F PR MOST RECENT DIASTOLIC BLOOD PRESSURE < 80 MM HG: ICD-10-PCS | Mod: CPTII,S$GLB,, | Performed by: FAMILY MEDICINE

## 2023-09-12 PROCEDURE — 20610 DRAIN/INJ JOINT/BURSA W/O US: CPT | Mod: 50,S$GLB,, | Performed by: FAMILY MEDICINE

## 2023-09-12 PROCEDURE — 3074F PR MOST RECENT SYSTOLIC BLOOD PRESSURE < 130 MM HG: ICD-10-PCS | Mod: CPTII,S$GLB,, | Performed by: FAMILY MEDICINE

## 2023-09-12 PROCEDURE — 1126F AMNT PAIN NOTED NONE PRSNT: CPT | Mod: CPTII,S$GLB,, | Performed by: FAMILY MEDICINE

## 2023-09-12 PROCEDURE — 99999 PR PBB SHADOW E&M-EST. PATIENT-LVL V: CPT | Mod: PBBFAC,,, | Performed by: FAMILY MEDICINE

## 2023-09-12 PROCEDURE — 1159F PR MEDICATION LIST DOCUMENTED IN MEDICAL RECORD: ICD-10-PCS | Mod: CPTII,S$GLB,, | Performed by: FAMILY MEDICINE

## 2023-09-12 PROCEDURE — 3008F PR BODY MASS INDEX (BMI) DOCUMENTED: ICD-10-PCS | Mod: CPTII,S$GLB,, | Performed by: FAMILY MEDICINE

## 2023-09-12 PROCEDURE — 99999 PR PBB SHADOW E&M-EST. PATIENT-LVL V: ICD-10-PCS | Mod: PBBFAC,,, | Performed by: FAMILY MEDICINE

## 2023-09-12 PROCEDURE — 1160F RVW MEDS BY RX/DR IN RCRD: CPT | Mod: CPTII,S$GLB,, | Performed by: FAMILY MEDICINE

## 2023-09-12 PROCEDURE — 1101F PT FALLS ASSESS-DOCD LE1/YR: CPT | Mod: CPTII,S$GLB,, | Performed by: FAMILY MEDICINE

## 2023-09-12 PROCEDURE — 1101F PR PT FALLS ASSESS DOC 0-1 FALLS W/OUT INJ PAST YR: ICD-10-PCS | Mod: CPTII,S$GLB,, | Performed by: FAMILY MEDICINE

## 2023-09-12 PROCEDURE — 20610 PR DRAIN/INJECT LARGE JOINT/BURSA: ICD-10-PCS | Mod: 50,S$GLB,, | Performed by: FAMILY MEDICINE

## 2023-09-12 PROCEDURE — 1126F PR PAIN SEVERITY QUANTIFIED, NO PAIN PRESENT: ICD-10-PCS | Mod: CPTII,S$GLB,, | Performed by: FAMILY MEDICINE

## 2023-09-12 PROCEDURE — 1160F PR REVIEW ALL MEDS BY PRESCRIBER/CLIN PHARMACIST DOCUMENTED: ICD-10-PCS | Mod: CPTII,S$GLB,, | Performed by: FAMILY MEDICINE

## 2023-09-12 PROCEDURE — 3288F FALL RISK ASSESSMENT DOCD: CPT | Mod: CPTII,S$GLB,, | Performed by: FAMILY MEDICINE

## 2023-09-12 PROCEDURE — 3074F SYST BP LT 130 MM HG: CPT | Mod: CPTII,S$GLB,, | Performed by: FAMILY MEDICINE

## 2023-09-12 PROCEDURE — 99213 OFFICE O/P EST LOW 20 MIN: CPT | Mod: 25,S$GLB,, | Performed by: FAMILY MEDICINE

## 2023-09-12 PROCEDURE — 3078F DIAST BP <80 MM HG: CPT | Mod: CPTII,S$GLB,, | Performed by: FAMILY MEDICINE

## 2023-09-12 PROCEDURE — 3288F PR FALLS RISK ASSESSMENT DOCUMENTED: ICD-10-PCS | Mod: CPTII,S$GLB,, | Performed by: FAMILY MEDICINE

## 2023-09-12 PROCEDURE — 3008F BODY MASS INDEX DOCD: CPT | Mod: CPTII,S$GLB,, | Performed by: FAMILY MEDICINE

## 2023-09-12 PROCEDURE — 1159F MED LIST DOCD IN RCRD: CPT | Mod: CPTII,S$GLB,, | Performed by: FAMILY MEDICINE

## 2023-09-12 PROCEDURE — 99213 PR OFFICE/OUTPT VISIT, EST, LEVL III, 20-29 MIN: ICD-10-PCS | Mod: 25,S$GLB,, | Performed by: FAMILY MEDICINE

## 2023-09-12 PROCEDURE — 83036 HEMOGLOBIN GLYCOSYLATED A1C: CPT | Performed by: FAMILY MEDICINE

## 2023-09-12 PROCEDURE — 36415 COLL VENOUS BLD VENIPUNCTURE: CPT | Mod: PO | Performed by: FAMILY MEDICINE

## 2023-09-12 RX ORDER — TRIAMCINOLONE ACETONIDE 40 MG/ML
40 INJECTION, SUSPENSION INTRA-ARTICULAR; INTRAMUSCULAR
Status: COMPLETED | OUTPATIENT
Start: 2023-09-12 | End: 2023-09-12

## 2023-09-12 RX ORDER — LIDOCAINE HYDROCHLORIDE 10 MG/ML
4 INJECTION INFILTRATION; PERINEURAL
Status: COMPLETED | OUTPATIENT
Start: 2023-09-12 | End: 2023-09-12

## 2023-09-12 RX ORDER — GABAPENTIN 100 MG/1
100 CAPSULE ORAL 3 TIMES DAILY
Qty: 270 CAPSULE | Refills: 0 | Status: SHIPPED | OUTPATIENT
Start: 2023-09-12

## 2023-09-12 RX ORDER — DICLOFENAC SODIUM 10 MG/G
2 GEL TOPICAL 3 TIMES DAILY
Qty: 350 G | Refills: 2 | Status: SHIPPED | OUTPATIENT
Start: 2023-09-12

## 2023-09-12 RX ADMIN — TRIAMCINOLONE ACETONIDE 40 MG: 40 INJECTION, SUSPENSION INTRA-ARTICULAR; INTRAMUSCULAR at 04:09

## 2023-09-12 RX ADMIN — LIDOCAINE HYDROCHLORIDE 4 ML: 10 INJECTION INFILTRATION; PERINEURAL at 04:09

## 2023-09-12 NOTE — PROGRESS NOTES
CHIEF COMPLAINT: This is a 67-year-old female complaining of bilateral knee pain.     SUBJECTIVE:  Patient complains of flare-up of chronic pain in both knees for several weeks which she describes as a tightness in the posterior knee that radiates anteriorly.  Patient has difficulty walking because of stiffness and inability to flex the knee. She denies joint swelling, redness or warmth. Past x-rays showed mild to moderate tricompartmental degenerative changes.  The patient was seen by orthopedic surgeon in August 2021.  She reports improvement in knee pain after steroid injections 7 months ago and requests injections today.       Patient requests refills of Voltaren gel and gabapentin.  Patient is a type 2 diabetic and is due for A1c.  Last A1c was 7.4% 9 months ago.  Patient reports recently passing a kidney stone.  She requests evaluation of skin lesion on back.     ROS:  GENERAL: Patient denies fever, chills, night sweats.  Patient denies weight gain or loss. Patient denies anorexia, fatigue, weakness or swollen glands.  SKIN: Patient denies rash.  LUNGS: Patient denies cough, wheeze or hemoptysis.  CARDIOVASCULAR: Patient denies chest pain, shortness of breath, palpitations, syncope or lower extremity edema.  GI: Patient denies abdominal pain, nausea, vomiting, diarrhea, constipation, blood in stool or melena.  MUSCULOSKELETAL: Patient denies joint swelling, redness or warmth.  Positive for joint pain.  NEUROLOGIC: Patient denies numbness, tingling or weakness in limb.       OBJECTIVE:   GENERAL: Well-developed well-nourished, obese, black female alert and oriented x3, in no acute distress. Memory, judgment and cognition without deficit.   SKIN: Clear without rash. Normal color and tone.  Sebaceous cyst on back.  HEENT: Eyes: No scleral icterus. Clear conjunctivae.   NECK: Supple, normal range of motion.   LUNGS:  Normal respiratory effort.  EXTREMITIES: Without cyanosis, clubbing or edema. Distal pulses 2+ and  equal. Normal range of motion in hips, knees and ankles. No joint effusion, erythema or warmth.  NEUROLOGIC:  Motor strength equal bilaterally. Sensation normal to touch. Deep tendon reflexes 2+ and equal. Gait antalgic.     Reviewed treatment options including physical therapy and orthopedic consult. Discussed potential complications of procedure including bleeding, infection, and nerve damage.  Patient understands and accepts risks.  Verbal consent obtained.     Procedure: After sterile prep and drape, 1 cc of Kenalog 40 mg/cc +1 cc of 1% Xylocaine injected into right knee and left knee via lateral approach without difficulty.     ASSESSMENT:  1. Primary osteoarthritis of both knees    2. Type 2 diabetes mellitus without complication, with long-term current use of insulin      PLAN:  1.  Apply ice q.i.d. for 15-20 minutes over the next 2-3 days.  2.  Check A1c.  3.  Refill Voltaren gel and gabapentin.    4.  Follow-up if no improvement or worsening symptoms.    20 minutes of total time spent on the encounter, which includes face to face time and non-face to face time preparing to see the patient (eg, review of tests), Obtaining and/or reviewing separately obtained history, Documenting clinical information in the electronic or other health record, Independently interpreting results (not separately reported) and communicating results to the patient/family/caregiver, or Care coordination (not separately reported).     This note is generated with speech recognition software and is subject to transcription error and sound alike phrases that may be missed by proofreading.

## 2023-10-05 ENCOUNTER — PATIENT OUTREACH (OUTPATIENT)
Dept: ADMINISTRATIVE | Facility: HOSPITAL | Age: 68
End: 2023-10-05
Payer: COMMERCIAL

## 2023-10-05 NOTE — LETTER
AUTHORIZATION FOR RELEASE OF   CONFIDENTIAL INFORMATION    [id number]    We are seeing Latosha Enriquez, date of birth 1955, in the clinic at [facility]. Gabbie Ronquillo MD is the patient's PCP. Latosha Enriquez has an outstanding lab/procedure at the time we reviewed her chart. In order to help keep her health information updated, she has authorized us to request the following medical record(s):       ( x ) 2022 DIABETIC EYE EXAM                     Please fax records to [facility], [place], Karen A., MD, [phone]     If you have any questions, please contact    Lizbeth KNIGHT [facility] at [phone]          Patient Name: Latosha Enriquez  : 1955  Patient Phone #: [phone]

## 2023-10-05 NOTE — PROGRESS NOTES
Working eye exam report; Chart searched; Called and spoke with patient who states that she has completed her eye exam in 2022. She should be coming due shortly. Records were requested from [facility] multiple times and nothing was received.   ZAIDA faxed to [facility] 1x to request dm eye exam records. Reminder set 1 week.

## 2023-10-12 NOTE — PROGRESS NOTES
2nd attempt  ZAIDA faxed to [facility] to request dm eye exam records. Reminder set 1 week.

## 2023-10-19 NOTE — PROGRESS NOTES
Called to f/u on request - Spoke with staff who state that they do not have the request. She asks that I fax the request again and she will fax the records over today.    3rd attempt  ZAIDA faxed to [facility]x to request dm eye exam records. Reminder set 1 week.

## 2023-11-11 NOTE — TELEPHONE ENCOUNTER
Care Due:                  Date            Visit Type   Department     Provider  --------------------------------------------------------------------------------                                EP -                              PRIMARY      [facility]  Last Visit: 09-      CARE ([facility])   MEDICINE       Gabbie Ronquillo  Next Visit: None Scheduled  None         None Found                                                            Last  Test          Frequency    Reason                     Performed    Due Date  --------------------------------------------------------------------------------    Lipid Panel.  12 months..  simvastatin..............  12- 12-    Uric Acid...  12 months..  allopurinoL..............  Not Found    Overdue    [employer] Embedded Care Due Messages. Reference number: [id number].   11/11/2023 10:34:07 AM CST

## 2023-11-12 RX ORDER — METFORMIN HYDROCHLORIDE 500 MG/1
TABLET, EXTENDED RELEASE ORAL
Qty: 270 TABLET | Refills: 0 | Status: SHIPPED | OUTPATIENT
Start: 2023-11-12 | End: 2024-02-13

## 2023-11-12 NOTE — TELEPHONE ENCOUNTER
Refill Routing Note   Medication(s) are not appropriate for processing by [facility] for the following reason(s):      Allergy or intolerance    [facility] action(s):  Defer Care Due:  Labs due          Appointments  past 12m or future 3m with PCP    Date Provider   Last Visit   9/12/2023 Gabbie Ronquillo MD   Next Visit   Visit date not found Gabbie Ronquillo MD   ED visits in past 90 days: 0        Note composed:10:55 PM 11/11/2023

## 2023-11-13 RX ORDER — INSULIN LISPRO 100 [IU]/ML
INJECTION, SUSPENSION SUBCUTANEOUS
Qty: 60 ML | Refills: 0 | Status: SHIPPED | OUTPATIENT
Start: 2023-11-13 | End: 2024-02-23

## 2023-11-13 NOTE — TELEPHONE ENCOUNTER
----- Message from Piper Morris sent at 11/13/2023 12:28 PM CST -----  Contact: [phone]  Pt states she has been trying to get her insulin filled since last week but no one has called the pharmacy to refill it. Pt states she is now out and needs this taken care of urgently. Please call her to discuss. Thanks    Requesting an RX refill or new RX.  Is this a refill or new RX: refill  RX name and strength (copy/paste from chart):  insulin lispro protamin-lispro 100 unit/mL (75-25) InPn, she would also like needles called in to go with the insulin.  Is this a 30 day or 90 day RX: pt would like 90 if possible   Pharmacy name and phone # (copy/paste from chart):    [id number] [place] [place] - [address] AT [address] OF [facility] [address]  [address]  [place] [address]  Phone: [phone] Fax: [phone]  The doctors have asked that we provide their patients with the following 2 reminders -- prescription refills can take up to 72 hours, and a friendly reminder that in the future you can use your [facility] account to request refills: yes

## 2023-11-13 NOTE — TELEPHONE ENCOUNTER
No care due was identified.  [facility] Embedded Care Due Messages. Reference number: [id number].   11/13/2023 1:00:11 PM CST

## 2023-11-24 ENCOUNTER — TELEPHONE (OUTPATIENT)
Dept: FAMILY MEDICINE | Facility: CLINIC | Age: 68
End: 2023-11-24

## 2023-11-24 RX ORDER — INSULIN LISPRO 100 [IU]/ML
INJECTION, SUSPENSION SUBCUTANEOUS
Qty: 60 ML | Refills: 0 | Status: CANCELLED | OUTPATIENT
Start: 2023-11-24

## 2023-11-24 NOTE — TELEPHONE ENCOUNTER
"----- Message from Karolyn Reva sent at 11/24/2023 12:56 PM CST -----  Contact: Latosha Amin is calling in regards to getting a refill on the insulin lispro protamin-lispro 100 unit/mL (75-25) InPn and BD ULTRA-FINE SHORT PEN NEEDLE 31 gauge x 5/16" Ndle.  Please call back with status of fill at  [phone]      [id number] - [place], [place] - [address] AT [address]    Thanks     "

## 2023-12-20 DIAGNOSIS — E11.9 TYPE 2 DIABETES MELLITUS WITHOUT COMPLICATION: ICD-10-CM

## 2023-12-26 ENCOUNTER — OFFICE VISIT (OUTPATIENT)
Dept: UROLOGY | Facility: CLINIC | Age: 68
End: 2023-12-26
Payer: COMMERCIAL

## 2023-12-26 VITALS
RESPIRATION RATE: 17 BRPM | BODY MASS INDEX: 38.76 KG/M2 | DIASTOLIC BLOOD PRESSURE: 66 MMHG | SYSTOLIC BLOOD PRESSURE: 114 MMHG | HEIGHT: 67 IN | TEMPERATURE: 98 F | HEART RATE: 72 BPM | WEIGHT: 246.94 LBS

## 2023-12-26 DIAGNOSIS — N20.0 KIDNEY STONE: Primary | ICD-10-CM

## 2023-12-26 DIAGNOSIS — R32 URINARY INCONTINENCE, UNSPECIFIED TYPE: ICD-10-CM

## 2023-12-26 LAB
BILIRUB UR QL STRIP: NEGATIVE
GLUCOSE UR QL STRIP: NEGATIVE
KETONES UR QL STRIP: NEGATIVE
LEUKOCYTE ESTERASE UR QL STRIP: NEGATIVE
PH, POC UA: 7
POC BLOOD, URINE: NEGATIVE
POC NITRATES, URINE: NEGATIVE
POC RESIDUAL URINE VOLUME: 101 ML (ref 0–100)
PROT UR QL STRIP: POSITIVE
SP GR UR STRIP: 1.02 (ref 1–1.03)
UROBILINOGEN UR STRIP-ACNC: 0.2 (ref 0.1–1.1)

## 2023-12-26 PROCEDURE — 1101F PR PT FALLS ASSESS DOC 0-1 FALLS W/OUT INJ PAST YR: ICD-10-PCS | Mod: CPTII,S$GLB,, | Performed by: UROLOGY

## 2023-12-26 PROCEDURE — 3008F BODY MASS INDEX DOCD: CPT | Mod: CPTII,S$GLB,, | Performed by: UROLOGY

## 2023-12-26 PROCEDURE — 99214 PR OFFICE/OUTPT VISIT, EST, LEVL IV, 30-39 MIN: ICD-10-PCS | Mod: S$GLB,,, | Performed by: UROLOGY

## 2023-12-26 PROCEDURE — 99999 PR PBB SHADOW E&M-EST. PATIENT-LVL V: CPT | Mod: PBBFAC,,, | Performed by: UROLOGY

## 2023-12-26 PROCEDURE — 51798 US URINE CAPACITY MEASURE: CPT | Mod: S$GLB,,, | Performed by: UROLOGY

## 2023-12-26 PROCEDURE — 1126F PR PAIN SEVERITY QUANTIFIED, NO PAIN PRESENT: ICD-10-PCS | Mod: CPTII,S$GLB,, | Performed by: UROLOGY

## 2023-12-26 PROCEDURE — 3078F DIAST BP <80 MM HG: CPT | Mod: CPTII,S$GLB,, | Performed by: UROLOGY

## 2023-12-26 PROCEDURE — 3078F PR MOST RECENT DIASTOLIC BLOOD PRESSURE < 80 MM HG: ICD-10-PCS | Mod: CPTII,S$GLB,, | Performed by: UROLOGY

## 2023-12-26 PROCEDURE — 3051F HG A1C>EQUAL 7.0%<8.0%: CPT | Mod: CPTII,S$GLB,, | Performed by: UROLOGY

## 2023-12-26 PROCEDURE — 51798 POCT BLADDER SCAN: ICD-10-PCS | Mod: S$GLB,,, | Performed by: UROLOGY

## 2023-12-26 PROCEDURE — 1160F RVW MEDS BY RX/DR IN RCRD: CPT | Mod: CPTII,S$GLB,, | Performed by: UROLOGY

## 2023-12-26 PROCEDURE — 1159F MED LIST DOCD IN RCRD: CPT | Mod: CPTII,S$GLB,, | Performed by: UROLOGY

## 2023-12-26 PROCEDURE — 1126F AMNT PAIN NOTED NONE PRSNT: CPT | Mod: CPTII,S$GLB,, | Performed by: UROLOGY

## 2023-12-26 PROCEDURE — 99214 OFFICE O/P EST MOD 30 MIN: CPT | Mod: S$GLB,,, | Performed by: UROLOGY

## 2023-12-26 PROCEDURE — 3288F PR FALLS RISK ASSESSMENT DOCUMENTED: ICD-10-PCS | Mod: CPTII,S$GLB,, | Performed by: UROLOGY

## 2023-12-26 PROCEDURE — 1101F PT FALLS ASSESS-DOCD LE1/YR: CPT | Mod: CPTII,S$GLB,, | Performed by: UROLOGY

## 2023-12-26 PROCEDURE — 81003 POCT URINALYSIS, DIPSTICK, AUTOMATED, W/O SCOPE: ICD-10-PCS | Mod: QW,S$GLB,, | Performed by: UROLOGY

## 2023-12-26 PROCEDURE — 3288F FALL RISK ASSESSMENT DOCD: CPT | Mod: CPTII,S$GLB,, | Performed by: UROLOGY

## 2023-12-26 PROCEDURE — 99999 PR PBB SHADOW E&M-EST. PATIENT-LVL V: ICD-10-PCS | Mod: PBBFAC,,, | Performed by: UROLOGY

## 2023-12-26 PROCEDURE — 3074F SYST BP LT 130 MM HG: CPT | Mod: CPTII,S$GLB,, | Performed by: UROLOGY

## 2023-12-26 PROCEDURE — 3051F PR MOST RECENT HEMOGLOBIN A1C LEVEL 7.0 - < 8.0%: ICD-10-PCS | Mod: CPTII,S$GLB,, | Performed by: UROLOGY

## 2023-12-26 PROCEDURE — 1160F PR REVIEW ALL MEDS BY PRESCRIBER/CLIN PHARMACIST DOCUMENTED: ICD-10-PCS | Mod: CPTII,S$GLB,, | Performed by: UROLOGY

## 2023-12-26 PROCEDURE — 81003 URINALYSIS AUTO W/O SCOPE: CPT | Mod: QW,S$GLB,, | Performed by: UROLOGY

## 2023-12-26 PROCEDURE — 1159F PR MEDICATION LIST DOCUMENTED IN MEDICAL RECORD: ICD-10-PCS | Mod: CPTII,S$GLB,, | Performed by: UROLOGY

## 2023-12-26 PROCEDURE — 3008F PR BODY MASS INDEX (BMI) DOCUMENTED: ICD-10-PCS | Mod: CPTII,S$GLB,, | Performed by: UROLOGY

## 2023-12-26 PROCEDURE — 3074F PR MOST RECENT SYSTOLIC BLOOD PRESSURE < 130 MM HG: ICD-10-PCS | Mod: CPTII,S$GLB,, | Performed by: UROLOGY

## 2023-12-26 RX ORDER — POTASSIUM CITRATE 10 MEQ/1
20 TABLET, EXTENDED RELEASE ORAL 2 TIMES DAILY WITH MEALS
Qty: 120 TABLET | Refills: 11 | Status: SHIPPED | OUTPATIENT
Start: 2023-12-26 | End: 2024-12-25

## 2023-12-26 NOTE — PROGRESS NOTES
Chief Complaint:   Encounter Diagnoses   Name Primary?    Urinary incontinence, unspecified type     Kidney stone Yes       HPI:  HPI Latosha Enriquez miquel 68 y.o. female who presents with follow up to 24 hour urine.  She has had now a 3rd 24 hour urine.  I have compared all the prior results.  She states she was patch seeing small amounts of yellow and brown appearing substance occasionally.  She has not having any flank pain today but did have a flare-up about a week ago.  She was had a long history of kidney stones.    History:  Social History     Tobacco Use    Smoking status: Never    Smokeless tobacco: Never   Substance Use Topics    Alcohol use: No    Drug use: No     Past Medical History:   Diagnosis Date    Asthma     Colon polyps     Diabetes mellitus type II, uncontrolled     Diabetic retinopathy     Diverticulosis of large intestine without hemorrhage 2015    Elevated liver enzymes     GERD (gastroesophageal reflux disease)     Gout, unspecified     Hemorrhoids, internal 2015    History of blood transfusion     Hyperlipidemia     Hypertension     IBS (irritable bowel syndrome)     Morbid obesity with BMI of 40.0-44.9, adult     Nasal vestibulitis     Osteoarthritis of multiple joints     Urolithiasis     Vitamin D deficiency disease      Past Surgical History:   Procedure Laterality Date    BREAST BIOPSY Right      SECTION, CLASSIC      COLONOSCOPY N/A 2015    Procedure: COLONOSCOPY;  Surgeon: Stanislav Pickard MD;  Location: [facility];  Service: Endoscopy;  Laterality: N/A;    CYSTOSCOPY W/ URETERAL STENT PLACEMENT  2017    EPIDURAL STEROID INJECTION Left 2022    Procedure: Lumbar L4/5 IL CHRISTOPHER with left paramedian approach RN IV Sedation;  Surgeon: Pradeep Oconnell MD;  Location: [facility];  Service: Pain Management;  Laterality: Left;    EXTRACORPOREAL SHOCK WAVE LITHOTRIPSY      LEFT HEART CATHETERIZATION Left 2019    Procedure: CATHETERIZATION, HEART, LEFT;  Surgeon:  "Haile Suggs MD;  Location: [facility] CATH [facility];  Service: Cardiology;  Laterality: Left;  10:30-11am start/poss rt radial approach    SELECTIVE INJECTION OF ANESTHETIC AGENT AROUND LUMBAR SPINAL NERVE ROOT BY TRANSFORAMINAL APPROACH Bilateral 4/11/2023    Procedure: Bilateral L4/5 +L5/S1 TF CHRISTOPHER;  Surgeon: Pradeep Oconnell MD;  Location: [facility];  Service: Pain Management;  Laterality: Bilateral;    TOTAL ABDOMINAL HYSTERECTOMY      URETEROSCOPY  05/2017     Family History   Problem Relation Age of Onset    Hypertension Mother     Heart disease Mother     Diabetes Mother     Colon polyps Mother     Other Mother         Lower limb amputation    Hypertension Brother     Stroke Brother     Colon cancer Maternal Grandmother     Diabetes Brother     Breast cancer Maternal Cousin        Current Outpatient Medications on File Prior to Visit   Medication Sig Dispense Refill    albuterol (PROVENTIL/VENTOLIN HFA) 90 mcg/actuation inhaler Inhale 1-2 puffs into the lungs every 4 to 6 hours as needed for Wheezing or Shortness of Breath. 18 g 3    albuterol (VENTOLIN HFA) 90 mcg/actuation inhaler Inhale 2 puffs into the lungs every 6 (six) hours as needed for Wheezing. Rescue 1 g 0    allopurinoL (ZYLOPRIM) 100 MG tablet TAKE 1 TABLET(100 MG) BY MOUTH EVERY DAY 90 tablet 1    apixaban (ELIQUIS) 5 mg Tab Take 1 tablet (5 mg total) by mouth 2 (two) times daily. 180 tablet 1    atenoloL (TENORMIN) 25 MG tablet Take 25 mg by mouth once daily.      BD ULTRA-FINE SHORT PEN NEEDLE 31 gauge x 5/16" Ndle USE 1 PEN NEEDLE UNDER THE SKIN TWICE A  each 0    cyanocobalamin (VITAMIN B-12) 100 MCG tablet Take by mouth.      diclofenac sodium (VOLTAREN) 1 % Gel Apply 2 g topically 3 (three) times daily. 350 g 2    ergocalciferol, vitamin D2, 400 unit Tab Take by mouth.      ferrous sulfate, dried (SLOW FE) 160 mg (50 mg iron) TbSR Take by mouth.      flecainide (TAMBOCOR) 50 MG Tab Take 50 mg by mouth 2 (two) times daily.      gabapentin " (NEURONTIN) 100 MG capsule Take 1 capsule (100 mg total) by mouth 3 (three) times daily. 270 capsule 0    HYDROcodone-acetaminophen (NORCO)  mg per tablet Take 1 tablet by mouth every 6 (six) hours as needed for Pain. 12 tablet 0    insulin lispro protamin-lispro 100 unit/mL (75-25) InPn INJECT 30 UNITS UNDER THE SKIN EVERY MORNING AND 20 UNITS EVERY EVENING 60 mL 0    losartan-hydrochlorothiazide 100-25 mg (HYZAAR) 100-25 mg per tablet Take 1 tablet by mouth once daily. 90 tablet 3    metFORMIN (GLUCOPHAGE-XR) 500 MG ER 24hr tablet TAKE 2 TABLETS BY MOUTH DAILY WITH BREAKFAST AND 1 TABLET WITH DINNER 270 tablet 0    montelukast (SINGULAIR) 10 mg tablet Take 1 tablet (10 mg total) by mouth every evening. 90 tablet 1    pantoprazole (PROTONIX) 20 MG tablet Take 20 mg by mouth every morning.      simvastatin (ZOCOR) 20 MG tablet Take 1 tablet (20 mg total) by mouth every evening. 90 tablet 3    tamsulosin (FLOMAX) 0.4 mg Cap Take 1 capsule (0.4 mg total) by mouth every evening. 90 capsule 1    triamcinolone (NASACORT) 55 mcg nasal inhaler 2 sprays by Nasal route once daily. 16.9 mL 5    [DISCONTINUED] potassium chloride (MICRO-K) 10 MEQ CpSR Take 1 capsule (10 mEq total) by mouth once daily. 90 capsule 3    baclofen (LIORESAL) 10 MG tablet Take 1 tablet (10 mg total) by mouth 3 (three) times daily. 30 tablet 0    DULoxetine (CYMBALTA) 30 MG capsule Take 1 capsule (30 mg total) by mouth once daily. 30 capsule 2    fexofenadine (ALLEGRA) 180 MG tablet Take 1 tablet (180 mg total) by mouth once daily. 30 tablet 2    fluticasone propion-salmeterol 45-21 mcg/dose (ADVAIR HFA) 45-21 mcg/actuation HFAA inhaler Inhale 2 puffs into the lungs every 12 (twelve) hours. Controller 12 g 2     No current facility-administered medications on file prior to visit.        Objective:     Vitals:    12/26/23 1508   BP: 114/66   Pulse: 72   Resp: 17   Temp: 98.3 °F (36.8 °C)   TempSrc: Oral   Weight: 112 kg (246 lb 14.6 oz)   Height:  "5' 7" (1.702 m)      BMI Readings from Last 1 Encounters:   12/26/23 38.67 kg/m²          Physical Exam  No acute distress alert and oriented  Respirations even unlabored   Abdomen is soft nontender    Lab Results   Component Value Date    CREATININE 0.8 08/03/2023      Assessment:       1. Kidney stone    2. Urinary incontinence, unspecified type        Plan:     1. Kidney stone    2. Urinary incontinence, unspecified type       Orders Placed This Encounter    POCT Bladder Scan    POCT Urinalysis, Dipstick, Automated, W/O Scope    potassium citrate (UROCIT-K) 10 mEq (1,080 mg) TbSR   Chronic recurrent urolithiasis.  Patient's 24 hour urine today shows low urine volume it also shows low urine pH.  She was on allopurinol.  Her urine uric acid is low.  Given her history in the low urine pH I would recommend starting her on potassium citrate 2 tablets twice daily.  I will see her back in 3 months and recheck potassium levels and tolerability.  I will discontinue her oral potassium supplementation.  "

## 2023-12-28 NOTE — PROGRESS NOTES
"Subjective     Patient ID: Latosha Enriquez is a 68 y.o. female.    Chief Complaint: Cough, Nasal Congestion (Mucus -greenish), Shortness of Breath, Generalized Body Aches (Rib ache from coughing - knee pain ), Fatigue, and Knee Pain      HPI  Patient presents due to cold symptoms. She reports she has been having symptoms of: cough with green mucus that is improving in color, difficulty sleeping, and rib pain from coughing since Saturday. She reports her grandson did test positive for Flu. She has taken Tylenol, placed Triamcinolone cream in nose and taken Monteleukast for symptoms.    Review of Systems   Constitutional:  Negative for chills and fatigue.   Respiratory:  Positive for cough. Negative for chest tightness and shortness of breath.    Cardiovascular:  Negative for chest pain and palpitations.   Gastrointestinal:  Negative for abdominal pain, constipation, diarrhea, nausea and vomiting.   Genitourinary:  Negative for frequency and urgency.   Neurological:  Negative for dizziness, numbness and headaches.   Psychiatric/Behavioral:  Positive for sleep disturbance.           Objective       Current Outpatient Medications:     albuterol (PROVENTIL/VENTOLIN HFA) 90 mcg/actuation inhaler, Inhale 1-2 puffs into the lungs every 4 to 6 hours as needed for Wheezing or Shortness of Breath., Disp: 18 g, Rfl: 3    albuterol (VENTOLIN HFA) 90 mcg/actuation inhaler, Inhale 2 puffs into the lungs every 6 (six) hours as needed for Wheezing. Rescue, Disp: 1 g, Rfl: 0    allopurinoL (ZYLOPRIM) 100 MG tablet, TAKE 1 TABLET(100 MG) BY MOUTH EVERY DAY, Disp: 90 tablet, Rfl: 1    apixaban (ELIQUIS) 5 mg Tab, Take 1 tablet (5 mg total) by mouth 2 (two) times daily., Disp: 180 tablet, Rfl: 1    atenoloL (TENORMIN) 25 MG tablet, Take 25 mg by mouth once daily., Disp: , Rfl:     BD ULTRA-FINE SHORT PEN NEEDLE 31 gauge x 5/16" Ndle, USE 1 PEN NEEDLE UNDER THE SKIN TWICE A DAY, Disp: 180 each, Rfl: 0    cyanocobalamin (VITAMIN B-12) 100 " MCG tablet, Take by mouth., Disp: , Rfl:     diclofenac sodium (VOLTAREN) 1 % Gel, Apply 2 g topically 3 (three) times daily., Disp: 350 g, Rfl: 2    ergocalciferol, vitamin D2, 400 unit Tab, Take by mouth., Disp: , Rfl:     ferrous sulfate, dried (SLOW FE) 160 mg (50 mg iron) TbSR, Take by mouth., Disp: , Rfl:     flecainide (TAMBOCOR) 50 MG Tab, Take 50 mg by mouth 2 (two) times daily., Disp: , Rfl:     gabapentin (NEURONTIN) 100 MG capsule, Take 1 capsule (100 mg total) by mouth 3 (three) times daily., Disp: 270 capsule, Rfl: 0    HYDROcodone-acetaminophen (NORCO)  mg per tablet, Take 1 tablet by mouth every 6 (six) hours as needed for Pain., Disp: 12 tablet, Rfl: 0    insulin lispro protamin-lispro 100 unit/mL (75-25) InPn, INJECT 30 UNITS UNDER THE SKIN EVERY MORNING AND 20 UNITS EVERY EVENING, Disp: 60 mL, Rfl: 0    losartan-hydrochlorothiazide 100-25 mg (HYZAAR) 100-25 mg per tablet, Take 1 tablet by mouth once daily., Disp: 90 tablet, Rfl: 3    metFORMIN (GLUCOPHAGE-XR) 500 MG ER 24hr tablet, TAKE 2 TABLETS BY MOUTH DAILY WITH BREAKFAST AND 1 TABLET WITH DINNER, Disp: 270 tablet, Rfl: 0    montelukast (SINGULAIR) 10 mg tablet, Take 1 tablet (10 mg total) by mouth every evening., Disp: 90 tablet, Rfl: 1    pantoprazole (PROTONIX) 20 MG tablet, Take 20 mg by mouth every morning., Disp: , Rfl:     potassium citrate (UROCIT-K) 10 mEq (1,080 mg) TbSR, Take 2 tablets (20 mEq total) by mouth 2 (two) times daily with meals., Disp: 120 tablet, Rfl: 11    simvastatin (ZOCOR) 20 MG tablet, Take 1 tablet (20 mg total) by mouth every evening., Disp: 90 tablet, Rfl: 3    tamsulosin (FLOMAX) 0.4 mg Cap, Take 1 capsule (0.4 mg total) by mouth every evening., Disp: 90 capsule, Rfl: 1    triamcinolone (NASACORT) 55 mcg nasal inhaler, 2 sprays by Nasal route once daily., Disp: 16.9 mL, Rfl: 5    baclofen (LIORESAL) 10 MG tablet, Take 1 tablet (10 mg total) by mouth 3 (three) times daily., Disp: 30 tablet, Rfl: 0     DULoxetine (CYMBALTA) 30 MG capsule, Take 1 capsule (30 mg total) by mouth once daily., Disp: 30 capsule, Rfl: 2    fexofenadine (ALLEGRA) 180 MG tablet, Take 1 tablet (180 mg total) by mouth once daily., Disp: 30 tablet, Rfl: 2    fluticasone propion-salmeterol 45-21 mcg/dose (ADVAIR HFA) 45-21 mcg/actuation HFAA inhaler, Inhale 2 puffs into the lungs every 12 (twelve) hours. Controller, Disp: 12 g, Rfl: 2    promethazine-dextromethorphan (PROMETHAZINE-DM) 6.25-15 mg/5 mL Syrp, Take 5 mLs by mouth every 4 (four) hours as needed., Disp: 118 mL, Rfl: 0     Physical Exam  Constitutional:       General: She is not in acute distress.     Appearance: Normal appearance. She is obese.   HENT:      Head: Normocephalic and atraumatic.   Cardiovascular:      Rate and Rhythm: Normal rate and regular rhythm.      Heart sounds: Normal heart sounds. No murmur heard.     No friction rub. No gallop.   Pulmonary:      Effort: Pulmonary effort is normal.      Breath sounds: Normal breath sounds. No wheezing, rhonchi or rales.   Abdominal:      General: Bowel sounds are normal. There is no distension.      Palpations: Abdomen is soft.      Tenderness: There is no abdominal tenderness. There is no rebound.   Skin:     General: Skin is warm and dry.      Coloration: Skin is not jaundiced.   Neurological:      General: No focal deficit present.      Mental Status: She is alert and oriented to person, place, and time. Mental status is at baseline.   Psychiatric:         Mood and Affect: Mood normal.         Behavior: Behavior normal.            Assessment and Plan     1. Viral upper respiratory tract infection  -     POCT Influenza A/B Rapid Antigen  -     Cancel: POCT COVID-19 Rapid Screening  -     promethazine-dextromethorphan (PROMETHAZINE-DM) 6.25-15 mg/5 mL Syrp; Take 5 mLs by mouth every 4 (four) hours as needed.  Dispense: 118 mL; Refill: 0    Will test patient for Flu, patient is outside Tamiflu window  Will prescribe  Promethazine-DM  Patient did test for COVID at home and it was negative  Advised patient it is too early to diagnose a sinus infection and therefore antibiotics are inappropriate  Advised patient that steroids would decrease the immune system's response which is counter intuitive. We would like her immune system to respond to the virus           No follow-ups on file.

## 2023-12-29 ENCOUNTER — OFFICE VISIT (OUTPATIENT)
Dept: FAMILY MEDICINE | Facility: CLINIC | Age: 68
End: 2023-12-29
Payer: COMMERCIAL

## 2023-12-29 VITALS
DIASTOLIC BLOOD PRESSURE: 66 MMHG | HEART RATE: 84 BPM | RESPIRATION RATE: 18 BRPM | BODY MASS INDEX: 38.4 KG/M2 | TEMPERATURE: 97 F | SYSTOLIC BLOOD PRESSURE: 100 MMHG | HEIGHT: 67 IN | WEIGHT: 244.69 LBS | OXYGEN SATURATION: 98 %

## 2023-12-29 DIAGNOSIS — J06.9 VIRAL UPPER RESPIRATORY TRACT INFECTION: Primary | ICD-10-CM

## 2023-12-29 LAB
CTP QC/QA: YES
FLUAV AG NPH QL: NEGATIVE
FLUBV AG NPH QL: NEGATIVE

## 2023-12-29 PROCEDURE — 99999 PR PBB SHADOW E&M-EST. PATIENT-LVL V: CPT | Mod: PBBFAC,,, | Performed by: INTERNAL MEDICINE

## 2023-12-29 PROCEDURE — 3078F DIAST BP <80 MM HG: CPT | Mod: CPTII,S$GLB,, | Performed by: INTERNAL MEDICINE

## 2023-12-29 PROCEDURE — 3008F PR BODY MASS INDEX (BMI) DOCUMENTED: ICD-10-PCS | Mod: CPTII,S$GLB,, | Performed by: INTERNAL MEDICINE

## 2023-12-29 PROCEDURE — 3288F FALL RISK ASSESSMENT DOCD: CPT | Mod: CPTII,S$GLB,, | Performed by: INTERNAL MEDICINE

## 2023-12-29 PROCEDURE — 3008F BODY MASS INDEX DOCD: CPT | Mod: CPTII,S$GLB,, | Performed by: INTERNAL MEDICINE

## 2023-12-29 PROCEDURE — 1101F PR PT FALLS ASSESS DOC 0-1 FALLS W/OUT INJ PAST YR: ICD-10-PCS | Mod: CPTII,S$GLB,, | Performed by: INTERNAL MEDICINE

## 2023-12-29 PROCEDURE — 99999 PR PBB SHADOW E&M-EST. PATIENT-LVL V: ICD-10-PCS | Mod: PBBFAC,,, | Performed by: INTERNAL MEDICINE

## 2023-12-29 PROCEDURE — 99213 PR OFFICE/OUTPT VISIT, EST, LEVL III, 20-29 MIN: ICD-10-PCS | Mod: 25,S$GLB,, | Performed by: INTERNAL MEDICINE

## 2023-12-29 PROCEDURE — 3051F HG A1C>EQUAL 7.0%<8.0%: CPT | Mod: CPTII,S$GLB,, | Performed by: INTERNAL MEDICINE

## 2023-12-29 PROCEDURE — 1101F PT FALLS ASSESS-DOCD LE1/YR: CPT | Mod: CPTII,S$GLB,, | Performed by: INTERNAL MEDICINE

## 2023-12-29 PROCEDURE — 3074F PR MOST RECENT SYSTOLIC BLOOD PRESSURE < 130 MM HG: ICD-10-PCS | Mod: CPTII,S$GLB,, | Performed by: INTERNAL MEDICINE

## 2023-12-29 PROCEDURE — 1125F AMNT PAIN NOTED PAIN PRSNT: CPT | Mod: CPTII,S$GLB,, | Performed by: INTERNAL MEDICINE

## 2023-12-29 PROCEDURE — 87804 INFLUENZA ASSAY W/OPTIC: CPT | Mod: 59,QW,S$GLB, | Performed by: INTERNAL MEDICINE

## 2023-12-29 PROCEDURE — 87804 POCT INFLUENZA A/B: ICD-10-PCS | Mod: 59,QW,S$GLB, | Performed by: INTERNAL MEDICINE

## 2023-12-29 PROCEDURE — 3078F PR MOST RECENT DIASTOLIC BLOOD PRESSURE < 80 MM HG: ICD-10-PCS | Mod: CPTII,S$GLB,, | Performed by: INTERNAL MEDICINE

## 2023-12-29 PROCEDURE — 1125F PR PAIN SEVERITY QUANTIFIED, PAIN PRESENT: ICD-10-PCS | Mod: CPTII,S$GLB,, | Performed by: INTERNAL MEDICINE

## 2023-12-29 PROCEDURE — 3288F PR FALLS RISK ASSESSMENT DOCUMENTED: ICD-10-PCS | Mod: CPTII,S$GLB,, | Performed by: INTERNAL MEDICINE

## 2023-12-29 PROCEDURE — 3074F SYST BP LT 130 MM HG: CPT | Mod: CPTII,S$GLB,, | Performed by: INTERNAL MEDICINE

## 2023-12-29 PROCEDURE — 1159F MED LIST DOCD IN RCRD: CPT | Mod: CPTII,S$GLB,, | Performed by: INTERNAL MEDICINE

## 2023-12-29 PROCEDURE — 99213 OFFICE O/P EST LOW 20 MIN: CPT | Mod: 25,S$GLB,, | Performed by: INTERNAL MEDICINE

## 2023-12-29 PROCEDURE — 1159F PR MEDICATION LIST DOCUMENTED IN MEDICAL RECORD: ICD-10-PCS | Mod: CPTII,S$GLB,, | Performed by: INTERNAL MEDICINE

## 2023-12-29 PROCEDURE — 3051F PR MOST RECENT HEMOGLOBIN A1C LEVEL 7.0 - < 8.0%: ICD-10-PCS | Mod: CPTII,S$GLB,, | Performed by: INTERNAL MEDICINE

## 2023-12-29 RX ORDER — PROMETHAZINE HYDROCHLORIDE AND DEXTROMETHORPHAN HYDROBROMIDE 6.25; 15 MG/5ML; MG/5ML
5 SYRUP ORAL EVERY 4 HOURS PRN
Qty: 118 ML | Refills: 0 | Status: SHIPPED | OUTPATIENT
Start: 2023-12-29 | End: 2024-01-02 | Stop reason: SDUPTHER

## 2024-01-02 ENCOUNTER — OFFICE VISIT (OUTPATIENT)
Dept: FAMILY MEDICINE | Facility: CLINIC | Age: 69
End: 2024-01-02
Payer: COMMERCIAL

## 2024-01-02 VITALS
HEART RATE: 83 BPM | OXYGEN SATURATION: 97 % | TEMPERATURE: 97 F | BODY MASS INDEX: 38.27 KG/M2 | SYSTOLIC BLOOD PRESSURE: 130 MMHG | HEIGHT: 67 IN | DIASTOLIC BLOOD PRESSURE: 82 MMHG | WEIGHT: 243.81 LBS

## 2024-01-02 DIAGNOSIS — R05.3 PERSISTENT COUGH: Primary | ICD-10-CM

## 2024-01-02 DIAGNOSIS — Z79.4 TYPE 2 DIABETES MELLITUS WITH BOTH EYES AFFECTED BY MILD NONPROLIFERATIVE RETINOPATHY AND MACULAR EDEMA, WITH LONG-TERM CURRENT USE OF INSULIN: ICD-10-CM

## 2024-01-02 DIAGNOSIS — E11.3213 TYPE 2 DIABETES MELLITUS WITH BOTH EYES AFFECTED BY MILD NONPROLIFERATIVE RETINOPATHY AND MACULAR EDEMA, WITH LONG-TERM CURRENT USE OF INSULIN: ICD-10-CM

## 2024-01-02 DIAGNOSIS — Z79.4 TYPE 2 DIABETES MELLITUS WITHOUT COMPLICATION, WITH LONG-TERM CURRENT USE OF INSULIN: Chronic | ICD-10-CM

## 2024-01-02 DIAGNOSIS — E66.01 SEVERE OBESITY (BMI 35.0-39.9) WITH COMORBIDITY: ICD-10-CM

## 2024-01-02 DIAGNOSIS — G89.29 CHRONIC PAIN OF LEFT KNEE: ICD-10-CM

## 2024-01-02 DIAGNOSIS — M25.562 CHRONIC PAIN OF LEFT KNEE: ICD-10-CM

## 2024-01-02 DIAGNOSIS — I48.0 PAF (PAROXYSMAL ATRIAL FIBRILLATION): ICD-10-CM

## 2024-01-02 DIAGNOSIS — E11.9 TYPE 2 DIABETES MELLITUS WITHOUT COMPLICATION, WITH LONG-TERM CURRENT USE OF INSULIN: Chronic | ICD-10-CM

## 2024-01-02 PROBLEM — I20.9 ANGINA PECTORIS: Status: RESOLVED | Noted: 2019-02-10 | Resolved: 2024-01-02

## 2024-01-02 PROCEDURE — 1159F MED LIST DOCD IN RCRD: CPT | Mod: CPTII,S$GLB,, | Performed by: FAMILY MEDICINE

## 2024-01-02 PROCEDURE — 1125F AMNT PAIN NOTED PAIN PRSNT: CPT | Mod: CPTII,S$GLB,, | Performed by: FAMILY MEDICINE

## 2024-01-02 PROCEDURE — 99999 PR PBB SHADOW E&M-EST. PATIENT-LVL V: CPT | Mod: PBBFAC,,, | Performed by: FAMILY MEDICINE

## 2024-01-02 PROCEDURE — 96372 THER/PROPH/DIAG INJ SC/IM: CPT | Mod: S$GLB,,, | Performed by: FAMILY MEDICINE

## 2024-01-02 PROCEDURE — 99213 OFFICE O/P EST LOW 20 MIN: CPT | Mod: 25,S$GLB,, | Performed by: FAMILY MEDICINE

## 2024-01-02 PROCEDURE — 3075F SYST BP GE 130 - 139MM HG: CPT | Mod: CPTII,S$GLB,, | Performed by: FAMILY MEDICINE

## 2024-01-02 PROCEDURE — 3079F DIAST BP 80-89 MM HG: CPT | Mod: CPTII,S$GLB,, | Performed by: FAMILY MEDICINE

## 2024-01-02 PROCEDURE — 3008F BODY MASS INDEX DOCD: CPT | Mod: CPTII,S$GLB,, | Performed by: FAMILY MEDICINE

## 2024-01-02 RX ORDER — PROMETHAZINE HYDROCHLORIDE AND DEXTROMETHORPHAN HYDROBROMIDE 6.25; 15 MG/5ML; MG/5ML
5 SYRUP ORAL EVERY 4 HOURS PRN
Qty: 180 ML | Refills: 0 | Status: SHIPPED | OUTPATIENT
Start: 2024-01-02 | End: 2024-01-12

## 2024-01-02 RX ORDER — ALBUTEROL SULFATE 90 UG/1
2 AEROSOL, METERED RESPIRATORY (INHALATION) EVERY 6 HOURS PRN
Qty: 1 G | Refills: 0 | Status: SHIPPED | OUTPATIENT
Start: 2024-01-02

## 2024-01-02 RX ORDER — BETAMETHASONE SODIUM PHOSPHATE AND BETAMETHASONE ACETATE 3; 3 MG/ML; MG/ML
9 INJECTION, SUSPENSION INTRA-ARTICULAR; INTRALESIONAL; INTRAMUSCULAR; SOFT TISSUE
Status: COMPLETED | OUTPATIENT
Start: 2024-01-02 | End: 2024-01-02

## 2024-01-02 RX ORDER — AZITHROMYCIN 250 MG/1
TABLET, FILM COATED ORAL
Qty: 6 TABLET | Refills: 0 | Status: SHIPPED | OUTPATIENT
Start: 2024-01-02 | End: 2024-01-11

## 2024-01-02 RX ADMIN — BETAMETHASONE SODIUM PHOSPHATE AND BETAMETHASONE ACETATE 9 MG: 3; 3 INJECTION, SUSPENSION INTRA-ARTICULAR; INTRALESIONAL; INTRAMUSCULAR; SOFT TISSUE at 02:01

## 2024-01-02 NOTE — LETTER
January 2, 2024      [facility]  [address] [place] [address]  Phone: [phone]  Fax: [phone]       Patient: Latosha Enriquez   YOB: 1955  Date of Visit: 01/02/2024    To Whom It May Concern:    Rdoo Enriquez  was at [facility] on 01/02/2024. The patient may return to work/school on January 8, 2024.  With no restrictions. If you have any questions or concerns, or if I can be of further assistance, please do not hesitate to contact me.    Sincerely,          Edith Mariscal LPN

## 2024-01-02 NOTE — PROGRESS NOTES
CHIEF COMPLAINT:  This is a 65-year-old female complaining of persistent respiratory symptoms and left knee pain.     SUBJECTIVE:  The patient complains of a 10 day history of of upper respiratory symptoms with no improvement.  She was seen 4 days ago and tested negative for COVID-19 infection and influenza.  She has a persistent cough productive of greenish gold mucus.  Rhinorrhea is of similar consistency and color.  She complains of sore throat, postnasal drip and cough which is worse when lying flat.  Patient complains of subjective fever, chest congestion, chest tightness, slight wheezing, fatigue and shortness of breath.  Patient reports chest wall pain from coughing.  She has been taking Tylenol and was given a prescription for promethazine DM.  She denies chills or loss of sense of smell or taste.      Patient had excellent improvement in bilateral knee pain after corticosteroid injection in September 2023.  She twisted her left knee recently and complains of flare-up of knee pain..  She cannot take NSAIDs due to chronic anticoagulation with Eliquis.  Patient has paroxysmal atrial fibrillation.  Patient has type 2 diabetes with last A1c 7%.  Complications of diabetes include retinopathy.  She is severely obese with a BMI of 38.19.     ROS:  GENERAL: Patient denies fever, chills, night sweats. Patient denies weight gain or loss. Patient denies anorexia, fatigue, weakness or swollen glands.  SKIN: Patient denies rash.  HEENT: Patient denies hearing loss, nasal congestion, or runny nose. Patient denies visual disturbance, eye irritation or discharge.  Positive for sore throat, postnasal drip and earache.  LUNGS: Patient denies wheeze or hemoptysis.  Positive for cough.  CARDIOVASCULAR: Patient denies chest pain, shortness of breath, palpitations, syncope or lower extremity edema.  GI: Patient denies abdominal pain, nausea, vomiting, diarrhea, blood in stool or melena.  GENITOURINARY:  Patient denies dysuria,  frequency, hematuria, nocturia, urgency or incontinence.  MUSCULOSKELETAL: Patient denies joint swelling, redness or warmth.  Positive for joint pain.  NEUROLOGIC: Patient denies headache, vertigo, paresthesias, weakness in limb, dysarthria, dysphagia or abnormality of gait.     OBJECTIVE:   GENERAL: Well-developed well-nourished, morbidly obese, black female alert and oriented x3, in no acute distress. Memory, judgment and cognition without deficit.   SKIN: Clear without rash. Normal color and tone.   HEENT: Eyes: No scleral icterus. Clear conjunctivae. Pupils equal reactive to light and accommodation. Ears: Clear canals.  Clear TMs.  Nose:Mild bilateral congestion.  Pharynx:  2+ injection.  No exudates.  NECK: Supple, normal range of motion. No masses, nodes or enlarged thyroid. No JVD. Carotids 2+ and equal. No bruits.  LUNGS: Clear to auscultation. Normal respiratory effort.  O2 saturation 97%.  CARDIOVASCULAR: Regular rhythm, normal S1, S2 without murmur, gallop or rub.  EXTREMITIES: Without cyanosis, clubbing or edema. Distal pulses 2+ and equal. Normal range of motion in hips, knees and ankles. No joint effusion, erythema or warmth.   NEUROLOGIC:  Motor strength equal bilaterally. Sensation normal to touch. Gait antalgic.    ASSESSMENT:  1. Persistent cough    2. Chronic pain of left knee    3. Type 2 diabetes mellitus without complication, with long-term current use of insulin    4. Severe obesity (BMI 35.0-39.9) with comorbidity    5. Type 2 diabetes mellitus with both eyes affected by mild nonproliferative retinopathy and macular edema, with long-term current use of insulin    6. PAF (paroxysmal atrial fibrillation)      PLAN:  1.  Celestone 9 mg IM.  2.  Z-Bar.    3.  Keep well hydrated.  Monitor blood sugar and dose insulin as needed.  4.  Refill promethazine DM.  5.  Refill albuterol inhaler.    6.  Work excuse given.  7.  May return to clinic for knee injection once feeling better.    20 minutes of  total time spent on the encounter, which includes face to face time and non-face to face time preparing to see the patient (eg, review of tests), Obtaining and/or reviewing separately obtained history, Documenting clinical information in the electronic or other health record, Independently interpreting results (not separately reported) and communicating results to the patient/family/caregiver, or Care coordination (not separately reported).     This note is generated with speech recognition software and is subject to transcription error and sound alike phrases that may be missed by proofreading.

## 2024-01-05 ENCOUNTER — OFFICE VISIT (OUTPATIENT)
Dept: FAMILY MEDICINE | Facility: CLINIC | Age: 69
End: 2024-01-05
Payer: COMMERCIAL

## 2024-01-05 VITALS
HEART RATE: 79 BPM | BODY MASS INDEX: 38.65 KG/M2 | DIASTOLIC BLOOD PRESSURE: 68 MMHG | OXYGEN SATURATION: 96 % | SYSTOLIC BLOOD PRESSURE: 116 MMHG | HEIGHT: 67 IN | RESPIRATION RATE: 18 BRPM | WEIGHT: 246.25 LBS | TEMPERATURE: 99 F

## 2024-01-05 DIAGNOSIS — M17.12 PRIMARY OSTEOARTHRITIS OF LEFT KNEE: Primary | ICD-10-CM

## 2024-01-05 PROCEDURE — 1125F AMNT PAIN NOTED PAIN PRSNT: CPT | Mod: CPTII,S$GLB,, | Performed by: FAMILY MEDICINE

## 2024-01-05 PROCEDURE — 1160F RVW MEDS BY RX/DR IN RCRD: CPT | Mod: CPTII,S$GLB,, | Performed by: FAMILY MEDICINE

## 2024-01-05 PROCEDURE — 3008F BODY MASS INDEX DOCD: CPT | Mod: CPTII,S$GLB,, | Performed by: FAMILY MEDICINE

## 2024-01-05 PROCEDURE — 1159F MED LIST DOCD IN RCRD: CPT | Mod: CPTII,S$GLB,, | Performed by: FAMILY MEDICINE

## 2024-01-05 PROCEDURE — 99999 PR PBB SHADOW E&M-EST. PATIENT-LVL V: CPT | Mod: PBBFAC,,, | Performed by: FAMILY MEDICINE

## 2024-01-05 PROCEDURE — 3074F SYST BP LT 130 MM HG: CPT | Mod: CPTII,S$GLB,, | Performed by: FAMILY MEDICINE

## 2024-01-05 PROCEDURE — 20610 DRAIN/INJ JOINT/BURSA W/O US: CPT | Mod: LT,S$GLB,, | Performed by: FAMILY MEDICINE

## 2024-01-05 PROCEDURE — 99213 OFFICE O/P EST LOW 20 MIN: CPT | Mod: 25,S$GLB,, | Performed by: FAMILY MEDICINE

## 2024-01-05 PROCEDURE — 3078F DIAST BP <80 MM HG: CPT | Mod: CPTII,S$GLB,, | Performed by: FAMILY MEDICINE

## 2024-01-05 RX ORDER — TRIAMCINOLONE ACETONIDE 40 MG/ML
40 INJECTION, SUSPENSION INTRA-ARTICULAR; INTRAMUSCULAR
Status: COMPLETED | OUTPATIENT
Start: 2024-01-05 | End: 2024-01-05

## 2024-01-05 RX ORDER — TRIAMCINOLONE ACETONIDE 40 MG/ML
40 INJECTION, SUSPENSION INTRA-ARTICULAR; INTRAMUSCULAR
Status: DISCONTINUED | OUTPATIENT
Start: 2024-01-05 | End: 2024-01-05

## 2024-01-05 RX ORDER — LIDOCAINE HYDROCHLORIDE 20 MG/ML
4 INJECTION, SOLUTION INFILTRATION; PERINEURAL
Status: COMPLETED | OUTPATIENT
Start: 2024-01-05 | End: 2024-01-05

## 2024-01-05 RX ADMIN — LIDOCAINE HYDROCHLORIDE 4 ML: 20 INJECTION, SOLUTION INFILTRATION; PERINEURAL at 10:01

## 2024-01-05 RX ADMIN — TRIAMCINOLONE ACETONIDE 40 MG: 40 INJECTION, SUSPENSION INTRA-ARTICULAR; INTRAMUSCULAR at 10:01

## 2024-01-05 NOTE — PROGRESS NOTES
CHIEF COMPLAINT: This is a 68-year-old female complaining of flare-up of left knee pain.     SUBJECTIVE:  Patient complains of flare-up of pain in left knee after stepping wrong on a curb.  She complains of a tightness in the posterior knee that radiates anteriorly.  Patient has difficulty walking because of stiffness and inability to flex the knee. She denies joint swelling, redness or warmth. Past x-rays showed mild to moderate tricompartmental degenerative changes.  The patient was seen by orthopedic surgeon in August 2021.  She reports improvement in knee pain after steroid injections 4 months ago and requests injection today.       Patient requests refills of Voltaren gel and gabapentin.  Patient is a type 2 diabetic and is due for A1c.  Last A1c was 7%, 3 months ago.     ROS:  GENERAL: Patient denies fever, chills, night sweats.  Patient denies weight gain or loss. Patient denies anorexia, fatigue, weakness or swollen glands.  SKIN: Patient denies rash.  LUNGS: Patient denies cough, wheeze or hemoptysis.  CARDIOVASCULAR: Patient denies chest pain, shortness of breath, palpitations, syncope or lower extremity edema.  GI: Patient denies abdominal pain, nausea, vomiting, diarrhea, constipation, blood in stool or melena.  MUSCULOSKELETAL: Patient denies joint swelling, redness or warmth.  Positive for joint pain.  NEUROLOGIC: Patient denies numbness, tingling or weakness in limb.       OBJECTIVE:   GENERAL: Well-developed well-nourished, obese, black female alert and oriented x3, in no acute distress. Memory, judgment and cognition without deficit.   SKIN: Clear without rash. Normal color and tone.  Sebaceous cyst on back.  HEENT: Eyes: No scleral icterus. Clear conjunctivae.   NECK: Supple, normal range of motion.   LUNGS:  Normal respiratory effort.  EXTREMITIES: Without cyanosis, clubbing or edema. Distal pulses 2+ and equal. Normal range of motion in hips, knees and ankles. No joint effusion, erythema or  warmth.  NEUROLOGIC:  Motor strength equal bilaterally. Sensation normal to touch. Deep tendon reflexes 2+ and equal. Gait antalgic.     Reviewed treatment options including physical therapy and orthopedic consult. Discussed potential complications of procedure including bleeding, infection, and nerve damage.  Patient understands and accepts risks.  Verbal consent obtained.     Procedure: After sterile prep and drape, 1 cc of Kenalog 40 mg/cc +1 cc of 1% Xylocaine injected into left knee via lateral approach without difficulty.    ASSESSMENT:  1. Primary osteoarthritis of left knee      PLAN:  1.  Apply ice q.i.d. for 15-20 minutes over the next 2-3 days.  2.  Consider orthopedic consult.    3.  Follow-up if no improvement or worsening symptoms.     20 minutes of total time spent on the encounter, which includes face to face time and non-face to face time preparing to see the patient (eg, review of tests), Obtaining and/or reviewing separately obtained history, Documenting clinical information in the electronic or other health record, Independently interpreting results (not separately reported) and communicating results to the patient/family/caregiver, or Care coordination (not separately reported).     This note is generated with speech recognition software and is subject to transcription error and sound alike phrases that may be missed by proofreading.

## 2024-01-09 DIAGNOSIS — M25.562 LEFT KNEE PAIN, UNSPECIFIED CHRONICITY: Primary | ICD-10-CM

## 2024-01-11 ENCOUNTER — HOSPITAL ENCOUNTER (OUTPATIENT)
Dept: RADIOLOGY | Facility: HOSPITAL | Age: 69
Discharge: HOME | End: 2024-01-11
Attending: PHYSICIAN ASSISTANT
Payer: COMMERCIAL

## 2024-01-11 ENCOUNTER — OFFICE VISIT (OUTPATIENT)
Dept: SPORTS MEDICINE | Facility: CLINIC | Age: 69
End: 2024-01-11
Payer: COMMERCIAL

## 2024-01-11 VITALS — HEIGHT: 67 IN | WEIGHT: 240 LBS | BODY MASS INDEX: 37.67 KG/M2

## 2024-01-11 DIAGNOSIS — I48.0 PAF (PAROXYSMAL ATRIAL FIBRILLATION): Chronic | ICD-10-CM

## 2024-01-11 DIAGNOSIS — M25.562 LEFT KNEE PAIN, UNSPECIFIED CHRONICITY: Primary | ICD-10-CM

## 2024-01-11 DIAGNOSIS — I10 ESSENTIAL HYPERTENSION: Chronic | ICD-10-CM

## 2024-01-11 DIAGNOSIS — I49.3 PVC'S (PREMATURE VENTRICULAR CONTRACTIONS): ICD-10-CM

## 2024-01-11 DIAGNOSIS — Z79.4 TYPE 2 DIABETES MELLITUS WITH BOTH EYES AFFECTED BY MILD NONPROLIFERATIVE RETINOPATHY AND MACULAR EDEMA, WITH LONG-TERM CURRENT USE OF INSULIN: ICD-10-CM

## 2024-01-11 DIAGNOSIS — M25.562 LEFT KNEE PAIN, UNSPECIFIED CHRONICITY: ICD-10-CM

## 2024-01-11 DIAGNOSIS — E55.9 VITAMIN D DEFICIENCY DISEASE: ICD-10-CM

## 2024-01-11 DIAGNOSIS — E78.5 HYPERLIPIDEMIA, UNSPECIFIED HYPERLIPIDEMIA TYPE: Chronic | ICD-10-CM

## 2024-01-11 DIAGNOSIS — Z79.4 TYPE 2 DIABETES MELLITUS WITHOUT COMPLICATION, WITH LONG-TERM CURRENT USE OF INSULIN: Chronic | ICD-10-CM

## 2024-01-11 DIAGNOSIS — K21.9 GASTROESOPHAGEAL REFLUX DISEASE, UNSPECIFIED WHETHER ESOPHAGITIS PRESENT: ICD-10-CM

## 2024-01-11 DIAGNOSIS — E66.01 MORBID OBESITY WITH BMI OF 40.0-44.9, ADULT: ICD-10-CM

## 2024-01-11 DIAGNOSIS — M17.12 PRIMARY OSTEOARTHRITIS OF LEFT KNEE: ICD-10-CM

## 2024-01-11 DIAGNOSIS — E11.3213 TYPE 2 DIABETES MELLITUS WITH BOTH EYES AFFECTED BY MILD NONPROLIFERATIVE RETINOPATHY AND MACULAR EDEMA, WITH LONG-TERM CURRENT USE OF INSULIN: ICD-10-CM

## 2024-01-11 DIAGNOSIS — M17.0 PRIMARY OSTEOARTHRITIS OF BOTH KNEES: ICD-10-CM

## 2024-01-11 DIAGNOSIS — E11.9 TYPE 2 DIABETES MELLITUS WITHOUT COMPLICATION, WITH LONG-TERM CURRENT USE OF INSULIN: Chronic | ICD-10-CM

## 2024-01-11 PROCEDURE — 99999 PR PBB SHADOW E&M-EST. PATIENT-LVL V: CPT | Mod: PBBFAC,,, | Performed by: PHYSICIAN ASSISTANT

## 2024-01-11 PROCEDURE — 73562 X-RAY EXAM OF KNEE 3: CPT | Mod: 26,59,RT, | Performed by: RADIOLOGY

## 2024-01-11 PROCEDURE — 97110 THERAPEUTIC EXERCISES: CPT | Mod: GP,S$GLB,, | Performed by: PHYSICIAN ASSISTANT

## 2024-01-11 PROCEDURE — 1125F AMNT PAIN NOTED PAIN PRSNT: CPT | Mod: CPTII,S$GLB,, | Performed by: PHYSICIAN ASSISTANT

## 2024-01-11 PROCEDURE — 3288F FALL RISK ASSESSMENT DOCD: CPT | Mod: CPTII,S$GLB,, | Performed by: PHYSICIAN ASSISTANT

## 2024-01-11 PROCEDURE — 99204 OFFICE O/P NEW MOD 45 MIN: CPT | Mod: S$GLB,,, | Performed by: PHYSICIAN ASSISTANT

## 2024-01-11 PROCEDURE — 73564 X-RAY EXAM KNEE 4 OR MORE: CPT | Mod: 26,LT,, | Performed by: RADIOLOGY

## 2024-01-11 PROCEDURE — 1160F RVW MEDS BY RX/DR IN RCRD: CPT | Mod: CPTII,S$GLB,, | Performed by: PHYSICIAN ASSISTANT

## 2024-01-11 PROCEDURE — 1101F PT FALLS ASSESS-DOCD LE1/YR: CPT | Mod: CPTII,S$GLB,, | Performed by: PHYSICIAN ASSISTANT

## 2024-01-11 PROCEDURE — 73562 X-RAY EXAM OF KNEE 3: CPT | Mod: TC,RT

## 2024-01-11 PROCEDURE — 3008F BODY MASS INDEX DOCD: CPT | Mod: CPTII,S$GLB,, | Performed by: PHYSICIAN ASSISTANT

## 2024-01-11 PROCEDURE — 1159F MED LIST DOCD IN RCRD: CPT | Mod: CPTII,S$GLB,, | Performed by: PHYSICIAN ASSISTANT

## 2024-01-11 NOTE — PROGRESS NOTES
Patient ID: Latosha Enriquez  YOB: 1955  MRN: [id number]    Chief Complaint: Pain of the Right Knee and Pain of the Left Knee    Referred By: Current patient and PCP    History of Present Illness: Latosha Enriquez is a  68 y.o. female   Data Unavailable with a chief complaint of Pain of the Right Knee and Pain of the Left Knee    Latosha Enriquez is a 68 y.o. female presents today for bilateral knee pain, worse in the left knee. She states that she has been having pain in the left knee since early December with no known injury. Most the pain is located to the inside of the left knee. She rates pain as a 5/10 which is constant and worse with standing or walking.  Patient was seen by her primary care doctor on 23.  At that time she was given a left knee Kenalog injection, she has been using a compressive knee brace, using Biofreeze and taking Tylenol as needed.  Patient denies any fevers, chills, night sweats, numbness and tingling.    Pain  Associated symptoms include joint swelling and myalgias. Pertinent negatives include no abdominal pain, chest pain, chills, coughing, fever, nausea, numbness, rash, sore throat or vomiting.       Past Medical History:   Past Medical History:   Diagnosis Date    Asthma     Colon polyps     Diabetes mellitus type II, uncontrolled     Diabetic retinopathy     Diverticulosis of large intestine without hemorrhage 2015    Elevated liver enzymes     GERD (gastroesophageal reflux disease)     Gout, unspecified     Hemorrhoids, internal 2015    History of blood transfusion     Hyperlipidemia     Hypertension     IBS (irritable bowel syndrome)     Morbid obesity with BMI of 40.0-44.9, adult     Nasal vestibulitis     Osteoarthritis of multiple joints     Urolithiasis     Vitamin D deficiency disease      Past Surgical History:   Procedure Laterality Date    BREAST BIOPSY Right      SECTION, CLASSIC      COLONOSCOPY N/A 2015    Procedure: COLONOSCOPY;   Surgeon: Stanislav Pickard MD;  Location: [facility] ENDO;  Service: Endoscopy;  Laterality: N/A;    CYSTOSCOPY W/ URETERAL STENT PLACEMENT  05/2017    EPIDURAL STEROID INJECTION Left 12/23/2022    Procedure: Lumbar L4/5 IL CHRISTOPHER with left paramedian approach RN IV Sedation;  Surgeon: Pradeep Oconnell MD;  Location: [facility]GT;  Service: Pain Management;  Laterality: Left;    EXTRACORPOREAL SHOCK WAVE LITHOTRIPSY      LEFT HEART CATHETERIZATION Left 2/20/2019    Procedure: CATHETERIZATION, HEART, LEFT;  Surgeon: Haile Suggs MD;  Location: [facility] CATH LAB;  Service: Cardiology;  Laterality: Left;  10:30-11am start/poss rt radial approach    SELECTIVE INJECTION OF ANESTHETIC AGENT AROUND LUMBAR SPINAL NERVE ROOT BY TRANSFORAMINAL APPROACH Bilateral 4/11/2023    Procedure: Bilateral L4/5 +L5/S1 TF CHRISTOPHER;  Surgeon: Pradeep Oconnell MD;  Location: [facility];  Service: Pain Management;  Laterality: Bilateral;    TOTAL ABDOMINAL HYSTERECTOMY      URETEROSCOPY  05/2017     Family History   Problem Relation Age of Onset    Hypertension Mother     Heart disease Mother     Diabetes Mother     Colon polyps Mother     Other Mother         Lower limb amputation    Hypertension Brother     Stroke Brother     Colon cancer Maternal Grandmother     Diabetes Brother     Breast cancer Maternal Cousin      Social History     Socioeconomic History    Marital status:    Occupational History    Occupation:      Employer: [employer]   Tobacco Use    Smoking status: Never    Smokeless tobacco: Never   Substance and Sexual Activity    Alcohol use: No    Drug use: No    Sexual activity: Never   Social History Narrative    Patient's daughter and grandson live with her. Her mother lives with her. 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     Social Determinants of Health     Stress: No Stress Concern Present (9/25/2019)    [employer] - Occupational Stress  "Questionnaire     Feeling of Stress : Not at all     Medication List with Changes/Refills   Current Medications    ALBUTEROL (PROVENTIL/VENTOLIN HFA) 90 MCG/ACTUATION INHALER    Inhale 1-2 puffs into the lungs every 4 to 6 hours as needed for Wheezing or Shortness of Breath.    ALBUTEROL (VENTOLIN HFA) 90 MCG/ACTUATION INHALER    Inhale 2 puffs into the lungs every 6 (six) hours as needed for Wheezing. Rescue    ALLOPURINOL (ZYLOPRIM) 100 MG TABLET    TAKE 1 TABLET(100 MG) BY MOUTH EVERY DAY    APIXABAN (ELIQUIS) 5 MG TAB    Take 1 tablet (5 mg total) by mouth 2 (two) times daily.    ATENOLOL (TENORMIN) 25 MG TABLET    Take 25 mg by mouth once daily.    AZITHROMYCIN (Z-FARRAH) 250 MG TABLET    Take 2 tablets for first dose today, then 1 tablet daily for 4 days.    BACLOFEN (LIORESAL) 10 MG TABLET    Take 1 tablet (10 mg total) by mouth 3 (three) times daily.    BD ULTRA-FINE SHORT PEN NEEDLE 31 GAUGE X 5/16" NDLE    USE 1 PEN NEEDLE UNDER THE SKIN TWICE A DAY    CYANOCOBALAMIN (VITAMIN B-12) 100 MCG TABLET    Take by mouth.    DICLOFENAC SODIUM (VOLTAREN) 1 % GEL    Apply 2 g topically 3 (three) times daily.    DULOXETINE (CYMBALTA) 30 MG CAPSULE    Take 1 capsule (30 mg total) by mouth once daily.    ERGOCALCIFEROL, VITAMIN D2, 400 UNIT TAB    Take by mouth.    FERROUS SULFATE, DRIED (SLOW FE) 160 MG (50 MG IRON) TBSR    Take by mouth.    FEXOFENADINE (ALLEGRA) 180 MG TABLET    Take 1 tablet (180 mg total) by mouth once daily.    FLECAINIDE (TAMBOCOR) 50 MG TAB    Take 50 mg by mouth 2 (two) times daily.    FLUTICASONE PROPION-SALMETEROL 45-21 MCG/DOSE (ADVAIR HFA) 45-21 MCG/ACTUATION HFAA INHALER    Inhale 2 puffs into the lungs every 12 (twelve) hours. Controller    GABAPENTIN (NEURONTIN) 100 MG CAPSULE    Take 1 capsule (100 mg total) by mouth 3 (three) times daily.    HYDROCODONE-ACETAMINOPHEN (NORCO)  MG PER TABLET    Take 1 tablet by mouth every 6 (six) hours as needed for Pain.    INSULIN LISPRO " PROTAMIN-LISPRO 100 UNIT/ML (75-25) INPN    INJECT 30 UNITS UNDER THE SKIN EVERY MORNING AND 20 UNITS EVERY EVENING    LOSARTAN-HYDROCHLOROTHIAZIDE 100-25 MG (HYZAAR) 100-25 MG PER TABLET    Take 1 tablet by mouth once daily.    METFORMIN (GLUCOPHAGE-XR) 500 MG ER 24HR TABLET    TAKE 2 TABLETS BY MOUTH DAILY WITH BREAKFAST AND 1 TABLET WITH DINNER    MONTELUKAST (SINGULAIR) 10 MG TABLET    Take 1 tablet (10 mg total) by mouth every evening.    PANTOPRAZOLE (PROTONIX) 20 MG TABLET    Take 20 mg by mouth every morning.    POTASSIUM CITRATE (UROCIT-K) 10 MEQ (1,080 MG) TBSR    Take 2 tablets (20 mEq total) by mouth 2 (two) times daily with meals.    PROMETHAZINE-DEXTROMETHORPHAN (PROMETHAZINE-DM) 6.25-15 MG/5 ML SYRP    Take 5 mLs by mouth every 4 (four) hours as needed (cough).    SIMVASTATIN (ZOCOR) 20 MG TABLET    Take 1 tablet (20 mg total) by mouth every evening.    TAMSULOSIN (FLOMAX) 0.4 MG CAP    Take 1 capsule (0.4 mg total) by mouth every evening.    TRIAMCINOLONE (NASACORT) 55 MCG NASAL INHALER    2 sprays by Nasal route once daily.     Review of patient's allergies indicates:   Allergen Reactions    Antihistamines - alkylamine Anaphylaxis and Itching     Cough, throat itches    Chocolate flavor Other (See Comments)     disoriented    Doxycycline Other (See Comments)     Stomach pain    Betadine [povidone-iodine] Itching    Strawberries [strawberry] Other (See Comments)    Iodine and iodide containing products Other (See Comments)     Tongue swelling    Tramadol Nausea Only    Yeast Itching     Facial swelling, constipation     Review of Systems   Constitutional: Negative for chills and fever.   HENT:  Negative for sore throat.    Eyes:  Negative for pain.   Cardiovascular:  Negative for chest pain and leg swelling.   Respiratory:  Negative for cough and shortness of breath.    Skin:  Negative for itching and rash.   Musculoskeletal:  Positive for arthritis, joint pain, joint swelling and myalgias.    Gastrointestinal:  Negative for abdominal pain, nausea and vomiting.   Genitourinary:  Negative for dysuria.   Neurological:  Negative for dizziness, numbness and paresthesias.       Physical Exam:   Body mass index is 38.16 kg/m².  There were no vitals filed for this visit.   GENERAL: Well appearing, appropriate for stated age, no acute distress.  CARDIOVASCULAR: Pulses regular by peripheral palpation.  PULMONARY: Respirations are even and non-labored.  NEURO: Awake, alert, and oriented x 3.  PSYCH: Mood & affect are appropriate.  HEENT: Head is normocephalic and atraumatic.  General    Nursing note and vitals reviewed.          Right Knee Exam   Right knee exam is normal.    Inspection   Effusion: absent    Tenderness   The patient is experiencing no tenderness and medial joint line.   The patient is tender to palpation of the no tenderness and medial joint line.    Range of Motion   Extension:  0   Flexion:  120     Tests   Ligament Examination   Lachman: normal (-1 to 2mm)   PCL-Posterior Drawer: normal (0 to 2mm)     MCL - Valgus: normal (0 to 2mm)  LCL - Varus: normal    Other   Sensation: normal    Left Knee Exam   Left knee exam is normal.    Inspection   Effusion: present    Tenderness   The patient is experiencing no tenderness and medial joint line.   The patient tender to palpation of the no tenderness and medial joint line.    Range of Motion   Extension:  0   Flexion:  120     Tests   Stability   Lachman: normal (-1 to 2mm)   PCL-Posterior Drawer: normal (0 to 2mm)  MCL - Valgus: normal (0 to 2mm)  LCL - Varus: normal (0 to 2mm)    Other   Popliteal (Baker's) Cyst: present  Sensation: normal    Muscle Strength   Right Lower Extremity   Hip Abduction: 5/5   Quadriceps:  4/5   Hamstrin/5   Left Lower Extremity   Hip Abduction: 5/5   Quadriceps:  4/5   Hamstrin/5     Vascular Exam     Right Pulses  Dorsalis Pedis:      2+  Posterior Tibial:      2+        Left Pulses  Dorsalis Pedis:       2+  Posterior Tibial:      2+        All compartments are soft and compressible. Calf soft non-tender. Intact EHL, FHL, gastroc soleus, and tibialis anterior. Sensation intact to light touch in superficial peroneal, deep peroneal, tibial, sural, and saphenous nerve distributions. Foot warm and well perfused with capillary refill of less than 2 seconds and palpable pedal pulses.       Imaging:    X-ray Knee Ortho Left with Flexion  Narrative: EXAMINATION:  XR KNEE ORTHO LEFT WITH FLEXION    CLINICAL HISTORY:  Pain in left knee    TECHNIQUE:  AP standing views of both knees, AP flexion views of both knees, lateral view of the left knee and Merchant views of both knees    COMPARISON:  11/15/2017    FINDINGS:  There is mild joint space narrowing and marginal osteophyte formation seen involving the medial compartment of the left knee and more mild-to-moderate joint space narrowing and osteophyte formation seen involving the medial compartment the right knee..  Mild-to-moderate degenerative change also seen at the left patellofemoral compartment.  No joint effusion.  No acute fracture or dislocation.  Impression: 1.  As above    Electronically signed by: Navjot Heller DO  Date:    01/11/2024  Time:    08:44      Relevant imaging results reviewed and interpreted by me, discussed with the patient and / or family today.     Other Tests:     Patient Instructions   Assessment:  Latosha Enriquez is a  68 y.o. female   Data Unavailable with a chief complaint of Pain of the Right Knee and Pain of the Left Knee  NP, referred by current patient & PCP for bilateral knee pain worse in the left knee with swelling and trouble ambulating with no injury.   Left knee OA  Left knee pain and swelling    Encounter Diagnoses   Name Primary?    Left knee pain, unspecified chronicity Yes    Primary osteoarthritis of left knee     Hyperlipidemia, unspecified hyperlipidemia type     Essential hypertension     PAF (paroxysmal atrial fibrillation)      PVC's (premature ventricular contractions)     Type 2 diabetes mellitus without complication, with long-term current use of insulin     Vitamin D deficiency disease     Morbid obesity with BMI of 40.0-44.9, adult     Gastroesophageal reflux disease, unspecified whether esophagitis present     Primary osteoarthritis of both knees     Type 2 diabetes mellitus with both eyes affected by mild nonproliferative retinopathy and macular edema, with long-term current use of insulin       Plan:  Bilateral knee compressive braces  Discussed tylenol OA can't do any oral anti-inflammatories due to blood thinner.   Already had Kenalog injection with PCP on 1/5/2023  Euflexxa in the left knee   Home exercise program         STRETCHING EXERCISES            STRENGTHENING EXERCISES                  If you have any difficulties reading this information, you may visit the online version using the following link: Knee Conditioning Program (https://orthoinfo.aaos.org/globalassets/pdfs/2017-rehab_knee.pdf)      Follow-up: - or sooner if there are any problems between now and then.    Leave Review:   Google: Leave Google Review  Healthgrades: Leave Healthgrades Review    After Hours Number: [phone]      Provider Note/Medical Decision Making:   At least 15 minutes were spent developing, teaching, and performing a home exercise program.  A written summary was provided and all questions were answered. CPT 74320-IB    Under my direction and supervision, 10 minutes were spent sizing, fitting, and educating regarding durable medical equipment by an assistant today.  CPT 31196.    MEDICAL NECESSITY FOR VISCOSUPPLEMENTATION: After thorough evaluation of the patient, I have determined that visco-supplementation is medically necessary. The patient has painful DJD of the knee with failure of conservative therapy including lifestyle modifications and rehabilitation exercises. Oral analgesis/NSAIDs have not adequately controlled symptoms and  there is radiographic evidence of joint space narrowing, subchondral sclerosis, and some early osteophytic changes Kellgren- Ricky grade 2 or greater, or in lack of radiographic evidence, there is arthroscopic or other evidence of chondrosis.    I discussed worrisome and red flag signs and symptoms with the patient. The patient expressed understanding and agreed to alert me immediately or to go to the emergency room if they experience any of these.   Treatment plan was developed with input from the patient/family, and they expressed understanding and agreement with the plan. All questions were answered today.        Disclaimer: This note was prepared using a voice recognition system and is likely to have sound alike errors within the text.

## 2024-01-11 NOTE — PATIENT INSTRUCTIONS
Assessment:  Latosha Enriquez is a  68 y.o. female   Data Unavailable with a chief complaint of Pain of the Right Knee and Pain of the Left Knee  NP, referred by current patient & PCP for bilateral knee pain worse in the left knee with swelling and trouble ambulating with no injury.   Left knee OA  Left knee pain and swelling    Encounter Diagnoses   Name Primary?    Left knee pain, unspecified chronicity Yes    Primary osteoarthritis of left knee     Hyperlipidemia, unspecified hyperlipidemia type     Essential hypertension     PAF (paroxysmal atrial fibrillation)     PVC's (premature ventricular contractions)     Type 2 diabetes mellitus without complication, with long-term current use of insulin     Vitamin D deficiency disease     Morbid obesity with BMI of 40.0-44.9, adult     Gastroesophageal reflux disease, unspecified whether esophagitis present     Primary osteoarthritis of both knees     Type 2 diabetes mellitus with both eyes affected by mild nonproliferative retinopathy and macular edema, with long-term current use of insulin       Plan:  Bilateral knee compressive braces  Discussed tylenol OA can't do any oral anti-inflammatories due to blood thinner.   Already had Kenalog injection with PCP on 1/5/2023  Euflexxa in the left knee   Home exercise program         STRETCHING EXERCISES            STRENGTHENING EXERCISES                  If you have any difficulties reading this information, you may visit the online version using the following link: Knee Conditioning Program (https://orthoinfo.aaos.org/globalassets/pdfs/2017-rehab_knee.pdf)      Follow-up: - or sooner if there are any problems between now and then.    Leave Review:   Google: Leave Google Review  Healthgrades: Leave Healthgrades Review    After Hours Number: [phone]

## 2024-01-31 DIAGNOSIS — Z78.0 MENOPAUSE: ICD-10-CM

## 2024-02-02 ENCOUNTER — OFFICE VISIT (OUTPATIENT)
Dept: FAMILY MEDICINE | Facility: CLINIC | Age: 69
End: 2024-02-02
Payer: COMMERCIAL

## 2024-02-02 VITALS
RESPIRATION RATE: 18 BRPM | TEMPERATURE: 99 F | WEIGHT: 239.63 LBS | HEART RATE: 84 BPM | DIASTOLIC BLOOD PRESSURE: 64 MMHG | HEIGHT: 67 IN | SYSTOLIC BLOOD PRESSURE: 110 MMHG | BODY MASS INDEX: 37.61 KG/M2 | OXYGEN SATURATION: 95 %

## 2024-02-02 DIAGNOSIS — U07.1 COVID-19 VIRUS INFECTION: Primary | ICD-10-CM

## 2024-02-02 LAB
CTP QC/QA: YES
CTP QC/QA: YES
POC MOLECULAR INFLUENZA A AGN: NEGATIVE
POC MOLECULAR INFLUENZA B AGN: NEGATIVE
SARS-COV-2 RDRP RESP QL NAA+PROBE: POSITIVE

## 2024-02-02 PROCEDURE — 1159F MED LIST DOCD IN RCRD: CPT | Mod: CPTII,S$GLB,, | Performed by: FAMILY MEDICINE

## 2024-02-02 PROCEDURE — 3078F DIAST BP <80 MM HG: CPT | Mod: CPTII,S$GLB,, | Performed by: FAMILY MEDICINE

## 2024-02-02 PROCEDURE — 87635 SARS-COV-2 COVID-19 AMP PRB: CPT | Mod: QW,S$GLB,, | Performed by: FAMILY MEDICINE

## 2024-02-02 PROCEDURE — 3074F SYST BP LT 130 MM HG: CPT | Mod: CPTII,S$GLB,, | Performed by: FAMILY MEDICINE

## 2024-02-02 PROCEDURE — 3008F BODY MASS INDEX DOCD: CPT | Mod: CPTII,S$GLB,, | Performed by: FAMILY MEDICINE

## 2024-02-02 PROCEDURE — 99213 OFFICE O/P EST LOW 20 MIN: CPT | Mod: S$GLB,,, | Performed by: FAMILY MEDICINE

## 2024-02-02 PROCEDURE — 87502 INFLUENZA DNA AMP PROBE: CPT | Mod: QW,S$GLB,, | Performed by: FAMILY MEDICINE

## 2024-02-02 PROCEDURE — 1126F AMNT PAIN NOTED NONE PRSNT: CPT | Mod: CPTII,S$GLB,, | Performed by: FAMILY MEDICINE

## 2024-02-02 PROCEDURE — 1160F RVW MEDS BY RX/DR IN RCRD: CPT | Mod: CPTII,S$GLB,, | Performed by: FAMILY MEDICINE

## 2024-02-02 PROCEDURE — 99999 PR PBB SHADOW E&M-EST. PATIENT-LVL V: CPT | Mod: PBBFAC,,, | Performed by: FAMILY MEDICINE

## 2024-02-02 RX ORDER — METHYLPREDNISOLONE 4 MG/1
TABLET ORAL
Qty: 1 EACH | Refills: 0 | Status: SHIPPED | OUTPATIENT
Start: 2024-02-02 | End: 2024-03-21

## 2024-02-02 NOTE — PROGRESS NOTES
CHIEF COMPLAINT:  This is a 68-year-old female complaining of respiratory illness.     SUBJECTIVE:  The patient complains of fever to 100.7°, chills and myalgias 48 hours ago.  She had associated sore throat which has resolved and coughing which persists.  Chest congestion has improved and she reports that fever broke last night.  She continues to feel fatigued and occasionally short of breath. Wheezing has resolved since using inhaler.  Patient reports clear rhinorrhea. Patient has been taking Tylenol.  Positive ill contacts.  She did rapid COVID-19 testing 24 hours ago which was negative.  Patient did not receive influenza vaccine.     Patient continues to have bilateral knee pain secondary to osteoarthritis.  She was seen by Orthopedics and is scheduled for Euflexxa injections.  She cannot take NSAIDs due to chronic anticoagulation with Eliquis.  Patient has paroxysmal atrial fibrillation.     ROS:  GENERAL: Patient denies fever, chills, night sweats. Patient denies weight gain or loss. Patient denies anorexia, fatigue, weakness or swollen glands.  SKIN: Patient denies rash.  HEENT: Patient denies sore throat, ear pain, nasal congestion.  Patient denies visual disturbance, eye irritation or discharge.  Positive for runny nose.  LUNGS:  Patient denies wheeze or hemoptysis.  Positive for cough.  CARDIOVASCULAR: Patient denies chest pain, palpitations, syncope or lower extremity edema.  Positive for shortness of breath.  GI: Patient denies abdominal pain, nausea, vomiting, diarrhea, blood in stool or melena.  GENITOURINARY:  Patient denies dysuria, frequency, hematuria, nocturia, urgency or incontinence.  MUSCULOSKELETAL: Patient denies joint swelling, redness or warmth.  Positive for joint pain.  NEUROLOGIC: Patient denies headache, vertigo, paresthesias, weakness in limb, or abnormality of gait.     OBJECTIVE:   GENERAL: Well-developed well-nourished, morbidly obese, black female alert and oriented x3, in no acute  distress. Memory, judgment and cognition without deficit.  No audible wheezing.  SKIN: Clear without rash. Normal color and tone.   HEENT: Eyes: No scleral icterus. Clear conjunctivae. Pupils equal reactive to light and accommodation. Ears: Clear canals.  Clear TMs.  Nose: Without congestion.  Pharynx: Without injection or exudates.  NECK: Supple, normal range of motion. No masses, nodes or enlarged thyroid.  LUNGS: Clear to auscultation. Normal respiratory effort.  O2 saturation 95%.  CARDIOVASCULAR: Regular rhythm, normal S1, S2 without murmur, gallop or rub.  NEUROLOGIC:  Gait antalgic.  No tremor.      COVID-19 test:  Positive.    Rapid influenza test: Negative.    ASSESSMENT:  1. COVID-19 virus infection      PLAN:  1. Keep well hydrated.    2. Symptomatic treatment.  3.  Medrol Dosepak.  4.  Quarantine instructions given.  Work excuse given.  5.  Follow-up if no improvement or worsening symptoms.    20 minutes of total time spent on the encounter, which includes face to face time and non-face to face time preparing to see the patient (eg, review of tests), Obtaining and/or reviewing separately obtained history, Documenting clinical information in the electronic or other health record, Independently interpreting results (not separately reported) and communicating results to the patient/family/caregiver, or Care coordination (not separately reported).     This note is generated with speech recognition software and is subject to transcription error and sound alike phrases that may be missed by proofreading.

## 2024-02-02 NOTE — LETTER
February 2, 2024      [facility]  [address] [place] [address]  Phone: [phone]  Fax: [phone]       Patient: Latosha Enriquez   YOB: 1955  Date of Visit: 02/02/2024    To Whom It May Concern:    Rodo Enriquez  was at [facility] on 02/02/2024. Please excuse from 2/1/2024 - 2/5/2024 The patient may return to work/school on 2/5/2024  with no restrictions. If you have any questions or concerns, or if I can be of further assistance, please do not hesitate to contact me.    Sincerely,  Gabbie Ronquillo MD

## 2024-02-06 DIAGNOSIS — M17.12 PRIMARY OSTEOARTHRITIS OF LEFT KNEE: Primary | ICD-10-CM

## 2024-02-12 ENCOUNTER — PROCEDURE VISIT (OUTPATIENT)
Dept: SPORTS MEDICINE | Facility: CLINIC | Age: 69
End: 2024-02-12
Payer: COMMERCIAL

## 2024-02-12 VITALS — WEIGHT: 239.63 LBS | BODY MASS INDEX: 37.61 KG/M2 | HEIGHT: 67 IN

## 2024-02-12 DIAGNOSIS — M17.12 PRIMARY OSTEOARTHRITIS OF LEFT KNEE: Primary | ICD-10-CM

## 2024-02-12 PROCEDURE — 99499 UNLISTED E&M SERVICE: CPT | Mod: S$GLB,,, | Performed by: PHYSICIAN ASSISTANT

## 2024-02-12 PROCEDURE — 20610 DRAIN/INJ JOINT/BURSA W/O US: CPT | Mod: LT,S$GLB,, | Performed by: PHYSICIAN ASSISTANT

## 2024-02-12 NOTE — PROCEDURES
Large Joint Aspiration/Injection: L knee    Date/Time: 2/12/2024 3:00 PM    Performed by: Ivet Bernal PA-C  Authorized by: Ivet Bernal PA-C    Consent Done?:  Yes (Verbal)  Indications:  Joint swelling and pain  Site marked: the procedure site was marked    Timeout: prior to procedure the correct patient, procedure, and site was verified    Prep: patient was prepped and draped in usual sterile fashion      Local anesthesia used?: Yes    Local anesthetic:  Topical anesthetic    Details:  Needle Size:  22 G  Ultrasonic Guidance for needle placement?: No    Approach:  Superior  Location:  Knee  Site:  L knee  Medications:  16 mg hyaluronate 16 mg/2 mL  Patient tolerance:  Patient tolerated the procedure well with no immediate complications     1/3

## 2024-02-12 NOTE — TELEPHONE ENCOUNTER
No care due was identified.  Health [facility] Embedded Care Due Messages. Reference number: [id number].   2/12/2024 6:05:15 AM CST

## 2024-02-13 RX ORDER — METFORMIN HYDROCHLORIDE 500 MG/1
TABLET, EXTENDED RELEASE ORAL
Qty: 270 TABLET | Refills: 0 | Status: SHIPPED | OUTPATIENT
Start: 2024-02-13 | End: 2024-06-10 | Stop reason: SDUPTHER

## 2024-02-13 NOTE — TELEPHONE ENCOUNTER
Refill Routing Note   Medication(s) are not appropriate for processing by [facility] for the following reason(s):        Allergy or intolerance: inactive ingredient    [facility] action(s):  Defer      Medication Therapy Plan:         Appointments  past 12m or future 3m with PCP    Date Provider   Last Visit   2/2/2024 Gabbie Ronquillo MD   Next Visit   Visit date not found Gabbie Ronquillo MD   ED visits in past 90 days: 0        Note composed:6:35 PM 02/12/2024

## 2024-02-19 ENCOUNTER — PROCEDURE VISIT (OUTPATIENT)
Dept: SPORTS MEDICINE | Facility: CLINIC | Age: 69
End: 2024-02-19
Payer: COMMERCIAL

## 2024-02-19 VITALS — HEIGHT: 66 IN | RESPIRATION RATE: 17 BRPM | WEIGHT: 239.63 LBS | BODY MASS INDEX: 38.51 KG/M2

## 2024-02-19 DIAGNOSIS — M17.12 PRIMARY OSTEOARTHRITIS OF LEFT KNEE: Primary | ICD-10-CM

## 2024-02-19 PROCEDURE — 99499 UNLISTED E&M SERVICE: CPT | Mod: S$GLB,,, | Performed by: PHYSICIAN ASSISTANT

## 2024-02-19 PROCEDURE — 20610 DRAIN/INJ JOINT/BURSA W/O US: CPT | Mod: LT,S$GLB,, | Performed by: PHYSICIAN ASSISTANT

## 2024-02-19 NOTE — PROCEDURES
Large Joint Aspiration/Injection: L knee    Date/Time: 2/19/2024 2:00 PM    Performed by: Ivet Bernal PA-C  Authorized by: Ivet Bernal PA-C    Consent Done?:  Yes (Verbal)  Indications:  Joint swelling and pain  Site marked: the procedure site was marked    Timeout: prior to procedure the correct patient, procedure, and site was verified    Prep: patient was prepped and draped in usual sterile fashion      Local anesthesia used?: Yes    Local anesthetic:  Topical anesthetic    Details:  Needle Size:  22 G  Ultrasonic Guidance for needle placement?: No    Approach:  Superior  Location:  Knee  Site:  L knee  Medications:  16 mg hyaluronate 16 mg/2 mL  Patient tolerance:  Patient tolerated the procedure well with no immediate complications     2/3

## 2024-02-21 DIAGNOSIS — E11.9 TYPE 2 DIABETES MELLITUS WITHOUT COMPLICATION: ICD-10-CM

## 2024-02-23 RX ORDER — INSULIN LISPRO 100 [IU]/ML
INJECTION, SUSPENSION SUBCUTANEOUS
Qty: 60 ML | Refills: 0 | Status: SHIPPED | OUTPATIENT
Start: 2024-02-23

## 2024-02-23 NOTE — TELEPHONE ENCOUNTER
No care due was identified.  Health [facility] Embedded Care Due Messages. Reference number: [id number].   2/23/2024 10:34:42 AM CST

## 2024-02-23 NOTE — TELEPHONE ENCOUNTER
Refill Decision Note   Latosha Mina  is requesting a refill authorization.    Brief Assessment and Rationale for Refill:  Approve       Medication Therapy Plan:         Comments:     Note composed:12:28 PM 02/23/2024

## 2024-02-26 ENCOUNTER — PROCEDURE VISIT (OUTPATIENT)
Dept: SPORTS MEDICINE | Facility: CLINIC | Age: 69
End: 2024-02-26
Payer: COMMERCIAL

## 2024-02-26 VITALS — HEIGHT: 66 IN | BODY MASS INDEX: 38.51 KG/M2 | WEIGHT: 239.63 LBS

## 2024-02-26 DIAGNOSIS — M17.12 PRIMARY OSTEOARTHRITIS OF LEFT KNEE: Primary | ICD-10-CM

## 2024-02-26 PROCEDURE — 99499 UNLISTED E&M SERVICE: CPT | Mod: S$GLB,,, | Performed by: PHYSICIAN ASSISTANT

## 2024-02-26 PROCEDURE — 20610 DRAIN/INJ JOINT/BURSA W/O US: CPT | Mod: LT,S$GLB,, | Performed by: PHYSICIAN ASSISTANT

## 2024-02-26 NOTE — PROCEDURES
Large Joint Aspiration/Injection: L knee    Date/Time: 2/26/2024 3:00 PM    Performed by: Ivet Bernal PA-C  Authorized by: Ivet Bernal PA-C    Consent Done?:  Yes (Verbal)  Indications:  Joint swelling and pain  Site marked: the procedure site was marked    Timeout: prior to procedure the correct patient, procedure, and site was verified    Prep: patient was prepped and draped in usual sterile fashion      Local anesthesia used?: Yes    Local anesthetic:  Topical anesthetic    Details:  Needle Size:  22 G  Ultrasonic Guidance for needle placement?: No    Approach:  Superior  Location:  Knee  Site:  L knee  Medications:  16 mg hyaluronate 16 mg/2 mL  Patient tolerance:  Patient tolerated the procedure well with no immediate complications     3/3

## 2024-03-05 ENCOUNTER — TELEPHONE (OUTPATIENT)
Dept: UROLOGY | Facility: CLINIC | Age: 69
End: 2024-03-05
Payer: COMMERCIAL

## 2024-03-05 NOTE — TELEPHONE ENCOUNTER
Called pt to inform her that her appt with Dr. Heller was being rescheduled as MD would be in surgery during that time; no answer.  Left detailed message on vm informing pt of new appt.

## 2024-03-21 ENCOUNTER — OFFICE VISIT (OUTPATIENT)
Dept: FAMILY MEDICINE | Facility: CLINIC | Age: 69
End: 2024-03-21
Payer: COMMERCIAL

## 2024-03-21 VITALS
OXYGEN SATURATION: 98 % | DIASTOLIC BLOOD PRESSURE: 68 MMHG | SYSTOLIC BLOOD PRESSURE: 120 MMHG | TEMPERATURE: 98 F | HEART RATE: 78 BPM | BODY MASS INDEX: 38.94 KG/M2 | WEIGHT: 242.31 LBS | HEIGHT: 66 IN | RESPIRATION RATE: 18 BRPM

## 2024-03-21 DIAGNOSIS — M17.0 PRIMARY OSTEOARTHRITIS OF BOTH KNEES: ICD-10-CM

## 2024-03-21 DIAGNOSIS — Z79.4 TYPE 2 DIABETES MELLITUS WITHOUT COMPLICATION, WITH LONG-TERM CURRENT USE OF INSULIN: Chronic | ICD-10-CM

## 2024-03-21 DIAGNOSIS — R05.3 PERSISTENT COUGH FOR 3 WEEKS OR LONGER: Primary | ICD-10-CM

## 2024-03-21 DIAGNOSIS — E11.9 TYPE 2 DIABETES MELLITUS WITHOUT COMPLICATION, WITH LONG-TERM CURRENT USE OF INSULIN: Chronic | ICD-10-CM

## 2024-03-21 PROCEDURE — 3008F BODY MASS INDEX DOCD: CPT | Mod: CPTII,S$GLB,, | Performed by: FAMILY MEDICINE

## 2024-03-21 PROCEDURE — 3078F DIAST BP <80 MM HG: CPT | Mod: CPTII,S$GLB,, | Performed by: FAMILY MEDICINE

## 2024-03-21 PROCEDURE — 99999 PR PBB SHADOW E&M-EST. PATIENT-LVL V: CPT | Mod: PBBFAC,,, | Performed by: FAMILY MEDICINE

## 2024-03-21 PROCEDURE — 96372 THER/PROPH/DIAG INJ SC/IM: CPT | Mod: S$GLB,,, | Performed by: FAMILY MEDICINE

## 2024-03-21 PROCEDURE — 1159F MED LIST DOCD IN RCRD: CPT | Mod: CPTII,S$GLB,, | Performed by: FAMILY MEDICINE

## 2024-03-21 PROCEDURE — 3074F SYST BP LT 130 MM HG: CPT | Mod: CPTII,S$GLB,, | Performed by: FAMILY MEDICINE

## 2024-03-21 PROCEDURE — 99214 OFFICE O/P EST MOD 30 MIN: CPT | Mod: 25,S$GLB,, | Performed by: FAMILY MEDICINE

## 2024-03-21 PROCEDURE — 3288F FALL RISK ASSESSMENT DOCD: CPT | Mod: CPTII,S$GLB,, | Performed by: FAMILY MEDICINE

## 2024-03-21 PROCEDURE — 1160F RVW MEDS BY RX/DR IN RCRD: CPT | Mod: CPTII,S$GLB,, | Performed by: FAMILY MEDICINE

## 2024-03-21 PROCEDURE — 1126F AMNT PAIN NOTED NONE PRSNT: CPT | Mod: CPTII,S$GLB,, | Performed by: FAMILY MEDICINE

## 2024-03-21 PROCEDURE — 1101F PT FALLS ASSESS-DOCD LE1/YR: CPT | Mod: CPTII,S$GLB,, | Performed by: FAMILY MEDICINE

## 2024-03-21 RX ORDER — METHYLPREDNISOLONE ACETATE 80 MG/ML
80 INJECTION, SUSPENSION INTRA-ARTICULAR; INTRALESIONAL; INTRAMUSCULAR; SOFT TISSUE
Status: COMPLETED | OUTPATIENT
Start: 2024-03-21 | End: 2024-03-21

## 2024-03-21 RX ORDER — AMOXICILLIN 875 MG/1
875 TABLET, FILM COATED ORAL 2 TIMES DAILY
Qty: 20 TABLET | Refills: 0 | Status: SHIPPED | OUTPATIENT
Start: 2024-03-21 | End: 2024-03-31

## 2024-03-21 RX ORDER — SEMAGLUTIDE 0.68 MG/ML
0.25 INJECTION, SOLUTION SUBCUTANEOUS
Qty: 1 EACH | Refills: 0 | Status: SHIPPED | OUTPATIENT
Start: 2024-03-21

## 2024-03-21 RX ADMIN — METHYLPREDNISOLONE ACETATE 80 MG: 80 INJECTION, SUSPENSION INTRA-ARTICULAR; INTRALESIONAL; INTRAMUSCULAR; SOFT TISSUE at 03:03

## 2024-03-21 NOTE — PROGRESS NOTES
CHIEF COMPLAINT:  This is a 68-year-old female complaining of respiratory illness.    SUBJECTIVE:  The patient complains recurrent respiratory symptoms since diagnosed with COVID 19 infection on February 2, 2024.  By the 3rd week after diagnosis with COVID, patient felt better but then had recurrence of symptoms particularly cough productive of greenish-yellow mucus, sore throat, chest congestion and postnasal drip.  Her symptoms improved but then a few days ago recurred again.  She reports cough and mucus production is worse in the morning.  She denies fever, chills, shortness and breath or wheezing.  Patient has been using saline nasal spray and corticosteroid spray.  She takes promethazine DM but only at night because of drowsiness.  Patient requests steroid injection.    Patient continues to have bilateral knee pain secondary to osteoarthritis.  She was seen by Orthopedics and has had 3 Euflexxa injections with no improvement.  She requests temporary handicapped license. She cannot take NSAIDs due to chronic anticoagulation with Eliquis.  Patient has paroxysmal atrial fibrillation.  She was told that weight loss would help with knee pain and requests GLP 1 agonists    Patient is a type 2 diabetic who takes metformin ER 1000 mg with breakfast and 500 mg with dinner and Humalog Mix 75-25.  Last A1c was 7% 6 months ago.    ROS:  GENERAL: Patient denies fever, chills, night sweats. Patient denies weight gain or loss. Patient denies anorexia, fatigue, weakness or swollen glands.  SKIN: Patient denies rash.  HEENT: Patient denies sore throat, ear pain, hearing loss, nasal congestion or runny nose.  Patient denies visual disturbance, eye irritation or discharge.  Positive for postnasal drip.  LUNGS:  Patient denies wheeze or hemoptysis.  Positive for cough.  CARDIOVASCULAR: Patient denies chest pain, palpitations, syncope or lower extremity edema.  Positive for shortness of breath.  GI: Patient denies abdominal pain,  nausea, vomiting, diarrhea, blood in stool or melena.  GENITOURINARY:  Patient denies dysuria, frequency, hematuria, nocturia, urgency or incontinence.  MUSCULOSKELETAL: Patient denies joint swelling, redness or warmth.  Positive for joint pain.  NEUROLOGIC: Patient denies headache, vertigo, paresthesias, weakness in limb, or abnormality of gait.     OBJECTIVE:   GENERAL: Well-developed well-nourished, morbidly obese, black female alert and oriented x3, in no acute distress. Memory, judgment and cognition without deficit.  No audible wheezing.  SKIN: Clear without rash. Normal color and tone.   HEENT: Eyes: No scleral icterus. Clear conjunctivae. Pupils equal reactive to light and accommodation. Ears: Clear canals.  Clear TMs.  Nose: Without congestion.  Pharynx: Without injection or exudates.  NECK: Supple, normal range of motion. No masses, nodes or enlarged thyroid.  LUNGS: Clear to auscultation. Normal respiratory effort.  O2 saturation 98%.  CARDIOVASCULAR: Regular rhythm, normal S1, S2 without murmur, gallop or rub.  NEUROLOGIC:  Gait antalgic.  No tremor.       ASSESSMENT:  1. Persistent cough for 3 weeks or longer    2. Type 2 diabetes mellitus without complication, with long-term current use of insulin    3. Primary osteoarthritis of both knees      PLAN:  1. Amoxicillin 875 mg twice daily for 10 days.    2. Depo-Medrol 80 mg IM.    3. Handicapped license form filled out.    4. Ozempic 0.25 mg weekly.  Follow-up in 4 weeks.    5. Keep well hydrated.    6. Return to clinic if no improvement or worsening symptoms.    30 minutes of total time spent on the encounter, which includes face to face time and non-face to face time preparing to see the patient (eg, review of tests), Obtaining and/or reviewing separately obtained history, Documenting clinical information in the electronic or other health record, Independently interpreting results (not separately reported) and communicating results to the  patient/family/caregiver, or Care coordination (not separately reported).     This note is generated with speech recognition software and is subject to transcription error and sound alike phrases that may be missed by proofreading.

## 2024-04-17 ENCOUNTER — PATIENT OUTREACH (OUTPATIENT)
Dept: ADMINISTRATIVE | Facility: HOSPITAL | Age: 69
End: 2024-04-17
Payer: COMMERCIAL

## 2024-04-17 ENCOUNTER — PATIENT MESSAGE (OUTPATIENT)
Dept: ADMINISTRATIVE | Facility: HOSPITAL | Age: 69
End: 2024-04-17
Payer: COMMERCIAL

## 2024-04-17 NOTE — PROGRESS NOTES
VBHM Score: 6     Osteoporosis Screening  Urine Screening  Eye Exam  Hemoglobin A1c  Lipid Panel  Foot Exam    Influenza Vaccine  Shingles/Zoster Vaccine  RSV Vaccine                  Health Maintenance Topic(s) Outreach Outcomes & Actions Taken:    Eye Exam - Outreach Outcomes & Actions Taken  : No answer, no updates    Lab(s) - Outreach Outcomes & Actions Taken  : No answer, no updates    Lab(s) - Outreach Outcomes & Actions Taken  : No answer, no updates         Additional Notes:    No annswer, LVM and sent [employer] message             Care Management, Digital Medicine, and/or Education Referrals    OPCM Risk Score: 46.2         Next Steps - Referral Actions: No referrals placed        Additional Notes:

## 2024-04-19 ENCOUNTER — TELEPHONE (OUTPATIENT)
Dept: FAMILY MEDICINE | Facility: CLINIC | Age: 69
End: 2024-04-19
Payer: COMMERCIAL

## 2024-04-19 NOTE — TELEPHONE ENCOUNTER
----- Message from Deysi Murphy sent at 4/19/2024 12:47 PM CDT -----  Contact: Self  Type:  Pharmacy Calling to Clarify an RX    Name of Caller:  Patient  Pharmacy Name:  [facility]  Prescription Name:  semaglutide (OZEMPIC) 0.25 mg or 0.5 mg (2 mg/3 mL) pen injector  What do they need to clarify?:  Needs PA per [facility]  Best Call Back Number:  [phone]  Additional Information:  Thank You

## 2024-04-30 ENCOUNTER — LAB VISIT (OUTPATIENT)
Dept: LAB | Facility: HOSPITAL | Age: 69
End: 2024-04-30
Attending: FAMILY MEDICINE
Payer: COMMERCIAL

## 2024-04-30 ENCOUNTER — OFFICE VISIT (OUTPATIENT)
Dept: FAMILY MEDICINE | Facility: CLINIC | Age: 69
End: 2024-04-30
Payer: COMMERCIAL

## 2024-04-30 VITALS
WEIGHT: 240.31 LBS | SYSTOLIC BLOOD PRESSURE: 130 MMHG | TEMPERATURE: 96 F | RESPIRATION RATE: 18 BRPM | BODY MASS INDEX: 38.62 KG/M2 | OXYGEN SATURATION: 96 % | HEIGHT: 66 IN | HEART RATE: 114 BPM | DIASTOLIC BLOOD PRESSURE: 64 MMHG

## 2024-04-30 DIAGNOSIS — Z79.4 TYPE 2 DIABETES MELLITUS WITHOUT COMPLICATION, WITH LONG-TERM CURRENT USE OF INSULIN: Chronic | ICD-10-CM

## 2024-04-30 DIAGNOSIS — E11.9 TYPE 2 DIABETES MELLITUS WITHOUT COMPLICATION, WITH LONG-TERM CURRENT USE OF INSULIN: Chronic | ICD-10-CM

## 2024-04-30 DIAGNOSIS — M17.12 PRIMARY OSTEOARTHRITIS OF LEFT KNEE: Primary | ICD-10-CM

## 2024-04-30 LAB
ESTIMATED AVG GLUCOSE: 146 MG/DL (ref 68–131)
HBA1C MFR BLD: 6.7 % (ref 4–5.6)

## 2024-04-30 PROCEDURE — 1101F PT FALLS ASSESS-DOCD LE1/YR: CPT | Mod: CPTII,S$GLB,, | Performed by: FAMILY MEDICINE

## 2024-04-30 PROCEDURE — 1160F RVW MEDS BY RX/DR IN RCRD: CPT | Mod: CPTII,S$GLB,, | Performed by: FAMILY MEDICINE

## 2024-04-30 PROCEDURE — 3288F FALL RISK ASSESSMENT DOCD: CPT | Mod: CPTII,S$GLB,, | Performed by: FAMILY MEDICINE

## 2024-04-30 PROCEDURE — 3075F SYST BP GE 130 - 139MM HG: CPT | Mod: CPTII,S$GLB,, | Performed by: FAMILY MEDICINE

## 2024-04-30 PROCEDURE — 3008F BODY MASS INDEX DOCD: CPT | Mod: CPTII,S$GLB,, | Performed by: FAMILY MEDICINE

## 2024-04-30 PROCEDURE — 1159F MED LIST DOCD IN RCRD: CPT | Mod: CPTII,S$GLB,, | Performed by: FAMILY MEDICINE

## 2024-04-30 PROCEDURE — 83036 HEMOGLOBIN GLYCOSYLATED A1C: CPT | Performed by: FAMILY MEDICINE

## 2024-04-30 PROCEDURE — 36415 COLL VENOUS BLD VENIPUNCTURE: CPT | Mod: PO | Performed by: FAMILY MEDICINE

## 2024-04-30 PROCEDURE — 99213 OFFICE O/P EST LOW 20 MIN: CPT | Mod: 25,S$GLB,, | Performed by: FAMILY MEDICINE

## 2024-04-30 PROCEDURE — 99999 PR PBB SHADOW E&M-EST. PATIENT-LVL V: CPT | Mod: PBBFAC,,, | Performed by: FAMILY MEDICINE

## 2024-04-30 PROCEDURE — 1125F AMNT PAIN NOTED PAIN PRSNT: CPT | Mod: CPTII,S$GLB,, | Performed by: FAMILY MEDICINE

## 2024-04-30 PROCEDURE — 3078F DIAST BP <80 MM HG: CPT | Mod: CPTII,S$GLB,, | Performed by: FAMILY MEDICINE

## 2024-04-30 PROCEDURE — 20610 DRAIN/INJ JOINT/BURSA W/O US: CPT | Mod: LT,S$GLB,, | Performed by: FAMILY MEDICINE

## 2024-04-30 RX ORDER — LIDOCAINE HYDROCHLORIDE 10 MG/ML
4 INJECTION INFILTRATION; PERINEURAL
Status: COMPLETED | OUTPATIENT
Start: 2024-04-30 | End: 2024-04-30

## 2024-04-30 RX ORDER — TRIAMCINOLONE ACETONIDE 40 MG/ML
40 INJECTION, SUSPENSION INTRA-ARTICULAR; INTRAMUSCULAR
Status: COMPLETED | OUTPATIENT
Start: 2024-04-30 | End: 2024-04-30

## 2024-04-30 RX ADMIN — TRIAMCINOLONE ACETONIDE 40 MG: 40 INJECTION, SUSPENSION INTRA-ARTICULAR; INTRAMUSCULAR at 04:04

## 2024-04-30 RX ADMIN — LIDOCAINE HYDROCHLORIDE 4 ML: 10 INJECTION INFILTRATION; PERINEURAL at 04:04

## 2024-04-30 NOTE — PROGRESS NOTES
CHIEF COMPLAINT: This is a 68-year-old female complaining of flare up of left knee pain.     SUBJECTIVE:  Patient complains of flare-up of pain in left knee. She complains of no improvement with gel injections x3 given by orthopedics. She complains of a tightness in the posterior knee that radiates anteriorly.  Patient has difficulty walking because of stiffness and inability to flex the knee. She denies joint swelling, redness or warmth. Past x-rays showed mild to moderate tricompartmental degenerative changes.  She reports improvement in knee pain after steroid injections 4 months ago and requests injection today.       Patient is a type 2 diabetic.  She has been taking Ozempic and concerned about hypoglycemia.  She takes Humalog 75/25 mix and metformin.  She reports that she is going to discontinue Ozempic.  Last A1c was 7%, 7 months ago.     ROS:  GENERAL: Patient denies fever, chills, night sweats.  Patient denies weight gain or loss. Patient denies anorexia, fatigue, weakness or swollen glands.  SKIN: Patient denies rash.  LUNGS: Patient denies cough, wheeze or hemoptysis.  CARDIOVASCULAR: Patient denies chest pain, shortness of breath, palpitations, syncope or lower extremity edema.  GI: Patient denies abdominal pain, nausea, vomiting, diarrhea, constipation, blood in stool or melena.  MUSCULOSKELETAL: Patient denies joint swelling, redness or warmth.  Positive for joint pain.  NEUROLOGIC: Patient denies numbness, tingling or weakness in limb.       OBJECTIVE:   GENERAL: Well-developed well-nourished, obese, black female alert and oriented x3, in no acute distress. Memory, judgment and cognition without deficit.   SKIN: Clear without rash. Normal color and tone.  Sebaceous cyst on back.  HEENT: Eyes: No scleral icterus. Clear conjunctivae.   NECK: Supple, normal range of motion.   LUNGS:  Normal respiratory effort.  EXTREMITIES: Without cyanosis, clubbing or edema. Distal pulses 2+ and equal. Normal range of  motion in hips, knees and ankles. No joint effusion, erythema or warmth.  NEUROLOGIC:  Motor strength equal bilaterally. Sensation normal to touch. Deep tendon reflexes 2+ and equal. Gait antalgic.     Reviewed treatment options including physical therapy and orthopedic consult. Discussed potential complications of procedure including bleeding, infection, and nerve damage.  Patient understands and accepts risks.  Verbal consent obtained.     Procedure: After sterile prep and drape, 1 cc of Kenalog 40 mg/cc +1 cc of 1% Xylocaine injected into left knee via lateral approach without difficulty.    ASSESSMENT:  1. Primary osteoarthritis of left knee    2. Type 2 diabetes mellitus without complication, with long-term current use of insulin      PLAN:  1.  Apply ice q.i.d. for 15-20 minutes over the next 2-3 days.  2.  Check A1c.    3.  Follow-up if no improvement or worsening symptoms.    20 minutes of total time spent on the encounter, which includes face to face time and non-face to face time preparing to see the patient (eg, review of tests), Obtaining and/or reviewing separately obtained history, Documenting clinical information in the electronic or other health record, Independently interpreting results (not separately reported) and communicating results to the patient/family/caregiver, or Care coordination (not separately reported).     This note is generated with speech recognition software and is subject to transcription error and sound alike phrases that may be missed by proofreading.

## 2024-05-16 RX ORDER — GABAPENTIN 100 MG/1
100 CAPSULE ORAL 3 TIMES DAILY
Qty: 90 CAPSULE | Refills: 0 | Status: SHIPPED | OUTPATIENT
Start: 2024-05-16

## 2024-05-16 NOTE — TELEPHONE ENCOUNTER
Care Due:                  Date            Visit Type   Department     Provider  --------------------------------------------------------------------------------                                MYCHART                              FOLLOWUP/OF  [facility]  Last Visit: 04-      FICE VISIT   MEDICINE       Gabbie JEFFREYT                              ANNUAL                              CHECKUP/PHY  [facility]  Next Visit: 06-      [facility]       Gabbie Ronquillo                                                            Last  Test          Frequency    Reason                     Performed    Due Date  --------------------------------------------------------------------------------    CBC.........  12 months..  allopurinoL, diclofenac..  08- 07-    CMP.........  12 months..  HUMALOG, allopurinoL,      08- 07-                             diclofenac, metFORMIN....    Uric Acid...  12 months..  allopurinoL..............  Not Found    Overdue    [employer] Embedded Care Due Messages. Reference number: [id number].   5/16/2024 5:54:51 AM CDT   All paperwork and instructions giiven to patient by Dipak GAO

## 2024-05-22 ENCOUNTER — LAB VISIT (OUTPATIENT)
Dept: LAB | Facility: HOSPITAL | Age: 69
End: 2024-05-22
Attending: REGISTERED NURSE
Payer: COMMERCIAL

## 2024-05-22 ENCOUNTER — OFFICE VISIT (OUTPATIENT)
Dept: FAMILY MEDICINE | Facility: CLINIC | Age: 69
End: 2024-05-22
Payer: COMMERCIAL

## 2024-05-22 VITALS
BODY MASS INDEX: 38.62 KG/M2 | TEMPERATURE: 98 F | HEART RATE: 110 BPM | SYSTOLIC BLOOD PRESSURE: 130 MMHG | WEIGHT: 240.31 LBS | DIASTOLIC BLOOD PRESSURE: 62 MMHG | OXYGEN SATURATION: 99 % | HEIGHT: 66 IN

## 2024-05-22 DIAGNOSIS — N30.90 CYSTITIS: ICD-10-CM

## 2024-05-22 DIAGNOSIS — N20.0 RIGHT RENAL STONE: ICD-10-CM

## 2024-05-22 DIAGNOSIS — R31.9 HEMATURIA, UNSPECIFIED TYPE: ICD-10-CM

## 2024-05-22 DIAGNOSIS — N30.90 CYSTITIS: Primary | ICD-10-CM

## 2024-05-22 LAB
ANION GAP SERPL CALC-SCNC: 6 MMOL/L (ref 8–16)
BILIRUBIN, UA POC OHS: ABNORMAL
BLOOD, UA POC OHS: ABNORMAL
BUN SERPL-MCNC: 20 MG/DL (ref 8–23)
CALCIUM SERPL-MCNC: 9.9 MG/DL (ref 8.7–10.5)
CHLORIDE SERPL-SCNC: 105 MMOL/L (ref 95–110)
CLARITY, UA POC OHS: ABNORMAL
CO2 SERPL-SCNC: 28 MMOL/L (ref 23–29)
COLOR, UA POC OHS: YELLOW
CREAT SERPL-MCNC: 0.8 MG/DL (ref 0.5–1.4)
EST. GFR  (NO RACE VARIABLE): >60 ML/MIN/1.73 M^2
GLUCOSE SERPL-MCNC: 129 MG/DL (ref 70–110)
GLUCOSE, UA POC OHS: NEGATIVE
KETONES, UA POC OHS: NEGATIVE
LEUKOCYTES, UA POC OHS: ABNORMAL
NITRITE, UA POC OHS: POSITIVE
PH, UA POC OHS: 5
POTASSIUM SERPL-SCNC: 3.8 MMOL/L (ref 3.5–5.1)
PROTEIN, UA POC OHS: 100
SODIUM SERPL-SCNC: 139 MMOL/L (ref 136–145)
SPECIFIC GRAVITY, UA POC OHS: >=1.03
URATE SERPL-MCNC: 3.7 MG/DL (ref 2.4–5.7)
UROBILINOGEN, UA POC OHS: 0.2

## 2024-05-22 PROCEDURE — 3075F SYST BP GE 130 - 139MM HG: CPT | Mod: CPTII,S$GLB,, | Performed by: REGISTERED NURSE

## 2024-05-22 PROCEDURE — 81002 URINALYSIS NONAUTO W/O SCOPE: CPT | Mod: S$GLB,,, | Performed by: REGISTERED NURSE

## 2024-05-22 PROCEDURE — 87077 CULTURE AEROBIC IDENTIFY: CPT | Performed by: REGISTERED NURSE

## 2024-05-22 PROCEDURE — 80048 BASIC METABOLIC PNL TOTAL CA: CPT | Performed by: REGISTERED NURSE

## 2024-05-22 PROCEDURE — 84550 ASSAY OF BLOOD/URIC ACID: CPT | Performed by: REGISTERED NURSE

## 2024-05-22 PROCEDURE — 36415 COLL VENOUS BLD VENIPUNCTURE: CPT | Mod: PO | Performed by: REGISTERED NURSE

## 2024-05-22 PROCEDURE — 1125F AMNT PAIN NOTED PAIN PRSNT: CPT | Mod: CPTII,S$GLB,, | Performed by: REGISTERED NURSE

## 2024-05-22 PROCEDURE — 3044F HG A1C LEVEL LT 7.0%: CPT | Mod: CPTII,S$GLB,, | Performed by: REGISTERED NURSE

## 2024-05-22 PROCEDURE — 1101F PT FALLS ASSESS-DOCD LE1/YR: CPT | Mod: CPTII,S$GLB,, | Performed by: REGISTERED NURSE

## 2024-05-22 PROCEDURE — 99999 PR PBB SHADOW E&M-EST. PATIENT-LVL III: CPT | Mod: PBBFAC,,, | Performed by: REGISTERED NURSE

## 2024-05-22 PROCEDURE — 3008F BODY MASS INDEX DOCD: CPT | Mod: CPTII,S$GLB,, | Performed by: REGISTERED NURSE

## 2024-05-22 PROCEDURE — 99213 OFFICE O/P EST LOW 20 MIN: CPT | Mod: 25,S$GLB,, | Performed by: REGISTERED NURSE

## 2024-05-22 PROCEDURE — 3078F DIAST BP <80 MM HG: CPT | Mod: CPTII,S$GLB,, | Performed by: REGISTERED NURSE

## 2024-05-22 PROCEDURE — 3288F FALL RISK ASSESSMENT DOCD: CPT | Mod: CPTII,S$GLB,, | Performed by: REGISTERED NURSE

## 2024-05-22 PROCEDURE — 87088 URINE BACTERIA CULTURE: CPT | Performed by: REGISTERED NURSE

## 2024-05-22 PROCEDURE — 87086 URINE CULTURE/COLONY COUNT: CPT | Performed by: REGISTERED NURSE

## 2024-05-22 PROCEDURE — 87186 SC STD MICRODIL/AGAR DIL: CPT | Performed by: REGISTERED NURSE

## 2024-05-22 RX ORDER — SULFAMETHOXAZOLE AND TRIMETHOPRIM 800; 160 MG/1; MG/1
1 TABLET ORAL 2 TIMES DAILY
Qty: 20 TABLET | Refills: 0 | Status: SHIPPED | OUTPATIENT
Start: 2024-05-22 | End: 2024-06-01

## 2024-05-22 NOTE — PROGRESS NOTES
"SUBJECTIVE:     Latosha Enriquez  MRN:  [id number]  68 y.o. female    CHIEF COMPLAINT:     Urinary Tract Infection    HPI:    Latosha Enriquez reports having a urinary infection as this always occurs when she passes a stone.  She reports was told by ER doctor previously that she always needs to take an antibiotic as having a stone and passing it can cause infection.  Had "an attack" last night with sudden onset of severe pain to the right flank radiating to lower abdomen into pelvic area.  Previous stone analysis indicated calcium but does mention possible instances of her having gout.  Today, still w/ some pain more to the abdominal area with darker colored urine, no bright red bloody urine reported.  Denies f/c or urinary retention.  Usually takes Keflex when abx needed.      Review of Systems   Constitutional:  Negative for chills and fever.   Respiratory: Negative.     Cardiovascular: Negative.    Gastrointestinal:  Positive for abdominal pain. Negative for diarrhea, nausea and vomiting.   Genitourinary:  Positive for dysuria, flank pain, hematuria and urgency. Negative for frequency.   Neurological:  Negative for dizziness and headaches.       Review of patient's allergies indicates:   Allergen Reactions    Antihistamines - alkylamine Anaphylaxis and Itching     Cough, throat itches    Chocolate flavor Other (See Comments)     disoriented    Doxycycline Other (See Comments)     Stomach pain    Betadine [povidone-iodine] Itching    Strawberries [strawberry] Other (See Comments)    Iodine and iodide containing products Other (See Comments)     Tongue swelling    Tramadol Nausea Only    Yeast Itching     Facial swelling, constipation       Patient Active Problem List   Diagnosis    Hemorrhoids, internal    Essential hypertension    Vitamin D deficiency disease    Primary osteoarthritis of both knees    GERD (gastroesophageal reflux disease)    Hyperlipidemia    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, with long-term current use of insulin    Kidney stones, calcium oxalate    PAF (paroxysmal atrial fibrillation)    Amaurosis fugax    Type 2 diabetes mellitus without complication, with long-term current use of insulin    Dryness, eye    KATHERINE (obstructive sleep apnea)    PVC's (premature ventricular contractions)    Morbid obesity with BMI of 40.0-44.9, adult    Asthma    Lumbar radiculopathy, chronic    Severe obesity (BMI 35.0-39.9) with comorbidity       Current Outpatient Medications   Medication Instructions    albuterol (VENTOLIN HFA) 90 mcg/actuation inhaler 2 puffs, Inhalation, Every 6 hours PRN, Rescue    allopurinoL (ZYLOPRIM) 100 mg, Oral    atenoloL (TENORMIN) 25 mg, Oral, Daily    BD ULTRA-FINE SHORT PEN NEEDLE 31 gauge x 5/16" Ndle USE 1 PEN NEEDLE UNDER THE SKIN TWICE A DAY    cyanocobalamin (VITAMIN B-12) 100 MCG tablet Oral    diclofenac sodium (VOLTAREN) 2 g, Topical (Top), 3 times daily    ELIQUIS 5 mg, Oral, 2 times daily    ergocalciferol, vitamin D2, 400 unit Tab Oral    ferrous sulfate, dried (SLOW FE) 160 mg (50 mg iron) TbSR Oral    fexofenadine (ALLEGRA) 180 mg, Oral, Daily    flecainide (TAMBOCOR) 50 mg, Oral, 2 times daily    fluticasone propion-salmeterol 45-21 mcg/dose (ADVAIR HFA) 45-21 mcg/actuation HFAA inhaler 2 puffs, Inhalation, Every 12 hours, Controller    gabapentin (NEURONTIN) 100 mg, Oral, 3 times daily    HUMALOG MIX 75-25 KWIKPEN 100 unit/mL (75-25) InPn Inject 30 Units into the skin every morning AND 20 Units every evening.    HYDROcodone-acetaminophen (NORCO)  mg per tablet 1 tablet, Oral, Every 6 hours PRN    losartan-hydrochlorothiazide 100-25 mg (HYZAAR) 100-25 mg per tablet 1 tablet, Oral, Daily    metFORMIN (GLUCOPHAGE-XR) 500 MG ER 24hr tablet Take 2 tablets (1,000 mg total) by mouth daily with breakfast AND 1 tablet (500 mg total) daily with dinner or evening meal.    montelukast (SINGULAIR) 10 mg, Oral, Nightly " "   OZEMPIC 0.25 mg, Subcutaneous, Every 7 days    pantoprazole (PROTONIX) 20 mg, Oral, Every morning    potassium citrate (UROCIT-K) 10 mEq (1,080 mg) TbSR 20 mEq, Oral, 2 times daily with meals    simvastatin (ZOCOR) 20 mg, Oral, Nightly    tamsulosin (FLOMAX) 0.4 mg, Oral, Nightly    triamcinolone (NASACORT) 55 mcg nasal inhaler 2 sprays, Nasal, Daily         Past medical, surgical, family and social histories have been reviewed today.      OBJECTIVE:     Vitals:    05/22/24 1529   BP: 130/62   Pulse: 110   Temp: 98.4 °F (36.9 °C)   TempSrc: Tympanic   SpO2: 99%   Weight: 109 kg (240 lb 4.8 oz)   Height: 5' 6" (1.676 m)   PainSc:   8   PainLoc: Abdomen       Physical Exam  Vitals reviewed.   HENT:      Head: Normocephalic and atraumatic.   Abdominal:      General: Bowel sounds are normal.      Palpations: Abdomen is soft.      Tenderness: There is abdominal tenderness in the suprapubic area. There is guarding. There is no right CVA tenderness, left CVA tenderness or rebound.   Neurological:      Mental Status: She is alert and oriented to person, place, and time.         Results for orders placed or performed in visit on 05/22/24   POCT Urinalysis (Visual)   Result Value Ref Range    Color, POC UA Yellow Yellow, Straw, Colorless    Clarity, POC UA Cloudy (A) Clear    Glucose, POC UA Negative Negative    Bilirubin, POC UA Small (A) Negative    Ketones, POC UA Negative Negative    Spec Grav POC UA >=1.030 1.005 - 1.030    Blood, POC UA Large (A) Negative    pH, POC UA 5.0 5.0 - 8.0    Protein, POC  (A) Negative    Urobilinogen, POC UA 0.2 <=1.0    Nitrite, POC UA Positive (A) Negative    WBC, POC UA Moderate (A) Negative       ASSESSMENT:     1. Cystitis  -     Urine culture  -     Basic Metabolic Panel; Future; Expected date: 05/22/2024  -     sulfamethoxazole-trimethoprim 800-160mg (BACTRIM DS) 800-160 mg Tab; Take 1 tablet by mouth 2 (two) times daily. for 10 days  Dispense: 20 tablet; Refill: 0    2. Right " renal stone  -     Basic Metabolic Panel; Future; Expected date: 05/22/2024  -     Uric Acid; Future; Expected date: 05/22/2024    3. Hematuria, unspecified type  -     POCT Urinalysis (Visual)      PLAN:     Recurrent renal stones, currently followed by Dr. Yasir Heller.  UA today positive for infection, get started on abx.  Will start her on Bactrim as there is some concern on my part of recurrent use of Keflex.  LOTS of water to flush out urinary system.  Check lab.  To Urology for any further issues or problems.        LUIS Erickson  [facility]         25 minutes of total time spent on the encounter, which includes face to face time and non-face to face time preparing to see the patient.  This includes obtaining and/or reviewing separately obtained history, performing a medically appropriate examination and/or evaluation, and counseling and educating the patient/family/caregiver.  Includes documenting clinical information in the electronic or other health record, independently interpreting results (not separately reported) and communicating results to the patient/family/caregiver, with care coordination (not separately reported).  Medications, tests and/or procedures ordered as necessary along with referring and communicating with other health professionals (when not separately reported).

## 2024-05-25 LAB — BACTERIA UR CULT: ABNORMAL

## 2024-06-10 RX ORDER — METFORMIN HYDROCHLORIDE 500 MG/1
TABLET, EXTENDED RELEASE ORAL
Qty: 90 TABLET | Refills: 0 | Status: SHIPPED | OUTPATIENT
Start: 2024-06-10

## 2024-06-10 NOTE — TELEPHONE ENCOUNTER
No care due was identified.  [facility] Embedded Care Due Messages. Reference number: [id number].   6/10/2024 10:44:13 AM CDT

## 2024-06-10 NOTE — TELEPHONE ENCOUNTER
----- Message from Halima Doyle sent at 6/10/2024 10:27 AM CDT -----  Contact: [phone]@patient  Requesting an RX refill or new RX.metFORMIN (GLUCOPHAGE-XR) 500 MG ER 24hr tablet  Is this a refill or new RX: refill  RX name and strength (copy/paste from chart):    Is this a 30 day or 90 day RX:   Pharmacy name and phone # [id number] [place] [place] - [address] AT [facility] [address]    The doctors have asked that we provide their patients with the following 2 reminders -- prescription refills can take up to 72 hours, and a friendly reminder that in the future you can use your [facility] account to request refills:

## 2024-06-11 ENCOUNTER — TELEPHONE (OUTPATIENT)
Dept: FAMILY MEDICINE | Facility: CLINIC | Age: 69
End: 2024-06-11
Payer: COMMERCIAL

## 2024-06-11 RX ORDER — PROMETHAZINE HYDROCHLORIDE 12.5 MG/1
12.5 TABLET ORAL EVERY 6 HOURS PRN
OUTPATIENT
Start: 2024-06-11

## 2024-06-11 RX ORDER — PROMETHAZINE HYDROCHLORIDE 12.5 MG/1
12.5 TABLET ORAL EVERY 6 HOURS PRN
COMMUNITY
End: 2024-06-13

## 2024-06-11 NOTE — TELEPHONE ENCOUNTER
----- Message from Gisel Pedraza sent at 6/11/2024  4:16 PM CDT -----  Regarding: Self .[phone]   Type: RX Refill Request    Who Called: Self     Have you contacted your pharmacy:  no     Refill    RX Name and Strength: promethazine (PHENERGAN) 12.5 mg in dextrose 5 % (D5W) 50 mL IVPB   [[id number]]    Preferred Pharmacy with phone number: .    [id number] - [facility] [address] AT [facility] [place] [address]  [facility] [address]  Phone: [phone] Fax: [phone]        Local or Mail Order: local     Would the patient rather a call back or a response via [facility]? Call back     Best Call Back Number:.[phone]      Additional Information:  Pt asked for both providers to be listed and she stated she can't stop coughing     Thank you.

## 2024-06-11 NOTE — TELEPHONE ENCOUNTER
----- Message from Ramiro Mckeon MA sent at 6/11/2024  4:24 PM CDT -----  Regarding: FW: Self .[phone]    ----- Message -----  From: Gisel Pedraza  Sent: 6/11/2024   4:19 PM CDT  To: Rk REGALADO Staff; Yg OSULLIVAN Staff  Subject: Self .[phone]                                Type: RX Refill Request    Who Called: Self     Have you contacted your pharmacy:  no     Refill    RX Name and Strength: promethazine (PHENERGAN) 12.5 mg in dextrose 5 % (D5W) 50 mL IVPB   [[id number]]    Preferred Pharmacy with phone number: .    [facility][id number] - [place] [address] AT [facility] [address] [facility] [address]  [address]  [facility] [address]  Phone: [phone] Fax: [phone]        Local or Mail Order: local     Would the patient rather a call back or a response via [facility]? Call back     Best Call Back Number:.[phone]      Additional Information:  Pt asked for both providers to be listed and she stated she can't stop coughing     Thank you.

## 2024-06-11 NOTE — TELEPHONE ENCOUNTER
No care due was identified.  [facility] Embedded Care Due Messages. Reference number: [id number].   6/11/2024 4:26:39 PM CDT

## 2024-06-11 NOTE — TELEPHONE ENCOUNTER
Please refer back to original medication request:    RX Name and Strength: promethazine (PHENERGAN) 12.5 mg in dextrose 5 % (D5W) 50 mL IVPB   [[id number]]       This is a request for IV medication (IV piggy back) which I am 100 % sure she did not request.  Med request needs to be clarified with the patient.

## 2024-06-13 ENCOUNTER — OFFICE VISIT (OUTPATIENT)
Dept: FAMILY MEDICINE | Facility: CLINIC | Age: 69
End: 2024-06-13
Payer: COMMERCIAL

## 2024-06-13 VITALS
OXYGEN SATURATION: 96 % | HEART RATE: 96 BPM | DIASTOLIC BLOOD PRESSURE: 82 MMHG | TEMPERATURE: 99 F | WEIGHT: 239.44 LBS | BODY MASS INDEX: 38.48 KG/M2 | HEIGHT: 66 IN | SYSTOLIC BLOOD PRESSURE: 130 MMHG

## 2024-06-13 DIAGNOSIS — Z79.4 TYPE 2 DIABETES MELLITUS WITHOUT COMPLICATION, WITH LONG-TERM CURRENT USE OF INSULIN: Chronic | ICD-10-CM

## 2024-06-13 DIAGNOSIS — J45.909 ACUTE ASTHMATIC BRONCHITIS: Primary | ICD-10-CM

## 2024-06-13 DIAGNOSIS — I48.0 PAF (PAROXYSMAL ATRIAL FIBRILLATION): Chronic | ICD-10-CM

## 2024-06-13 DIAGNOSIS — J45.21 MILD INTERMITTENT ASTHMA WITH ACUTE EXACERBATION IN ADULT: ICD-10-CM

## 2024-06-13 DIAGNOSIS — E11.9 TYPE 2 DIABETES MELLITUS WITHOUT COMPLICATION, WITH LONG-TERM CURRENT USE OF INSULIN: Chronic | ICD-10-CM

## 2024-06-13 LAB
CTP QC/QA: YES
SARS-COV-2 RDRP RESP QL NAA+PROBE: NEGATIVE

## 2024-06-13 PROCEDURE — 3008F BODY MASS INDEX DOCD: CPT | Mod: CPTII,S$GLB,, | Performed by: FAMILY MEDICINE

## 2024-06-13 PROCEDURE — 3079F DIAST BP 80-89 MM HG: CPT | Mod: CPTII,S$GLB,, | Performed by: FAMILY MEDICINE

## 2024-06-13 PROCEDURE — 96372 THER/PROPH/DIAG INJ SC/IM: CPT | Mod: S$GLB,,, | Performed by: FAMILY MEDICINE

## 2024-06-13 PROCEDURE — 1159F MED LIST DOCD IN RCRD: CPT | Mod: CPTII,S$GLB,, | Performed by: FAMILY MEDICINE

## 2024-06-13 PROCEDURE — 3288F FALL RISK ASSESSMENT DOCD: CPT | Mod: CPTII,S$GLB,, | Performed by: FAMILY MEDICINE

## 2024-06-13 PROCEDURE — 3044F HG A1C LEVEL LT 7.0%: CPT | Mod: CPTII,S$GLB,, | Performed by: FAMILY MEDICINE

## 2024-06-13 PROCEDURE — 99214 OFFICE O/P EST MOD 30 MIN: CPT | Mod: 25,S$GLB,, | Performed by: FAMILY MEDICINE

## 2024-06-13 PROCEDURE — 1125F AMNT PAIN NOTED PAIN PRSNT: CPT | Mod: CPTII,S$GLB,, | Performed by: FAMILY MEDICINE

## 2024-06-13 PROCEDURE — 3075F SYST BP GE 130 - 139MM HG: CPT | Mod: CPTII,S$GLB,, | Performed by: FAMILY MEDICINE

## 2024-06-13 PROCEDURE — 87635 SARS-COV-2 COVID-19 AMP PRB: CPT | Mod: QW,S$GLB,, | Performed by: FAMILY MEDICINE

## 2024-06-13 PROCEDURE — 1101F PT FALLS ASSESS-DOCD LE1/YR: CPT | Mod: CPTII,S$GLB,, | Performed by: FAMILY MEDICINE

## 2024-06-13 PROCEDURE — 99999 PR PBB SHADOW E&M-EST. PATIENT-LVL V: CPT | Mod: PBBFAC,,, | Performed by: FAMILY MEDICINE

## 2024-06-13 RX ORDER — MONTELUKAST SODIUM 10 MG/1
10 TABLET ORAL NIGHTLY
Qty: 90 TABLET | Refills: 1 | Status: SHIPPED | OUTPATIENT
Start: 2024-06-13

## 2024-06-13 RX ORDER — ALBUTEROL SULFATE 90 UG/1
2 AEROSOL, METERED RESPIRATORY (INHALATION) EVERY 6 HOURS PRN
Qty: 6.7 EACH | Refills: 5 | Status: SHIPPED | OUTPATIENT
Start: 2024-06-13

## 2024-06-13 RX ORDER — BETAMETHASONE SODIUM PHOSPHATE AND BETAMETHASONE ACETATE 3; 3 MG/ML; MG/ML
9 INJECTION, SUSPENSION INTRA-ARTICULAR; INTRALESIONAL; INTRAMUSCULAR; SOFT TISSUE
Status: COMPLETED | OUTPATIENT
Start: 2024-06-13 | End: 2024-06-13

## 2024-06-13 RX ORDER — PROMETHAZINE HYDROCHLORIDE AND DEXTROMETHORPHAN HYDROBROMIDE 6.25; 15 MG/5ML; MG/5ML
5 SYRUP ORAL
Qty: 240 ML | Refills: 0 | Status: SHIPPED | OUTPATIENT
Start: 2024-06-13 | End: 2024-06-23

## 2024-06-13 RX ADMIN — BETAMETHASONE SODIUM PHOSPHATE AND BETAMETHASONE ACETATE 9 MG: 3; 3 INJECTION, SUSPENSION INTRA-ARTICULAR; INTRALESIONAL; INTRAMUSCULAR; SOFT TISSUE at 03:06

## 2024-06-13 NOTE — PROGRESS NOTES
CHIEF COMPLAINT: This is a 68-year-old female complaining of respiratory illness.     SUBJECTIVE:  Patient complains of onset of achiness 4 days ago with development of severe coughing productive of copious amounts of clear mucus.  She has associated slight sore throat, runny nose, wheezing and minimal chest congestion.  She denies fever, chills, shortness for breath, ear pain or loss of sense of smell or taste.  She took COVID-19 test 24-48 hours after onset of symptoms.  Test was negative.  She denies ill contacts.  Patient has been using albuterol inhaler and taking montelukast.  She requests refill of promethazine DM.  Patient is a nonsmoker.     Patient is a type 2 diabetic.  She has been taking Ozempic, Humalog 75/25 mix and metformin.  Last A1c was 6.7% 2 months ago.  She reports that she is going to discontinue Ozempic.  Complications of diabetes include retinopathy.  Patient has a history of paroxysmal atrial fibrillation but has not been on Eliquis for 1 month due to kidney stone and gross hematuria.  Patient has bilateral knee pain related to osteoarthritis.  Last corticosteroid injection in April 2024. The patient is severely obese with a BMI of 38.64.      OS:  GENERAL: Patient denies fever, chills, night sweats. Patient denies weight gain or loss. Patient denies anorexia, fatigue, weakness or swollen glands.  SKIN: Patient denies rash.  HEENT: Patient denies sore throat, ear pain, hearing loss, nasal congestion or runny nose.  Patient denies visual disturbance, eye irritation or discharge.  Positive for postnasal drip.  LUNGS:  Patient denies hemoptysis.  Positive for cough and wheezing.  CARDIOVASCULAR: Patient denies chest pain, shortness of breath, palpitations, syncope or lower extremity edema.  GI: Patient denies abdominal pain, nausea, vomiting, diarrhea, blood in stool or melena.  GENITOURINARY:  Patient denies dysuria, frequency, hematuria, nocturia, urgency or incontinence.  MUSCULOSKELETAL:  Patient denies joint swelling, redness or warmth.  Positive for joint pain.  NEUROLOGIC: Patient denies headache, vertigo, paresthesias, weakness in limb, or abnormality of gait.     OBJECTIVE:   GENERAL: Well-developed well-nourished, morbidly obese, black female alert and oriented x3, in no acute distress. Memory, judgment and cognition without deficit.  No audible wheezing.  SKIN: Clear without rash. Normal color and tone.   HEENT: Eyes: No scleral icterus. Clear conjunctivae. Pupils equal reactive to light and accommodation. Ears: Clear canals.  Clear TMs.  Nose: Without congestion.  Pharynx: Without injection or exudates.  NECK: Supple, normal range of motion. No masses, nodes or enlarged thyroid.  LUNGS: Clear to auscultation with bibasilar rhonchi. Normal respiratory effort.  O2 saturation 96%.  CARDIOVASCULAR: Regular rhythm, normal S1, S2 without murmur, gallop or rub.  EXTREMITIES:  Without cyanosis, clubbing or edema.  Normal range of motion in all extremities.  NEUROLOGIC:  Gait antalgic.  No tremor.      Rapid COVID 19 test: Negative.    ASSESSMENT:  1. Acute asthmatic bronchitis    2. Mild intermittent asthma with acute exacerbation in adult    3. Type 2 diabetes mellitus without complication, with long-term current use of insulin    4. PAF (paroxysmal atrial fibrillation)      PLAN:  1. Celestone 9 mg IM.    2. Promethazine DM.  Dispense 8 oz.  3. Refill albuterol inhaler and montelukast.    4. Increase water intake and monitor blood sugar.  Cautioned regarding possible hyperglycemia.  5. Follow-up if no improvement or worsening symptoms.    30 minutes of total time spent on the encounter, which includes face to face time and non-face to face time preparing to see the patient (eg, review of tests), Obtaining and/or reviewing separately obtained history, Documenting clinical information in the electronic or other health record, Independently interpreting results (not separately reported) and  communicating results to the patient/family/caregiver, or Care coordination (not separately reported).     This note is generated with speech recognition software and is subject to transcription error and sound alike phrases that may be missed by proofreading.

## 2024-06-25 ENCOUNTER — TELEPHONE (OUTPATIENT)
Dept: FAMILY MEDICINE | Facility: CLINIC | Age: 69
End: 2024-06-25
Payer: COMMERCIAL

## 2024-06-25 DIAGNOSIS — E11.9 TYPE 2 DIABETES MELLITUS WITHOUT COMPLICATION, WITH LONG-TERM CURRENT USE OF INSULIN: Chronic | ICD-10-CM

## 2024-06-25 DIAGNOSIS — Z79.4 TYPE 2 DIABETES MELLITUS WITHOUT COMPLICATION, WITH LONG-TERM CURRENT USE OF INSULIN: Chronic | ICD-10-CM

## 2024-06-25 DIAGNOSIS — Z00.00 PREVENTATIVE HEALTH CARE: Primary | ICD-10-CM

## 2024-06-25 NOTE — TELEPHONE ENCOUNTER
----- Message from Yarely Tellez sent at 6/25/2024 10:30 AM CDT -----  Contact: [phone]  Pt is calling requesting lab .  Please call back at [phone] . Meenakshi

## 2024-07-05 NOTE — TELEPHONE ENCOUNTER
No care due was identified.  [facility] Embedded Care Due Messages. Reference number: [id number].   7/05/2024 2:13:05 PM CDT

## 2024-07-07 RX ORDER — METFORMIN HYDROCHLORIDE 500 MG/1
TABLET, EXTENDED RELEASE ORAL
Qty: 270 TABLET | Refills: 0 | Status: SHIPPED | OUTPATIENT
Start: 2024-07-07

## 2024-07-07 NOTE — TELEPHONE ENCOUNTER
Refill Routing Note   Medication(s) are not appropriate for processing by [facility] for the following reason(s):        No active prescription written by provider    [facility] action(s):  Defer               Appointments  past 12m or future 3m with PCP    Date Provider   Last Visit   6/13/2024 Gabbie Ronquillo MD   Next Visit   7/15/2024 Gabbie Ronquillo MD   ED visits in past 90 days: 0        Note composed:7:36 PM 07/06/2024

## 2024-07-08 ENCOUNTER — OFFICE VISIT (OUTPATIENT)
Dept: FAMILY MEDICINE | Facility: CLINIC | Age: 69
End: 2024-07-08
Payer: COMMERCIAL

## 2024-07-08 VITALS
WEIGHT: 241.88 LBS | HEART RATE: 96 BPM | BODY MASS INDEX: 38.87 KG/M2 | DIASTOLIC BLOOD PRESSURE: 74 MMHG | SYSTOLIC BLOOD PRESSURE: 118 MMHG | HEIGHT: 66 IN | RESPIRATION RATE: 18 BRPM | OXYGEN SATURATION: 96 % | TEMPERATURE: 98 F

## 2024-07-08 DIAGNOSIS — R05.3 PERSISTENT COUGH FOR 3 WEEKS OR LONGER: Primary | ICD-10-CM

## 2024-07-08 DIAGNOSIS — Z79.4 TYPE 2 DIABETES MELLITUS WITHOUT COMPLICATION, WITH LONG-TERM CURRENT USE OF INSULIN: Chronic | ICD-10-CM

## 2024-07-08 DIAGNOSIS — I48.0 PAF (PAROXYSMAL ATRIAL FIBRILLATION): Chronic | ICD-10-CM

## 2024-07-08 DIAGNOSIS — R59.1 LYMPHADENOPATHY: ICD-10-CM

## 2024-07-08 DIAGNOSIS — J45.21 MILD INTERMITTENT ASTHMA WITH ACUTE EXACERBATION IN ADULT: ICD-10-CM

## 2024-07-08 DIAGNOSIS — E11.9 TYPE 2 DIABETES MELLITUS WITHOUT COMPLICATION, WITH LONG-TERM CURRENT USE OF INSULIN: Chronic | ICD-10-CM

## 2024-07-08 PROCEDURE — 3288F FALL RISK ASSESSMENT DOCD: CPT | Mod: CPTII,S$GLB,, | Performed by: FAMILY MEDICINE

## 2024-07-08 PROCEDURE — 1101F PT FALLS ASSESS-DOCD LE1/YR: CPT | Mod: CPTII,S$GLB,, | Performed by: FAMILY MEDICINE

## 2024-07-08 PROCEDURE — 3074F SYST BP LT 130 MM HG: CPT | Mod: CPTII,S$GLB,, | Performed by: FAMILY MEDICINE

## 2024-07-08 PROCEDURE — 1159F MED LIST DOCD IN RCRD: CPT | Mod: CPTII,S$GLB,, | Performed by: FAMILY MEDICINE

## 2024-07-08 PROCEDURE — 3078F DIAST BP <80 MM HG: CPT | Mod: CPTII,S$GLB,, | Performed by: FAMILY MEDICINE

## 2024-07-08 PROCEDURE — G2211 COMPLEX E/M VISIT ADD ON: HCPCS | Mod: S$GLB,,, | Performed by: FAMILY MEDICINE

## 2024-07-08 PROCEDURE — 99999 PR PBB SHADOW E&M-EST. PATIENT-LVL V: CPT | Mod: PBBFAC,,, | Performed by: FAMILY MEDICINE

## 2024-07-08 PROCEDURE — 99213 OFFICE O/P EST LOW 20 MIN: CPT | Mod: S$GLB,,, | Performed by: FAMILY MEDICINE

## 2024-07-08 PROCEDURE — 3008F BODY MASS INDEX DOCD: CPT | Mod: CPTII,S$GLB,, | Performed by: FAMILY MEDICINE

## 2024-07-08 PROCEDURE — 3044F HG A1C LEVEL LT 7.0%: CPT | Mod: CPTII,S$GLB,, | Performed by: FAMILY MEDICINE

## 2024-07-08 PROCEDURE — 1160F RVW MEDS BY RX/DR IN RCRD: CPT | Mod: CPTII,S$GLB,, | Performed by: FAMILY MEDICINE

## 2024-07-08 PROCEDURE — 1126F AMNT PAIN NOTED NONE PRSNT: CPT | Mod: CPTII,S$GLB,, | Performed by: FAMILY MEDICINE

## 2024-07-08 RX ORDER — AMOXICILLIN 875 MG/1
875 TABLET, FILM COATED ORAL 2 TIMES DAILY
Qty: 20 TABLET | Refills: 0 | Status: SHIPPED | OUTPATIENT
Start: 2024-07-08 | End: 2024-07-18

## 2024-07-08 RX ORDER — MONTELUKAST SODIUM 10 MG/1
10 TABLET ORAL NIGHTLY
Qty: 90 TABLET | Refills: 0 | Status: SHIPPED | OUTPATIENT
Start: 2024-07-08

## 2024-07-08 RX ORDER — PREDNISONE 20 MG/1
TABLET ORAL
Qty: 10 TABLET | Refills: 0 | Status: SHIPPED | OUTPATIENT
Start: 2024-07-08

## 2024-07-08 NOTE — PROGRESS NOTES
CHIEF COMPLAINT: This is a 68-year-old female complaining of prolonged respiratory illness.     SUBJECTIVE:  Patient complains of persistent cough for over 3 weeks. She is generally feeling better and coughing less but still with frequent congested sounding cough.  Mucous continues to be clear.  She has associated nasal congestion.  Patient denies shortness or breath or wheezing.  She is taking montelukast, promethazine DM and using steroid nasal spray.  She is using her inhaler less frequently.  The patient complains of tender swollen glands under jaw.  She denies sore throat, ear pain, runny nose, fever or chills.  She requests refill of montelukast she lost her prescription.  Patient is concerned about taking oxybutynin XL prescribed by urologist with her current medications.       Patient is a type 2 diabetic.  She takes Humalog 75/25 mix and metformin.  She discontinued Ozempic due to side effects.   Last A1c was 6.7% 2 months ago. Complications of diabetes include retinopathy.  Patient has a history of paroxysmal atrial fibrillation but has not taking Eliquis for 1 month due to kidney stone and gross hematuria.  Patient has bilateral knee pain related to osteoarthritis.  The patient is severely obese with a BMI of 39.03.       ROS:  GENERAL: Patient denies fever, chills, night sweats. Patient denies weight gain or loss. Patient denies anorexia, fatigue, weakness or swollen glands.  SKIN: Patient denies rash.  HEENT: Patient denies sore throat, ear pain, hearing loss, or runny nose.  Patient denies visual disturbance, eye irritation or discharge.  Positive for nasal congestion.  LUNGS:  Patient denies wheezing or hemoptysis.  Positive for cough.  CARDIOVASCULAR: Patient denies chest pain, shortness of breath, palpitations, syncope or lower extremity edema.  GI: Patient denies abdominal pain, nausea, vomiting, diarrhea, blood in stool or melena.  GENITOURINARY:  Patient denies dysuria, frequency, hematuria,  nocturia, urgency or incontinence.  MUSCULOSKELETAL: Patient denies joint swelling, redness or warmth.  Positive for joint pain.  NEUROLOGIC: Patient denies headache, vertigo, paresthesias, weakness in limb, or abnormality of gait.     OBJECTIVE:   GENERAL: Well-developed well-nourished, morbidly obese, black female alert and oriented x3, in no acute distress. Memory, judgment and cognition without deficit.  No audible wheezing.  Congested-sounding cough.  SKIN: Clear without rash. Normal color and tone.   HEENT: Eyes: No scleral icterus. Clear conjunctivae. Pupils equal reactive to light and accommodation. Ears: Clear canals.  Clear TMs.  Nose: Without congestion.  Pharynx: Without injection or exudates.  NECK: Supple, normal range of motion.  Tender swollen submental and submandibular lymph nodes.  LUNGS: Clear to auscultation with bibasilar rhonchi. Normal respiratory effort.  O2 saturation 96%.  CARDIOVASCULAR: Regular rhythm, normal S1, S2 without murmur, gallop or rub.  EXTREMITIES:  Without cyanosis, clubbing or edema.  Normal range of motion in all extremities.  NEUROLOGIC:  Gait without abnormality.  No tremor.      ASSESSMENT:  1. Persistent cough for 3 weeks or longer    2. Lymphadenopathy    3. Mild intermittent asthma with acute exacerbation in adult    4. PAF (paroxysmal atrial fibrillation)    5. Type 2 diabetes mellitus without complication, with long-term current use of insulin      PLAN:  1.  Amoxicillin 875 mg twice daily for 10 days.    2.  Short prednisone taper starting at 40 mg over 1 week.  Patient cautioned regarding elevation of blood sugar.  3.  Refill montelukast.    4.  Symptomatic treatment.    5.  Keep well hydrated.    6.  Follow-up if no improvement or worsening symptoms.    20 minutes of total time spent on the encounter, which includes face to face time and non-face to face time preparing to see the patient (eg, review of tests), Obtaining and/or reviewing separately obtained  history, Documenting clinical information in the electronic or other health record, Independently interpreting results (not separately reported) and communicating results to the patient/family/caregiver, or Care coordination (not separately reported).     This note is generated with speech recognition software and is subject to transcription error and sound alike phrases that may be missed by proofreading.

## 2024-07-10 ENCOUNTER — LAB VISIT (OUTPATIENT)
Dept: LAB | Facility: HOSPITAL | Age: 69
End: 2024-07-10
Attending: FAMILY MEDICINE
Payer: COMMERCIAL

## 2024-07-10 DIAGNOSIS — Z00.00 PREVENTATIVE HEALTH CARE: ICD-10-CM

## 2024-07-10 LAB
ALBUMIN SERPL BCP-MCNC: 3.4 G/DL (ref 3.5–5.2)
ALP SERPL-CCNC: 80 U/L (ref 55–135)
ALT SERPL W/O P-5'-P-CCNC: 29 U/L (ref 10–44)
ANION GAP SERPL CALC-SCNC: 8 MMOL/L (ref 8–16)
AST SERPL-CCNC: 16 U/L (ref 10–40)
BASOPHILS # BLD AUTO: 0.05 K/UL (ref 0–0.2)
BASOPHILS NFR BLD: 0.6 % (ref 0–1.9)
BILIRUB SERPL-MCNC: 0.4 MG/DL (ref 0.1–1)
BUN SERPL-MCNC: 17 MG/DL (ref 8–23)
CALCIUM SERPL-MCNC: 9.5 MG/DL (ref 8.7–10.5)
CHLORIDE SERPL-SCNC: 105 MMOL/L (ref 95–110)
CHOLEST SERPL-MCNC: 199 MG/DL (ref 120–199)
CHOLEST/HDLC SERPL: 4.3 {RATIO} (ref 2–5)
CO2 SERPL-SCNC: 27 MMOL/L (ref 23–29)
CREAT SERPL-MCNC: 0.7 MG/DL (ref 0.5–1.4)
DIFFERENTIAL METHOD BLD: ABNORMAL
EOSINOPHIL # BLD AUTO: 0.1 K/UL (ref 0–0.5)
EOSINOPHIL NFR BLD: 1.6 % (ref 0–8)
ERYTHROCYTE [DISTWIDTH] IN BLOOD BY AUTOMATED COUNT: 13.2 % (ref 11.5–14.5)
EST. GFR  (NO RACE VARIABLE): >60 ML/MIN/1.73 M^2
GLUCOSE SERPL-MCNC: 138 MG/DL (ref 70–110)
HCT VFR BLD AUTO: 37.9 % (ref 37–48.5)
HDLC SERPL-MCNC: 46 MG/DL (ref 40–75)
HDLC SERPL: 23.1 % (ref 20–50)
HGB BLD-MCNC: 11.8 G/DL (ref 12–16)
IMM GRANULOCYTES # BLD AUTO: 0.03 K/UL (ref 0–0.04)
IMM GRANULOCYTES NFR BLD AUTO: 0.3 % (ref 0–0.5)
LDLC SERPL CALC-MCNC: 134 MG/DL (ref 63–159)
LYMPHOCYTES # BLD AUTO: 3.1 K/UL (ref 1–4.8)
LYMPHOCYTES NFR BLD: 35.3 % (ref 18–48)
MCH RBC QN AUTO: 30.8 PG (ref 27–31)
MCHC RBC AUTO-ENTMCNC: 31.1 G/DL (ref 32–36)
MCV RBC AUTO: 99 FL (ref 82–98)
MONOCYTES # BLD AUTO: 0.7 K/UL (ref 0.3–1)
MONOCYTES NFR BLD: 8.1 % (ref 4–15)
NEUTROPHILS # BLD AUTO: 4.8 K/UL (ref 1.8–7.7)
NEUTROPHILS NFR BLD: 54.1 % (ref 38–73)
NONHDLC SERPL-MCNC: 153 MG/DL
NRBC BLD-RTO: 0 /100 WBC
PLATELET # BLD AUTO: 250 K/UL (ref 150–450)
PMV BLD AUTO: 11.1 FL (ref 9.2–12.9)
POTASSIUM SERPL-SCNC: 3.4 MMOL/L (ref 3.5–5.1)
PROT SERPL-MCNC: 6.7 G/DL (ref 6–8.4)
RBC # BLD AUTO: 3.83 M/UL (ref 4–5.4)
SODIUM SERPL-SCNC: 140 MMOL/L (ref 136–145)
TRIGL SERPL-MCNC: 95 MG/DL (ref 30–150)
TSH SERPL DL<=0.005 MIU/L-ACNC: 1 UIU/ML (ref 0.4–4)
WBC # BLD AUTO: 8.89 K/UL (ref 3.9–12.7)

## 2024-07-10 PROCEDURE — 36415 COLL VENOUS BLD VENIPUNCTURE: CPT | Mod: PO | Performed by: FAMILY MEDICINE

## 2024-07-10 PROCEDURE — 84443 ASSAY THYROID STIM HORMONE: CPT | Performed by: FAMILY MEDICINE

## 2024-07-10 PROCEDURE — 85025 COMPLETE CBC W/AUTO DIFF WBC: CPT | Performed by: FAMILY MEDICINE

## 2024-07-10 PROCEDURE — 80053 COMPREHEN METABOLIC PANEL: CPT | Performed by: FAMILY MEDICINE

## 2024-07-10 PROCEDURE — 80061 LIPID PANEL: CPT | Performed by: FAMILY MEDICINE

## 2024-07-15 ENCOUNTER — OFFICE VISIT (OUTPATIENT)
Dept: FAMILY MEDICINE | Facility: CLINIC | Age: 69
End: 2024-07-15
Payer: COMMERCIAL

## 2024-07-15 VITALS
RESPIRATION RATE: 18 BRPM | HEIGHT: 66 IN | WEIGHT: 241.88 LBS | SYSTOLIC BLOOD PRESSURE: 120 MMHG | DIASTOLIC BLOOD PRESSURE: 78 MMHG | BODY MASS INDEX: 38.87 KG/M2 | TEMPERATURE: 98 F | HEART RATE: 85 BPM | OXYGEN SATURATION: 96 %

## 2024-07-15 DIAGNOSIS — I48.0 PAF (PAROXYSMAL ATRIAL FIBRILLATION): Chronic | ICD-10-CM

## 2024-07-15 DIAGNOSIS — E11.9 TYPE 2 DIABETES MELLITUS WITHOUT COMPLICATION, WITH LONG-TERM CURRENT USE OF INSULIN: Chronic | ICD-10-CM

## 2024-07-15 DIAGNOSIS — E78.5 HYPERLIPIDEMIA, UNSPECIFIED HYPERLIPIDEMIA TYPE: Chronic | ICD-10-CM

## 2024-07-15 DIAGNOSIS — I10 ESSENTIAL HYPERTENSION: Chronic | ICD-10-CM

## 2024-07-15 DIAGNOSIS — Z12.31 ENCOUNTER FOR SCREENING MAMMOGRAM FOR MALIGNANT NEOPLASM OF BREAST: ICD-10-CM

## 2024-07-15 DIAGNOSIS — E66.01 SEVERE OBESITY (BMI 35.0-39.9) WITH COMORBIDITY: ICD-10-CM

## 2024-07-15 DIAGNOSIS — Z78.0 POSTMENOPAUSAL: ICD-10-CM

## 2024-07-15 DIAGNOSIS — Z79.4 TYPE 2 DIABETES MELLITUS WITHOUT COMPLICATION, WITH LONG-TERM CURRENT USE OF INSULIN: Chronic | ICD-10-CM

## 2024-07-15 DIAGNOSIS — Z00.00 PREVENTATIVE HEALTH CARE: Primary | ICD-10-CM

## 2024-07-15 PROCEDURE — 3008F BODY MASS INDEX DOCD: CPT | Mod: CPTII,S$GLB,, | Performed by: FAMILY MEDICINE

## 2024-07-15 PROCEDURE — 3078F DIAST BP <80 MM HG: CPT | Mod: CPTII,S$GLB,, | Performed by: FAMILY MEDICINE

## 2024-07-15 PROCEDURE — 3044F HG A1C LEVEL LT 7.0%: CPT | Mod: CPTII,S$GLB,, | Performed by: FAMILY MEDICINE

## 2024-07-15 PROCEDURE — 3074F SYST BP LT 130 MM HG: CPT | Mod: CPTII,S$GLB,, | Performed by: FAMILY MEDICINE

## 2024-07-15 PROCEDURE — 3288F FALL RISK ASSESSMENT DOCD: CPT | Mod: CPTII,S$GLB,, | Performed by: FAMILY MEDICINE

## 2024-07-15 PROCEDURE — 1101F PT FALLS ASSESS-DOCD LE1/YR: CPT | Mod: CPTII,S$GLB,, | Performed by: FAMILY MEDICINE

## 2024-07-15 PROCEDURE — 99397 PER PM REEVAL EST PAT 65+ YR: CPT | Mod: S$GLB,,, | Performed by: FAMILY MEDICINE

## 2024-07-15 PROCEDURE — 1126F AMNT PAIN NOTED NONE PRSNT: CPT | Mod: CPTII,S$GLB,, | Performed by: FAMILY MEDICINE

## 2024-07-15 PROCEDURE — 99999 PR PBB SHADOW E&M-EST. PATIENT-LVL V: CPT | Mod: PBBFAC,,, | Performed by: FAMILY MEDICINE

## 2024-07-15 PROCEDURE — 1159F MED LIST DOCD IN RCRD: CPT | Mod: CPTII,S$GLB,, | Performed by: FAMILY MEDICINE

## 2024-07-15 RX ORDER — ROSUVASTATIN CALCIUM 5 MG/1
5 TABLET, COATED ORAL DAILY
Qty: 90 TABLET | Refills: 3 | Status: SHIPPED | OUTPATIENT
Start: 2024-07-15 | End: 2025-07-15

## 2024-07-15 RX ORDER — LOSARTAN POTASSIUM AND HYDROCHLOROTHIAZIDE 12.5; 5 MG/1; MG/1
1 TABLET ORAL DAILY
Qty: 90 TABLET | Refills: 3 | Status: SHIPPED | OUTPATIENT
Start: 2024-07-15 | End: 2025-07-15

## 2024-07-15 RX ORDER — INSULIN LISPRO 100 [IU]/ML
INJECTION, SUSPENSION SUBCUTANEOUS
Qty: 60 ML | Refills: 5 | Status: SHIPPED | OUTPATIENT
Start: 2024-07-15

## 2024-07-15 RX ORDER — POTASSIUM CHLORIDE 750 MG/1
10 CAPSULE, EXTENDED RELEASE ORAL DAILY
Qty: 90 CAPSULE | Refills: 3 | Status: SHIPPED | OUTPATIENT
Start: 2024-07-15

## 2024-07-15 NOTE — PROGRESS NOTES
CHIEF COMPLAINT:  This is a 68-year-old female here for preventive health exam.       SUBJECTIVE:  The patient reports improvement in cough.  She complains of urge incontinence and was prescribed oxybutynin by urologist.  She has not started medication.  Patient is being treated for paroxysmal atrial fibrillation and ventricular ectopy with flecainide 50 mg twice daily. Eliquis is being held because of hematuria due to kidney stones.     The patient has type 2 diabetes for which she takes Humalog mix 75/25 30 units in the morning and 15 units in the evening. She would like to wean off metformin  mg 2 tablets with breakfast and 1 tablet with dinner. Her last A1c was 6.7%, a few days ago.  She denies polyuria, polydipsia or polyphagia. Her blood pressure is controlled on losartan -25 mg daily but she has been taking 1/2 tablet daily due to urinary frequency. She stopped simvastatin for hyperlipidemia due to joint aches. Patient has a history of angina pectoris diagnosed by cardiologist for which she took Ranexa and had a prescription for nitroglycerin in the past.  Patient has vitamin D deficiency and takes vitamin-D supplement daily. She has a history of uric acid kidney stones for which she takes allopurinol but admits to not taking medication daily. She takes Nexium as needed for GERD.  The patient is severely obese with a BMI of 39.03.  Patient has osteoarthritis of both knees.      Eye exam May 2024.  Mammogram July 2023. Colonoscopy November 2015, due again in November 2025.  Tdap July 2017. COVID 19 vaccine February, March 2021, April 2022.     ROS:  GENERAL: Patient denies fever, chills, night sweats. Patient denies weight gain or loss. Patient denies anorexia, fatigue, weakness or swollen glands.  SKIN: Patient denies rash or hair loss.  HEENT: Patient denies sore throat, ear pain, hearing loss, nasal congestion, or runny nose. Patient denies visual disturbance, eye irritation or  discharge.  LUNGS: Patient denies cough, wheeze or hemoptysis.  CARDIOVASCULAR: Patient denies chest pain, shortness of breath, palpitations, syncope or lower extremity edema.  GI: Patient denies abdominal pain, nausea, vomiting, diarrhea, blood in stool or melena.  GENITOURINARY: Patient denies pelvic pain, vaginal discharge, itch or odor. Patient denies irregular vaginal bleeding. Patient denies dysuria, frequency, hematuria, nocturia, urgency or incontinence.  BREASTS: Patient denies breast pain, mass or nipple discharge.  MUSCULOSKELETAL: Patient denies joint pain, swelling, redness or warmth.  NEUROLOGIC: Patient denies headache, vertigo, paresthesias, weakness in limb, dysarthria, dysphagia or abnormality of gait.  PSYCHIATRIC: Patient denies anxiety, depression, or memory loss.     OBJECTIVE:   GENERAL: Well-developed well-nourished, morbidly obese, black female alert and oriented x3, in no acute distress. Memory, judgment and cognition without deficit.   SKIN: Clear without rash. Normal color and tone.  Onychomycosis left great toenail.  HEENT: Eyes: No scleral icterus. Clear conjunctivae. Pupils equal reactive to light and accommodation. Ears: Clear canals.  Clear TMs.  Nose: Without congestion. Crusty scabs in nares. Pharynx: Without injection or exudates.  NECK: Supple, normal range of motion. No masses, nodes or enlarged thyroid. No JVD. Carotids 2+ and equal. No bruits.  LUNGS: Clear to auscultation. Normal respiratory effort.  CARDIOVASCULAR: Regular rhythm, normal S1, S2 without murmur, gallop or rub.  BACK: No CVA or spinal tenderness.  BREASTS: No masses, tenderness or nipple discharge.  ABDOMEN: Normal appearance. Active bowel sounds. Soft, nontender without mass or organomegaly. No rebound or guarding.  EXTREMITIES: Without cyanosis, clubbing or edema. Distal pulses 2+ and equal. Normal range of motion in all extremities. No joint effusion, erythema or warmth.  NEUROLOGIC: Cranial nerves II  through XII without deficit. Motor strength equal bilaterally. Sensation normal to touch. Deep tendon reflexes 2+ and equal. Gait without abnormality. No tremor. Negative cerebellar signs.  FOOT EVALUATION: 10 gram monofilament exam with protective sensation intact bilaterally. Nails appropriately trimmed. No ulcers. Distal pulses palpable.  Onychomycosis both great toenails.  PELVIC: External: Without lesions or inflammation. Vaginal: Atrophic changes, malodor, cuff intact. Bimanual: Non-tender, without masses. Rectovaginal: Confirms, heme-negative stool x2.     ASSESSMENT:  1. Preventative health care    2. Type 2 diabetes mellitus without complication, with long-term current use of insulin    3. Essential hypertension    4. Hyperlipidemia, unspecified hyperlipidemia type    5. PAF (paroxysmal atrial fibrillation)    6. Severe obesity (BMI 35.0-39.9) with comorbidity    7. Postmenopausal    8. Encounter for screening mammogram for malignant neoplasm of breast      PLAN:  1. Weight reduction. Exercise regularly.  2. Age-appropriate counseling.  3. Bone DEXA scan.  4. Mammogram.  5. Crestor 5 mg daily.  6. Losartan 50-12.5 mg daily.  Monitor blood pressure report readings greater than equal to 140/90.  7. Restart potassium chloride.  8. Follow-up in 6 months.    This note is generated with speech recognition software and is subject to transcription error and sound alike phrases that may be missed by proofreading.

## 2024-08-26 ENCOUNTER — OFFICE VISIT (OUTPATIENT)
Dept: FAMILY MEDICINE | Facility: CLINIC | Age: 69
End: 2024-08-26
Payer: COMMERCIAL

## 2024-08-26 VITALS
TEMPERATURE: 97 F | OXYGEN SATURATION: 96 % | SYSTOLIC BLOOD PRESSURE: 120 MMHG | WEIGHT: 243.63 LBS | BODY MASS INDEX: 39.15 KG/M2 | HEIGHT: 66 IN | HEART RATE: 100 BPM | DIASTOLIC BLOOD PRESSURE: 78 MMHG

## 2024-08-26 DIAGNOSIS — I10 ESSENTIAL HYPERTENSION: Chronic | ICD-10-CM

## 2024-08-26 DIAGNOSIS — Z79.4 TYPE 2 DIABETES MELLITUS WITHOUT COMPLICATION, WITH LONG-TERM CURRENT USE OF INSULIN: Chronic | ICD-10-CM

## 2024-08-26 DIAGNOSIS — M17.0 PRIMARY OSTEOARTHRITIS OF BOTH KNEES: Primary | ICD-10-CM

## 2024-08-26 DIAGNOSIS — E11.9 TYPE 2 DIABETES MELLITUS WITHOUT COMPLICATION, WITH LONG-TERM CURRENT USE OF INSULIN: Chronic | ICD-10-CM

## 2024-08-26 PROCEDURE — 20610 DRAIN/INJ JOINT/BURSA W/O US: CPT | Mod: 50,S$GLB,, | Performed by: FAMILY MEDICINE

## 2024-08-26 PROCEDURE — 3078F DIAST BP <80 MM HG: CPT | Mod: CPTII,S$GLB,, | Performed by: FAMILY MEDICINE

## 2024-08-26 PROCEDURE — 3074F SYST BP LT 130 MM HG: CPT | Mod: CPTII,S$GLB,, | Performed by: FAMILY MEDICINE

## 2024-08-26 PROCEDURE — 3008F BODY MASS INDEX DOCD: CPT | Mod: CPTII,S$GLB,, | Performed by: FAMILY MEDICINE

## 2024-08-26 PROCEDURE — 99999 PR PBB SHADOW E&M-EST. PATIENT-LVL V: CPT | Mod: PBBFAC,,, | Performed by: FAMILY MEDICINE

## 2024-08-26 PROCEDURE — 1159F MED LIST DOCD IN RCRD: CPT | Mod: CPTII,S$GLB,, | Performed by: FAMILY MEDICINE

## 2024-08-26 PROCEDURE — 1125F AMNT PAIN NOTED PAIN PRSNT: CPT | Mod: CPTII,S$GLB,, | Performed by: FAMILY MEDICINE

## 2024-08-26 PROCEDURE — 99213 OFFICE O/P EST LOW 20 MIN: CPT | Mod: 25,S$GLB,, | Performed by: FAMILY MEDICINE

## 2024-08-26 PROCEDURE — 3044F HG A1C LEVEL LT 7.0%: CPT | Mod: CPTII,S$GLB,, | Performed by: FAMILY MEDICINE

## 2024-08-26 RX ORDER — OXYBUTYNIN CHLORIDE 5 MG/1
5 TABLET, EXTENDED RELEASE ORAL
COMMUNITY

## 2024-08-26 RX ORDER — LIDOCAINE HYDROCHLORIDE 10 MG/ML
4 INJECTION, SOLUTION INFILTRATION; PERINEURAL
Status: COMPLETED | OUTPATIENT
Start: 2024-08-26 | End: 2024-08-26

## 2024-08-26 RX ORDER — TRIAMCINOLONE ACETONIDE 40 MG/ML
40 INJECTION, SUSPENSION INTRA-ARTICULAR; INTRAMUSCULAR
Status: COMPLETED | OUTPATIENT
Start: 2024-08-26 | End: 2024-08-26

## 2024-08-26 RX ORDER — LOSARTAN POTASSIUM AND HYDROCHLOROTHIAZIDE 25; 100 MG/1; MG/1
1 TABLET ORAL DAILY
Qty: 90 TABLET | Refills: 3 | Status: SHIPPED | OUTPATIENT
Start: 2024-08-26

## 2024-08-26 RX ADMIN — TRIAMCINOLONE ACETONIDE 40 MG: 40 INJECTION, SUSPENSION INTRA-ARTICULAR; INTRAMUSCULAR at 05:08

## 2024-08-26 RX ADMIN — LIDOCAINE HYDROCHLORIDE 4 ML: 10 INJECTION, SOLUTION INFILTRATION; PERINEURAL at 05:08

## 2024-08-26 NOTE — PROGRESS NOTES
CHIEF COMPLAINT: This is a 68-year-old female complaining of bilateral knee pain     SUBJECTIVE:  Patient complains of flare-up of pain in right knee.  She continues to have pain in left knee which is worse than right.  Pain improves after corticosteroid injection.  Last injection was 4 months ago.  Patient requests injections in both knees today.  She complains of no improvement with gel injections x3 given by orthopedics. She complains of a tightness in the posterior knee that radiates anteriorly.  Patient has difficulty walking because of stiffness and inability to flex the knee. She denies joint swelling, redness or warmth. Past x-rays showed mild to moderate tricompartmental degenerative changes.       Patient is a type 2 diabetic.  She has been taking Ozempic and concerned about hypoglycemia.  She takes Humalog 75/25 mix and metformin.  She reports that she is going to discontinue Ozempic.  Last A1c was 6.7%, 3 months ago.  Patient has essential hypertension for which she is currently taking losartan 50-12.5 mg daily.  Patient requests increasing dose back to 100-25 mg daily due to increased lower extremity swelling.  Her blood pressure today is 120/78.     ROS:  GENERAL: Patient denies fever, chills, night sweats.  Patient denies weight gain or loss. Patient denies anorexia, fatigue, weakness or swollen glands.  SKIN: Patient denies rash.  LUNGS: Patient denies cough, wheeze or hemoptysis.  CARDIOVASCULAR: Patient denies chest pain, shortness of breath, palpitations, syncope or lower extremity edema.  GI: Patient denies abdominal pain, nausea, vomiting, diarrhea, constipation, blood in stool or melena.  MUSCULOSKELETAL: Patient denies joint swelling, redness or warmth.  Positive for joint pain.  NEUROLOGIC: Patient denies numbness, tingling or weakness in limb.       OBJECTIVE:   GENERAL: Well-developed well-nourished, obese, black female alert and oriented x3, in no acute distress. Memory, judgment and  cognition without deficit.   SKIN: Clear without rash. Normal color and tone.  Sebaceous cyst on back.  HEENT: Eyes: No scleral icterus. Clear conjunctivae.   NECK: Supple, normal range of motion.   LUNGS:  Normal respiratory effort.  EXTREMITIES: Without cyanosis, clubbing or edema. Distal pulses 2+ and equal. Normal range of motion in hips, knees and ankles. No joint effusion, erythema or warmth.  NEUROLOGIC:  Motor strength equal bilaterally. Sensation normal to touch. Deep tendon reflexes 2+ and equal. Gait antalgic.     Reviewed treatment options including physical therapy and orthopedic consult. Discussed potential complications of procedure including bleeding, infection, and nerve damage.  Patient understands and accepts risks.  Verbal consent obtained.     Procedure: After sterile prep and drape, 1 cc of Kenalog 40 mg/cc +1 cc of 1% Xylocaine injected into both left and right knee via lateral approach without difficulty.    ASSESSMENT:  1. Primary osteoarthritis of both knees    2. Type 2 diabetes mellitus without complication, with long-term current use of insulin    3. Essential hypertension      PLAN:  1.  Apply ice q.i.d. for 15-20 minutes over the next 2-3 days.  2.  Discontinue losartan HCT 50-12.5 mg.  Start losartan 100-25 mg 1 tablet daily.  Monitor blood pressure and report readings greater than equal to 140/90.    3.  Follow-up if no improvement or worsening symptoms.    20 minutes of total time spent on the encounter, which includes face to face time and non-face to face time preparing to see the patient (eg, review of tests), Obtaining and/or reviewing separately obtained history, Documenting clinical information in the electronic or other health record, Independently interpreting results (not separately reported) and communicating results to the patient/family/caregiver, or Care coordination (not separately reported).     This note is generated with speech recognition software and is subject to  transcription error and sound alike phrases that may be missed by proofreading.

## 2024-08-30 ENCOUNTER — OFFICE VISIT (OUTPATIENT)
Dept: FAMILY MEDICINE | Facility: CLINIC | Age: 69
End: 2024-08-30
Payer: COMMERCIAL

## 2024-08-30 VITALS
HEART RATE: 78 BPM | WEIGHT: 242.94 LBS | HEIGHT: 66 IN | TEMPERATURE: 97 F | BODY MASS INDEX: 39.04 KG/M2 | SYSTOLIC BLOOD PRESSURE: 116 MMHG | OXYGEN SATURATION: 96 % | DIASTOLIC BLOOD PRESSURE: 78 MMHG

## 2024-08-30 DIAGNOSIS — E11.9 TYPE 2 DIABETES MELLITUS WITHOUT COMPLICATION, WITH LONG-TERM CURRENT USE OF INSULIN: Chronic | ICD-10-CM

## 2024-08-30 DIAGNOSIS — J02.9 SORE THROAT: ICD-10-CM

## 2024-08-30 DIAGNOSIS — I10 ESSENTIAL HYPERTENSION: Chronic | ICD-10-CM

## 2024-08-30 DIAGNOSIS — N20.0 KIDNEY STONES, CALCIUM OXALATE: ICD-10-CM

## 2024-08-30 DIAGNOSIS — Z79.4 TYPE 2 DIABETES MELLITUS WITHOUT COMPLICATION, WITH LONG-TERM CURRENT USE OF INSULIN: Chronic | ICD-10-CM

## 2024-08-30 DIAGNOSIS — R35.0 URINARY FREQUENCY: Primary | ICD-10-CM

## 2024-08-30 LAB
BILIRUB SERPL-MCNC: NEGATIVE MG/DL
BLOOD URINE, POC: NORMAL
CLARITY, POC UA: CLEAR
COLOR, POC UA: NORMAL
GLUCOSE UR QL STRIP: NEGATIVE
KETONES UR QL STRIP: NEGATIVE
LEUKOCYTE ESTERASE URINE, POC: NORMAL
NITRITE, POC UA: POSITIVE
PH, POC UA: 6.5
PROTEIN, POC: NORMAL
SPECIFIC GRAVITY, POC UA: 1.01
UROBILINOGEN, POC UA: 0.2

## 2024-08-30 PROCEDURE — 99999 PR PBB SHADOW E&M-EST. PATIENT-LVL V: CPT | Mod: PBBFAC,,, | Performed by: FAMILY MEDICINE

## 2024-08-30 PROCEDURE — 87086 URINE CULTURE/COLONY COUNT: CPT | Performed by: FAMILY MEDICINE

## 2024-08-30 RX ORDER — CIPROFLOXACIN 250 MG/1
250 TABLET, FILM COATED ORAL 2 TIMES DAILY
Qty: 14 TABLET | Refills: 0 | Status: SHIPPED | OUTPATIENT
Start: 2024-08-30

## 2024-08-30 NOTE — PROGRESS NOTES
CHIEF COMPLAINT:  This is a 68-year-old female complaining of bladder symptoms.    SUBJECTIVE:  Patient complains of a 3 day history of urinary frequency and urgency with bladder discomfort at the end of urination.  She complains of urinary odor and lower abdominal discomfort.  She denies fever, chills, dysuria, hematuria or incontinence.  Patient also reports awakening with sore throat and runny nose this morning.  Patient has a history of kidney stones.    Patient is a type 2 diabetic.  She takes Humalog 75/25 mix and metformin.  She discontinued Ozempic due to side effects.   Last A1c was 6.7% for months ago. Complications of diabetes include retinopathy.  Patient has a history of paroxysmal atrial fibrillation but has not taking Eliquis for 1 month due to kidney stone and gross hematuria.  Patient has bilateral knee pain related to osteoarthritis.  The patient is severely obese with a BMI of 39.03.       ROS:  GENERAL: Patient denies fever, chills, night sweats. Patient denies weight gain or loss. Patient denies anorexia, fatigue, weakness or swollen glands.  SKIN: Patient denies rash.  HEENT: Patient denies sore throat, ear pain, hearing loss, or runny nose.  Patient denies visual disturbance, eye irritation or discharge.  Positive for nasal congestion.  LUNGS:  Patient denies wheezing or hemoptysis.  Positive for cough.  CARDIOVASCULAR: Patient denies chest pain, shortness of breath, palpitations, syncope or lower extremity edema.  GI: Patient denies abdominal pain, nausea, vomiting, diarrhea, blood in stool or melena.  GENITOURINARY:  Patient denies dysuria, hematuria or incontinence.  Positive for frequency and urgency.    MUSCULOSKELETAL: Patient denies joint swelling, redness or warmth.  Positive for joint pain.  NEUROLOGIC: Patient denies headache, vertigo, paresthesias, weakness in limb, or abnormality of gait.     OBJECTIVE:   GENERAL: Well-developed well-nourished, morbidly obese, black female alert  and oriented x3, in no acute distress. Memory, judgment and cognition without deficit.    SKIN: Clear without rash. Normal color and tone.   HEENT: Eyes: No scleral icterus. Clear conjunctivae.   NECK: Supple, normal range of motion.    LUNGS:  Normal respiratory effort.  O2 saturation 96%.    BACK:  No spinal or CVA tenderness.   ABDOMEN:  Soft, nontender.  EXTREMITIES:  Without cyanosis, clubbing or edema.  Normal range of motion in all extremities.  NEUROLOGIC:  Gait without abnormality.  No tremor.      UA dip:  1+ leukocytes.  Positive nitrites.  Trace protein.  Trace blood.    ASSESSMENT:  1. Urinary frequency    2. Sore throat    3. Type 2 diabetes mellitus without complication, with long-term current use of insulin    4. Kidney stones, calcium oxalate    5. Essential hypertension      PLAN:  1. Keep well hydrated.    2. Cipro 250 mg twice daily for 1 week.    3. Urine culture and sensitivity due to patient's multiple antibiotic allergies.  4. Follow-up if no improvement or worsening symptoms.    20 minutes of total time spent on the encounter, which includes face to face time and non-face to face time preparing to see the patient (eg, review of tests), Obtaining and/or reviewing separately obtained history, Documenting clinical information in the electronic or other health record, Independently interpreting results (not separately reported) and communicating results to the patient/family/caregiver, or Care coordination (not separately reported).     This note is generated with speech recognition software and is subject to transcription error and sound alike phrases that may be missed by proofreading.

## 2024-08-31 LAB — BACTERIA UR CULT: ABNORMAL

## 2024-09-19 ENCOUNTER — OFFICE VISIT (OUTPATIENT)
Dept: OBSTETRICS AND GYNECOLOGY | Facility: CLINIC | Age: 69
End: 2024-09-19
Payer: COMMERCIAL

## 2024-09-19 VITALS
SYSTOLIC BLOOD PRESSURE: 102 MMHG | HEIGHT: 66 IN | DIASTOLIC BLOOD PRESSURE: 59 MMHG | WEIGHT: 240.94 LBS | BODY MASS INDEX: 38.72 KG/M2

## 2024-09-19 DIAGNOSIS — N90.89 VULVAR IRRITATION: ICD-10-CM

## 2024-09-19 DIAGNOSIS — L30.9 VULVAR DERMATITIS: Primary | ICD-10-CM

## 2024-09-19 PROCEDURE — 99999 PR PBB SHADOW E&M-EST. PATIENT-LVL IV: CPT | Mod: PBBFAC,,, | Performed by: STUDENT IN AN ORGANIZED HEALTH CARE EDUCATION/TRAINING PROGRAM

## 2024-09-19 RX ORDER — CLOBETASOL PROPIONATE 0.5 MG/G
OINTMENT TOPICAL
Qty: 45 G | Refills: 0 | Status: SHIPPED | OUTPATIENT
Start: 2024-09-19

## 2024-09-19 NOTE — PROGRESS NOTES
"  Subjective     Patient ID: Latosha Enriquez is a 68 y.o. female     Chief Complaint:  Vulvar Rash       History of Present Illness  HPI  Concerns about vaginal irritation started 2-3 weeks ago. +itching.  Has used Monistat cream, Mupirocin, Vaseline, and other topical ointments over the last few weeks. No bleeding from vulva. Follows with urology was recently treated for UTI, she is also using oxybutynin. Staying wet on vulvar area and started using pads. She is concerned that the pads have caused irritation.     She also is wondering if there is something wrong with her labia. She noticed small non-tender balls on the skin for the last 2 years. Knew they were there but did not have concerns about them.     GYN & OB History  OB History   OB History    Para Term  AB Living   2 2 1         SAB IAB Ectopic Multiple Live Births                  # Outcome Date GA Lbr Tunde/2nd Weight Sex Type Anes PTL Lv   2 Term            1 Para      CS-Unspec          No LMP recorded. Patient has had a hysterectomy.   Last Pap Date: No result found  No results found for: "PAPSDIAG", "HPVHIGHRISK", "KPJ52QRZ", "ECX22BOJ"  No results found for: "FPATHDX", "CYTOSPEC", "FLOWINTER"   Results for orders placed during the hospital encounter of 23    Mammo Digital Screening Bilat w/ Terry    Narrative  Result:  Mammo Digital Screening Bilat w/ Terry    History:  Patient is 67 y.o. and is seen for a screening mammogram.      Films Compared:  Compared to: 2022 Mammo Digital Screening Bilat w/ Terry, 2020 Mammo Digital Screening Bilat w/ Terry, 2019 Mammo Digital Screening Bilat w/ Terry, 12/10/2018 Mammo Digital Diagnostic Bilat w/ Terry, and 2017 Mammo Digital Screening Bilat with CAD    Findings:  This procedure was performed using tomosynthesis.  Computer-aided detection was utilized in the interpretation of this examination.    The breasts have scattered areas of fibroglandular density. There is no " evidence of suspicious masses, microcalcifications or architectural distortion.    Impression  No mammographic evidence of malignancy.    BI-RADS Category 1: Negative    Recommendation:  Routine screening mammogram in 1 year is recommended.    Your estimated lifetime risk of breast cancer (to age 85) based on Tyrer-Cuzick risk assessment model is 5.52 %.  According to the American [employer] Society, patients with a lifetime breast cancer risk of 20% or higher might benefit from supplemental screening tests. ??     Age of Menarche:13  Age at first live birth:23  Age at Menopause:    Comments:        Review of Systems  Review of Systems   Constitutional:  Negative for chills and fever.   Respiratory:  Negative for cough and shortness of breath.    Cardiovascular:  Negative for chest pain.   Gastrointestinal:  Negative for diarrhea and vomiting.   Genitourinary:  Positive for vaginal pain and urinary incontinence. Negative for dysmenorrhea, dyspareunia, dysuria, menorrhagia, menstrual problem, pelvic pain, vaginal bleeding, vaginal discharge, postcoital bleeding, vaginal dryness and vaginal odor.   Integumentary:  Negative for breast mass, nipple discharge and breast skin changes.   All other systems reviewed and are negative.  Breast: Negative for lump, mass, nipple discharge and skin changes        Objective   Physical Exam:   Constitutional: She is oriented to person, place, and time. She appears well-developed and well-nourished. No distress.    HENT:   Head: Normocephalic and atraumatic.    Eyes: Conjunctivae and EOM are normal. Right eye exhibits no discharge. Left eye exhibits no discharge.     Cardiovascular:  Normal rate.             Pulmonary/Chest: Effort normal. No accessory muscle usage. No tachypnea. No respiratory distress.        Abdominal: Soft. She exhibits no distension. There is no abdominal tenderness.     Genitourinary:    Vagina normal.      Pelvic exam was performed with patient supine.   The  external female genitalia was abnormal.     Labial bartholins normal.There is no rash, tenderness, lesion or injury on the right labia. There is no rash, tenderness, lesion or injury on the left labia. Right adnexum displays no mass, no tenderness and no fullness. Left adnexum displays no mass, no tenderness and no fullness. No erythema, vaginal discharge, tenderness or bleeding in the vagina.    No foreign body in the vagina.      No signs of injury in the vagina.   Cervix is absent.Uterus is absent.    Genitourinary Comments: Erythema along bilateral labia, sebaceous cysts,              Musculoskeletal: Normal range of motion and moves all extremeties. No tenderness or edema.       Neurological: She is alert and oriented to person, place, and time.    Skin: Skin is warm and dry.    Psychiatric: She has a normal mood and affect. Her behavior is normal. Thought content normal.            Assessment and Plan   Latosha Enriquez is an 68 y.o. year old female here for Vulvar Rash  .  Vulvar dermatitis  -     clobetasol 0.05% (TEMOVATE) 0.05 % Oint; Use thin layer of ointment on affected EXTERNAL vaginal (vulvar) area twice a day as needed for irritation. If condition does not resolve after 2weeks of use.  Dispense: 45 g; Refill: 0    Vulvar irritation         Shanna Newman MD  Obstetrics and Gynecology  [facility] [place], [place]

## 2024-09-20 ENCOUNTER — HOSPITAL ENCOUNTER (OUTPATIENT)
Dept: RADIOLOGY | Facility: HOSPITAL | Age: 69
Discharge: HOME | End: 2024-09-20
Attending: FAMILY MEDICINE
Payer: COMMERCIAL

## 2024-09-20 VITALS — BODY MASS INDEX: 38.72 KG/M2 | HEIGHT: 66 IN | WEIGHT: 240.94 LBS

## 2024-09-20 DIAGNOSIS — Z12.31 ENCOUNTER FOR SCREENING MAMMOGRAM FOR MALIGNANT NEOPLASM OF BREAST: ICD-10-CM

## 2024-09-20 PROCEDURE — 77063 BREAST TOMOSYNTHESIS BI: CPT | Mod: TC

## 2024-09-20 PROCEDURE — 77067 SCR MAMMO BI INCL CAD: CPT | Mod: TC

## 2024-11-05 ENCOUNTER — HOSPITAL ENCOUNTER (OUTPATIENT)
Dept: RADIOLOGY | Facility: HOSPITAL | Age: 69
Discharge: HOME | End: 2024-11-05
Attending: PODIATRIST
Payer: COMMERCIAL

## 2024-11-05 ENCOUNTER — OFFICE VISIT (OUTPATIENT)
Dept: PODIATRY | Facility: CLINIC | Age: 69
End: 2024-11-05
Payer: COMMERCIAL

## 2024-11-05 VITALS — BODY MASS INDEX: 38.72 KG/M2 | HEIGHT: 66 IN | WEIGHT: 240.94 LBS

## 2024-11-05 DIAGNOSIS — Z79.4 TYPE 2 DIABETES MELLITUS WITHOUT COMPLICATION, WITH LONG-TERM CURRENT USE OF INSULIN: Chronic | ICD-10-CM

## 2024-11-05 DIAGNOSIS — M72.2 PLANTAR FASCIITIS: ICD-10-CM

## 2024-11-05 DIAGNOSIS — M21.41 ACQUIRED PES PLANOVALGUS OF RIGHT FOOT: ICD-10-CM

## 2024-11-05 DIAGNOSIS — M72.2 PLANTAR FASCIITIS: Primary | ICD-10-CM

## 2024-11-05 DIAGNOSIS — E11.9 TYPE 2 DIABETES MELLITUS WITHOUT COMPLICATION, WITH LONG-TERM CURRENT USE OF INSULIN: Chronic | ICD-10-CM

## 2024-11-05 DIAGNOSIS — M62.462 GASTROCNEMIUS EQUINUS OF LEFT LOWER EXTREMITY: ICD-10-CM

## 2024-11-05 DIAGNOSIS — M62.461 GASTROCNEMIUS EQUINUS OF RIGHT LOWER EXTREMITY: ICD-10-CM

## 2024-11-05 PROCEDURE — 1159F MED LIST DOCD IN RCRD: CPT | Mod: CPTII,S$GLB,, | Performed by: PODIATRIST

## 2024-11-05 PROCEDURE — 99214 OFFICE O/P EST MOD 30 MIN: CPT | Mod: S$GLB,,, | Performed by: PODIATRIST

## 2024-11-05 PROCEDURE — 3288F FALL RISK ASSESSMENT DOCD: CPT | Mod: CPTII,S$GLB,, | Performed by: PODIATRIST

## 2024-11-05 PROCEDURE — 3008F BODY MASS INDEX DOCD: CPT | Mod: CPTII,S$GLB,, | Performed by: PODIATRIST

## 2024-11-05 PROCEDURE — 73630 X-RAY EXAM OF FOOT: CPT | Mod: 26,RT,, | Performed by: RADIOLOGY

## 2024-11-05 PROCEDURE — 3044F HG A1C LEVEL LT 7.0%: CPT | Mod: CPTII,S$GLB,, | Performed by: PODIATRIST

## 2024-11-05 PROCEDURE — 1125F AMNT PAIN NOTED PAIN PRSNT: CPT | Mod: CPTII,S$GLB,, | Performed by: PODIATRIST

## 2024-11-05 PROCEDURE — 99999 PR PBB SHADOW E&M-EST. PATIENT-LVL V: CPT | Mod: PBBFAC,,, | Performed by: PODIATRIST

## 2024-11-05 PROCEDURE — 1101F PT FALLS ASSESS-DOCD LE1/YR: CPT | Mod: CPTII,S$GLB,, | Performed by: PODIATRIST

## 2024-11-05 PROCEDURE — 73630 X-RAY EXAM OF FOOT: CPT | Mod: TC,FY,PO,RT

## 2024-11-05 RX ORDER — DICLOFENAC SODIUM 75 MG/1
75 TABLET, DELAYED RELEASE ORAL 2 TIMES DAILY
Qty: 20 TABLET | Refills: 0 | Status: SHIPPED | OUTPATIENT
Start: 2024-11-05 | End: 2024-11-15

## 2024-11-05 NOTE — PROGRESS NOTES
"    Podiatry Department    Patient ID: Latosha Enriquez is a 68 y.o. female.    Chief Complaint: Foot Pain (C/o right foot is in pain , pt states this been going on for about a 2 weeks ago, pt states the pain is in the heel of the foot , pt states its hard to walk on, pt rates pain 8/10 and fungus of left great toe , pt is diabetic, pt last seen pcp Gabbie Ronquillo MD 8/30/2024)      History of Present Illness    CHIEF COMPLAINT:  - Latosha presents today for initial evaluation of heel pain and difficulty walking.    HPI:  Latosha presents with foot pain that began suddenly while walking outside to participate in a "trunk or treat" event. Her foot gave out completely, resulting in severe pain in the heel area. The pain lasted for about 4-5 days initially, during which time she continued to walk on it despite the discomfort. She describes the pain as being localized to the bottom of the foot, mentioning having pre-existing knee and back issues, which were exacerbated by the foot pain.    Although the pain has improved, she still experiences discomfort when walking. She has been taking Aleve and Tylenol for pain relief. She also mentions having a long-standing fatty tissue in her foot since childhood. The current pain is described as less intense than before.    She typically uses more supportive tennis shoes for work but has been wearing less supportive ones for the past two days due to misplacing her usual pair. She reports increased pain with the less supportive shoes. She is flat-footed and expresses concern about the impact of her foot condition on work activities.    She denies severe pain upon waking up in the morning. She denies any kidney disease.    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  TTP Medial tubercle of left calcaneus. Dec AJ DF with knee extended and flexed.          Diagnoses:  Encounter Diagnoses   Name Primary?    Plantar fasciitis Yes    Acquired pes planovalgus of right foot     Gastrocnemius equinus of right lower extremity     Type 2 diabetes mellitus without complication, with long-term current use of insulin          Assessment & Plan    M21.40 Flat foot [pes planus] (acquired), unspecified foot  M72.2 Plantar fascial fibromatosis  M79.671 Pain in right foot  Z96.651 Presence of right artificial knee joint  E11.9 Type 2 diabetes mellitus without complications    LIFESTYLE:  - Recommend wearing supportive tennis shoes, specifically [employer] brand.  - Advise getting orthotics or inserts for shoes due to flat feet and plantar fasciitis symptoms.  - Suggest visiting the [facility] for proper shoe fitting and orthotics.    MEDICATIONS PRESCRIBED:  - Prescribe diclofenac for 10 days as a strong anti-inflammatory medication.    IMAGING ORDERS:  - Order x-ray of the foot to rule out stress fracture and evaluate heel pain.    PROCEDURES:  - Discussed possibility of future injection for plantar fasciitis, pending x-ray results and patient's response to other treatments.    FOLLOW UP:  - Follow up in 3 weeks for possible injection and to assess response to treatment.          Future Appointments   Date Time Provider Department Center   11/27/2024 11:45 AM Samantha Menjivar DPM [facility] POD [facility]         This note was generated with the assistance of ambient listening technology. Verbal consent was obtained by the patient  and accompanying visitor(s) for the recording of patient appointment to facilitate this note. I attest to having reviewed and edited the generated note for accuracy, though some syntax or spelling errors may persist. Please contact the author of this note for any clarification.      Report Electronically Signed By:     Samantha Menjivar DPM   Podiatry [facility]  11/5/2024      Report Electronically Signed By:     Samantha Menjivar DPM   Podiatry [facility]  11/5/2024

## 2025-02-04 RX ORDER — METFORMIN HYDROCHLORIDE 500 MG/1
TABLET, EXTENDED RELEASE ORAL
Qty: 270 TABLET | Refills: 0 | Status: SHIPPED | OUTPATIENT
Start: 2025-02-04

## 2025-02-04 NOTE — TELEPHONE ENCOUNTER
Care Due:                  Date            Visit Type   Department     Provider  --------------------------------------------------------------------------------                                EP -                              PRIMARY      [facility]  Last Visit: 08-      CARE ([facility])   MEDICINE       Gabbie EASLEY                              FOLLOWUP/OF  [facility]  Next Visit: 02-      [facility]ICE VISIT   MEDICINE       Gabbie Ronquillo                                                            Last  Test          Frequency    Reason                     Performed    Due Date  --------------------------------------------------------------------------------    HBA1C.......  6 months...  HUMALOG, metFORMIN,        05-   10-                             semaglutide..............    [employer] Embedded Care Due Messages. Reference number: [id number].   2/04/2025 9:13:12 AM CST

## 2025-02-04 NOTE — TELEPHONE ENCOUNTER
Refill Routing Note   Medication(s) are not appropriate for processing by [facility] for the following reason(s):        Required labs outdated    [facility] action(s):  Defer   Requires labs : Yes               Appointments  past 12m or future 3m with PCP    Date Provider   Last Visit   8/30/2024 Gabbie Ronquillo MD   Next Visit   2/13/2025 Gabbie Ronquillo MD   ED visits in past 90 days: 0        Note composed:12:19 PM 02/04/2025

## 2025-02-13 ENCOUNTER — OFFICE VISIT (OUTPATIENT)
Dept: FAMILY MEDICINE | Facility: CLINIC | Age: 70
End: 2025-02-13
Payer: COMMERCIAL

## 2025-02-13 VITALS
SYSTOLIC BLOOD PRESSURE: 106 MMHG | HEART RATE: 72 BPM | TEMPERATURE: 97 F | WEIGHT: 245.25 LBS | OXYGEN SATURATION: 96 % | DIASTOLIC BLOOD PRESSURE: 60 MMHG | HEIGHT: 66 IN | BODY MASS INDEX: 39.41 KG/M2

## 2025-02-13 DIAGNOSIS — M17.0 PRIMARY OSTEOARTHRITIS OF BOTH KNEES: Primary | ICD-10-CM

## 2025-02-13 DIAGNOSIS — E66.01 SEVERE OBESITY (BMI 35.0-39.9) WITH COMORBIDITY: ICD-10-CM

## 2025-02-13 DIAGNOSIS — E11.3213 TYPE 2 DIABETES MELLITUS WITH BOTH EYES AFFECTED BY MILD NONPROLIFERATIVE RETINOPATHY AND MACULAR EDEMA, WITH LONG-TERM CURRENT USE OF INSULIN: ICD-10-CM

## 2025-02-13 DIAGNOSIS — N20.0 KIDNEY STONES, CALCIUM OXALATE: ICD-10-CM

## 2025-02-13 DIAGNOSIS — Z79.4 TYPE 2 DIABETES MELLITUS WITH BOTH EYES AFFECTED BY MILD NONPROLIFERATIVE RETINOPATHY AND MACULAR EDEMA, WITH LONG-TERM CURRENT USE OF INSULIN: ICD-10-CM

## 2025-02-13 PROCEDURE — 99999 PR PBB SHADOW E&M-EST. PATIENT-LVL V: CPT | Mod: PBBFAC,,, | Performed by: FAMILY MEDICINE

## 2025-02-13 RX ORDER — PEN NEEDLE, DIABETIC 31 GX5/16"
NEEDLE, DISPOSABLE MISCELLANEOUS
Qty: 180 EACH | Refills: 1 | Status: SHIPPED | OUTPATIENT
Start: 2025-02-13

## 2025-02-13 RX ORDER — TRIAMCINOLONE ACETONIDE 40 MG/ML
40 INJECTION, SUSPENSION INTRA-ARTICULAR; INTRAMUSCULAR
Status: COMPLETED | OUTPATIENT
Start: 2025-02-13 | End: 2025-02-13

## 2025-02-13 RX ORDER — INSULIN LISPRO 100 [IU]/ML
INJECTION, SUSPENSION SUBCUTANEOUS
Qty: 60 ML | Refills: 5 | Status: SHIPPED | OUTPATIENT
Start: 2025-02-13

## 2025-02-13 RX ORDER — LIDOCAINE HYDROCHLORIDE 10 MG/ML
4 INJECTION, SOLUTION INFILTRATION; PERINEURAL
Status: COMPLETED | OUTPATIENT
Start: 2025-02-13 | End: 2025-02-13

## 2025-02-13 RX ADMIN — TRIAMCINOLONE ACETONIDE 40 MG: 40 INJECTION, SUSPENSION INTRA-ARTICULAR; INTRAMUSCULAR at 05:02

## 2025-02-13 RX ADMIN — LIDOCAINE HYDROCHLORIDE 4 ML: 10 INJECTION, SOLUTION INFILTRATION; PERINEURAL at 05:02

## 2025-02-13 NOTE — PROGRESS NOTES
CHIEF COMPLAINT: This is a 69-year-old female complaining of bilateral knee pain     SUBJECTIVE:  Patient complains of flare up of pain in both knees. Pain improves after corticosteroid injections and she requests injections in both knees today.  She complains of no improvement with gel injections x3 given by orthopedics. She complains of a tightness in the posterior knee that radiates anteriorly.  Patient has difficulty walking because of stiffness and inability to flex the knee. She denies joint swelling, redness or warmth. Past x-rays showed mild to moderate tricompartmental degenerative changes.      Patient complains of flare up of allergies associated with watery, itchy eyes which is responding to Pataday eye drops. Patient complains of right mid back pain and she is concerned that she has a kidney stone.      Patient is a type 2 diabetic.  She has taken Ozempic and concerned about hypoglycemia.  She takes Humalog 75/25 mix and metformin.  She reports that she is going to discontinue Ozempic.  Last A1c was 6.7%, 3 months ago.  Patient has essential hypertension for which she is currently taking losartan 50-12.5 mg daily.  Patient requests increasing dose back to 100-25 mg daily due to increased lower extremity swelling.  Her blood pressure today is 106/60.     ROS:  GENERAL: Patient denies fever, chills, night sweats.  Patient denies weight gain or loss. Patient denies anorexia, fatigue, weakness or swollen glands.  SKIN: Patient denies rash.  LUNGS: Patient denies cough, wheeze or hemoptysis.  CARDIOVASCULAR: Patient denies chest pain, shortness of breath, palpitations, syncope or lower extremity edema.  GI: Patient denies abdominal pain, nausea, vomiting, diarrhea, constipation, blood in stool or melena.  MUSCULOSKELETAL: Patient denies joint swelling, redness or warmth.  Positive for joint pain.  NEUROLOGIC: Patient denies numbness, tingling or weakness in limb.       OBJECTIVE:   GENERAL: Well-developed  well-nourished, obese, black female alert and oriented x3, in no acute distress. Memory, judgment and cognition without deficit.   SKIN: Clear without rash. Normal color and tone.  Sebaceous cyst on back.  HEENT: Eyes: No scleral icterus. Clear conjunctivae.   NECK: Supple, normal range of motion.   LUNGS:  Normal respiratory effort.  EXTREMITIES: Without cyanosis, clubbing or edema. Distal pulses 2+ and equal. Normal range of motion in hips, knees and ankles. No joint effusion, erythema or warmth.  NEUROLOGIC:  Motor strength equal bilaterally. Sensation normal to touch. Deep tendon reflexes 2+ and equal. Gait antalgic.     Reviewed treatment options including physical therapy and orthopedic consult. Discussed potential complications of procedure including bleeding, infection, and nerve damage.  Patient understands and accepts risks.  Verbal consent obtained.     Procedure: After sterile prep and drape, 1 cc of Kenalog 40 mg/cc +1 cc of 1% Xylocaine injected into both left and right knee via lateral approach without difficulty.     ASSESSMENT:  1. Primary osteoarthritis of both knees    2. Type 2 diabetes mellitus with both eyes affected by mild nonproliferative retinopathy and macular edema, with long-term current use of insulin    3. Kidney stones, calcium oxalate    4. Severe obesity (BMI 35.0-39.9) with comorbidity      PLAN:  1.  Apply ice q.i.d. for 15-20 minutes over the next 2-3 days.  2.  Discontinue losartan HCT 50-12.5 mg.  Start losartan 100-25 mg 1 tablet daily.  Monitor blood pressure and report readings greater than equal to 140/90.    3.  Follow-up if no improvement or worsening symptoms.    25 minutes of total time spent on the encounter, which includes face to face time and non-face to face time preparing to see the patient (eg, review of tests), Obtaining and/or reviewing separately obtained history, Documenting clinical information in the electronic or other health record, Independently  interpreting results (not separately reported) and communicating results to the patient/family/caregiver, or Care coordination (not separately reported).     This note is generated with speech recognition software and is subject to transcription error and sound alike phrases that may be missed by proofreading.

## 2025-02-14 ENCOUNTER — LAB VISIT (OUTPATIENT)
Dept: LAB | Facility: HOSPITAL | Age: 70
End: 2025-02-14
Attending: FAMILY MEDICINE
Payer: COMMERCIAL

## 2025-02-14 DIAGNOSIS — Z79.4 TYPE 2 DIABETES MELLITUS WITH BOTH EYES AFFECTED BY MILD NONPROLIFERATIVE RETINOPATHY AND MACULAR EDEMA, WITH LONG-TERM CURRENT USE OF INSULIN: ICD-10-CM

## 2025-02-14 DIAGNOSIS — E11.3213 TYPE 2 DIABETES MELLITUS WITH BOTH EYES AFFECTED BY MILD NONPROLIFERATIVE RETINOPATHY AND MACULAR EDEMA, WITH LONG-TERM CURRENT USE OF INSULIN: ICD-10-CM

## 2025-02-14 DIAGNOSIS — M17.0 PRIMARY OSTEOARTHRITIS OF BOTH KNEES: ICD-10-CM

## 2025-02-14 LAB
ALBUMIN SERPL BCP-MCNC: 3.4 G/DL (ref 3.5–5.2)
ALP SERPL-CCNC: 78 U/L (ref 40–150)
ALT SERPL W/O P-5'-P-CCNC: 24 U/L (ref 10–44)
ANION GAP SERPL CALC-SCNC: 8 MMOL/L (ref 8–16)
AST SERPL-CCNC: 30 U/L (ref 10–40)
BILIRUB SERPL-MCNC: 0.4 MG/DL (ref 0.1–1)
BUN SERPL-MCNC: 16 MG/DL (ref 8–23)
CALCIUM SERPL-MCNC: 9.3 MG/DL (ref 8.7–10.5)
CHLORIDE SERPL-SCNC: 106 MMOL/L (ref 95–110)
CO2 SERPL-SCNC: 26 MMOL/L (ref 23–29)
CREAT SERPL-MCNC: 0.7 MG/DL (ref 0.5–1.4)
EST. GFR  (NO RACE VARIABLE): >60 ML/MIN/1.73 M^2
GLUCOSE SERPL-MCNC: 139 MG/DL (ref 70–110)
POTASSIUM SERPL-SCNC: 4.2 MMOL/L (ref 3.5–5.1)
PROT SERPL-MCNC: 6.6 G/DL (ref 6–8.4)
SODIUM SERPL-SCNC: 140 MMOL/L (ref 136–145)

## 2025-02-14 PROCEDURE — 83036 HEMOGLOBIN GLYCOSYLATED A1C: CPT | Performed by: FAMILY MEDICINE

## 2025-02-14 PROCEDURE — 80053 COMPREHEN METABOLIC PANEL: CPT | Performed by: FAMILY MEDICINE

## 2025-02-15 LAB
ESTIMATED AVG GLUCOSE: 166 MG/DL (ref 68–131)
HBA1C MFR BLD: 7.4 % (ref 4–5.6)

## 2025-02-16 ENCOUNTER — RESULTS FOLLOW-UP (OUTPATIENT)
Dept: FAMILY MEDICINE | Facility: CLINIC | Age: 70
End: 2025-02-16

## 2025-03-24 ENCOUNTER — OFFICE VISIT (OUTPATIENT)
Dept: FAMILY MEDICINE | Facility: CLINIC | Age: 70
End: 2025-03-24
Payer: COMMERCIAL

## 2025-03-24 VITALS
HEIGHT: 66 IN | BODY MASS INDEX: 38.35 KG/M2 | DIASTOLIC BLOOD PRESSURE: 74 MMHG | SYSTOLIC BLOOD PRESSURE: 138 MMHG | WEIGHT: 238.63 LBS | OXYGEN SATURATION: 96 % | TEMPERATURE: 96 F | HEART RATE: 99 BPM

## 2025-03-24 DIAGNOSIS — I48.0 PAF (PAROXYSMAL ATRIAL FIBRILLATION): Chronic | ICD-10-CM

## 2025-03-24 DIAGNOSIS — Z79.4 TYPE 2 DIABETES MELLITUS WITHOUT COMPLICATION, WITH LONG-TERM CURRENT USE OF INSULIN: Chronic | ICD-10-CM

## 2025-03-24 DIAGNOSIS — I10 ESSENTIAL HYPERTENSION: Chronic | ICD-10-CM

## 2025-03-24 DIAGNOSIS — J01.90 ACUTE RHINOSINUSITIS: Primary | ICD-10-CM

## 2025-03-24 DIAGNOSIS — E11.9 TYPE 2 DIABETES MELLITUS WITHOUT COMPLICATION, WITH LONG-TERM CURRENT USE OF INSULIN: Chronic | ICD-10-CM

## 2025-03-24 PROCEDURE — 1126F AMNT PAIN NOTED NONE PRSNT: CPT | Mod: CPTII,S$GLB,, | Performed by: FAMILY MEDICINE

## 2025-03-24 PROCEDURE — 3008F BODY MASS INDEX DOCD: CPT | Mod: CPTII,S$GLB,, | Performed by: FAMILY MEDICINE

## 2025-03-24 PROCEDURE — 3078F DIAST BP <80 MM HG: CPT | Mod: CPTII,S$GLB,, | Performed by: FAMILY MEDICINE

## 2025-03-24 PROCEDURE — 96372 THER/PROPH/DIAG INJ SC/IM: CPT | Mod: S$GLB,,, | Performed by: FAMILY MEDICINE

## 2025-03-24 PROCEDURE — 3051F HG A1C>EQUAL 7.0%<8.0%: CPT | Mod: CPTII,S$GLB,, | Performed by: FAMILY MEDICINE

## 2025-03-24 PROCEDURE — 3066F NEPHROPATHY DOC TX: CPT | Mod: CPTII,S$GLB,, | Performed by: FAMILY MEDICINE

## 2025-03-24 PROCEDURE — 99213 OFFICE O/P EST LOW 20 MIN: CPT | Mod: 25,S$GLB,, | Performed by: FAMILY MEDICINE

## 2025-03-24 PROCEDURE — 3061F NEG MICROALBUMINURIA REV: CPT | Mod: CPTII,S$GLB,, | Performed by: FAMILY MEDICINE

## 2025-03-24 PROCEDURE — 3075F SYST BP GE 130 - 139MM HG: CPT | Mod: CPTII,S$GLB,, | Performed by: FAMILY MEDICINE

## 2025-03-24 PROCEDURE — 99999 PR PBB SHADOW E&M-EST. PATIENT-LVL V: CPT | Mod: PBBFAC,,, | Performed by: FAMILY MEDICINE

## 2025-03-24 PROCEDURE — 1159F MED LIST DOCD IN RCRD: CPT | Mod: CPTII,S$GLB,, | Performed by: FAMILY MEDICINE

## 2025-03-24 RX ORDER — BETAMETHASONE SODIUM PHOSPHATE AND BETAMETHASONE ACETATE 3; 3 MG/ML; MG/ML
9 INJECTION, SUSPENSION INTRA-ARTICULAR; INTRALESIONAL; INTRAMUSCULAR; SOFT TISSUE
Status: COMPLETED | OUTPATIENT
Start: 2025-03-24 | End: 2025-03-24

## 2025-03-24 RX ADMIN — BETAMETHASONE SODIUM PHOSPHATE AND BETAMETHASONE ACETATE 9 MG: 3; 3 INJECTION, SUSPENSION INTRA-ARTICULAR; INTRALESIONAL; INTRAMUSCULAR; SOFT TISSUE at 03:03

## 2025-03-24 NOTE — PROGRESS NOTES
CHIEF COMPLAINT:  This is a 69-year-old female complaining of respiratory illness.     SUBJECTIVE:  The patient complains of onset respiratory symptoms 2-1/2 weeks ago.  Patient has lingering nasal congestion, postnasal drip and cough.  Mucus from nose and coughing is thick and white in color.  She initially had low-grade fever, chest congestion wheezing which have resolved.  She complains of associated frontal and eye pain when blowing nose.  Patient reports onset of symptoms immediately after she had teeth removed and temporary partial plate placed.  At that time, she was given amoxicillin and Medrol Dosepak.  She completed antibiotics and Dosepak 10 days ago.  Patient is using nasal steroid spray and taking montelukast and Claritin.  She denies fever, chills, sneezing, itching, shortness and breath or wheezing.    Patient is a type 2 diabetic.  She has taken Ozempic and is concerned about hypoglycemia.  She takes Humalog 75/25 mix and metformin.  She reports that she is going to discontinue Ozempic.  Last A1c was 7.4%, 1 month ago.  Patient has essential hypertension for which she is currently taking losartan 100-25 mg daily.  Her blood pressure today is 138/74.      ROS:  GENERAL: Patient denies fever, chills, night sweats. Patient denies weight gain or loss. Patient denies anorexia, fatigue, weakness or swollen glands.  SKIN: Patient denies rash.  HEENT: Patient denies sore throat, ear pain, hearing loss, or runny nose.  Patient denies visual disturbance, eye irritation or discharge.  Positive for nasal congestion and postnasal drip.  LUNGS:  Patient denies wheeze or hemoptysis.  Positive for cough.  CARDIOVASCULAR: Patient denies chest pain, shortness for breath, palpitations, syncope or lower extremity edema.  GI: Patient denies abdominal pain, nausea, vomiting, diarrhea, blood in stool or melena.  GENITOURINARY:  Patient denies dysuria, frequency, hematuria, nocturia, urgency or  incontinence.  MUSCULOSKELETAL: Patient denies joint swelling, redness or warmth.  Positive for joint pain.  NEUROLOGIC: Patient denies headache, vertigo, paresthesias, weakness in limb, or abnormality of gait.     OBJECTIVE:   GENERAL: Well-developed well-nourished, morbidly obese, black female alert and oriented x3, in no acute distress. Memory, judgment and cognition without deficit.  No audible wheezing.  SKIN: Clear without rash. Normal color and tone.   HEENT: Eyes: No scleral icterus. Clear conjunctivae. Pupils equal reactive to light and accommodation. Ears: Clear canals.  Clear TMs.  Nose:  Moderate bilateral congestion.  Pharynx: Without injection or exudates.  NECK: Supple, normal range of motion. No masses, nodes or enlarged thyroid.  LUNGS: Clear to auscultation. Normal respiratory effort.  O2 saturation 96%.  CARDIOVASCULAR: Regular rhythm, normal S1, S2 without murmur, gallop or rub.  NEUROLOGIC:  Gait without abnormality.  No tremor.      ASSESSMENT:  1. Acute rhinosinusitis    2. Type 2 diabetes mellitus without complication, with long-term current use of insulin    3. Essential hypertension    4. PAF (paroxysmal atrial fibrillation)      PLAN:  1. Celestone 9 mg IM.    2. Symptomatic treatment.    3. Keep well hydrated.    4. Follow-up if no improvement or worsening symptoms.    20 minutes of total time spent on the encounter, which includes face to face time and non-face to face time preparing to see the patient (eg, review of tests), Obtaining and/or reviewing separately obtained history, Documenting clinical information in the electronic or other health record, Independently interpreting results (not separately reported) and communicating results to the patient/family/caregiver, or Care coordination (not separately reported).     This note is generated with speech recognition software and is subject to transcription error and sound alike phrases that may be missed by proofreading.

## 2025-05-28 ENCOUNTER — PATIENT OUTREACH (OUTPATIENT)
Dept: ADMINISTRATIVE | Facility: HOSPITAL | Age: 70
End: 2025-05-28
Payer: COMMERCIAL

## 2025-05-28 NOTE — PROGRESS NOTES
Working [facility] (external) 2025    ZAIDA to [facility].  Dr. Elder Grubbs O.D.  Reminder set x 2 weeks.

## 2025-05-28 NOTE — LETTER
AUTHORIZATION FOR RELEASE OF   CONFIDENTIAL INFORMATION    Dear Dr. Grubbs,    We are seeing Latosha Enriquez, date of birth 1955, in the clinic at [facility]. Gabbie Ronquillo MD is the patient's PCP. Latosha Enriquez has an outstanding lab/procedure at the time we reviewed her chart. In order to help keep her health information updated, she has authorized us to request the following medical record(s):        (  )  MAMMOGRAM                                      (  )  COLONOSCOPY      (  )  PAP SMEAR                                          (  )  OUTSIDE LAB RESULTS     (  )  DEXA SCAN                                          (X)  DIABETIC EYE EXAM            (  )  FOOT EXAM                                          (  )  ENTIRE RECORD     (  )  OUTSIDE IMMUNIZATIONS                 (  )  _______________         Please fax records to [facility] Care Coordination @ [phone].       If you have any questions, please contact Karol Arredondo [facility] Care Coordinator  [phone]            Patient Name: Latosha Enriquez  : 1955  Patient Phone #: [phone]

## 2025-06-03 DIAGNOSIS — J45.21 MILD INTERMITTENT ASTHMA WITH ACUTE EXACERBATION IN ADULT: ICD-10-CM

## 2025-06-03 RX ORDER — MONTELUKAST SODIUM 10 MG/1
10 TABLET ORAL NIGHTLY
Qty: 90 TABLET | Refills: 3 | Status: SHIPPED | OUTPATIENT
Start: 2025-06-03

## 2025-06-05 ENCOUNTER — LAB VISIT (OUTPATIENT)
Dept: LAB | Facility: HOSPITAL | Age: 70
End: 2025-06-05
Attending: FAMILY MEDICINE
Payer: COMMERCIAL

## 2025-06-05 ENCOUNTER — OFFICE VISIT (OUTPATIENT)
Dept: FAMILY MEDICINE | Facility: CLINIC | Age: 70
End: 2025-06-05
Payer: COMMERCIAL

## 2025-06-05 VITALS
TEMPERATURE: 98 F | DIASTOLIC BLOOD PRESSURE: 72 MMHG | BODY MASS INDEX: 38.49 KG/M2 | SYSTOLIC BLOOD PRESSURE: 136 MMHG | OXYGEN SATURATION: 96 % | HEIGHT: 66 IN | WEIGHT: 239.5 LBS | HEART RATE: 87 BPM

## 2025-06-05 DIAGNOSIS — E11.9 TYPE 2 DIABETES MELLITUS WITHOUT COMPLICATION, WITH LONG-TERM CURRENT USE OF INSULIN: Chronic | ICD-10-CM

## 2025-06-05 DIAGNOSIS — Z79.4 TYPE 2 DIABETES MELLITUS WITHOUT COMPLICATION, WITH LONG-TERM CURRENT USE OF INSULIN: Chronic | ICD-10-CM

## 2025-06-05 DIAGNOSIS — Z12.11 COLON CANCER SCREENING: ICD-10-CM

## 2025-06-05 DIAGNOSIS — N20.0 KIDNEY STONES, CALCIUM OXALATE: ICD-10-CM

## 2025-06-05 DIAGNOSIS — Z78.0 POSTMENOPAUSAL: ICD-10-CM

## 2025-06-05 DIAGNOSIS — I48.0 PAF (PAROXYSMAL ATRIAL FIBRILLATION): Chronic | ICD-10-CM

## 2025-06-05 DIAGNOSIS — Z00.00 PREVENTATIVE HEALTH CARE: ICD-10-CM

## 2025-06-05 DIAGNOSIS — Z00.00 PREVENTATIVE HEALTH CARE: Primary | ICD-10-CM

## 2025-06-05 DIAGNOSIS — E55.9 VITAMIN D DEFICIENCY DISEASE: ICD-10-CM

## 2025-06-05 DIAGNOSIS — E78.5 HYPERLIPIDEMIA, UNSPECIFIED HYPERLIPIDEMIA TYPE: Chronic | ICD-10-CM

## 2025-06-05 DIAGNOSIS — I10 ESSENTIAL HYPERTENSION: Chronic | ICD-10-CM

## 2025-06-05 LAB
ABSOLUTE EOSINOPHIL (OHS): 0.14 K/UL
ABSOLUTE MONOCYTE (OHS): 0.49 K/UL (ref 0.3–1)
ABSOLUTE NEUTROPHIL COUNT (OHS): 4.34 K/UL (ref 1.8–7.7)
ALBUMIN SERPL BCP-MCNC: 3.5 G/DL (ref 3.5–5.2)
ALP SERPL-CCNC: 80 UNIT/L (ref 40–150)
ALT SERPL W/O P-5'-P-CCNC: 40 UNIT/L (ref 10–44)
ANION GAP (OHS): 9 MMOL/L (ref 8–16)
AST SERPL-CCNC: 20 UNIT/L (ref 11–45)
BASOPHILS # BLD AUTO: 0.05 K/UL
BASOPHILS NFR BLD AUTO: 0.7 %
BILIRUB SERPL-MCNC: 0.5 MG/DL (ref 0.1–1)
BUN SERPL-MCNC: 23 MG/DL (ref 8–23)
CALCIUM SERPL-MCNC: 9.5 MG/DL (ref 8.7–10.5)
CHLORIDE SERPL-SCNC: 105 MMOL/L (ref 95–110)
CHOLEST SERPL-MCNC: 145 MG/DL (ref 120–199)
CHOLEST/HDLC SERPL: 2.7 {RATIO} (ref 2–5)
CO2 SERPL-SCNC: 26 MMOL/L (ref 23–29)
CREAT SERPL-MCNC: 0.8 MG/DL (ref 0.5–1.4)
EAG (OHS): 154 MG/DL (ref 68–131)
ERYTHROCYTE [DISTWIDTH] IN BLOOD BY AUTOMATED COUNT: 12.9 % (ref 11.5–14.5)
GFR SERPLBLD CREATININE-BSD FMLA CKD-EPI: >60 ML/MIN/1.73/M2
GLUCOSE SERPL-MCNC: 180 MG/DL (ref 70–110)
HBA1C MFR BLD: 7 % (ref 4–5.6)
HCT VFR BLD AUTO: 38.8 % (ref 37–48.5)
HDLC SERPL-MCNC: 54 MG/DL (ref 40–75)
HDLC SERPL: 37.2 % (ref 20–50)
HGB BLD-MCNC: 11.7 GM/DL (ref 12–16)
IMM GRANULOCYTES # BLD AUTO: 0.02 K/UL (ref 0–0.04)
IMM GRANULOCYTES NFR BLD AUTO: 0.3 % (ref 0–0.5)
LDLC SERPL CALC-MCNC: 76.2 MG/DL (ref 63–159)
LYMPHOCYTES # BLD AUTO: 2.3 K/UL (ref 1–4.8)
MCH RBC QN AUTO: 30.6 PG (ref 27–31)
MCHC RBC AUTO-ENTMCNC: 30.2 G/DL (ref 32–36)
MCV RBC AUTO: 102 FL (ref 82–98)
NONHDLC SERPL-MCNC: 91 MG/DL
NUCLEATED RBC (/100WBC) (OHS): 0 /100 WBC
PLATELET # BLD AUTO: 240 K/UL (ref 150–450)
PMV BLD AUTO: 11.5 FL (ref 9.2–12.9)
POTASSIUM SERPL-SCNC: 3.6 MMOL/L (ref 3.5–5.1)
PROT SERPL-MCNC: 6.7 GM/DL (ref 6–8.4)
RBC # BLD AUTO: 3.82 M/UL (ref 4–5.4)
RELATIVE EOSINOPHIL (OHS): 1.9 %
RELATIVE LYMPHOCYTE (OHS): 31.3 % (ref 18–48)
RELATIVE MONOCYTE (OHS): 6.7 % (ref 4–15)
RELATIVE NEUTROPHIL (OHS): 59.1 % (ref 38–73)
SODIUM SERPL-SCNC: 140 MMOL/L (ref 136–145)
TRIGL SERPL-MCNC: 74 MG/DL (ref 30–150)
TSH SERPL-ACNC: 0.72 UIU/ML (ref 0.4–4)
WBC # BLD AUTO: 7.34 K/UL (ref 3.9–12.7)

## 2025-06-05 PROCEDURE — 80061 LIPID PANEL: CPT

## 2025-06-05 PROCEDURE — 83036 HEMOGLOBIN GLYCOSYLATED A1C: CPT

## 2025-06-05 PROCEDURE — 3061F NEG MICROALBUMINURIA REV: CPT | Mod: CPTII,S$GLB,, | Performed by: FAMILY MEDICINE

## 2025-06-05 PROCEDURE — 1159F MED LIST DOCD IN RCRD: CPT | Mod: CPTII,S$GLB,, | Performed by: FAMILY MEDICINE

## 2025-06-05 PROCEDURE — 3008F BODY MASS INDEX DOCD: CPT | Mod: CPTII,S$GLB,, | Performed by: FAMILY MEDICINE

## 2025-06-05 PROCEDURE — 3066F NEPHROPATHY DOC TX: CPT | Mod: CPTII,S$GLB,, | Performed by: FAMILY MEDICINE

## 2025-06-05 PROCEDURE — 99397 PER PM REEVAL EST PAT 65+ YR: CPT | Mod: S$GLB,,, | Performed by: FAMILY MEDICINE

## 2025-06-05 PROCEDURE — 85025 COMPLETE CBC W/AUTO DIFF WBC: CPT

## 2025-06-05 PROCEDURE — 3075F SYST BP GE 130 - 139MM HG: CPT | Mod: CPTII,S$GLB,, | Performed by: FAMILY MEDICINE

## 2025-06-05 PROCEDURE — 3051F HG A1C>EQUAL 7.0%<8.0%: CPT | Mod: CPTII,S$GLB,, | Performed by: FAMILY MEDICINE

## 2025-06-05 PROCEDURE — 36415 COLL VENOUS BLD VENIPUNCTURE: CPT | Mod: PO

## 2025-06-05 PROCEDURE — 3078F DIAST BP <80 MM HG: CPT | Mod: CPTII,S$GLB,, | Performed by: FAMILY MEDICINE

## 2025-06-05 PROCEDURE — 84443 ASSAY THYROID STIM HORMONE: CPT

## 2025-06-05 PROCEDURE — 80053 COMPREHEN METABOLIC PANEL: CPT

## 2025-06-05 PROCEDURE — 99999 PR PBB SHADOW E&M-EST. PATIENT-LVL V: CPT | Mod: PBBFAC,,, | Performed by: FAMILY MEDICINE

## 2025-06-05 RX ORDER — PRAVASTATIN SODIUM 40 MG/1
40 TABLET ORAL DAILY
Qty: 90 TABLET | Refills: 3 | Status: SHIPPED | OUTPATIENT
Start: 2025-06-05

## 2025-06-06 ENCOUNTER — PATIENT OUTREACH (OUTPATIENT)
Dept: ADMINISTRATIVE | Facility: HOSPITAL | Age: 70
End: 2025-06-06
Payer: COMMERCIAL

## 2025-06-06 ENCOUNTER — RESULTS FOLLOW-UP (OUTPATIENT)
Dept: FAMILY MEDICINE | Facility: CLINIC | Age: 70
End: 2025-06-06

## 2025-06-12 RX ORDER — METFORMIN HYDROCHLORIDE 500 MG/1
TABLET, EXTENDED RELEASE ORAL
Qty: 270 TABLET | Refills: 1 | Status: SHIPPED | OUTPATIENT
Start: 2025-06-12

## 2025-06-12 NOTE — TELEPHONE ENCOUNTER
No care due was identified.  [facility] Embedded Care Due Messages. Reference number: [id number].   6/12/2025 11:20:49 AM CDT

## 2025-06-12 NOTE — TELEPHONE ENCOUNTER
Refill Routing Note   Medication(s) are not appropriate for processing by [facility] for the following reason(s):        Allergy or intolerance    [facility] action(s):  Defer        Medication Therapy Plan: Allergy/Contraindication: metFORMIN      Appointments  past 12m or future 3m with PCP    Date Provider   Last Visit   6/5/2025 Gabbie Ronquillo MD   Next Visit   Visit date not found Gabbie Ronquillo MD   ED visits in past 90 days: 0        Note composed:12:20 PM 06/12/2025

## 2025-06-25 DIAGNOSIS — J45.21 MILD INTERMITTENT ASTHMA WITH ACUTE EXACERBATION IN ADULT: ICD-10-CM

## 2025-06-25 NOTE — TELEPHONE ENCOUNTER
No care due was identified.  [facility] Embedded Care Due Messages. Reference number: [id number].   6/25/2025 9:01:31 AM CDT

## 2025-06-26 RX ORDER — ALBUTEROL SULFATE 90 UG/1
2 INHALANT RESPIRATORY (INHALATION) EVERY 6 HOURS PRN
Qty: 18 G | Refills: 11 | Status: SHIPPED | OUTPATIENT
Start: 2025-06-26

## 2025-06-26 RX ORDER — ALLOPURINOL 100 MG/1
100 TABLET ORAL DAILY
Qty: 90 TABLET | Refills: 3 | Status: SHIPPED | OUTPATIENT
Start: 2025-06-26

## 2025-06-26 RX ORDER — METFORMIN HYDROCHLORIDE 500 MG/1
TABLET, EXTENDED RELEASE ORAL
Qty: 270 TABLET | Refills: 1 | Status: SHIPPED | OUTPATIENT
Start: 2025-06-26

## 2025-06-26 RX ORDER — PEN NEEDLE, DIABETIC 31 GX5/16"
NEEDLE, DISPOSABLE MISCELLANEOUS
Qty: 180 EACH | Refills: 3 | Status: SHIPPED | OUTPATIENT
Start: 2025-06-26

## 2025-06-30 ENCOUNTER — OFFICE VISIT (OUTPATIENT)
Dept: FAMILY MEDICINE | Facility: CLINIC | Age: 70
End: 2025-06-30
Payer: COMMERCIAL

## 2025-06-30 VITALS
HEART RATE: 74 BPM | HEIGHT: 66 IN | WEIGHT: 240.31 LBS | BODY MASS INDEX: 38.62 KG/M2 | DIASTOLIC BLOOD PRESSURE: 70 MMHG | OXYGEN SATURATION: 97 % | SYSTOLIC BLOOD PRESSURE: 128 MMHG | TEMPERATURE: 98 F

## 2025-06-30 DIAGNOSIS — I10 ESSENTIAL HYPERTENSION: Chronic | ICD-10-CM

## 2025-06-30 DIAGNOSIS — E11.9 TYPE 2 DIABETES MELLITUS WITHOUT COMPLICATION, WITH LONG-TERM CURRENT USE OF INSULIN: Chronic | ICD-10-CM

## 2025-06-30 DIAGNOSIS — M17.0 PRIMARY OSTEOARTHRITIS OF BOTH KNEES: Primary | ICD-10-CM

## 2025-06-30 DIAGNOSIS — E78.5 HYPERLIPIDEMIA, UNSPECIFIED HYPERLIPIDEMIA TYPE: Chronic | ICD-10-CM

## 2025-06-30 DIAGNOSIS — I48.0 PAF (PAROXYSMAL ATRIAL FIBRILLATION): Chronic | ICD-10-CM

## 2025-06-30 DIAGNOSIS — Z79.4 TYPE 2 DIABETES MELLITUS WITHOUT COMPLICATION, WITH LONG-TERM CURRENT USE OF INSULIN: Chronic | ICD-10-CM

## 2025-06-30 PROCEDURE — 3008F BODY MASS INDEX DOCD: CPT | Mod: CPTII,S$GLB,, | Performed by: FAMILY MEDICINE

## 2025-06-30 PROCEDURE — 1126F AMNT PAIN NOTED NONE PRSNT: CPT | Mod: CPTII,S$GLB,, | Performed by: FAMILY MEDICINE

## 2025-06-30 PROCEDURE — 3066F NEPHROPATHY DOC TX: CPT | Mod: CPTII,S$GLB,, | Performed by: FAMILY MEDICINE

## 2025-06-30 PROCEDURE — 3288F FALL RISK ASSESSMENT DOCD: CPT | Mod: CPTII,S$GLB,, | Performed by: FAMILY MEDICINE

## 2025-06-30 PROCEDURE — 1160F RVW MEDS BY RX/DR IN RCRD: CPT | Mod: CPTII,S$GLB,, | Performed by: FAMILY MEDICINE

## 2025-06-30 PROCEDURE — 1159F MED LIST DOCD IN RCRD: CPT | Mod: CPTII,S$GLB,, | Performed by: FAMILY MEDICINE

## 2025-06-30 PROCEDURE — 3051F HG A1C>EQUAL 7.0%<8.0%: CPT | Mod: CPTII,S$GLB,, | Performed by: FAMILY MEDICINE

## 2025-06-30 PROCEDURE — 3074F SYST BP LT 130 MM HG: CPT | Mod: CPTII,S$GLB,, | Performed by: FAMILY MEDICINE

## 2025-06-30 PROCEDURE — 1101F PT FALLS ASSESS-DOCD LE1/YR: CPT | Mod: CPTII,S$GLB,, | Performed by: FAMILY MEDICINE

## 2025-06-30 PROCEDURE — 3061F NEG MICROALBUMINURIA REV: CPT | Mod: CPTII,S$GLB,, | Performed by: FAMILY MEDICINE

## 2025-06-30 PROCEDURE — 20610 DRAIN/INJ JOINT/BURSA W/O US: CPT | Mod: 50,S$GLB,, | Performed by: FAMILY MEDICINE

## 2025-06-30 PROCEDURE — 3078F DIAST BP <80 MM HG: CPT | Mod: CPTII,S$GLB,, | Performed by: FAMILY MEDICINE

## 2025-06-30 PROCEDURE — 99999 PR PBB SHADOW E&M-EST. PATIENT-LVL V: CPT | Mod: PBBFAC,,, | Performed by: FAMILY MEDICINE

## 2025-06-30 PROCEDURE — 99213 OFFICE O/P EST LOW 20 MIN: CPT | Mod: 25,S$GLB,, | Performed by: FAMILY MEDICINE

## 2025-06-30 RX ORDER — LIDOCAINE HYDROCHLORIDE 10 MG/ML
4 INJECTION, SOLUTION INFILTRATION; PERINEURAL
Status: COMPLETED | OUTPATIENT
Start: 2025-06-30 | End: 2025-06-30

## 2025-06-30 RX ORDER — TRIAMCINOLONE ACETONIDE 40 MG/ML
40 INJECTION, SUSPENSION INTRA-ARTICULAR; INTRAMUSCULAR
Status: COMPLETED | OUTPATIENT
Start: 2025-06-30 | End: 2025-06-30

## 2025-06-30 RX ADMIN — TRIAMCINOLONE ACETONIDE 40 MG: 40 INJECTION, SUSPENSION INTRA-ARTICULAR; INTRAMUSCULAR at 05:06

## 2025-06-30 RX ADMIN — LIDOCAINE HYDROCHLORIDE 4 ML: 10 INJECTION, SOLUTION INFILTRATION; PERINEURAL at 05:06

## 2025-06-30 NOTE — PROGRESS NOTES
CHIEF COMPLAINT: This is a 69-year-old female complaining of bilateral knee pain     SUBJECTIVE:  Patient complains of flare up of pain in both knees after jumping off of low curb.  She experienced pain in both knees the next day.  Pain improves after corticosteroid injections and she requests injections in both knees today.  Last injection was given February 13, 2025 She complains of no improvement with gel injections x3 given by orthopedics. She complains of a tightness in the posterior knee that radiates anteriorly.  Patient has difficulty walking because of stiffness and inability to flex the knee. She denies joint swelling, redness or warmth. Past x-rays showed mild to moderate tricompartmental degenerative changes.      Patient is a type 2 diabetic.  She has taken Ozempic and concerned about hypoglycemia.  She takes Humalog 75/25 mix and metformin.  She reports that she is going to discontinue Ozempic.  Last A1c was 7%, 3 weeks ago.  Patient has essential hypertension for which she is currently taking losartan -25 mg daily.  Her blood pressure today is 128/70.      ROS:  GENERAL: Patient denies fever, chills, night sweats.  Patient denies weight gain or loss. Patient denies anorexia, fatigue, weakness or swollen glands.  SKIN: Patient denies rash.  LUNGS: Patient denies cough, wheeze or hemoptysis.  CARDIOVASCULAR: Patient denies chest pain, shortness of breath, palpitations, syncope or lower extremity edema.  GI: Patient denies abdominal pain, nausea, vomiting, diarrhea, constipation, blood in stool or melena.  MUSCULOSKELETAL: Patient denies joint swelling, redness or warmth.  Positive for joint pain.  NEUROLOGIC: Patient denies numbness, tingling or weakness in limb.       OBJECTIVE:   GENERAL: Well-developed well-nourished, obese, black female alert and oriented x3, in no acute distress. Memory, judgment and cognition without deficit.   SKIN: Clear without rash. Normal color and tone.  Sebaceous  cyst on back.  HEENT: Eyes: No scleral icterus. Clear conjunctivae.   NECK: Supple, normal range of motion.   LUNGS:  Normal respiratory effort.  EXTREMITIES: Without cyanosis, clubbing or edema. Distal pulses 2+ and equal. Normal range of motion in hips, knees and ankles. No joint effusion, erythema or warmth.  NEUROLOGIC:  Motor strength equal bilaterally. Sensation normal to touch. Deep tendon reflexes 2+ and equal. Gait antalgic.     Reviewed treatment options including physical therapy and orthopedic consult. Discussed potential complications of procedure including bleeding, infection, and nerve damage.  Patient understands and accepts risks.  Verbal consent obtained.     Procedure: After sterile prep and drape, 1 cc of Kenalog 40 mg/cc +1 cc of 1% Xylocaine injected into both left and right knee via lateral approach without difficulty.    ASSESSMENT:  1. Primary osteoarthritis of both knees    2. Essential hypertension    3. Hyperlipidemia, unspecified hyperlipidemia type    4. PAF (paroxysmal atrial fibrillation)    5. Type 2 diabetes mellitus without complication, with long-term current use of insulin      PLAN:  1.  Apply ice q.i.d. for 15-20 minutes over the next 2-3 days.  2.  Avoid weight-bearing for 48-72 hours.    3.  Follow-up if no improvement or worsening symptoms.    20 minutes of total time spent on the encounter, which includes face to face time and non-face to face time preparing to see the patient (eg, review of tests), Obtaining and/or reviewing separately obtained history, Documenting clinical information in the electronic or other health record, Independently interpreting results (not separately reported) and communicating results to the patient/family/caregiver, or Care coordination (not separately reported).     This note is generated with speech recognition software and is subject to transcription error and sound alike phrases that may be missed by proofreading.

## 2025-07-07 ENCOUNTER — APPOINTMENT (OUTPATIENT)
Dept: RADIOLOGY | Facility: HOSPITAL | Age: 70
End: 2025-07-07
Attending: FAMILY MEDICINE
Payer: COMMERCIAL

## 2025-07-07 DIAGNOSIS — Z78.0 POSTMENOPAUSAL: ICD-10-CM

## 2025-07-07 PROCEDURE — 77080 DXA BONE DENSITY AXIAL: CPT | Mod: 26,,, | Performed by: RADIOLOGY

## 2025-07-07 PROCEDURE — 77080 DXA BONE DENSITY AXIAL: CPT | Mod: TC

## 2025-08-10 RX ORDER — ROSUVASTATIN CALCIUM 5 MG/1
5 TABLET, COATED ORAL
Qty: 90 TABLET | Refills: 3 | OUTPATIENT
Start: 2025-08-10

## 2025-08-19 ENCOUNTER — PATIENT MESSAGE (OUTPATIENT)
Dept: ADMINISTRATIVE | Facility: HOSPITAL | Age: 70
End: 2025-08-19
Payer: COMMERCIAL

## (undated) DEVICE — FIBER LSR HOLM FLT TP 550 MH

## (undated) DEVICE — SOL IRR NACL .9% 3000ML

## (undated) DEVICE — ADAPTER TUOHY BORST 6FR

## (undated) DEVICE — CONTAINER SPECIMEN STRL 4OZ

## (undated) DEVICE — SET IRR URLGY 2LINE UNIV SPIKE

## (undated) DEVICE — GOWN SMARTGOWN LVL4 X-LONG XL

## (undated) DEVICE — SYR ONLY LUER LOCK 20CC

## (undated) DEVICE — UROVIEW 2600/2800

## (undated) DEVICE — SOL WATER STRL IRR 1000ML

## (undated) DEVICE — CATH 5FR OPEN END URETERAL

## (undated) DEVICE — SKIN MARKER DEVON 160

## (undated) DEVICE — CLOSURE SKIN STERI STRIP 1/2X4

## (undated) DEVICE — CANISTER SUCTION MEDI-VAC 12L

## (undated) DEVICE — SEE MEDLINE ITEM 152487

## (undated) DEVICE — SEE MEDLINE ITEM 157185

## (undated) DEVICE — GLOVE SURGICAL LATEX SZ 8

## (undated) DEVICE — WIRE GD LUB ANG 3CM .038 150CM

## (undated) DEVICE — SEE MEDLINE ITEM 154981

## (undated) DEVICE — GAUZE SPONGE 4X4 12PLY

## (undated) DEVICE — ADHESIVE MASTISOL VIAL 48/BX

## (undated) DEVICE — SET EXTENSION ULTRASITE

## (undated) DEVICE — BASKET STNE RTRVL HLC 3F 4WR O

## (undated) DEVICE — WIRE GUIDE 0.038OLD

## (undated) DEVICE — SEE MEDLINE ITEM 152622

## (undated) DEVICE — ELECTRODE REM PLYHSV RETURN 9